# Patient Record
Sex: MALE | Race: WHITE | NOT HISPANIC OR LATINO | Employment: FULL TIME | URBAN - METROPOLITAN AREA
[De-identification: names, ages, dates, MRNs, and addresses within clinical notes are randomized per-mention and may not be internally consistent; named-entity substitution may affect disease eponyms.]

---

## 2017-02-17 ENCOUNTER — TRANSFERRED RECORDS (OUTPATIENT)
Dept: HEALTH INFORMATION MANAGEMENT | Facility: CLINIC | Age: 57
End: 2017-02-17

## 2017-02-17 LAB
ALT SERPL-CCNC: 54 IU/L (ref 5–40)
AST SERPL-CCNC: 25 U/L (ref 5–34)
CREAT SERPL-MCNC: 1.39 MG/DL (ref 0.5–1.3)
GFR SERPL CREATININE-BSD FRML MDRD: 56 ML/MIN/1.73M2

## 2017-05-02 ENCOUNTER — TRANSFERRED RECORDS (OUTPATIENT)
Dept: HEALTH INFORMATION MANAGEMENT | Facility: CLINIC | Age: 57
End: 2017-05-02

## 2017-06-07 ENCOUNTER — TRANSFERRED RECORDS (OUTPATIENT)
Dept: HEALTH INFORMATION MANAGEMENT | Facility: CLINIC | Age: 57
End: 2017-06-07

## 2017-11-15 ENCOUNTER — TRANSFERRED RECORDS (OUTPATIENT)
Dept: HEALTH INFORMATION MANAGEMENT | Facility: CLINIC | Age: 57
End: 2017-11-15

## 2017-11-20 ENCOUNTER — OFFICE VISIT (OUTPATIENT)
Dept: FAMILY MEDICINE | Facility: CLINIC | Age: 57
End: 2017-11-20
Payer: COMMERCIAL

## 2017-11-20 VITALS
RESPIRATION RATE: 16 BRPM | BODY MASS INDEX: 33.92 KG/M2 | SYSTOLIC BLOOD PRESSURE: 126 MMHG | WEIGHT: 229 LBS | OXYGEN SATURATION: 95 % | DIASTOLIC BLOOD PRESSURE: 96 MMHG | HEIGHT: 69 IN | HEART RATE: 95 BPM | TEMPERATURE: 97.8 F

## 2017-11-20 DIAGNOSIS — Z23 ENCOUNTER FOR IMMUNIZATION: ICD-10-CM

## 2017-11-20 DIAGNOSIS — E78.5 HYPERLIPIDEMIA LDL GOAL <160: Primary | ICD-10-CM

## 2017-11-20 DIAGNOSIS — M54.2 CERVICALGIA: ICD-10-CM

## 2017-11-20 DIAGNOSIS — I10 ESSENTIAL HYPERTENSION, BENIGN: ICD-10-CM

## 2017-11-20 LAB
ANION GAP SERPL CALCULATED.3IONS-SCNC: 11 MMOL/L (ref 3–14)
BUN SERPL-MCNC: 18 MG/DL (ref 7–30)
CALCIUM SERPL-MCNC: 9.3 MG/DL (ref 8.5–10.1)
CHLORIDE SERPL-SCNC: 105 MMOL/L (ref 94–109)
CHOLEST SERPL-MCNC: 230 MG/DL
CO2 SERPL-SCNC: 23 MMOL/L (ref 20–32)
CREAT SERPL-MCNC: 1.3 MG/DL (ref 0.66–1.25)
GFR SERPL CREATININE-BSD FRML MDRD: 57 ML/MIN/1.7M2
GLUCOSE SERPL-MCNC: 99 MG/DL (ref 70–99)
HDLC SERPL-MCNC: 54 MG/DL
LDLC SERPL CALC-MCNC: 125 MG/DL
NONHDLC SERPL-MCNC: 176 MG/DL
POTASSIUM SERPL-SCNC: 4.2 MMOL/L (ref 3.4–5.3)
SODIUM SERPL-SCNC: 139 MMOL/L (ref 133–144)
TRIGL SERPL-MCNC: 257 MG/DL
TSH SERPL DL<=0.005 MIU/L-ACNC: 1.41 MU/L (ref 0.4–4)

## 2017-11-20 PROCEDURE — 80061 LIPID PANEL: CPT | Performed by: FAMILY MEDICINE

## 2017-11-20 PROCEDURE — 90471 IMMUNIZATION ADMIN: CPT | Performed by: FAMILY MEDICINE

## 2017-11-20 PROCEDURE — 99214 OFFICE O/P EST MOD 30 MIN: CPT | Mod: 25 | Performed by: FAMILY MEDICINE

## 2017-11-20 PROCEDURE — 84443 ASSAY THYROID STIM HORMONE: CPT | Performed by: FAMILY MEDICINE

## 2017-11-20 PROCEDURE — 90686 IIV4 VACC NO PRSV 0.5 ML IM: CPT | Performed by: FAMILY MEDICINE

## 2017-11-20 PROCEDURE — 80048 BASIC METABOLIC PNL TOTAL CA: CPT | Performed by: FAMILY MEDICINE

## 2017-11-20 PROCEDURE — 36415 COLL VENOUS BLD VENIPUNCTURE: CPT | Performed by: FAMILY MEDICINE

## 2017-11-20 RX ORDER — GABAPENTIN 300 MG/1
900 CAPSULE ORAL AT BEDTIME
Refills: 1 | COMMUNITY
Start: 2017-08-13 | End: 2023-11-25

## 2017-11-20 RX ORDER — MISOPROSTOL 200 UG/1
TABLET ORAL
Refills: 0 | COMMUNITY
Start: 2017-08-21 | End: 2019-10-30

## 2017-11-20 RX ORDER — ESOMEPRAZOLE MAGNESIUM 40 MG/1
40 CAPSULE, DELAYED RELEASE ORAL 2 TIMES DAILY
Refills: 0 | COMMUNITY
Start: 2017-11-15 | End: 2021-01-14

## 2017-11-20 RX ORDER — SUMATRIPTAN 100 MG/1
100 TABLET, FILM COATED ORAL
Refills: 1 | COMMUNITY
Start: 2017-11-18 | End: 2021-01-14

## 2017-11-20 RX ORDER — LISINOPRIL 10 MG/1
10 TABLET ORAL DAILY
Qty: 90 TABLET | Refills: 1 | Status: SHIPPED | OUTPATIENT
Start: 2017-11-20 | End: 2018-05-17 | Stop reason: DRUGHIGH

## 2017-11-20 RX ORDER — DICLOFENAC SODIUM 75 MG/1
TABLET, DELAYED RELEASE ORAL
Refills: 2 | COMMUNITY
Start: 2017-11-14 | End: 2017-11-20

## 2017-11-20 NOTE — NURSING NOTE
Injectable Influenza Immunization Documentation    1.  Are you sick today? (Fever of 100.5 or higher on the day of the clinic)   No    2.  Have you ever had Guillain-Fairfax Syndrome within 6 weeks of an influenza vaccionation?  No    3. Do you have a life-threatening allergy to eggs?  No    4. Do you have a life-threatening allergy to a component of the vaccine? May include antibiotics, gelatin or latex.  No     5. Have you ever had a reaction to a dose of flu vaccine that needed immediate medical attention?  No     Form completed by Princess SHANIKA Duong CMA

## 2017-11-20 NOTE — PROGRESS NOTES
"  SUBJECTIVE:   Avi Bhatti is a 57 year old male who presents to clinic today for the following health issues:      Hyperlipidemia Follow-Up      Rate your low fat/cholesterol diet?: good    Taking statin?  No    Other lipid medications/supplements?:  none    Hypertension Follow-up      Outpatient blood pressures are not being checked.    Low Salt Diet: not monitoring salt          Amount of exercise or physical activity: None    Problems taking medications regularly: No    Medication side effects: Gabapentin  Diet: low salt and low fat/cholesterol    Headaches and neck pain  Pt has seen neurologist and is scheduled to see pain clinic on 11/30/17        Problem list and histories reviewed & adjusted, as indicated.  Additional history: as documented    Labs reviewed in EPIC    Reviewed and updated as needed this visit by clinical staffTobacco  Allergies  Meds  Med Hx  Surg Hx  Fam Hx  Soc Hx      Reviewed and updated as needed this visit by Provider         ROS:  CONSTITUTIONAL:NEGATIVE for fever, chills, change in weight  RESP:NEGATIVE for significant cough or SOB  CV: NEGATIVE for chest pain, palpitations or peripheral edema  MUSCULOSKELETAL: POSITIVE  for back pain low back and neck pain  NEURO: NEGATIVE for weakness, dizziness or paresthesias and POSITIVE for HX headaches-migraine and HX headaches-musculoskelatal    OBJECTIVE:                                                    BP (!) 126/96 (BP Location: Right arm, Patient Position: Sitting, Cuff Size: Adult Large)  Pulse 95  Temp 97.8  F (36.6  C) (Tympanic)  Resp 16  Ht 5' 9\" (1.753 m)  Wt 229 lb (103.9 kg)  SpO2 95%  BMI 33.82 kg/m2  Body mass index is 33.82 kg/(m^2).  GENERAL APPEARANCE: healthy, alert and no distress  NECK: no adenopathy, no asymmetry, masses, or scars, thyroid normal to palpation and no bruits  RESP: lungs clear to auscultation - no rales, rhonchi or wheezes  CV: regular rates and rhythm, normal S1 S2, no S3 or S4 and no " murmur, click or rub  MS: extremities normal- no gross deformities noted    Diagnostic test results:  Lab: see below, results pending       ASSESSMENT/PLAN:                                                        ICD-10-CM    1. Hyperlipidemia LDL goal <160 E78.5 Lipid panel reflex to direct LDL Fasting   2. Essential hypertension, benign I10 lisinopril (PRINIVIL/ZESTRIL) 10 MG tablet     Basic metabolic panel  (Ca, Cl, CO2, Creat, Gluc, K, Na, BUN)     TSH with free T4 reflex   3. Encounter for immunization Z23 HC FLU VAC PRESRV FREE QUAD SPLIT VIR 3+YRS IM     VACCINE ADMINISTRATION, INITIAL   4. Cervicalgia M54.2        Follow up with Provider - 3 mo   Patient Instructions   Monitor BP 1-2 times weekly for the next 4-8 weeks.  If not improving return for recheck earlier than planned  Goal for BP< 130/80  Alternate ice & heat.  Use Ice massage on trigger point areas.  Do gentle Range of motion exercises      Andrew Anders MD  [facility]

## 2017-11-20 NOTE — LETTER
November 22, 2017      Avi Bhatti  [address] [facility] [address]        Dear ,    We are writing to inform you of your test results.    Metabolic panel shows minimal decrease in kidney function but stable from last year  Thyroid test (TSH ) is normal  Cholesterol panel shows mild elevation of total cholesterol, Triglycerides and LDL cholesterol, slightly higher than last year.  Keep watching diet, limiting cholesterol and carbohydrates.  Increase Omega 3 fatty acid foods      Resulted Orders   Basic metabolic panel  (Ca, Cl, CO2, Creat, Gluc, K, Na, BUN)   Result Value Ref Range    Sodium 139 133 - 144 mmol/L    Potassium 4.2 3.4 - 5.3 mmol/L    Chloride 105 94 - 109 mmol/L    Carbon Dioxide 23 20 - 32 mmol/L    Anion Gap 11 3 - 14 mmol/L    Glucose 99 70 - 99 mg/dL      Comment:      Fasting specimen    Urea Nitrogen 18 7 - 30 mg/dL    Creatinine 1.30 (H) 0.66 - 1.25 mg/dL    GFR Estimate 57 (L) >60 mL/min/1.7m2      Comment:      Non  GFR Calc    GFR Estimate If Black 68 >60 mL/min/1.7m2      Comment:       GFR Calc    Calcium 9.3 8.5 - 10.1 mg/dL   TSH with free T4 reflex   Result Value Ref Range    TSH 1.41 0.40 - 4.00 mU/L   Lipid panel reflex to direct LDL Fasting   Result Value Ref Range    Cholesterol 230 (H) <200 mg/dL      Comment:      Desirable:       <200 mg/dl    Triglycerides 257 (H) <150 mg/dL      Comment:      Borderline high:  150-199 mg/dl  High:             200-499 mg/dl  Very high:       >499 mg/dl  Fasting specimen      HDL Cholesterol 54 >39 mg/dL    LDL Cholesterol Calculated 125 (H) <100 mg/dL      Comment:      Above desirable:  100-129 mg/dl  Borderline High:  130-159 mg/dL  High:             160-189 mg/dL  Very high:       >189 mg/dl      Non HDL Cholesterol 176 (H) <130 mg/dL      Comment:      Above Desirable:  130-159 mg/dl  Borderline high:  160-189 mg/dl  High:             190-219 mg/dl  Very high:       >219  mg/dl         If you have any questions or concerns, please call the clinic at the number listed above.       Sincerely,        Andrew Anders MD

## 2017-11-20 NOTE — MR AVS SNAPSHOT
"              After Visit Summary   11/20/2017    Avi Bhatti    MRN: [id number]           Patient Information     Date Of Birth          1960        Visit Information        Provider Department      11/20/2017 7:45 AM Andrew Anders MD [facility]        Today's Diagnoses     Hyperlipidemia LDL goal <160    -  1    Essential hypertension, benign          Care Instructions    Monitor BP 1-2 times weekly for the next 4-8 weeks.  If not improving return for recheck earlier than planned  Goal for BP< 130/80          Follow-ups after your visit        Follow-up notes from your care team     Return in about 3 months (around 2/20/2018).      Who to contact     If you have questions or need follow up information about today's clinic visit or your schedule please contact [facility] directly at [phone].  Normal or non-critical lab and imaging results will be communicated to you by MyChart, letter or phone within 4 business days after the clinic has received the results. If you do not hear from us within 7 days, please contact the clinic through MyChart or phone. If you have a critical or abnormal lab result, we will notify you by phone as soon as possible.  Submit refill requests through [employer] or call your pharmacy and they will forward the refill request to us. Please allow 3 business days for your refill to be completed.          Additional Information About Your Visit        MyChart Information     [employer] lets you send messages to your doctor, view your test results, renew your prescriptions, schedule appointments and more. To sign up, go to [email or web address] . Click on \"Log in\" on the left side of the screen, which will take you to the Welcome page. Then click on \"Sign up Now\" on the right side of the page.     You will be asked to enter the access code listed below, as well as some personal information. Please follow the directions to " "create your username and password.     Your access code is: [email or web address]  Expires: 2018  8:05 AM     Your access code will  in 90 days. If you need help or a new code, please call your [facility] clinic or [phone].        Care EveryWhere ID     This is your Care EveryWhere ID. This could be used by other organizations to access your [facility] medical records  [id number]        Your Vitals Were     Pulse Temperature Respirations Height Pulse Oximetry BMI (Body Mass Index)    95 97.8  F (36.6  C) (Tympanic) 16 5' 9\" (1.753 m) 95% 33.82 kg/m2       Blood Pressure from Last 3 Encounters:   17 (!) 126/96   16 128/82   10/22/15 (!) 136/94    Weight from Last 3 Encounters:   17 229 lb (103.9 kg)   16 223 lb (101.2 kg)   10/22/15 222 lb (100.7 kg)              We Performed the Following     Basic metabolic panel  (Ca, Cl, CO2, Creat, Gluc, K, Na, BUN)     Lipid panel reflex to direct LDL Fasting     TSH with free T4 reflex          Today's Medication Changes          These changes are accurate as of: 17  8:08 AM.  If you have any questions, ask your nurse or doctor.               These medicines have changed or have updated prescriptions.        Dose/Directions    lisinopril 10 MG tablet   Commonly known as:  PRINIVIL/ZESTRIL   This may have changed:  See the new instructions.   Used for:  Essential hypertension, benign   Changed by:  Andrew Anders MD        Dose:  10 mg   Take 1 tablet (10 mg) by mouth daily   Quantity:  90 tablet   Refills:  1         Stop taking these medicines if you haven't already. Please contact your care team if you have questions.     ENBREL 50 MG/ML injection   Generic drug:  etanercept   Stopped by:  Andrew Anders MD                Where to get your medicines      These medications were sent to [facility] - [address]   [address], [place] [address]     Phone:  [phone]     " lisinopril 10 MG tablet                Primary Care Provider Office Phone # Fax #    Andrew Anders [phone] [address] [facility] [address]        Equal Access to Services     NAEL HERZOG : Hadii aad ku hadsixtoo Soomaali, waaxda luqadaha, qaybta kaalmada adeegyada, waxradha elizaldein lilyn adejuan c turner laMarcojosey aparicio. So [facility] [phone].    ATENCIÓN: Si habla español, tiene a smith disposición servicios gratuitos de asistencia lingüística. Llame al [phone].    We comply with applicable federal civil rights laws and [place] laws. We do not discriminate on the basis of race, color, national origin, age, disability, sex, sexual orientation, or gender identity.            Thank you!     Thank you for choosing [facility]  for your care. Our goal is always to provide you with excellent care. Hearing back from our patients is one way we can continue to improve our services. Please take a few minutes to complete the written survey that you may receive in the mail after your visit with us. Thank you!             Your Updated Medication List - Protect others around you: Learn how to safely use, store and throw away your medicines at www.disposemymeds.org.          This list is accurate as of: 11/20/17  8:08 AM.  Always use your most recent med list.                   Brand Name Dispense Instructions for use Diagnosis    ASACOL  MG EC tablet   Generic drug:  mesalamine           CALCIUM /VITAMIN D TABS   OR      1 TABLET DAILY        diclofenac-misoprostol 75-0.2 MG per EC tablet    ARTHROTEC 75          esomeprazole 40 MG CR capsule    nexIUM          gabapentin 300 MG capsule    NEURONTIN          HUMIRA PEN 40 MG/0.8ML pen kit   Generic drug:  adalimumab           lisinopril 10 MG tablet    PRINIVIL/ZESTRIL    90 tablet    Take 1 tablet (10 mg) by mouth daily    Essential hypertension, benign       misoprostol 200 MCG tablet    CYTOTEC          Multi-vitamin  Tabs tablet      Take 1 tablet by mouth daily.        ranitidine 150 MG tablet    ZANTAC     daily prn        SUMAtriptan 100 MG tablet    IMITREX          TIZANIDINE HCL PO      Take 8 mg by mouth At Bedtime.

## 2017-11-20 NOTE — NURSING NOTE
"Chief Complaint   Patient presents with     Hypertension     F/U-FASTING     Lipids     F/U-FASTING       Initial BP (!) 126/94 (BP Location: Right arm, Patient Position: Sitting, Cuff Size: Adult Large)  Pulse 95  Temp 97.8  F (36.6  C) (Tympanic)  Resp 16  Ht 5' 9\" (1.753 m)  Wt 229 lb (103.9 kg)  SpO2 95%  BMI 33.82 kg/m2 Estimated body mass index is 33.82 kg/(m^2) as calculated from the following:    Height as of this encounter: 5' 9\" (1.753 m).    Weight as of this encounter: 229 lb (103.9 kg).  Medication Reconciliation: complete     Princess SHANIKA Duong, BERNY      "

## 2017-11-30 ENCOUNTER — TRANSFERRED RECORDS (OUTPATIENT)
Dept: HEALTH INFORMATION MANAGEMENT | Facility: CLINIC | Age: 57
End: 2017-11-30

## 2017-12-07 ENCOUNTER — TRANSFERRED RECORDS (OUTPATIENT)
Dept: HEALTH INFORMATION MANAGEMENT | Facility: CLINIC | Age: 57
End: 2017-12-07

## 2017-12-27 ENCOUNTER — TRANSFERRED RECORDS (OUTPATIENT)
Dept: HEALTH INFORMATION MANAGEMENT | Facility: CLINIC | Age: 57
End: 2017-12-27

## 2018-04-18 ENCOUNTER — TRANSFERRED RECORDS (OUTPATIENT)
Dept: HEALTH INFORMATION MANAGEMENT | Facility: CLINIC | Age: 58
End: 2018-04-18

## 2018-05-15 ENCOUNTER — TRANSFERRED RECORDS (OUTPATIENT)
Dept: HEALTH INFORMATION MANAGEMENT | Facility: CLINIC | Age: 58
End: 2018-05-15

## 2018-05-17 ENCOUNTER — OFFICE VISIT (OUTPATIENT)
Dept: FAMILY MEDICINE | Facility: CLINIC | Age: 58
End: 2018-05-17
Payer: COMMERCIAL

## 2018-05-17 VITALS
HEART RATE: 106 BPM | WEIGHT: 233 LBS | OXYGEN SATURATION: 96 % | HEIGHT: 68 IN | BODY MASS INDEX: 35.31 KG/M2 | SYSTOLIC BLOOD PRESSURE: 140 MMHG | TEMPERATURE: 97.7 F | DIASTOLIC BLOOD PRESSURE: 100 MMHG | RESPIRATION RATE: 20 BRPM

## 2018-05-17 DIAGNOSIS — K50.10 CROHN'S DISEASE OF LARGE INTESTINE WITHOUT COMPLICATION (H): Chronic | ICD-10-CM

## 2018-05-17 DIAGNOSIS — I10 ESSENTIAL HYPERTENSION, BENIGN: ICD-10-CM

## 2018-05-17 DIAGNOSIS — M54.2 CERVICALGIA: ICD-10-CM

## 2018-05-17 DIAGNOSIS — G43.009 MIGRAINE WITHOUT AURA AND WITHOUT STATUS MIGRAINOSUS, NOT INTRACTABLE: ICD-10-CM

## 2018-05-17 DIAGNOSIS — Z11.59 NEED FOR HEPATITIS C SCREENING TEST: ICD-10-CM

## 2018-05-17 DIAGNOSIS — Z00.01 ENCOUNTER FOR ROUTINE ADULT HEALTH EXAMINATION WITH ABNORMAL FINDINGS: Primary | ICD-10-CM

## 2018-05-17 DIAGNOSIS — Z23 NEED FOR PROPHYLACTIC VACCINATION WITH TETANUS-DIPHTHERIA (TD): ICD-10-CM

## 2018-05-17 LAB
ERYTHROCYTE [DISTWIDTH] IN BLOOD BY AUTOMATED COUNT: 13.1 % (ref 10–15)
HCT VFR BLD AUTO: 49.5 % (ref 40–53)
HGB BLD-MCNC: 17 G/DL (ref 13.3–17.7)
MCH RBC QN AUTO: 31.8 PG (ref 26.5–33)
MCHC RBC AUTO-ENTMCNC: 34.3 G/DL (ref 31.5–36.5)
MCV RBC AUTO: 93 FL (ref 78–100)
PLATELET # BLD AUTO: 200 10E9/L (ref 150–450)
RBC # BLD AUTO: 5.35 10E12/L (ref 4.4–5.9)
WBC # BLD AUTO: 8.3 10E9/L (ref 4–11)

## 2018-05-17 PROCEDURE — 90471 IMMUNIZATION ADMIN: CPT | Performed by: FAMILY MEDICINE

## 2018-05-17 PROCEDURE — 36415 COLL VENOUS BLD VENIPUNCTURE: CPT | Performed by: FAMILY MEDICINE

## 2018-05-17 PROCEDURE — 99396 PREV VISIT EST AGE 40-64: CPT | Mod: 25 | Performed by: FAMILY MEDICINE

## 2018-05-17 PROCEDURE — 85027 COMPLETE CBC AUTOMATED: CPT | Performed by: FAMILY MEDICINE

## 2018-05-17 PROCEDURE — 80053 COMPREHEN METABOLIC PANEL: CPT | Performed by: FAMILY MEDICINE

## 2018-05-17 PROCEDURE — 80061 LIPID PANEL: CPT | Performed by: FAMILY MEDICINE

## 2018-05-17 PROCEDURE — 90715 TDAP VACCINE 7 YRS/> IM: CPT | Performed by: FAMILY MEDICINE

## 2018-05-17 PROCEDURE — 86803 HEPATITIS C AB TEST: CPT | Performed by: FAMILY MEDICINE

## 2018-05-17 PROCEDURE — G0103 PSA SCREENING: HCPCS | Performed by: FAMILY MEDICINE

## 2018-05-17 RX ORDER — LISINOPRIL 20 MG/1
20 TABLET ORAL DAILY
Qty: 90 TABLET | Refills: 3 | Status: SHIPPED | OUTPATIENT
Start: 2018-05-17 | End: 2019-05-20

## 2018-05-17 RX ORDER — LISINOPRIL 10 MG/1
10 TABLET ORAL DAILY
Qty: 90 TABLET | Refills: 1 | Status: CANCELLED | OUTPATIENT
Start: 2018-05-17

## 2018-05-17 RX ORDER — OXYCODONE AND ACETAMINOPHEN 5; 325 MG/1; MG/1
1 TABLET ORAL 2 TIMES DAILY PRN
Qty: 40 TABLET | Refills: 0 | Status: SHIPPED | OUTPATIENT
Start: 2018-05-17 | End: 2018-09-06

## 2018-05-17 NOTE — MR AVS SNAPSHOT
After Visit Summary   5/17/2018    Avi Bhatti    MRN: [id number]           Patient Information     Date Of Birth          1960        Visit Information        Provider Department      5/17/2018 11:15 AM Andrew Anders MD [facility]        Today's Diagnoses     Encounter for routine adult health examination with abnormal findings    -  1    Need for hepatitis C screening test        Need for prophylactic vaccination with tetanus-diphtheria (TD)        Essential hypertension, benign        Crohn's disease of large intestine without complication (H)        Cervicalgia          Care Instructions      Preventive Health Recommendations  Male Ages 50 - 64    Yearly exam:             See your health care provider every year in order to  o   Review health changes.   o   Discuss preventive care.    o   Review your medicines if your doctor has prescribed any.     Have a cholesterol test every 5 years, or more frequently if you are at risk for high cholesterol/heart disease.     Have a diabetes test (fasting glucose) every three years. If you are at risk for diabetes, you should have this test more often.     Have a colonoscopy at age 50, or have a yearly FIT test (stool test). These exams will check for colon cancer.      Talk with your health care provider about whether or not a prostate cancer screening test (PSA) is right for you.    You should be tested each year for STDs (sexually transmitted diseases), if you re at risk.     Shots: Get a flu shot each year. Get a tetanus shot every 10 years.     Nutrition:    Eat at least 5 servings of fruits and vegetables daily.     Eat whole-grain bread, whole-wheat pasta and brown rice instead of white grains and rice.     Talk to your provider about Calcium and Vitamin D.     Lifestyle    Exercise for at least 150 minutes a week (30 minutes a day, 5 days a week). This will help you control your weight and prevent disease.  "    Limit alcohol to one drink per day.     No smoking.     Wear sunscreen to prevent skin cancer.     See your dentist every six months for an exam and cleaning.     See your eye doctor every 1 to 2 years.            Follow-ups after your visit        Who to contact     If you have questions or need follow up information about today's clinic visit or your schedule please contact [facility] directly at [phone].  Normal or non-critical lab and imaging results will be communicated to you by [employer], letter or phone within 4 business days after the clinic has received the results. If you do not hear from us within 7 days, please contact the clinic through [email or web address] or phone. If you have a critical or abnormal lab result, we will notify you by phone as soon as possible.  Submit refill requests through [employer] or call your pharmacy and they will forward the refill request to us. Please allow 3 business days for your refill to be completed.          Additional Information About Your Visit        [employer] Information     [employer] lets you send messages to your doctor, view your test results, renew your prescriptions, schedule appointments and more. To sign up, go to [email or web address] . Click on \"Log in\" on the left side of the screen, which will take you to the Welcome page. Then click on \"Sign up Now\" on the right side of the page.     You will be asked to enter the access code listed below, as well as some personal information. Please follow the directions to create your username and password.     Your access code is: [email or web address]  Expires: 8/15/2018 11:45 AM     Your access code will  in 90 days. If you need help or a new code, please call your [facility] or [phone].        Care EveryWhere ID     This is your Care EveryWhere ID. This could be used by other organizations to access your [facility] medical records  [id number]        Your Vitals Were     Pulse Temperature " "Respirations Height Pulse Oximetry BMI (Body Mass Index)    106 97.7  F (36.5  C) (Tympanic) 20 5' 8.25\" (1.734 m) 96% 35.17 kg/m2       Blood Pressure from Last 3 Encounters:   05/17/18 (!) 140/100   11/20/17 (!) 126/96   02/29/16 128/82    Weight from Last 3 Encounters:   05/17/18 233 lb (105.7 kg)   11/20/17 229 lb (103.9 kg)   02/29/16 223 lb (101.2 kg)              We Performed the Following     CBC with platelets     Comprehensive metabolic panel     Hepatitis C Screen Reflex to HCV RNA Quant and Genotype     Lipid panel reflex to direct LDL Fasting     Prostate spec antigen screen     TDAP VACCINE (ADACEL)     VACCINE ADMINISTRATION, INITIAL          Today's Medication Changes          These changes are accurate as of 5/17/18 11:45 AM.  If you have any questions, ask your nurse or doctor.               Start taking these medicines.        Dose/Directions    oxyCODONE-acetaminophen 5-325 MG per tablet   Commonly known as:  PERCOCET   Used for:  Cervicalgia   Started by:  Andrew Anders MD        Dose:  1 tablet   Take 1 tablet by mouth 2 times daily as needed for moderate to severe pain   Quantity:  40 tablet   Refills:  0         These medicines have changed or have updated prescriptions.        Dose/Directions    lisinopril 20 MG tablet   Commonly known as:  PRINIVIL/ZESTRIL   This may have changed:    - medication strength  - how much to take   Used for:  Essential hypertension, benign   Changed by:  Andrew Anders MD        Dose:  20 mg   Take 1 tablet (20 mg) by mouth daily   Quantity:  90 tablet   Refills:  3            Where to get your medicines      These medications were sent to [facility] - [address]   [address]  [address], [place] [address]     Phone:  [phone]     lisinopril 20 MG tablet         Some of these will need a paper prescription and others can be bought over the counter.  Ask your nurse if you have questions.     Bring a " paper prescription for each of these medications     oxyCODONE-acetaminophen 5-325 MG per tablet               Information about OPIOIDS     PRESCRIPTION OPIOIDS: WHAT YOU NEED TO KNOW   You have a prescription for an opioid (narcotic) pain medicine. Opioids can cause addiction. If you have a history of chemical dependency of any type, you are at a higher risk of becoming addicted to opioids. Only take this medicine after all other options have been tried. Take it for as short a time and as few doses as possible.     Do not:    Drive. If you drive while taking these medicines, you could be arrested for driving under the influence (DUI).    Operate heavy machinery    Do any other dangerous activities while taking these medicines.     Drink any alcohol while taking these medicines.      Take with any other medicines that contain acetaminophen. Read all labels carefully. Look for the word  acetaminophen  or  Tylenol.  Ask your pharmacist if you have questions or are unsure.    Store your pills in a secure place, locked if possible. We will not replace any lost or stolen medicine. If you don t finish your medicine, please throw away (dispose) as directed by your pharmacist. The [place] Pollution Control Agency has more information about safe disposal: https://www.pca.[facility].[place].us/living-green/managing-unwanted-medications    All opioids tend to cause constipation. Drink plenty of water and eat foods that have a lot of fiber, such as fruits, vegetables, prune juice, apple juice and high-fiber cereal. Take a laxative (Miralax, milk of magnesia, Colace, Senna) if you don t move your bowels at least every other day.          Primary Care Provider Office Phone # Fax #    Andrew Anders [phone] [address] [facility] [address]        Equal Access to Services     NAEL HERZOG AH: Vincent Al, shira saavedra, donovan avina  ah. So [facility] [phone].    ATENCIÓN: Si angela yi, tiene a smith disposición servicios gratuitos de asistencia lingüística. Lissett al [phone].    We comply with applicable federal civil rights laws and [place] laws. We do not discriminate on the basis of race, color, national origin, age, disability, sex, sexual orientation, or gender identity.            Thank you!     Thank you for choosing [facility]  for your care. Our goal is always to provide you with excellent care. Hearing back from our patients is one way we can continue to improve our services. Please take a few minutes to complete the written survey that you may receive in the mail after your visit with us. Thank you!             Your Updated Medication List - Protect others around you: Learn how to safely use, store and throw away your medicines at www.disposemymeds.org.          This list is accurate as of 5/17/18 11:45 AM.  Always use your most recent med list.                   Brand Name Dispense Instructions for use Diagnosis    ASACOL  MG EC tablet   Generic drug:  mesalamine           CALCIUM /VITAMIN D TABS   OR      1 TABLET DAILY        diclofenac-misoprostol 75-0.2 MG per EC tablet    ARTHROTEC 75          esomeprazole 40 MG CR capsule    nexIUM          gabapentin 300 MG capsule    NEURONTIN          HUMIRA PEN 40 MG/0.8ML pen kit   Generic drug:  adalimumab           lisinopril 20 MG tablet    PRINIVIL/ZESTRIL    90 tablet    Take 1 tablet (20 mg) by mouth daily    Essential hypertension, benign       misoprostol 200 MCG tablet    CYTOTEC          Multi-vitamin Tabs tablet      Take 1 tablet by mouth daily.        oxyCODONE-acetaminophen 5-325 MG per tablet    PERCOCET    40 tablet    Take 1 tablet by mouth 2 times daily as needed for moderate to severe pain    Cervicalgia       ranitidine 150 MG tablet    ZANTAC     daily prn        SUMAtriptan 100 MG tablet    IMITREX          TIZANIDINE HCL PO       Take 8 mg by mouth At Bedtime.

## 2018-05-17 NOTE — NURSING NOTE
Screening Questionnaire for Adult Immunization    Are you sick today?   No   Do you have allergies to medications, food, a vaccine component or latex?   Yes   Have you ever had a serious reaction after receiving a vaccination?   No   Do you have a long-term health problem with heart disease, lung disease, asthma, kidney disease, metabolic disease (e.g. diabetes), anemia, or other blood disorder?   No   Do you have cancer, leukemia, HIV/AIDS, or any other immune system problem?   No   In the past 3 months, have you taken medications that affect  your immune system, such as prednisone, other steroids, or anticancer drugs; drugs for the treatment of rheumatoid arthritis, Crohn s disease, or psoriasis; or have you had radiation treatments?   No   Have you had a seizure, or a brain or other nervous system problem?   No   During the past year, have you received a transfusion of blood or blood     products, or been given immune (gamma) globulin or antiviral drug?   No   For women: Are you pregnant or is there a chance you could become        pregnant during the next month?   No   Have you received any vaccinations in the past 4 weeks?   No     Immunization questionnaire was positive for at least one answer.  Notified provider.        Per orders of Dr. Anders, injection of Tdap given by Princess SHANIKA Duong. Patient instructed to remain in clinic for 15 minutes afterwards, and to report any adverse reaction to me immediately.       Screening performed by Princess SHANIKA Duong on 5/17/2018 at 11:47 AM.

## 2018-05-17 NOTE — LETTER
May 18, 2018      Avi Bhatti  [address] [facility] [address]        Dear ,    We are writing to inform you of your test results.    Prostate test (PSA) is normal  Metabolic panel is essentially normal.  GFR very slightly decreased but not significant  Cholesterol panel shows mild elevation of Triglycerides and total.  LDL is under goal of 130  Hepatitis C test is normal, no evidence for exposure  CBC is normal including Hemoglobin    Resulted Orders   Hepatitis C Screen Reflex to HCV RNA Quant and Genotype   Result Value Ref Range    Hepatitis C Antibody Nonreactive NR^Nonreactive      Comment:      Assay performance characteristics have not been established for newborns,   infants, and children     Lipid panel reflex to direct LDL Fasting   Result Value Ref Range    Cholesterol 218 (H) <200 mg/dL      Comment:      Desirable:       <200 mg/dl    Triglycerides 232 (H) <150 mg/dL      Comment:      Borderline high:  150-199 mg/dl  High:             200-499 mg/dl  Very high:       >499 mg/dl  Non Fasting      HDL Cholesterol 44 >39 mg/dL    LDL Cholesterol Calculated 128 (H) <100 mg/dL      Comment:      Above desirable:  100-129 mg/dl  Borderline High:  130-159 mg/dL  High:             160-189 mg/dL  Very high:       >189 mg/dl      Non HDL Cholesterol 174 (H) <130 mg/dL      Comment:      Above Desirable:  130-159 mg/dl  Borderline high:  160-189 mg/dl  High:             190-219 mg/dl  Very high:       >219 mg/dl     CBC with platelets   Result Value Ref Range    WBC 8.3 4.0 - 11.0 10e9/L    RBC Count 5.35 4.4 - 5.9 10e12/L    Hemoglobin 17.0 13.3 - 17.7 g/dL    Hematocrit 49.5 40.0 - 53.0 %    MCV 93 78 - 100 fl    MCH 31.8 26.5 - 33.0 pg    MCHC 34.3 31.5 - 36.5 g/dL    RDW 13.1 10.0 - 15.0 %    Platelet Count 200 150 - 450 10e9/L   Comprehensive metabolic panel   Result Value Ref Range    Sodium 138 133 - 144 mmol/L    Potassium 4.3 3.4 - 5.3 mmol/L    Chloride 106 94 - 109  mmol/L    Carbon Dioxide 23 20 - 32 mmol/L    Anion Gap 9 3 - 14 mmol/L    Glucose 98 70 - 99 mg/dL      Comment:      Non Fasting    Urea Nitrogen 21 7 - 30 mg/dL    Creatinine 1.25 0.66 - 1.25 mg/dL    GFR Estimate 59 (L) >60 mL/min/1.7m2      Comment:      Non  GFR Calc    GFR Estimate If Black 71 >60 mL/min/1.7m2      Comment:       GFR Calc    Calcium 9.3 8.5 - 10.1 mg/dL    Bilirubin Total 0.7 0.2 - 1.3 mg/dL    Albumin 4.1 3.4 - 5.0 g/dL    Protein Total 7.7 6.8 - 8.8 g/dL    Alkaline Phosphatase 87 40 - 150 U/L    ALT 54 0 - 70 U/L    AST 21 0 - 45 U/L   Prostate spec antigen screen   Result Value Ref Range    PSA 0.88 0 - 4 ug/L      Comment:      Assay Method:  Chemiluminescence using Siemens Vista analyzer       If you have any questions or concerns, please call the clinic at the number listed above.       Sincerely,        Andrew Anders MD

## 2018-05-18 LAB
ALBUMIN SERPL-MCNC: 4.1 G/DL (ref 3.4–5)
ALP SERPL-CCNC: 87 U/L (ref 40–150)
ALT SERPL W P-5'-P-CCNC: 54 U/L (ref 0–70)
ANION GAP SERPL CALCULATED.3IONS-SCNC: 9 MMOL/L (ref 3–14)
AST SERPL W P-5'-P-CCNC: 21 U/L (ref 0–45)
BILIRUB SERPL-MCNC: 0.7 MG/DL (ref 0.2–1.3)
BUN SERPL-MCNC: 21 MG/DL (ref 7–30)
CALCIUM SERPL-MCNC: 9.3 MG/DL (ref 8.5–10.1)
CHLORIDE SERPL-SCNC: 106 MMOL/L (ref 94–109)
CHOLEST SERPL-MCNC: 218 MG/DL
CO2 SERPL-SCNC: 23 MMOL/L (ref 20–32)
CREAT SERPL-MCNC: 1.25 MG/DL (ref 0.66–1.25)
GFR SERPL CREATININE-BSD FRML MDRD: 59 ML/MIN/1.7M2
GLUCOSE SERPL-MCNC: 98 MG/DL (ref 70–99)
HCV AB SERPL QL IA: NONREACTIVE
HDLC SERPL-MCNC: 44 MG/DL
LDLC SERPL CALC-MCNC: 128 MG/DL
NONHDLC SERPL-MCNC: 174 MG/DL
POTASSIUM SERPL-SCNC: 4.3 MMOL/L (ref 3.4–5.3)
PROT SERPL-MCNC: 7.7 G/DL (ref 6.8–8.8)
PSA SERPL-ACNC: 0.88 UG/L (ref 0–4)
SODIUM SERPL-SCNC: 138 MMOL/L (ref 133–144)
TRIGL SERPL-MCNC: 232 MG/DL

## 2018-05-25 ENCOUNTER — TRANSFERRED RECORDS (OUTPATIENT)
Dept: HEALTH INFORMATION MANAGEMENT | Facility: CLINIC | Age: 58
End: 2018-05-25

## 2018-05-29 ENCOUNTER — TRANSFERRED RECORDS (OUTPATIENT)
Dept: HEALTH INFORMATION MANAGEMENT | Facility: CLINIC | Age: 58
End: 2018-05-29

## 2018-05-31 ENCOUNTER — TRANSFERRED RECORDS (OUTPATIENT)
Dept: HEALTH INFORMATION MANAGEMENT | Facility: CLINIC | Age: 58
End: 2018-05-31

## 2018-08-06 ENCOUNTER — TRANSFERRED RECORDS (OUTPATIENT)
Dept: HEALTH INFORMATION MANAGEMENT | Facility: CLINIC | Age: 58
End: 2018-08-06

## 2018-08-30 ENCOUNTER — TRANSFERRED RECORDS (OUTPATIENT)
Dept: HEALTH INFORMATION MANAGEMENT | Facility: CLINIC | Age: 58
End: 2018-08-30

## 2018-09-06 ENCOUNTER — OFFICE VISIT (OUTPATIENT)
Dept: FAMILY MEDICINE | Facility: CLINIC | Age: 58
End: 2018-09-06
Payer: COMMERCIAL

## 2018-09-06 VITALS
DIASTOLIC BLOOD PRESSURE: 86 MMHG | HEART RATE: 115 BPM | OXYGEN SATURATION: 98 % | RESPIRATION RATE: 20 BRPM | WEIGHT: 228 LBS | TEMPERATURE: 97.8 F | BODY MASS INDEX: 34.56 KG/M2 | HEIGHT: 68 IN | SYSTOLIC BLOOD PRESSURE: 114 MMHG

## 2018-09-06 DIAGNOSIS — M54.2 CERVICALGIA: Primary | ICD-10-CM

## 2018-09-06 DIAGNOSIS — G44.59 OTHER COMPLICATED HEADACHE SYNDROME: ICD-10-CM

## 2018-09-06 PROCEDURE — 99213 OFFICE O/P EST LOW 20 MIN: CPT | Performed by: FAMILY MEDICINE

## 2018-09-06 RX ORDER — DULOXETIN HYDROCHLORIDE 60 MG/1
60 CAPSULE, DELAYED RELEASE ORAL DAILY
Refills: 0 | COMMUNITY
Start: 2018-08-23 | End: 2023-10-18

## 2018-09-06 RX ORDER — OXYCODONE AND ACETAMINOPHEN 5; 325 MG/1; MG/1
1 TABLET ORAL 2 TIMES DAILY PRN
Qty: 40 TABLET | Refills: 0 | Status: SHIPPED | OUTPATIENT
Start: 2018-09-06 | End: 2019-07-19

## 2018-09-06 NOTE — PROGRESS NOTES
SUBJECTIVE:   Avi Bhatti is a 58 year old male who presents to clinic today for the following health issues:    Headache  Onset: ongoing. Last HA has lasted 6 days    Description:   Location: bilateral in the temporal area, back of head, neck   Character: throbbing pain  Frequency:  weekly  Duration:  days    Intensity: mild, severe    Progression of Symptoms:  same, constant and intermittent    Accompanying Signs & Symptoms:  Stiff neck: YES  Neck or upper back pain: YES  Fever: YES- hot flash episodes  Sinus pressure: no   Nausea or vomiting: no   Dizziness: YES  Numbness: no   Weakness: no   Visual changes: no     History:   Head trauma: YES  Family history of migraines: no   Previous tests for headaches: YES  Neurologist evaluations: YES  Able to do daily activities: sometimes  Wake with a headaches: YES  Do headaches wake you up: YES  Daily pain medication use: YES- percocet  Work/school stressors/changes: no     Precipitating factors:   Does light make it worse: no   Does sound make it worse: no     Alleviating factors:  Does sleep help: YES    Therapies Tried and outcome: Gabapentin and narcotics ( Percocet )    Migraine Follow-Up    Headaches symptoms:  Worsened     Frequency: weekly     Duration of headaches: days    Able to do normal daily activities/work with migraines: Yes    Rescue/Relief medication:sumatriptan (Imitrex)              Effectiveness: moderate relief    Preventative medication: None    Neurologic complications: No new stroke-like symptoms, loss of vision or speech, numbness or weakness    In the past 4 weeks, how often have you gone to Urgent Care or the emergency room because of your headaches?  0    Pt has seen neurology, last time was this spring.  Last report I have was from November 2017  He also has been seen at [facility] in June and Aug 23 for a facet injection    He notes some relief but only slightly from the injections        Problem list and histories  "reviewed & adjusted, as indicated.  Additional history: as documented    Labs reviewed in EPIC    Reviewed and updated as needed this visit by clinical staff       Reviewed and updated as needed this visit by Provider         ROS:  CONSTITUTIONAL:NEGATIVE for fever, chills, change in weight  RESP:NEGATIVE for significant cough or SOB  CV: NEGATIVE for chest pain, palpitations or peripheral edema  MUSCULOSKELETAL: POSITIVE  for arthralgias diffuse and neck pain  NEURO: NEGATIVE for weakness, dizziness or paresthesias and POSITIVE for HX headaches-musculoskelatal    OBJECTIVE:                                                    /86 (BP Location: Right arm, Patient Position: Sitting, Cuff Size: Adult Large)  Pulse 115  Temp 97.8  F (36.6  C) (Tympanic)  Resp 20  Ht 5' 8.25\" (1.734 m)  Wt 228 lb (103.4 kg)  SpO2 98%  BMI 34.41 kg/m2  Body mass index is 34.41 kg/(m^2).  GENERAL APPEARANCE: healthy, alert and no distress  NECK: no adenopathy, no asymmetry, masses, or scars and thyroid normal to palpation  RESP: lungs clear to auscultation - no rales, rhonchi or wheezes  MS: extremities normal- no gross deformities noted and normal range of motion neck, but pain with hyperextension  NEURO: Normal strength and tone, sensory exam grossly normal, mentation intact, speech normal and Romberg negative    Diagnostic test results:  none      ASSESSMENT/PLAN:                                                        ICD-10-CM    1. Cervicalgia M54.2 oxyCODONE-acetaminophen (PERCOCET) 5-325 MG per tablet   2. Other complicated headache syndrome G44.59        Follow up with Provider - 3 mo   Patient will schedule follow up with neurologist and have report sent here   Patient Instructions   Alternate ice & heat.  Use Ice massage on trigger point areas.  Do gentle Range of motion exercises      Andrew Anders MD  [facility]    "

## 2018-09-06 NOTE — MR AVS SNAPSHOT
"              After Visit Summary   9/6/2018    Avi Bhatti    MRN: [id number]           Patient Information     Date Of Birth          1960        Visit Information        Provider Department      9/6/2018 9:15 AM Andrew Anders MD [facility]        Today's Diagnoses     Cervicalgia    -  1    Other complicated headache syndrome          Care Instructions    Alternate ice & heat.  Use Ice massage on trigger point areas.  Do gentle Range of motion exercises          Follow-ups after your visit        Who to contact     If you have questions or need follow up information about today's clinic visit or your schedule please contact [facility] directly at [phone].  Normal or non-critical lab and imaging results will be communicated to you by MyChart, letter or phone within 4 business days after the clinic has received the results. If you do not hear from us within 7 days, please contact the clinic through MyChart or phone. If you have a critical or abnormal lab result, we will notify you by phone as soon as possible.  Submit refill requests through [facility] or call your pharmacy and they will forward the refill request to us. Please allow 3 business days for your refill to be completed.          Additional Information About Your Visit        Care EveryWhere ID     This is your Care EveryWhere ID. This could be used by other organizations to access your [facility] medical records  [id number]        Your Vitals Were     Pulse Temperature Respirations Height Pulse Oximetry BMI (Body Mass Index)    115 97.8  F (36.6  C) (Tympanic) 20 5' 8.25\" (1.734 m) 98% 34.41 kg/m2       Blood Pressure from Last 3 Encounters:   09/06/18 114/86   05/17/18 (!) 140/100   11/20/17 (!) 126/96    Weight from Last 3 Encounters:   09/06/18 228 lb (103.4 kg)   05/17/18 233 lb (105.7 kg)   11/20/17 229 lb (103.9 kg)              Today, you had the following     No orders " found for display         Where to get your medicines      Some of these will need a paper prescription and others can be bought over the counter.  Ask your nurse if you have questions.     Bring a paper prescription for each of these medications     oxyCODONE-acetaminophen 5-325 MG per tablet         Information about OPIOIDS     PRESCRIPTION OPIOIDS: WHAT YOU NEED TO KNOW   We gave you an opioid (narcotic) pain medicine. It is important to manage your pain, but opioids are not always the best choice. You should first try all the other options your care team gave you. Take this medicine for as short a time (and as few doses) as possible.    Some activities can increase your pain, such as bandage changes or therapy sessions. It may help to take your pain medicine 30 to 60 minutes before these activities. Reduce your stress by getting enough sleep, working on hobbies you enjoy and practicing relaxation or meditation. Talk to your care team about ways to manage your pain beyond prescription opioids.    These medicines have risks:    DO NOT drive when on new or higher doses of pain medicine. These medicines can affect your alertness and reaction times, and you could be arrested for driving under the influence (DUI). If you need to use opioids long-term, talk to your care team about driving.    DO NOT operate heavy machinery    DO NOT do any other dangerous activities while taking these medicines.    DO NOT drink any alcohol while taking these medicines.     If the opioid prescribed includes acetaminophen, DO NOT take with any other medicines that contain acetaminophen. Read all labels carefully. Look for the word  acetaminophen  or  Tylenol.  Ask your pharmacist if you have questions or are unsure.    You can get addicted to pain medicines, especially if you have a history of addiction (chemical, alcohol or substance dependence). Talk to your care team about ways to reduce this risk.    All opioids tend to cause  constipation. Drink plenty of water and eat foods that have a lot of fiber, such as fruits, vegetables, prune juice, apple juice and high-fiber cereal. Take a laxative (Miralax, milk of magnesia, Colace, Senna) if you don t move your bowels at least every other day. Other side effects include upset stomach, sleepiness, dizziness, throwing up, tolerance (needing more of the medicine to have the same effect), physical dependence and slowed breathing.    Store your pills in a secure place, locked if possible. We will not replace any lost or stolen medicine. If you don t finish your medicine, please throw away (dispose) as directed by your pharmacist. The [place] [employer] has more information about safe disposal: https://www.[employer] - Payments Anytime | [email or web address]-[email or web address]         Primary Care Provider Office Phone # Fax #    Andrew Anders [phone]       [address] [facility] [address]        Equal Access to Services     NAEL HERZOG : Hadii aad ku hadasho Soomaali, waaxda luqadaha, qaybta kaalmada adeegyada, waxay idiin hayjosey sandy . So [facility] [phone].    ATENCIÓN: Si habla español, tiene a smith disposición servicios gratuitos de asistencia lingüística. Llame al [phone].    We comply with applicable federal civil rights laws and [place] laws. We do not discriminate on the basis of race, color, national origin, age, disability, sex, sexual orientation, or gender identity.            Thank you!     Thank you for choosing [facility]  for your care. Our goal is always to provide you with excellent care. Hearing back from our patients is one way we can continue to improve our services. Please take a few minutes to complete the written survey that you may receive in the mail after your visit with us. Thank you!             Your Updated Medication List - Protect others around you: Learn how to safely use, store and  throw away your medicines at www.disposemymeds.org.          This list is accurate as of 9/6/18  9:46 AM.  Always use your most recent med list.                   Brand Name Dispense Instructions for use Diagnosis    ASACOL  MG EC tablet   Generic drug:  mesalamine           CALCIUM /VITAMIN D TABS   OR      1 TABLET DAILY        diclofenac-misoprostol 75-0.2 MG per EC tablet    ARTHROTEC 75          DULoxetine 60 MG EC capsule    CYMBALTA          esomeprazole 40 MG CR capsule    nexIUM          gabapentin 300 MG capsule    NEURONTIN          HUMIRA PEN 40 MG/0.8ML pen kit   Generic drug:  adalimumab           lisinopril 20 MG tablet    PRINIVIL/ZESTRIL    90 tablet    Take 1 tablet (20 mg) by mouth daily    Essential hypertension, benign       misoprostol 200 MCG tablet    CYTOTEC          Multi-vitamin Tabs tablet      Take 1 tablet by mouth daily.        oxyCODONE-acetaminophen 5-325 MG per tablet    PERCOCET    40 tablet    Take 1 tablet by mouth 2 times daily as needed for moderate to severe pain    Cervicalgia       ranitidine 150 MG tablet    ZANTAC     daily prn        SUMAtriptan 100 MG tablet    IMITREX          TIZANIDINE HCL PO      Take 8 mg by mouth At Bedtime.

## 2018-10-29 ENCOUNTER — TRANSFERRED RECORDS (OUTPATIENT)
Dept: HEALTH INFORMATION MANAGEMENT | Facility: CLINIC | Age: 58
End: 2018-10-29

## 2018-11-09 ENCOUNTER — TRANSFERRED RECORDS (OUTPATIENT)
Dept: HEALTH INFORMATION MANAGEMENT | Facility: CLINIC | Age: 58
End: 2018-11-09

## 2018-11-15 ENCOUNTER — TRANSFERRED RECORDS (OUTPATIENT)
Dept: HEALTH INFORMATION MANAGEMENT | Facility: CLINIC | Age: 58
End: 2018-11-15

## 2018-12-12 ENCOUNTER — TRANSFERRED RECORDS (OUTPATIENT)
Dept: HEALTH INFORMATION MANAGEMENT | Facility: CLINIC | Age: 58
End: 2018-12-12

## 2019-01-10 RX ORDER — MISOPROSTOL 200 UG/1
TABLET ORAL
Refills: 0 | Status: CANCELLED | OUTPATIENT
Start: 2019-01-10

## 2019-02-19 ENCOUNTER — TRANSFERRED RECORDS (OUTPATIENT)
Dept: HEALTH INFORMATION MANAGEMENT | Facility: CLINIC | Age: 59
End: 2019-02-19

## 2019-02-27 ENCOUNTER — TRANSFERRED RECORDS (OUTPATIENT)
Dept: HEALTH INFORMATION MANAGEMENT | Facility: CLINIC | Age: 59
End: 2019-02-27

## 2019-03-06 ENCOUNTER — TELEPHONE (OUTPATIENT)
Dept: FAMILY MEDICINE | Facility: CLINIC | Age: 59
End: 2019-03-06

## 2019-03-06 NOTE — TELEPHONE ENCOUNTER
PA Initiation    Medication: Lyrica 50mg - INITIATED  Insurance Company: [employer] [place] - Phone [phone] Fax [phone]  Pharmacy Filling the Rx: [facility] - [address] [place] [address]   Filling Pharmacy Phone: [phone]  Filling Pharmacy Fax:    Start Date: 3/6/2019

## 2019-03-06 NOTE — TELEPHONE ENCOUNTER
Prior Authorization Retail Medication Request    Medication/Dose: Lyrica 50mg  ICD code (if different than what is on RX):  unkonwn  Previously Tried and Failed:  unknown  Rationale:  Prior auth required    Insurance Name:  [facility] [employer]  Insurance ID:  [id number]      [facility]

## 2019-03-07 NOTE — TELEPHONE ENCOUNTER
This medication was prescribed by Dr. Vizcarra at [facility] of neurology. I called the patient to let him know and that he should contact them to see what he should do. Left VM on home phone.

## 2019-03-07 NOTE — TELEPHONE ENCOUNTER
PRIOR AUTHORIZATION DENIED    Medication: Lyrica 50mg - DENIED    Denial Date: 3/7/2019    Denial Rational: Patient does not have have an FDA approved condition for this drug. If given an FDA approved condition, patient will then have to try and fail 3 formulary alternatives.    Appeal Information:

## 2019-03-11 ENCOUNTER — TRANSFERRED RECORDS (OUTPATIENT)
Dept: HEALTH INFORMATION MANAGEMENT | Facility: CLINIC | Age: 59
End: 2019-03-11

## 2019-05-20 DIAGNOSIS — I10 ESSENTIAL HYPERTENSION, BENIGN: ICD-10-CM

## 2019-05-22 NOTE — TELEPHONE ENCOUNTER
"Requested Prescriptions   Pending Prescriptions Disp Refills     lisinopril (PRINIVIL/ZESTRIL) 20 MG tablet [Pharmacy Med Name: LISINOPRIL 20MG TABS] 90 tablet 3     Sig: TAKE ONE TABLET BY MOUTH DAILY   Last Written Prescription Date:  5/17/18  Last Fill Quantity: 90,  # refills: 3   Last office visit: 9/6/2018 with prescribing provider:     Future Office Visit:        ACE Inhibitors (Including Combos) Protocol Failed - 5/20/2019  8:45 PM        Failed - Normal serum creatinine on file in past 12 months     Recent Labs   Lab Test 05/17/18  1150   CR 1.25             Failed - Normal serum potassium on file in past 12 months     Recent Labs   Lab Test 05/17/18  1150   POTASSIUM 4.3             Passed - Blood pressure under 140/90 in past 12 months     BP Readings from Last 3 Encounters:   09/06/18 114/86   05/17/18 (!) 140/100   11/20/17 (!) 126/96                 Passed - Recent (12 mo) or future (30 days) visit within the authorizing provider's specialty     Patient had office visit in the last 12 months or has a visit in the next 30 days with authorizing provider or within the authorizing provider's specialty.  See \"Patient Info\" tab in inbasket, or \"Choose Columns\" in Meds & Orders section of the refill encounter.              Passed - Medication is active on med list        Passed - Patient is age 18 or older          "

## 2019-05-23 RX ORDER — LISINOPRIL 20 MG/1
TABLET ORAL
Qty: 30 TABLET | Refills: 0 | Status: SHIPPED | OUTPATIENT
Start: 2019-05-23 | End: 2019-07-03

## 2019-05-23 NOTE — TELEPHONE ENCOUNTER
Medication is being filled for 1 time refill only due to:  Future labs ordered Cr, K+.     OV: 9/6/2018.

## 2019-06-05 ENCOUNTER — TRANSFERRED RECORDS (OUTPATIENT)
Dept: HEALTH INFORMATION MANAGEMENT | Facility: CLINIC | Age: 59
End: 2019-06-05

## 2019-07-18 ENCOUNTER — OFFICE VISIT (OUTPATIENT)
Dept: URGENT CARE | Facility: URGENT CARE | Age: 59
End: 2019-07-18
Payer: COMMERCIAL

## 2019-07-18 VITALS
OXYGEN SATURATION: 95 % | SYSTOLIC BLOOD PRESSURE: 152 MMHG | WEIGHT: 240 LBS | TEMPERATURE: 97.7 F | BODY MASS INDEX: 36.23 KG/M2 | DIASTOLIC BLOOD PRESSURE: 90 MMHG | HEART RATE: 86 BPM

## 2019-07-18 DIAGNOSIS — E66.01 MORBID OBESITY (H): ICD-10-CM

## 2019-07-18 PROCEDURE — 99213 OFFICE O/P EST LOW 20 MIN: CPT | Performed by: FAMILY MEDICINE

## 2019-07-19 ENCOUNTER — OFFICE VISIT (OUTPATIENT)
Dept: URGENT CARE | Facility: URGENT CARE | Age: 59
End: 2019-07-19
Payer: COMMERCIAL

## 2019-07-19 VITALS
OXYGEN SATURATION: 96 % | BODY MASS INDEX: 36.57 KG/M2 | HEART RATE: 89 BPM | DIASTOLIC BLOOD PRESSURE: 106 MMHG | WEIGHT: 242.3 LBS | SYSTOLIC BLOOD PRESSURE: 149 MMHG | TEMPERATURE: 96.5 F

## 2019-07-19 DIAGNOSIS — H61.21 IMPACTED CERUMEN OF RIGHT EAR: Primary | ICD-10-CM

## 2019-07-19 PROCEDURE — 99213 OFFICE O/P EST LOW 20 MIN: CPT | Performed by: PHYSICIAN ASSISTANT

## 2019-07-19 NOTE — PROGRESS NOTES
Subjective     Avi Bhatti is a 59 year old male who presents to clinic today for the following health issues:    Complaining of his ear being plugged and that he is not able to hear much today on the right side.    He works with his ears covered quite a bit having trouble hearing.  No fever no chills  No runny nose or congestion does have obstruction with need for use of the CPAP at night.    HPI   Past Medical History:   Diagnosis Date     Ankylosing spondylitis (H)      Arthritis      Crohn's colitis (H)      Sleep apnea     does not use CPAP due to claustrophobia               Reviewed and updated as needed this visit by Provider         Review of Systems   ROS COMP: Constitutional, HEENT, cardiovascular, pulmonary, GI, , musculoskeletal, neuro, skin, endocrine and psych systems are negative, except as otherwise noted.      Objective    /90   Pulse 86   Temp 97.7  F (36.5  C) (Tympanic)   Wt 108.9 kg (240 lb)   SpO2 95%   BMI 36.23 kg/m     Body mass index is 36.23 kg/m .  Physical Exam   GENERAL: alert and mild   distress  EYES: Eyes grossly normal to inspection, PERRL and conjunctivae and sclerae normal  HENT: normal cephalic/atraumatic, tympanic membranes not visualized secondary to cerumen impaction, nasal mucosa edematous , oropharynx clear and oral mucous membranes moist  NECK: bilateral anterior cervical adenopathy, no asymmetry, masses, or scars and thyroid normal to palpation  RESP: lungs clear to auscultation - no rales, rhonchi or wheezes  MS: no gross musculoskeletal defects noted, no edema    Diagnostic Test Results:  Labs reviewed in Epic  none         Assessment & Plan     Bilateral cerumen impaction worse on the right.       Follow-up with your primary care as needed  Jerry Stafford MD  [facility]

## 2019-07-19 NOTE — PATIENT INSTRUCTIONS
1.  Use the viscous lidocaine overnight see if this helps loosen up the wax in your ear.    Return tomorrow for further evaluation early as possible in case we need to have ENT see you

## 2019-07-20 NOTE — PROGRESS NOTES
SUBJECTIVE:  Avi Bhatti is a 59 year old male who presents with right ear hearing loss for 1 day(s). Was seen in the clinic yesterday and had R ear flushed. Nurse was unable to clear cerumen impaction on the R side. He has been using lidocaine viscous for the last day.       Past Medical History:   Diagnosis Date     Ankylosing spondylitis (H)      Arthritis      Crohn's colitis (H)      Sleep apnea     does not use CPAP due to claustrophobia     Current Outpatient Medications   Medication Sig Dispense Refill     ASACOL  MG EC tablet   0     CALCIUM /VITAMIN D TABS   OR 1 TABLET DAILY       diclofenac-misoprostol (ARTHROTEC 75) 75-0.2 MG per tablet   0     DULoxetine (CYMBALTA) 60 MG EC capsule   0     esomeprazole (NEXIUM) 40 MG CR capsule   0     gabapentin (NEURONTIN) 300 MG capsule   1     HUMIRA PEN 40 MG/0.8ML pen kit   0     lisinopril (PRINIVIL/ZESTRIL) 20 MG tablet TAKE ONE TABLET BY MOUTH ONCE DAILY (NEEDS LABS FOR FURTHER REFILLS) 30 tablet 0     misoprostol (CYTOTEC) 200 MCG tablet   0     multivitamin, therapeutic with minerals (MULTI-VITAMIN) TABS Take 1 tablet by mouth daily.       RANITIDINE  MG OR TABS daily prn       SUMAtriptan (IMITREX) 100 MG tablet   1     TIZANIDINE HCL PO Take 8 mg by mouth At Bedtime.       Social History     Tobacco Use     Smoking status: Never Smoker     Smokeless tobacco: Never Used   Substance Use Topics     Alcohol use: Yes     Comment: seldom       ROS:   Review of systems negative except as stated above.    OBJECTIVE:  BP (!) 149/106   Pulse 89   Temp 96.5  F (35.8  C)   Wt 109.9 kg (242 lb 4.8 oz)   SpO2 96%   BMI 36.57 kg/m     EXAM:  The right TM is not visualized secondary to cerumen     The right auditory canal is obstructed with cerumen  The left TM is normal: no effusions, no erythema, and normal landmarks  The left auditory canal is normal and without drainage, edema or erythema  GENERAL: no acute distress  SKIN: no suspicious lesions  or rashes     ASSESSMENT / PLAN:  1. Impacted cerumen of right ear  Cerumen removed by nurse in clinic   Mild swelling is noted of ear canal, likely due to recent manipulation.  If development of drainage or increased pain at tragus use drops as prescribed    Diagnosis and treatment plan was reviewed with patient and/or family.   We went over any labs or imaging. Discussed worsening symptoms or little to no relief despite treatment plan to follow-up with PCP or return to clinic.  Patient verbalizes understanding. All questions were addressed and answered.   Fara Craig PA-C

## 2019-07-25 PROBLEM — E66.01 MORBID OBESITY (H): Status: ACTIVE | Noted: 2019-07-25

## 2019-07-26 ENCOUNTER — OFFICE VISIT (OUTPATIENT)
Dept: FAMILY MEDICINE | Facility: CLINIC | Age: 59
End: 2019-07-26
Payer: COMMERCIAL

## 2019-07-26 VITALS
HEART RATE: 114 BPM | WEIGHT: 239 LBS | TEMPERATURE: 98 F | OXYGEN SATURATION: 98 % | SYSTOLIC BLOOD PRESSURE: 160 MMHG | DIASTOLIC BLOOD PRESSURE: 100 MMHG | RESPIRATION RATE: 20 BRPM | BODY MASS INDEX: 36.07 KG/M2

## 2019-07-26 DIAGNOSIS — M25.552 HIP PAIN, LEFT: Primary | ICD-10-CM

## 2019-07-26 DIAGNOSIS — I10 ESSENTIAL HYPERTENSION, BENIGN: ICD-10-CM

## 2019-07-26 PROCEDURE — 99214 OFFICE O/P EST MOD 30 MIN: CPT | Performed by: FAMILY MEDICINE

## 2019-07-26 RX ORDER — METHYLPREDNISOLONE 4 MG
TABLET, DOSE PACK ORAL
Qty: 21 TABLET | Refills: 0 | Status: SHIPPED | OUTPATIENT
Start: 2019-07-26 | End: 2019-10-30

## 2019-07-26 RX ORDER — LISINOPRIL 40 MG/1
40 TABLET ORAL DAILY
Qty: 30 TABLET | Refills: 1 | Status: SHIPPED | OUTPATIENT
Start: 2019-07-26 | End: 2019-09-26

## 2019-07-26 NOTE — PROGRESS NOTES
Subjective     Avi Bhatti is a 59 year old male who presents to clinic today for the following health issues:    HPI     BP high at last Urgent Care visit.    Hypertension Follow-up      Do you check your blood pressure regularly outside of the clinic? No     Are you following a low salt diet? No    Are your blood pressures ever more than 140 on the top number (systolic) OR more   than 90 on the bottom number (diastolic), for example 140/90? N/a    Pt has had BP checked a couple of times recently, found it higher       Joint Pain    Onset: over 1 year    Description:   Location: left leg  Character: Stabbing    Intensity: mild, moderate, severe    Progression of Symptoms: worse    Accompanying Signs & Symptoms:  Other symptoms: lump near left hip    History:   Previous similar pain: YES      Precipitating factors:   Trauma or overuse: no     Alleviating factors:  Improved by: nothing    Therapies Tried and outcome: heat and ice have not helped    Pain more intense when he lifts leg to go up stairs especially after sitting for longer time          BP Readings from Last 3 Encounters:   07/26/19 (!) 160/100   07/19/19 (!) 149/106   07/18/19 152/90    Wt Readings from Last 3 Encounters:   07/26/19 108.4 kg (239 lb)   07/19/19 109.9 kg (242 lb 4.8 oz)   07/18/19 108.9 kg (240 lb)                      Reviewed and updated as needed this visit by Provider         Review of Systems   ROS COMP: CONSTITUTIONAL: NEGATIVE for fever, chills, change in weight  ENT/MOUTH: NEGATIVE for ear, mouth and throat problems  RESP: NEGATIVE for significant cough or SOB  CV: NEGATIVE for chest pain, palpitations or peripheral edema  MUSCULOSKELETAL: POSITIVE  for arthralgias Lt hip      Objective    BP (!) 160/100 (BP Location: Right arm, Patient Position: Sitting, Cuff Size: Adult Large)   Pulse 114   Temp 98  F (36.7  C) (Tympanic)   Resp 20   Wt 108.4 kg (239 lb)   SpO2 98%   BMI 36.07 kg/m    Body mass index is 36.07  kg/m .  Physical Exam   GENERAL: healthy, alert and no distress  NECK: no adenopathy, no asymmetry, masses, or scars and thyroid normal to palpation  RESP: lungs clear to auscultation - no rales, rhonchi or wheezes  CV: regular rate and rhythm, normal S1 S2, no S3 or S4, no murmur, click or rub, no peripheral edema and peripheral pulses strong  MS: LLE exam shows tender to palpation Lt quad tendon, increased pain with tendon resistance    Diagnostic Test Results:  Labs reviewed in Epic  none         Assessment & Plan     Avi was seen today for hypertension and musculoskeletal problem.    Diagnoses and all orders for this visit:    Hip pain, left  -     methylPREDNISolone (MEDROL DOSEPAK) 4 MG tablet therapy pack; Follow Package Directions    Essential hypertension, benign  -     lisinopril (PRINIVIL/ZESTRIL) 40 MG tablet; Take 1 tablet (40 mg) by mouth daily           FUTURE APPOINTMENTS:       - Follow-up visit in 1 mo  Patient Instructions   Alternate ice & heat.  Use Ice massage on trigger point areas.  Do gentle Range of motion exercises  Increase the Lisinopril to 40 mg daily, take 2 of the current 20 mg daily until gone      Return in about 1 month (around 8/26/2019) for Hypertension.    Andrew Anders MD  [facility]

## 2019-07-26 NOTE — PATIENT INSTRUCTIONS
Alternate ice & heat.  Use Ice massage on trigger point areas.  Do gentle Range of motion exercises  Increase the Lisinopril to 40 mg daily, take 2 of the current 20 mg daily until gone

## 2019-08-06 ENCOUNTER — OFFICE VISIT (OUTPATIENT)
Dept: FAMILY MEDICINE | Facility: CLINIC | Age: 59
End: 2019-08-06
Payer: COMMERCIAL

## 2019-08-06 ENCOUNTER — ANCILLARY PROCEDURE (OUTPATIENT)
Dept: GENERAL RADIOLOGY | Facility: CLINIC | Age: 59
End: 2019-08-06
Attending: PHYSICIAN ASSISTANT
Payer: COMMERCIAL

## 2019-08-06 VITALS
DIASTOLIC BLOOD PRESSURE: 98 MMHG | SYSTOLIC BLOOD PRESSURE: 124 MMHG | OXYGEN SATURATION: 96 % | RESPIRATION RATE: 16 BRPM | WEIGHT: 236 LBS | BODY MASS INDEX: 35.62 KG/M2 | TEMPERATURE: 98 F | HEART RATE: 112 BPM

## 2019-08-06 DIAGNOSIS — M25.552 HIP PAIN, LEFT: Primary | ICD-10-CM

## 2019-08-06 DIAGNOSIS — M16.12 PRIMARY OSTEOARTHRITIS OF LEFT HIP: ICD-10-CM

## 2019-08-06 PROCEDURE — 99213 OFFICE O/P EST LOW 20 MIN: CPT | Performed by: PHYSICIAN ASSISTANT

## 2019-08-06 PROCEDURE — 73502 X-RAY EXAM HIP UNI 2-3 VIEWS: CPT | Mod: FY

## 2019-08-06 ASSESSMENT — ENCOUNTER SYMPTOMS
GASTROINTESTINAL NEGATIVE: 1
CONSTITUTIONAL NEGATIVE: 1
CARDIOVASCULAR NEGATIVE: 1
RESPIRATORY NEGATIVE: 1
NEUROLOGICAL NEGATIVE: 1
EYES NEGATIVE: 1
PSYCHIATRIC NEGATIVE: 1

## 2019-08-06 NOTE — PROGRESS NOTES
Subjective     Avi Bhatti is a 59 year old male who presents to clinic today for the following health issues:    HPI   Musculoskeletal problem/pain      Duration: over a year- progressively getting worse    Description  Location: lt hip radiates to knee    Intensity:  moderate    Accompanying signs and symptoms: radiation of pain to lt knee    History  Previous similar problem: no   Previous evaluation:  none    Precipitating or alleviating factors:  Trauma or overuse: no   Aggravating factors include: walking and overuse    Therapies tried and outcome: Medrol dose collin- not effective Lidocaine patch 4%- helpful    Sometimes he can walk it off  Sometimes walking makes it worse  Inconsistent symptoms  Steps are difficult - going up  Decreased range of motion, difficult to lift leg to get in truck  When severe - toes are tingling  After favoring it, he has pain in the buttocks  Mostly pain is anterior hip/groin        Patient Active Problem List   Diagnosis     Sleep apnea     Ankylosing spondylitis (H)     Hyperlipidemia LDL goal <160     BMI 30-35     Essential hypertension, benign     Crohn's disease (H)     Adenomatous colon polyp     Cervicalgia     Migraine without aura and without status migrainosus, not intractable     Other complicated headache syndrome     Obesity (BMI 35.0-39.9) with comorbidity (H)     Past Surgical History:   Procedure Laterality Date     EXTRACTION(S) DENTAL  12/18/2012    Procedure: EXTRACTION(S) DENTAL;  Extraction of Teeth 30, 19;  Surgeon: Sujey Cunningham DDS;  Location: [facility] OR     repair fracture & insert pins left hand[       SMALL BOWEL RESECTION  1993       Social History     Tobacco Use     Smoking status: Never Smoker     Smokeless tobacco: Never Used   Substance Use Topics     Alcohol use: Yes     Comment: seldom     Family History   Problem Relation Age of Onset     Hypertension Father      Alcoholism Father      Other - See Comments Father      Coronary Artery Disease  Father      Skin Cancer Father          Current Outpatient Medications   Medication Sig Dispense Refill     ASACOL  MG EC tablet   0     CALCIUM /VITAMIN D TABS   OR 1 TABLET DAILY       diclofenac-misoprostol (ARTHROTEC 75) 75-0.2 MG per tablet   0     DULoxetine (CYMBALTA) 60 MG EC capsule   0     esomeprazole (NEXIUM) 40 MG CR capsule   0     gabapentin (NEURONTIN) 300 MG capsule   1     HUMIRA PEN 40 MG/0.8ML pen kit   0     lisinopril (PRINIVIL/ZESTRIL) 20 MG tablet TAKE ONE TABLET BY MOUTH ONCE DAILY (NEEDS LABS FOR FURTHER REFILLS) 30 tablet 0     lisinopril (PRINIVIL/ZESTRIL) 40 MG tablet Take 1 tablet (40 mg) by mouth daily 30 tablet 1     methylPREDNISolone (MEDROL DOSEPAK) 4 MG tablet therapy pack Follow Package Directions 21 tablet 0     misoprostol (CYTOTEC) 200 MCG tablet   0     multivitamin, therapeutic with minerals (MULTI-VITAMIN) TABS Take 1 tablet by mouth daily.       RANITIDINE  MG OR TABS daily prn       SUMAtriptan (IMITREX) 100 MG tablet   1     TIZANIDINE HCL PO Take 8 mg by mouth At Bedtime.       Allergies   Allergen Reactions     Reglan [Metoclopramide]        Reviewed and updated as needed this visit by Provider         Review of Systems   Constitutional: Negative.    HENT: Negative.    Eyes: Negative.    Respiratory: Negative.    Cardiovascular: Negative.    Gastrointestinal: Negative.    Genitourinary: Negative.    Musculoskeletal:        As in HPI   Skin: Negative.    Neurological: Negative.    Psychiatric/Behavioral: Negative.          Objective    BP (!) 124/98 (Cuff Size: Adult Large)   Pulse 112   Temp 98  F (36.7  C) (Tympanic)   Resp 16   Wt 107 kg (236 lb)   SpO2 96%   BMI 35.62 kg/m    Physical Exam   Constitutional: He is oriented to person, place, and time. He appears well-developed and well-nourished. No distress.   HENT:   Head: Normocephalic and atraumatic.   Nose: Nose normal.   Eyes: Conjunctivae and EOM are normal.   Neck: Normal range of motion.    Pulmonary/Chest: Effort normal.   Musculoskeletal:        Right hip: He exhibits normal range of motion.        Left hip: He exhibits decreased range of motion (pain at endpoints).   Neurological: He is alert and oriented to person, place, and time. He has normal strength. Gait (antalgic) abnormal.   Skin: Skin is warm and dry.   Psychiatric: He has a normal mood and affect. Judgment normal.       Diagnostic Test Results:  No results found for this or any previous visit (from the past 24 hour(s)).      XR AP pelvis and lateral left hip:  My findings: decreased joint space left hip, osteophyte formation laterally    Assessment & Plan   Problem List Items Addressed This Visit     None      Visit Diagnoses     Hip pain, left    -  Primary    Relevant Orders    XR Pelvis and Hip Left 1 View    Primary osteoarthritis of left hip        Relevant Orders    ORTHOPEDICS ADULT REFERRAL         Patient would like to first discuss left hip arthritis with his rheumatologist (appointment is in two weeks), then proceed with orthopedics as needed.         There are no Patient Instructions on file for this visit.    Return in about 2 weeks (around 8/20/2019) for Specialist Appointment.    Sandrita Ernst PA-C  [facility]

## 2019-08-26 ENCOUNTER — TELEPHONE (OUTPATIENT)
Dept: FAMILY MEDICINE | Facility: CLINIC | Age: 59
End: 2019-08-26

## 2019-08-26 NOTE — TELEPHONE ENCOUNTER
Reason for Call:  Other     Detailed comments: needs copy of most recent xrays to take to [facility] ordered last week by [facility]    Phone Number Patient can be reached at: Home number on file [phone] (home)    Best Time: will pu at [facility] on Thursday 8/29    Can we leave a detailed message on this number? Not Applicable    Call taken on 8/26/2019 at 9:17 AM by EM CARVER

## 2019-08-29 ENCOUNTER — TRANSFERRED RECORDS (OUTPATIENT)
Dept: HEALTH INFORMATION MANAGEMENT | Facility: CLINIC | Age: 59
End: 2019-08-29

## 2019-09-22 ENCOUNTER — ANCILLARY PROCEDURE (OUTPATIENT)
Dept: GENERAL RADIOLOGY | Facility: CLINIC | Age: 59
End: 2019-09-22
Attending: PHYSICIAN ASSISTANT
Payer: COMMERCIAL

## 2019-09-22 ENCOUNTER — OFFICE VISIT (OUTPATIENT)
Dept: URGENT CARE | Facility: URGENT CARE | Age: 59
End: 2019-09-22
Payer: COMMERCIAL

## 2019-09-22 VITALS
DIASTOLIC BLOOD PRESSURE: 99 MMHG | RESPIRATION RATE: 20 BRPM | WEIGHT: 235 LBS | SYSTOLIC BLOOD PRESSURE: 130 MMHG | TEMPERATURE: 97.9 F | BODY MASS INDEX: 35.47 KG/M2 | HEART RATE: 117 BPM | OXYGEN SATURATION: 94 %

## 2019-09-22 DIAGNOSIS — J20.9 ACUTE BRONCHITIS, UNSPECIFIED ORGANISM: ICD-10-CM

## 2019-09-22 DIAGNOSIS — R05.9 COUGH: Primary | ICD-10-CM

## 2019-09-22 DIAGNOSIS — R05.9 COUGH: ICD-10-CM

## 2019-09-22 LAB — D DIMER PPP FEU-MCNC: 0.5 UG/ML FEU (ref 0–0.5)

## 2019-09-22 PROCEDURE — 99214 OFFICE O/P EST MOD 30 MIN: CPT | Performed by: PHYSICIAN ASSISTANT

## 2019-09-22 PROCEDURE — 85379 FIBRIN DEGRADATION QUANT: CPT | Performed by: PHYSICIAN ASSISTANT

## 2019-09-22 PROCEDURE — 36415 COLL VENOUS BLD VENIPUNCTURE: CPT | Performed by: PHYSICIAN ASSISTANT

## 2019-09-22 PROCEDURE — 71046 X-RAY EXAM CHEST 2 VIEWS: CPT

## 2019-09-22 RX ORDER — ALBUTEROL SULFATE 90 UG/1
2 AEROSOL, METERED RESPIRATORY (INHALATION) EVERY 4 HOURS PRN
Qty: 1 INHALER | Refills: 0 | Status: SHIPPED | OUTPATIENT
Start: 2019-09-22 | End: 2021-01-14

## 2019-09-22 NOTE — PROGRESS NOTES
SUBJECTIVE:  Avi Bhatti is a 59 year old male who presents to the clinic today with a chief complaint of cough  for 1 week(s).  His cough is described as productive of yellow sputum.    The patient's symptoms are moderate and stable.  Associated symptoms include congestion, nasal congestion, rhinorrhea and sore throat, feverish. The patient's symptoms are exacerbated by lying down  Patient has been using nothing  to improve symptoms.    Past Medical History:   Diagnosis Date     Ankylosing spondylitis (H)      Arthritis      Crohn's colitis (H)      Sleep apnea     does not use CPAP due to claustrophobia       Current Outpatient Medications   Medication Sig Dispense Refill     ASACOL  MG EC tablet   0     diclofenac-misoprostol (ARTHROTEC 75) 75-0.2 MG per tablet   0     DULoxetine (CYMBALTA) 60 MG EC capsule   0     esomeprazole (NEXIUM) 40 MG CR capsule   0     gabapentin (NEURONTIN) 300 MG capsule   1     HUMIRA PEN 40 MG/0.8ML pen kit   0     lisinopril (PRINIVIL/ZESTRIL) 20 MG tablet TAKE ONE TABLET BY MOUTH ONCE DAILY (NEEDS LABS FOR FURTHER REFILLS) 30 tablet 0     lisinopril (PRINIVIL/ZESTRIL) 40 MG tablet Take 1 tablet (40 mg) by mouth daily 30 tablet 1     misoprostol (CYTOTEC) 200 MCG tablet   0     multivitamin, therapeutic with minerals (MULTI-VITAMIN) TABS Take 1 tablet by mouth daily.       RANITIDINE  MG OR TABS daily prn       SUMAtriptan (IMITREX) 100 MG tablet   1     TIZANIDINE HCL PO Take 8 mg by mouth At Bedtime.       CALCIUM /VITAMIN D TABS   OR 1 TABLET DAILY       methylPREDNISolone (MEDROL DOSEPAK) 4 MG tablet therapy pack Follow Package Directions (Patient not taking: Reported on 9/22/2019) 21 tablet 0       Social History     Tobacco Use     Smoking status: Never Smoker     Smokeless tobacco: Never Used   Substance Use Topics     Alcohol use: Yes     Comment: seldom       ROS  10 point ROS negative except as listed above      OBJECTIVE:  BP (!) 130/99   Pulse 117    Temp 97.9  F (36.6  C) (Tympanic)   Resp 20   Wt 106.6 kg (235 lb)   SpO2 94%   BMI 35.47 kg/m    GENERAL APPEARANCE: healthy, alert and no distress  EYES: conjunctiva clear  HENT: ear canals and TM's normal.  Nose and mouth without ulcers, erythema or lesions  NECK: supple, nontender, no lymphadenopathy  RESP: lungs clear to auscultation - no rales, rhonchi or wheezes  CV: regular rates and rhythm, normal S1 S2, no murmur noted  ABDOMEN:  soft, nontender, no HSM or masses and bowel sounds normal  NEURO: Normal strength and tone, sensory exam grossly normal,  normal speech and mentation  SKIN: no suspicious lesions or rashes    X-ray image initially interpreted in clinic by me in order to rule out pneumonia.  No evidence appreciated.    Results for orders placed or performed in visit on 09/22/19   D dimer, quantitative   Result Value Ref Range    D Dimer 0.5 0.0 - 0.50 ug/ml FEU         ASSESSMENT:    (R05) Cough  (primary encounter diagnosis)  Comment: likely viral uri.  Symptoms fit with infectious etiology.  D-Dimer negative+  Plan: XR Chest 2 Views, D dimer, quantitative  Red flags and emergent follow up discussed, and understood by patient  Follow up with PCP if symptoms worsen or fail to improve    (J20.9) Acute bronchitis, unspecified organism  Plan: albuterol (PROAIR HFA/PROVENTIL HFA/VENTOLIN         HFA) 108 (90 Base) MCG/ACT inhaler  Follow up with PCP if symptoms worsen or fail to improve    Patient Instructions   GO HOME, REST.      Go to ER if worsening tonight    Answer call later tonight

## 2019-09-27 ENCOUNTER — OFFICE VISIT (OUTPATIENT)
Dept: FAMILY MEDICINE | Facility: CLINIC | Age: 59
End: 2019-09-27
Payer: COMMERCIAL

## 2019-09-27 VITALS
SYSTOLIC BLOOD PRESSURE: 138 MMHG | HEIGHT: 68 IN | BODY MASS INDEX: 35.31 KG/M2 | TEMPERATURE: 97.7 F | RESPIRATION RATE: 20 BRPM | OXYGEN SATURATION: 96 % | HEART RATE: 113 BPM | WEIGHT: 233 LBS | DIASTOLIC BLOOD PRESSURE: 100 MMHG

## 2019-09-27 DIAGNOSIS — I10 UNCONTROLLED HYPERTENSION: ICD-10-CM

## 2019-09-27 DIAGNOSIS — H65.01 NON-RECURRENT ACUTE SEROUS OTITIS MEDIA OF RIGHT EAR: ICD-10-CM

## 2019-09-27 DIAGNOSIS — N18.30 CKD (CHRONIC KIDNEY DISEASE) STAGE 3, GFR 30-59 ML/MIN (H): ICD-10-CM

## 2019-09-27 DIAGNOSIS — M45.9 ANKYLOSING SPONDYLITIS, UNSPECIFIED SITE OF SPINE (H): ICD-10-CM

## 2019-09-27 DIAGNOSIS — R00.0 SINUS TACHYCARDIA: ICD-10-CM

## 2019-09-27 DIAGNOSIS — R73.01 IMPAIRED FASTING GLUCOSE: ICD-10-CM

## 2019-09-27 DIAGNOSIS — Z23 NEED FOR PROPHYLACTIC VACCINATION AND INOCULATION AGAINST INFLUENZA: ICD-10-CM

## 2019-09-27 DIAGNOSIS — K50.10 CROHN'S DISEASE OF LARGE INTESTINE WITHOUT COMPLICATION (H): ICD-10-CM

## 2019-09-27 DIAGNOSIS — J40 BRONCHITIS: Primary | ICD-10-CM

## 2019-09-27 DIAGNOSIS — E66.01 MORBID OBESITY (H): ICD-10-CM

## 2019-09-27 DIAGNOSIS — R09.02 HYPOXIA: ICD-10-CM

## 2019-09-27 DIAGNOSIS — G43.009 MIGRAINE WITHOUT AURA AND WITHOUT STATUS MIGRAINOSUS, NOT INTRACTABLE: ICD-10-CM

## 2019-09-27 PROBLEM — G43.019 INTRACTABLE MIGRAINE WITHOUT AURA AND WITHOUT STATUS MIGRAINOSUS: Status: ACTIVE | Noted: 2017-11-30

## 2019-09-27 PROBLEM — M50.30 DEGENERATIVE DISC DISEASE, CERVICAL: Status: ACTIVE | Noted: 2017-11-30

## 2019-09-27 PROCEDURE — 90471 IMMUNIZATION ADMIN: CPT | Performed by: FAMILY MEDICINE

## 2019-09-27 PROCEDURE — 90682 RIV4 VACC RECOMBINANT DNA IM: CPT | Performed by: FAMILY MEDICINE

## 2019-09-27 PROCEDURE — 99214 OFFICE O/P EST MOD 30 MIN: CPT | Mod: 25 | Performed by: FAMILY MEDICINE

## 2019-09-27 RX ORDER — CEPHALEXIN 500 MG/1
500 CAPSULE ORAL 3 TIMES DAILY
Qty: 21 CAPSULE | Refills: 0 | Status: SHIPPED | OUTPATIENT
Start: 2019-09-27 | End: 2019-10-30

## 2019-09-27 RX ORDER — METOPROLOL SUCCINATE 50 MG/1
50 TABLET, EXTENDED RELEASE ORAL DAILY
Qty: 90 TABLET | Refills: 0 | Status: SHIPPED | OUTPATIENT
Start: 2019-09-27 | End: 2019-12-18

## 2019-09-27 RX ORDER — LISINOPRIL 40 MG/1
40 TABLET ORAL DAILY
Qty: 90 TABLET | Refills: 1 | Status: SHIPPED | OUTPATIENT
Start: 2019-09-27 | End: 2019-10-30

## 2019-09-27 ASSESSMENT — MIFFLIN-ST. JEOR: SCORE: 1845.35

## 2019-09-27 NOTE — LETTER
My Migraine Action Plan      Date: 9/27/2019     My Name: Avi Bhatti   YOB: 1960  My Pharmacy:    [id number] - [place], [place] - [address] [address] [facility] [place] - [address] [id number] - [place], [place] - [address]       My (Preventative) Control Medicine:         My Rescue Medicine:    My Doctor: Andrew Anders     My Clinic: [address] [id number] [phone]        GREEN ZONE = Good Control    My headache plan is working.   I can do what I need to do.           I WILL:     ? Keep managing my triggers.  ? Write in my migraine diary each time I have a headache.  ? Keep taking my preventive (controller) medicine daily.  ? Take my relief and rescue medicine as needed.             YELLOW ZONE = Not Enough Control    My headache plan isn t always working.   My headaches keep me from doing   some of the things I need to do.       I WILL:     ? Set goals to control my triggers and act on them.  ? Write in my migraine diary each time I have a headache and review it for                      patterns or new triggers.  ? Keep taking my preventive (controller) medicine daily.  ? Take my relief and rescue medicine as needed.  ? Call my doctor or clinic at if I stay in the Yellow Zone.             RED ZONE = Poor or No Control    My headache plan has  failed. I can t do anything  when I have one. My  medicines aren t working.           I WILL:   ? Set goals to control my triggers and act on them.  ? Write in my migraine diary each time I have a headache and review it for                      patterns or new triggers.  ? Keep taking my preventive (controller) medicine daily.  ? Take my relief and rescue medicine as needed.  ? Call my doctor or clinic or go to urgent care or an ER if I m having the worst                  headache of my  life.  ? Call my doctor or clinic or go to urgent care or an ER if my medicine doesn t work.  ? Let my doctor or clinic know within 2 weeks if I have gone to an urgent care or             emergency department.          Provider specific instructions:

## 2019-09-27 NOTE — PATIENT INSTRUCTIONS
1.  Run a cold air vaporizer as much as possible. If you cannot,  boil water and breath the warm vapors 2-3 times a day to try to open up the sinuses take 2400mgm of guaifenesin per 24 hours   You can do this by taking  Mucinex plain blue  1200 mg  One tablet twice a day (This may come as 600mg/tablet and you need to take 2 tabs twice a day) or you could buy the cheaper  generic 400mgm / tab and take 2 tablets 3 x a day or 1 and 1/2 tablets 4 x a day . .Guaifenesin is  the major component of most cough syrups, because it makes the mucus less thick, and therefore it drains out better and you are less likely to cough from it dripping on the back of your throat.  Irrigate the  nose with plain water under the kitchen sink faucet or the shower.  Barbra pots, spray bottles, etc accumulate bacteria and are not recommended.   The tickle in the throat is also helped by gargling with vinegar and honey mixture, or pop or mouth wash as these coat the throat.  Please try to rinse teeth with water after using these .   Do not use sudafed or pseudephedrine as it dries the mucus up so it is harder to get it out, and it can raise your BP     2. No difference was  Noted by patients in a double blind study when given codeine, tylenol ( acetaminophen) or ibuprofen (all in identical pills). They felt no difference in pain relief. Since ibuprofen and the NSAIDs  causes kidney damage, esophageal damage with heartburn, and can increase the risk of esophageal and stomach cancer and ulcers,and colonic strictures. They also cause increased risk of heart attack .   I recommend that you use tylenol(acetaminophen) for pain. Use the acetaminophen ES  Which has 500mgm/tablet You can take up to 2 tablets 4 times a day as need for pain.  If this is not enough, you can add in ibuprofen or aleve(naprosyn) with 2 glasses of fluid and some food-to protect the stomach and esophagus. Please let us know if you are continuing to take ibuprofen or aleve, as  we will need to periodically check your kidney function with a blood test.    3/.  Weight Loss Tips  1. Do not eat after 6 hrs before your expected bedtime  2. Have your heaviest meal for breakfast, a slightly lighter meal at lunch and a snack 6 hrs before bed  3. No sugar/calorie drinks except milk ie no fruit juice, pop, alcohol.  4. Drink milk 30min before meals to decrease your hunger. Also it is excellent as part of your last meal of the day snack  5. Drink lots of water  6. Increase fiber in diet: all bran cereal, salads, popcorn etc  7. Have only one small serving of fruit a day about 1/2 cup (as this is high in sugar)  8. EXERCISE is the bottom line. Without it, you will gain weight even on a low calorie diet. Best if done 2-3X a day as can    Being overweight contributes to high blood pressure and high cholesterol, both of which cause heart attacks, strokes and kidney failure, prediabetes and diabetes, arthritis, and liver disease

## 2019-09-27 NOTE — PROGRESS NOTES
Avi Bhatti is a 59 year old male who presents to clinic today for the following health issues:    HPI     ED/UC Follow-Up    Facility:  [facility]  Date of visit: 09/22/2019  Reason for visit: Cough  Current Status: Not Better    ENT Symptoms             Symptoms: cc Present Absent Comment   Fever/Chills   x    Fatigue   x    Muscle Aches   x    Eye Irritation   x    Sneezing   x    Nasal Tony/Drg   x    Sinus Pressure/Pain   x    Loss of smell   x    Dental pain   x    Sore Throat   x    Swollen Glands   x    Ear Pain/Fullness  x  Right Ear Plugged   Cough  x     Wheeze   x    Chest Pain   x    Shortness of breath   x    Rash   x    Other   x      Symptom duration:  x 2 weeks   Symptom severity:  Severe   Treatments tried:  Inhaler, Cough Syrup   Contacts:  None   Hx of hypoxia 96% , tobacco, frequent URIs   Likely COPD  CXR 9-22-19  wnl lungs with cardiomegaly  MRI 10-08 [facility]     MORBID OBESITY    --BMI = 35.2  -comorbid glu intol, CKD, HTN,   --sedentary life style       Glucose Intolerance   Follow-up      How often are you checking your blood sugar? Not at all    Hi FBS     What time of day are you checking your blood sugars (select all that apply)?      Have you had any blood sugars above 200?  No    Have you had any blood sugars below 70?  No    What symptoms do you notice when your blood sugar is low?  None    What concerns do you have today about your diabetes? None     Do you have any of these symptoms? (Select all that apply)  No numbness or tingling in feet.  No redness, sores or blisters on feet.  No complaints of excessive thirst.  No reports of blurry vision.  No significant changes to weight.     Have you had a diabetic eye exam in the last 12 months? No    BP Readings from Last 2 Encounters:   09/27/19 (!) 138/100   09/22/19 (!) 130/99     LDL Cholesterol Calculated (mg/dL)   Date Value   05/17/2018 128 (H)   11/20/2017 125 (H)       Diabetes Management Resources  Hypertension  -Uncontrolled with Chronic Sinus Tachycardia       Do you check your blood pressure regularly outside of the clinic? No     Are you following a low salt diet? No    Are your blood pressures ever more than 140 on the top number (systolic) OR more   than 90 on the bottom number (diastolic), for example 140/90? Yes to 160/110 for yrs   Med lisinoril 40mgm     Chronic Kidney Disease:Stage 3 Follow-up      Current NSAID use?  No    ---comorbid glu intol, CKD, HTN,     CROHNS DISEASE     - on asacol   -rare problems  -Dx in 2008    ANKYLOSING SPONDYLITIS     -stiff in am   -upper lumbar   -under rheum   -    Migraine     Since your last clinic visit, how have your headaches changed?  Improved    How often are you getting headaches or migraines? rare     Are you able to do normal daily activities when you have a migraine? Yes    Are you taking rescue/relief medications? (Select all that apply) No    How helpful is your rescue/relief medication?  I get total relief    Are you taking any medications to prevent migraines? (Select all that apply)  No    In the past 4 weeks, how often have you gone to urgent care or the emergency room because of your headaches?  0            Reviewed and updated as needed this visit by Provider  Tobacco  Allergies  Meds  Problems  Med Hx  Surg Hx  Fam Hx         Review of Systems   ROS COMP: CONSTITUTIONAL: NEGATIVE for fever, chills, change in weight  INTEGUMENTARY/SKIN: NEGATIVE for worrisome rashes, moles or lesions  EYES: NEGATIVE for vision changes or irritation  ENT/MOUTH: POSITIVE for , earache bilateral, hoarseness, nasal congestion, postnasal drainage and rhinorrhea-clear  RESP:POSITIVE for , cough-productive, dyspnea on exertion and wheezing  BREAST: NEGATIVE for masses, tenderness or discharge  CV: NEGATIVE for chest pain, palpitations or peripheral edema  GI: NEGATIVE for nausea, abdominal pain, heartburn, or change in bowel habits  : NEGATIVE for frequency, dysuria, or  "hematuria  MUSCULOSKELETAL: NEGATIVE for significant arthralgias or myalgia  NEURO: NEGATIVE for weakness, dizziness or paresthesias  ENDOCRINE: NEGATIVE for temperature intolerance, skin/hair changes  HEME: NEGATIVE for bleeding problems  PSYCHIATRIC: NEGATIVE for changes in mood or affect         BP (!) 138/100   Pulse 113   Temp 97.7  F (36.5  C) (Tympanic)   Resp 20   Ht 1.734 m (5' 8.25\")   Wt 105.7 kg (233 lb)   SpO2 96%   BMI 35.17 kg/m    Body mass index is 35.17 kg/m .  Physical Exam   GENERAL: healthy, alert, no distress, obese and elderly  EYES: Eyes grossly normal to inspection, PERRL and conjunctivae and sclerae normal  HENT: ear canals and TM's normal, nose and mouth without ulcers or lesions  NECK: no adenopathy, no asymmetry, masses, or scars and thyroid normal to palpation  RESP: lungs clear to auscultation - no rales, rhonchi or wheezes and rhonchi bibasilar  CV: regular rate and rhythm, normal S1 S2, no S3 or S4, no murmur, click or rub, no peripheral edema and peripheral pulses strong  ABDOMEN: soft, nontender, no hepatosplenomegaly, no masses and bowel sounds normal  MS: no gross musculoskeletal defects noted, no edema  SKIN: no suspicious lesions or rashes  NEURO: Normal strength and tone, mentation intact and speech normal  PSYCH: mentation appears normal, affect normal/bright  LYMPH: no cervical, supraclavicular, axillary, or inguinal adenopathy    Diagnostic Test Results:  Labs reviewed in Epic  Results for orders placed or performed in visit on 09/22/19   XR Chest 2 Views    Narrative    CHEST TWO VIEW   9/22/2019 4:40 PM     HISTORY: Cough.    COMPARISON: Chest x-rays dated 10/17/2015 and 1/23/2008.    FINDINGS:  Cardiac silhouette appears mildly prominent. Thoracic aorta  is tortuous. Lungs are grossly clear. Mediastinum and pulmonary  vascularity are within normal limits. No pneumothorax or significant  pleural fluid collection is identified.      Impression    IMPRESSION :  1. " "Cardiac silhouette appears mildly enlarged indicating possible mild  cardiomegaly.  2. Tortuous thoracic aorta.  3. No other evidence of acute cardiopulmonary disease is seen. No  pneumonia is seen.    LYNN DE LA PAZ MD           Assessment & Plan       ICD-10-CM    1. Bronchitis J40    2. Uncontrolled hypertension-issue of new med  I10    3. CKD (chronic kidney disease) stage 3, GFR 30-59 ml/min (H) N18.3    4. Ankylosing spondylitis, unspecified site of spine (H) M45.9    5. Crohn's disease of large intestine without complication (H) K50.10    6. Non-recurrent acute serous otitis media of right ear H65.01    7. Hypoxia R09.02    8. Sinus tachycardia R00.0    9. Impaired fasting glucose R73.01    10. Migraine without aura and without status migrainosus, not intractable G43.009    11. Obesity (BMI 35.0-39.9) with comorbidity (H) E66.01    12. Need for prophylactic vaccination and inoculation against influenza Z23 INFLUENZA QUAD, RECOMBINANT, P-FREE (RIV4) (FLUBLOCK) [98471]     Vaccine Administration, Initial [96039]        BMI:   Estimated body mass index is 35.17 kg/m  as calculated from the following:    Height as of this encounter: 1.734 m (5' 8.25\").    Weight as of this encounter: 105.7 kg (233 lb).   Weight management plan: Discussed healthy diet and exercise guidelines        Patient Instructions   1.  Run a cold air vaporizer as much as possible. If you cannot,  boil water and breath the warm vapors 2-3 times a day to try to open up the sinuses take 2400mgm of guaifenesin per 24 hours   You can do this by taking  Mucinex plain blue  1200 mg  One tablet twice a day (This may come as 600mg/tablet and you need to take 2 tabs twice a day) or you could buy the cheaper  generic 400mgm / tab and take 2 tablets 3 x a day or 1 and 1/2 tablets 4 x a day . .Guaifenesin is  the major component of most cough syrups, because it makes the mucus less thick, and therefore it drains out better and you are less likely to cough " from it dripping on the back of your throat.  Irrigate the  nose with plain water under the kitchen sink faucet or the shower.  Barbra pots, spray bottles, etc accumulate bacteria and are not recommended.   The tickle in the throat is also helped by gargling with vinegar and honey mixture, or pop or mouth wash as these coat the throat.  Please try to rinse teeth with water after using these .   Do not use sudafed or pseudephedrine as it dries the mucus up so it is harder to get it out, and it can raise your BP     2. No difference was  Noted by patients in a double blind study when given codeine, tylenol ( acetaminophen) or ibuprofen (all in identical pills). They felt no difference in pain relief. Since ibuprofen and the NSAIDs  causes kidney damage, esophageal damage with heartburn, and can increase the risk of esophageal and stomach cancer and ulcers,and colonic strictures. They also cause increased risk of heart attack .   I recommend that you use tylenol(acetaminophen) for pain. Use the acetaminophen ES  Which has 500mgm/tablet You can take up to 2 tablets 4 times a day as need for pain.  If this is not enough, you can add in ibuprofen or aleve(naprosyn) with 2 glasses of fluid and some food-to protect the stomach and esophagus. Please let us know if you are continuing to take ibuprofen or aleve, as we will need to periodically check your kidney function with a blood test.    3/.  Weight Loss Tips  1. Do not eat after 6 hrs before your expected bedtime  2. Have your heaviest meal for breakfast, a slightly lighter meal at lunch and a snack 6 hrs before bed  3. No sugar/calorie drinks except milk ie no fruit juice, pop, alcohol.  4. Drink milk 30min before meals to decrease your hunger. Also it is excellent as part of your last meal of the day snack  5. Drink lots of water  6. Increase fiber in diet: all bran cereal, salads, popcorn etc  7. Have only one small serving of fruit a day about 1/2 cup (as this is high  in sugar)  8. EXERCISE is the bottom line. Without it, you will gain weight even on a low calorie diet. Best if done 2-3X a day as can    Being overweight contributes to high blood pressure and high cholesterol, both of which cause heart attacks, strokes and kidney failure, prediabetes and diabetes, arthritis, and liver disease         Return in about 1 month (around 10/27/2019) for BP Recheck.    Beryl Jansen MD  [facility]

## 2019-09-27 NOTE — NURSING NOTE
"Chief Complaint   Patient presents with     Urgent Care     URI     Imm/Inj     BP (!) 138/100   Pulse 113   Temp 97.7  F (36.5  C) (Tympanic)   Resp 20   Ht 1.734 m (5' 8.25\")   Wt 105.7 kg (233 lb)   SpO2 96%   BMI 35.17 kg/m   Estimated body mass index is 35.17 kg/m  as calculated from the following:    Height as of this encounter: 1.734 m (5' 8.25\").    Weight as of this encounter: 105.7 kg (233 lb).  BP completed using cuff size: regular   Adelita Moyer CMA    Health Maintenance Due   Topic Date Due     ADVANCE CARE PLANNING  1960     HIV SCREENING  02/19/1974     ZOSTER IMMUNIZATION (1 of 2) 02/19/2009     PREVENTIVE CARE VISIT  05/17/2019     LIPID  05/17/2019     Health Maintenance reviewed at today's visit patient asked to schedule/complete:   Routine Health Visit: Patient agrees to schedule  Immunizations:  Patient agrees to schedule    "

## 2019-09-28 PROBLEM — I10 UNCONTROLLED HYPERTENSION: Status: ACTIVE | Noted: 2019-09-28

## 2019-10-16 ENCOUNTER — OFFICE VISIT (OUTPATIENT)
Dept: URGENT CARE | Facility: URGENT CARE | Age: 59
End: 2019-10-16
Payer: COMMERCIAL

## 2019-10-16 ENCOUNTER — ANCILLARY PROCEDURE (OUTPATIENT)
Dept: GENERAL RADIOLOGY | Facility: CLINIC | Age: 59
End: 2019-10-16
Attending: PHYSICIAN ASSISTANT
Payer: COMMERCIAL

## 2019-10-16 VITALS
DIASTOLIC BLOOD PRESSURE: 94 MMHG | RESPIRATION RATE: 18 BRPM | BODY MASS INDEX: 35.62 KG/M2 | HEART RATE: 112 BPM | SYSTOLIC BLOOD PRESSURE: 126 MMHG | TEMPERATURE: 97.3 F | OXYGEN SATURATION: 94 % | WEIGHT: 236 LBS

## 2019-10-16 DIAGNOSIS — R05.9 COUGH: Primary | ICD-10-CM

## 2019-10-16 DIAGNOSIS — R05.9 COUGH: ICD-10-CM

## 2019-10-16 LAB
BASOPHILS # BLD AUTO: 0 10E9/L (ref 0–0.2)
BASOPHILS NFR BLD AUTO: 0.3 %
DIFFERENTIAL METHOD BLD: NORMAL
EOSINOPHIL # BLD AUTO: 0.3 10E9/L (ref 0–0.7)
EOSINOPHIL NFR BLD AUTO: 3.1 %
ERYTHROCYTE [DISTWIDTH] IN BLOOD BY AUTOMATED COUNT: 12.6 % (ref 10–15)
HCT VFR BLD AUTO: 49.5 % (ref 40–53)
HGB BLD-MCNC: 16.6 G/DL (ref 13.3–17.7)
LYMPHOCYTES # BLD AUTO: 2.8 10E9/L (ref 0.8–5.3)
LYMPHOCYTES NFR BLD AUTO: 30.3 %
MCH RBC QN AUTO: 31.3 PG (ref 26.5–33)
MCHC RBC AUTO-ENTMCNC: 33.5 G/DL (ref 31.5–36.5)
MCV RBC AUTO: 93 FL (ref 78–100)
MONOCYTES # BLD AUTO: 0.9 10E9/L (ref 0–1.3)
MONOCYTES NFR BLD AUTO: 9.6 %
NEUTROPHILS # BLD AUTO: 5.2 10E9/L (ref 1.6–8.3)
NEUTROPHILS NFR BLD AUTO: 56.7 %
PLATELET # BLD AUTO: 218 10E9/L (ref 150–450)
RBC # BLD AUTO: 5.31 10E12/L (ref 4.4–5.9)
WBC # BLD AUTO: 9.3 10E9/L (ref 4–11)

## 2019-10-16 PROCEDURE — 87798 DETECT AGENT NOS DNA AMP: CPT | Performed by: PHYSICIAN ASSISTANT

## 2019-10-16 PROCEDURE — 71046 X-RAY EXAM CHEST 2 VIEWS: CPT

## 2019-10-16 PROCEDURE — 36415 COLL VENOUS BLD VENIPUNCTURE: CPT | Performed by: PHYSICIAN ASSISTANT

## 2019-10-16 PROCEDURE — 99214 OFFICE O/P EST MOD 30 MIN: CPT | Performed by: PHYSICIAN ASSISTANT

## 2019-10-16 PROCEDURE — 85025 COMPLETE CBC W/AUTO DIFF WBC: CPT | Performed by: PHYSICIAN ASSISTANT

## 2019-10-17 NOTE — PATIENT INSTRUCTIONS
1.  Plenty of fluids, rest, warm compresses on face  2.  Mucinex twice daily for at least 4 days  3.  Barbra Pot 1x in the morning 1x at night (SALINE MIST SPRAY IS AN ACCEPTABLE, THOUGH NOT AS EFFECTIVE REPLACEMENT)  4.  Benadryl (diphenhydramine) at bedtime   5.  Either Claritin (Loratadine), Allegra (Fexofenadine), or Zyrtec (Cetirizine) in the day  6.  Flonase (Fluticasone) 2x each nostril twice a day for two weeks, then once each nostril once a day       Please let us know if symptoms persist, or worsen.  Fever and focal pain may be a sign of a bacterial infection which may need antibiotic treatment              Patient Education     Viral Bronchitis (Adult)    You have a viral bronchitis. Bronchitis is inflammation and swelling of the lining of the lungs. This is often caused by an infection. Symptoms include a dry, hacking cough that is worse at night. The cough may bring up yellow-green mucus. You may also feel short of breath or wheeze. Other symptoms may include tiredness, chest discomfort, and chills.  Bronchitis that is caused by a virus is not treated with antibiotics. Instead, medicines may be given to help relieve symptoms. Symptoms can last up to 2 weeks, although the cough may last much longer.  This illness is contagious during the first few days and is spread through the air by coughing and sneezing, or by direct contact (touching the sick person and then touching your own eyes, nose, or mouth).  Most viral illnesses resolve within 10 to 14 days with rest and simple home remedies, although they may sometimes last for several weeks.  Home care    If symptoms are severe, rest at home for the first 2 to 3 days. When you go back to your usual activities, don't let yourself get too tired.    Do not smoke. Also avoid being exposed to secondhand smoke.    You may use over-the-counter medicine to control fever or pain, unless another pain medicine was prescribed. If you have chronic liver or kidney disease  or have ever had a stomach ulcer or gastrointestinal bleeding, talk with your healthcare provider before using these medicines. Also talk to your provider if you are taking medicine to prevent blood clots. Aspirin should never be given to anyone younger than 18 years of age who is ill with a viral infection or fever. It may cause severe liver or brain damage.    Your appetite may be poor, so a light diet is fine. Avoid dehydration by drinking 6 to 8 glasses of fluids per day (such as water, soft drinks, sports drinks, juices, tea, or soup). Extra fluids will help loosen secretions in the nose and lungs.    Over-the-counter cough, cold, and sore-throat medicines will not shorten the length of the illness, but they may help to reduce symptoms. Don't use decongestants if you have high blood pressure.  Follow-up care  Follow up with your healthcare provider, or as advised. If you had an X-ray or ECG (electrocardiogram), a specialist will review it. You will be notified of any new findings that may affect your care.  If you are age 65 or older, or if you have a chronic lung disease or condition that affects your immune system, or you smoke, ask your healthcare provider about getting a pneumococcal vaccine and a yearly flu shot (influenza vaccine).  When to seek medical advice  Call your healthcare provider right away if any of these occur:    Fever of 100.4 F (38 C) or higher, or as directed by your healthcare provider    Coughing up increased amounts of colored sputum    Weakness, drowsiness, headache, facial pain, ear pain, or a stiff neck  Call 911  Call 911 if any of these occur:    Coughing up blood    Worsening weakness, drowsiness, headache, or stiff neck    Trouble breathing, wheezing, or pain with breathing  Date Last Reviewed: 6/1/2018 2000-2018 [employer]. [address] [place] [address], [place] [address]. All rights reserved. This information is not intended as a substitute for professional medical  care. Always follow your healthcare professional's instructions.

## 2019-10-18 LAB
B PARAPERT DNA SPEC QL NAA+PROBE: NOT DETECTED
B PERT DNA SPEC QL NAA+PROBE: NOT DETECTED
BORDETELLA COMMENT: NORMAL

## 2019-10-21 NOTE — PROGRESS NOTES
SUBJECTIVE:  Avi Bhatti is a 59 year old male who presents to the clinic today with a chief complaint of cough  for 2 week(s).  His cough is described as nonproductive.    The patient's symptoms are moderate and stable.  Associated symptoms include ear pain. The patient's symptoms are exacerbated by no particular triggers  Patient has been using inhaler  to improve symptoms.    Past Medical History:   Diagnosis Date     Ankylosing spondylitis (H)      Arthritis      Crohn's colitis (H)      Sleep apnea     does not use CPAP due to claustrophobia       Current Outpatient Medications   Medication Sig Dispense Refill     albuterol (PROAIR HFA/PROVENTIL HFA/VENTOLIN HFA) 108 (90 Base) MCG/ACT inhaler Inhale 2 puffs into the lungs every 4 hours as needed for shortness of breath / dyspnea or wheezing 1 Inhaler 0     ASACOL  MG EC tablet   0     CALCIUM /VITAMIN D TABS   OR 1 TABLET DAILY       cephALEXin (KEFLEX) 500 MG capsule Take 1 capsule (500 mg) by mouth 3 times daily 21 capsule 0     diclofenac-misoprostol (ARTHROTEC 75) 75-0.2 MG per tablet   0     DULoxetine (CYMBALTA) 60 MG EC capsule   0     esomeprazole (NEXIUM) 40 MG CR capsule   0     gabapentin (NEURONTIN) 300 MG capsule   1     HUMIRA PEN 40 MG/0.8ML pen kit   0     lisinopril (PRINIVIL/ZESTRIL) 20 MG tablet TAKE ONE TABLET BY MOUTH ONCE DAILY (NEEDS LABS FOR FURTHER REFILLS) 30 tablet 0     lisinopril (PRINIVIL/ZESTRIL) 40 MG tablet Take 1 tablet (40 mg) by mouth daily 90 tablet 1     metoprolol succinate ER (TOPROL-XL) 50 MG 24 hr tablet Take 1 tablet (50 mg) by mouth daily 90 tablet 0     misoprostol (CYTOTEC) 200 MCG tablet   0     multivitamin, therapeutic with minerals (MULTI-VITAMIN) TABS Take 1 tablet by mouth daily.       RANITIDINE  MG OR TABS daily prn       SUMAtriptan (IMITREX) 100 MG tablet   1     TIZANIDINE HCL PO Take 8 mg by mouth At Bedtime.       methylPREDNISolone (MEDROL DOSEPAK) 4 MG tablet therapy pack Follow  Package Directions (Patient not taking: Reported on 10/16/2019) 21 tablet 0       Social History     Tobacco Use     Smoking status: Never Smoker     Smokeless tobacco: Never Used   Substance Use Topics     Alcohol use: Yes     Comment: seldom       ROS  10 point ROS negative except as listed above      OBJECTIVE:  BP (!) 126/94 (BP Location: Right arm)   Pulse 112   Temp 97.3  F (36.3  C) (Tympanic)   Resp 18   Wt 107 kg (236 lb)   SpO2 94%   BMI 35.62 kg/m    GENERAL APPEARANCE: healthy, alert and no distress  EYES:  conjunctiva clear  HENT: ear canals and TM's normal.  Nose and mouth without ulcers, erythema or lesions  NECK: supple, nontender, no lymphadenopathy  RESP: lungs clear to auscultation - no rales, rhonchi or wheezes  CV: regular rates and rhythm, normal S1 S2, no murmur noted  ABDOMEN:  soft, nontender, no HSM or masses and bowel sounds normal  NEURO: Normal strength and tone, sensory exam grossly normal,  normal speech and mentation  SKIN: no suspicious lesions or rashes      X-ray image initially interpreted in clinic by me in order to rule out pneumonia.  No evidence appreciated.    Results for orders placed or performed in visit on 10/16/19   CBC with platelets and differential   Result Value Ref Range    WBC 9.3 4.0 - 11.0 10e9/L    RBC Count 5.31 4.4 - 5.9 10e12/L    Hemoglobin 16.6 13.3 - 17.7 g/dL    Hematocrit 49.5 40.0 - 53.0 %    MCV 93 78 - 100 fl    MCH 31.3 26.5 - 33.0 pg    MCHC 33.5 31.5 - 36.5 g/dL    RDW 12.6 10.0 - 15.0 %    Platelet Count 218 150 - 450 10e9/L    Diff Method Automated Method     % Neutrophils 56.7 %    % Lymphocytes 30.3 %    % Monocytes 9.6 %    % Eosinophils 3.1 %    % Basophils 0.3 %    Absolute Neutrophil 5.2 1.6 - 8.3 10e9/L    Absolute Lymphocytes 2.8 0.8 - 5.3 10e9/L    Absolute Monocytes 0.9 0.0 - 1.3 10e9/L    Absolute Eosinophils 0.3 0.0 - 0.7 10e9/L    Absolute Basophils 0.0 0.0 - 0.2 10e9/L   B. pertussis/parapertussis PCR-NP   Result Value Ref  Range    B Pertussis PCR Not Detected NDET^Not Detected    B parapertussis PCR Not Detected NDET^Not Detected    Bordetella comment SEE NOTE        ASSESSMENT:  (R05) Cough  (primary encounter diagnosis)  Comment: no evidence of pneumonia  Plan: XR Chest 2 Views, CBC with platelets and         differential, B. pertussis/parapertussis PCR-NP    Follow up with primary this week     Patient Instructions   1.  Plenty of fluids, rest, warm compresses on face  2.  Mucinex twice daily for at least 4 days  3.  Barbra Pot 1x in the morning 1x at night (SALINE MIST SPRAY IS AN ACCEPTABLE, THOUGH NOT AS EFFECTIVE REPLACEMENT)  4.  Benadryl (diphenhydramine) at bedtime   5.  Either Claritin (Loratadine), Allegra (Fexofenadine), or Zyrtec (Cetirizine) in the day  6.  Flonase (Fluticasone) 2x each nostril twice a day for two weeks, then once each nostril once a day       Please let us know if symptoms persist, or worsen.  Fever and focal pain may be a sign of a bacterial infection which may need antibiotic treatment              Patient Education     Viral Bronchitis (Adult)    You have a viral bronchitis. Bronchitis is inflammation and swelling of the lining of the lungs. This is often caused by an infection. Symptoms include a dry, hacking cough that is worse at night. The cough may bring up yellow-green mucus. You may also feel short of breath or wheeze. Other symptoms may include tiredness, chest discomfort, and chills.  Bronchitis that is caused by a virus is not treated with antibiotics. Instead, medicines may be given to help relieve symptoms. Symptoms can last up to 2 weeks, although the cough may last much longer.  This illness is contagious during the first few days and is spread through the air by coughing and sneezing, or by direct contact (touching the sick person and then touching your own eyes, nose, or mouth).  Most viral illnesses resolve within 10 to 14 days with rest and simple home remedies, although they may  sometimes last for several weeks.  Home care    If symptoms are severe, rest at home for the first 2 to 3 days. When you go back to your usual activities, don't let yourself get too tired.    Do not smoke. Also avoid being exposed to secondhand smoke.    You may use over-the-counter medicine to control fever or pain, unless another pain medicine was prescribed. If you have chronic liver or kidney disease or have ever had a stomach ulcer or gastrointestinal bleeding, talk with your healthcare provider before using these medicines. Also talk to your provider if you are taking medicine to prevent blood clots. Aspirin should never be given to anyone younger than 18 years of age who is ill with a viral infection or fever. It may cause severe liver or brain damage.    Your appetite may be poor, so a light diet is fine. Avoid dehydration by drinking 6 to 8 glasses of fluids per day (such as water, soft drinks, sports drinks, juices, tea, or soup). Extra fluids will help loosen secretions in the nose and lungs.    Over-the-counter cough, cold, and sore-throat medicines will not shorten the length of the illness, but they may help to reduce symptoms. Don't use decongestants if you have high blood pressure.  Follow-up care  Follow up with your healthcare provider, or as advised. If you had an X-ray or ECG (electrocardiogram), a specialist will review it. You will be notified of any new findings that may affect your care.  If you are age 65 or older, or if you have a chronic lung disease or condition that affects your immune system, or you smoke, ask your healthcare provider about getting a pneumococcal vaccine and a yearly flu shot (influenza vaccine).  When to seek medical advice  Call your healthcare provider right away if any of these occur:    Fever of 100.4 F (38 C) or higher, or as directed by your healthcare provider    Coughing up increased amounts of colored sputum    Weakness, drowsiness, headache, facial pain, ear  pain, or a stiff neck  Call 911  Call 911 if any of these occur:    Coughing up blood    Worsening weakness, drowsiness, headache, or stiff neck    Trouble breathing, wheezing, or pain with breathing  Date Last Reviewed: 6/1/2018 2000-2018 [employer]. [address] [place] [address]. All rights reserved. This information is not intended as a substitute for professional medical care. Always follow your healthcare professional's instructions.

## 2019-10-24 ENCOUNTER — HOSPITAL ENCOUNTER (OUTPATIENT)
Dept: LAB | Facility: CLINIC | Age: 59
Discharge: HOME | End: 2019-10-24
Attending: ORTHOPAEDIC SURGERY | Admitting: ORTHOPAEDIC SURGERY
Payer: COMMERCIAL

## 2019-10-24 DIAGNOSIS — Z01.812 PRE-OPERATIVE LABORATORY EXAMINATION: ICD-10-CM

## 2019-10-24 LAB
MRSA DNA SPEC QL NAA+PROBE: NEGATIVE
SPECIMEN SOURCE: NORMAL

## 2019-10-24 PROCEDURE — 87640 STAPH A DNA AMP PROBE: CPT | Performed by: ORTHOPAEDIC SURGERY

## 2019-10-24 PROCEDURE — 40000830 ZZHCL STATISTIC STAPH AUREUS METH RESIST SCREEN PCR: Performed by: ORTHOPAEDIC SURGERY

## 2019-10-24 PROCEDURE — 87641 MR-STAPH DNA AMP PROBE: CPT | Performed by: ORTHOPAEDIC SURGERY

## 2019-10-24 PROCEDURE — 40000829 ZZHCL STATISTIC STAPH AUREUS SUSCEPT SCREEN PCR: Performed by: ORTHOPAEDIC SURGERY

## 2019-10-25 RX ORDER — MUPIROCIN 20 MG/G
OINTMENT TOPICAL 2 TIMES DAILY
Status: ON HOLD | COMMUNITY
Start: 2019-11-12 | End: 2019-11-20

## 2019-10-25 NOTE — OR NURSING
Nasal screen MSSA positive.  Pt notified.  Reviewed instructions with pt regarding mupiricin for 7 days prior to surgery as well as extra showers with exidine for 5 days prior to surgery.  Pt verbalized understanding.  Dr. Coats' office notified of above.

## 2019-10-29 ENCOUNTER — HEALTH MAINTENANCE LETTER (OUTPATIENT)
Age: 59
End: 2019-10-29

## 2019-10-30 RX ORDER — DICLOFENAC SODIUM 75 MG/1
75 TABLET, DELAYED RELEASE ORAL 2 TIMES DAILY
Status: ON HOLD | COMMUNITY
End: 2021-02-04

## 2019-10-30 RX ORDER — LISINOPRIL 40 MG/1
40 TABLET ORAL DAILY
COMMUNITY
End: 2020-03-23

## 2019-10-30 RX ORDER — MISOPROSTOL 200 UG/1
200 TABLET ORAL 2 TIMES DAILY
COMMUNITY
End: 2023-10-28

## 2019-11-01 ENCOUNTER — OFFICE VISIT (OUTPATIENT)
Dept: FAMILY MEDICINE | Facility: CLINIC | Age: 59
End: 2019-11-01
Payer: COMMERCIAL

## 2019-11-01 VITALS
TEMPERATURE: 97.9 F | BODY MASS INDEX: 35.32 KG/M2 | DIASTOLIC BLOOD PRESSURE: 86 MMHG | WEIGHT: 234 LBS | RESPIRATION RATE: 20 BRPM | SYSTOLIC BLOOD PRESSURE: 128 MMHG | HEART RATE: 66 BPM | OXYGEN SATURATION: 96 %

## 2019-11-01 DIAGNOSIS — K50.10 CROHN'S DISEASE OF LARGE INTESTINE WITHOUT COMPLICATION (H): Chronic | ICD-10-CM

## 2019-11-01 DIAGNOSIS — M16.12 PRIMARY OSTEOARTHRITIS OF LEFT HIP: ICD-10-CM

## 2019-11-01 DIAGNOSIS — Z01.818 PREOP GENERAL PHYSICAL EXAM: Primary | ICD-10-CM

## 2019-11-01 DIAGNOSIS — I10 ESSENTIAL HYPERTENSION, BENIGN: ICD-10-CM

## 2019-11-01 DIAGNOSIS — E78.5 HYPERLIPIDEMIA LDL GOAL <160: ICD-10-CM

## 2019-11-01 LAB
ERYTHROCYTE [DISTWIDTH] IN BLOOD BY AUTOMATED COUNT: 13 % (ref 10–15)
HCT VFR BLD AUTO: 44 % (ref 40–53)
HGB BLD-MCNC: 15 G/DL (ref 13.3–17.7)
MCH RBC QN AUTO: 31.5 PG (ref 26.5–33)
MCHC RBC AUTO-ENTMCNC: 34.1 G/DL (ref 31.5–36.5)
MCV RBC AUTO: 92 FL (ref 78–100)
PLATELET # BLD AUTO: 211 10E9/L (ref 150–450)
RBC # BLD AUTO: 4.76 10E12/L (ref 4.4–5.9)
WBC # BLD AUTO: 8.1 10E9/L (ref 4–11)

## 2019-11-01 PROCEDURE — 93000 ELECTROCARDIOGRAM COMPLETE: CPT | Performed by: FAMILY MEDICINE

## 2019-11-01 PROCEDURE — 99215 OFFICE O/P EST HI 40 MIN: CPT | Mod: 25 | Performed by: FAMILY MEDICINE

## 2019-11-01 PROCEDURE — 85027 COMPLETE CBC AUTOMATED: CPT | Performed by: FAMILY MEDICINE

## 2019-11-01 PROCEDURE — 80048 BASIC METABOLIC PNL TOTAL CA: CPT | Performed by: FAMILY MEDICINE

## 2019-11-01 PROCEDURE — 36415 COLL VENOUS BLD VENIPUNCTURE: CPT | Performed by: FAMILY MEDICINE

## 2019-11-01 PROCEDURE — 80061 LIPID PANEL: CPT | Performed by: FAMILY MEDICINE

## 2019-11-01 NOTE — PROGRESS NOTES
[facility]  [address]  [place] [address]  [phone]  Dept: [phone]    PRE-OP EVALUATION:  Today's date: 2019    Avi Bhatti (: 1960) presents for pre-operative evaluation assessment as requested by Dr. Allen Coats.  He requires evaluation and anesthesia risk assessment prior to undergoing surgery/procedure for treatment of left hip pain.    Primary Physician: Andrew Anders  Type of Anesthesia Anticipated: General    Patient has a Health Care Directive or Living Will:  Has a directive completed but not notarized. Will bring to hospital to have notarized    Preop Questions 2019   Who is doing your surgery? Dr. Allen Coats   What are you having done? TOTAL LEFT HIP ARTHROPLASTY   Date of Surgery/Procedure: 19   Facility or Hospital where procedure/surgery will be performed: [facility]   1.  Do you have a history of Heart attack, stroke, stent, coronary bypass surgery, or other heart surgery? No   2.  Do you ever have any pain or discomfort in your chest? No   3.  Do you have a history of  Heart Failure? No   4.   Are you troubled by shortness of breath when:  walking on a level surface, or up a slight hill, or at night? YES - all   5.  Do you currently have a cold, bronchitis or other respiratory infection? No   6.  Do you have a cough, shortness of breath, or wheezing? YES - ongoing cough   7.  Do you sometimes get pains in the calves of your legs when you walk? No   8. Do you or anyone in your family have previous history of blood clots? No   9.  Do you or does anyone in your family have a serious bleeding problem such as prolonged bleeding following surgeries or cuts? No   10. Have you ever had problems with anemia or been told to take iron pills? YES - many years ago, not current   11. Have you had any abnormal blood loss such as black, tarry or bloody stools? No   12. Have you ever had a blood  transfusion? No   13. Have you or any of your relatives ever had problems with anesthesia? YES- patient has difficulty waking up, certain anesthesia aren't effective (at dentist), bowel problem   14. Do you have sleep apnea, excessive snoring or daytime drowsiness? YES - sleep apnea   15. Do you have any prosthetic heart valves? No   16. Do you have prosthetic joints? No         HPI:     HPI related to upcoming procedure: Worsening Lt hip arthritis over past several years, now limiting activities significantly.  Pt is presenting for surgery      See problem list for active medical problems.  Problems all longstanding and stable, except as noted/documented.  See ROS for pertinent symptoms related to these conditions.      MEDICAL HISTORY:     Patient Active Problem List    Diagnosis Date Noted     Uncontrolled hypertension 09/28/2019     Priority: Medium     CKD (chronic kidney disease) stage 3, GFR 30-59 ml/min (H) 09/27/2019     Priority: Medium     Obesity (BMI 35.0-39.9) with comorbidity (H) 07/25/2019     Priority: Medium     Intractable migraine without aura and without status migrainosus 11/30/2017     Priority: Medium     Degenerative disc disease, cervical 11/30/2017     Priority: Medium     Adenomatous colon polyp 10/23/2015     Priority: Medium     Essential hypertension, benign 04/21/2015     Priority: Medium     Crohn's disease (H) 04/21/2015     Priority: Medium     BMI 30-35 04/20/2015     Priority: Medium     Hyperlipidemia LDL goal <160 10/16/2014     Priority: Medium     Cervicalgia 07/03/2013     Priority: Medium     Tension headache 07/03/2013     Priority: Medium     Muscle spasm 07/03/2013     Priority: Medium     Sleep apnea, obstructive 12/14/2012     Priority: Medium     Ankylosing spondylitis (H) 12/14/2012     Priority: Medium      Past Medical History:   Diagnosis Date     Ankylosing spondylitis (H)      Arthritis      Crohn's colitis (H)      Gastroesophageal reflux disease       Hypertension      Migraine      Other chronic pain     headache and neck pain, receives botox injections Q 12 weeks     Sleep apnea     does not use CPAP due to claustrophobia     Past Surgical History:   Procedure Laterality Date     BOTOX CHRONIC MIGRAINE HEAD ACHES      for chronic headache and neck pain, injections every 12 weeks     EXTRACTION(S) DENTAL  12/18/2012    Procedure: EXTRACTION(S) DENTAL;  Extraction of Teeth 30, 19;  Surgeon: Sujey Cunningham DDS;  Location: [facility] OR     repair fracture & insert pins left hand  1996     SMALL BOWEL RESECTION  1993     Current Outpatient Medications   Medication Sig Dispense Refill     albuterol (PROAIR HFA/PROVENTIL HFA/VENTOLIN HFA) 108 (90 Base) MCG/ACT inhaler Inhale 2 puffs into the lungs every 4 hours as needed for shortness of breath / dyspnea or wheezing 1 Inhaler 0     ASACOL  MG EC tablet Take 1,600 mg by mouth 2 times daily   0     calcium carbonate-vitamin D (CALCIUM 600+D) 600-200 MG-UNIT TABS Take by mouth 2 times daily       diclofenac (VOLTAREN) 75 MG EC tablet Take 75 mg by mouth 2 times daily       DULoxetine (CYMBALTA) 60 MG EC capsule 60 mg daily   0     esomeprazole (NEXIUM) 40 MG CR capsule 40 mg 2 times daily   0     gabapentin (NEURONTIN) 300 MG capsule Take 900 mg by mouth At Bedtime   1     HUMIRA PEN 40 MG/0.8ML pen kit Inject 40 mg Subcutaneous every 14 days   0     lisinopril (PRINIVIL/ZESTRIL) 40 MG tablet Take 40 mg by mouth daily       metoprolol succinate ER (TOPROL-XL) 50 MG 24 hr tablet Take 1 tablet (50 mg) by mouth daily 90 tablet 0     misoprostol (CYTOTEC) 200 MCG tablet Take 200 mcg by mouth 2 times daily       multivitamin, therapeutic with minerals (MULTI-VITAMIN) TABS Take 1 tablet by mouth daily.       [START ON 11/12/2019] mupirocin (BACTROBAN) 2 % external ointment 2 times daily Apply a small amount in each nostril 2 times per day for 7 days prior to surgery.       RANITIDINE  MG OR TABS daily prn        SUMAtriptan (IMITREX) 100 MG tablet 100 mg at onset of headache   1     TIZANIDINE HCL PO Take 16 mg by mouth At Bedtime        OTC products: None, except as noted above    Allergies   Allergen Reactions     Prednisone Other (See Comments)     Elevated heart rate     Reglan [Metoclopramide] Other (See Comments)     agitation      Latex Allergy: NO    Social History     Tobacco Use     Smoking status: Never Smoker     Smokeless tobacco: Never Used   Substance Use Topics     Alcohol use: Yes     Comment: seldom     History   Drug Use No       REVIEW OF SYSTEMS:   CONSTITUTIONAL: NEGATIVE for fever, chills, change in weight  INTEGUMENTARY/SKIN: NEGATIVE for worrisome rashes, moles or lesions  EYES: NEGATIVE for vision changes or irritation  ENT/MOUTH: NEGATIVE for ear, mouth and throat problems  RESP: NEGATIVE for significant cough or SOB  BREAST: NEGATIVE for masses, tenderness or discharge  CV: NEGATIVE for chest pain, palpitations or peripheral edema  GI: NEGATIVE for nausea, abdominal pain, heartburn, or change in bowel habits  : NEGATIVE for frequency, dysuria, or hematuria  MUSCULOSKELETAL:POSITIVE  for arthralgias Lt hip and back pain low back  NEURO: NEGATIVE for weakness, dizziness or paresthesias  ENDOCRINE: NEGATIVE for temperature intolerance, skin/hair changes  HEME: NEGATIVE for bleeding problems  PSYCHIATRIC: NEGATIVE for changes in mood or affect    EXAM:   /86 (BP Location: Left arm, Patient Position: Sitting, Cuff Size: Adult Large)   Pulse 66   Temp 97.9  F (36.6  C) (Tympanic)   Resp 20   Wt 106.1 kg (234 lb)   SpO2 96%   BMI 35.32 kg/m      GENERAL APPEARANCE: healthy, alert and no distress     EYES: EOMI,  PERRL     HENT: ear canals and TM's normal and nose and mouth without ulcers or lesions     NECK: no adenopathy, no asymmetry, masses, or scars and thyroid normal to palpation     RESP: lungs clear to auscultation - no rales, rhonchi or wheezes     CV: regular rates and rhythm,  normal S1 S2, no S3 or S4 and no murmur, click or rub     ABDOMEN:  soft, nontender, no HSM or masses and bowel sounds normal     MS: arthritis of the Lt hip     SKIN: no suspicious lesions or rashes     NEURO: Normal strength and tone, sensory exam grossly normal, mentation intact and speech normal     PSYCH: mentation appears normal. and affect normal/bright     LYMPHATICS: No cervical adenopathy    DIAGNOSTICS:     EKG: appears normal, NSR, normal axis, normal intervals, no acute ST/T changes c/w ischemia, no LVH by voltage criteria  Labs Resulted Today:   Results for orders placed or performed in visit on 11/01/19   CBC with platelets     Status: None   Result Value Ref Range    WBC 8.1 4.0 - 11.0 10e9/L    RBC Count 4.76 4.4 - 5.9 10e12/L    Hemoglobin 15.0 13.3 - 17.7 g/dL    Hematocrit 44.0 40.0 - 53.0 %    MCV 92 78 - 100 fl    MCH 31.5 26.5 - 33.0 pg    MCHC 34.1 31.5 - 36.5 g/dL    RDW 13.0 10.0 - 15.0 %    Platelet Count 211 150 - 450 10e9/L     Labs Drawn and in Process:   Unresulted Labs Ordered in the Past 30 Days of this Admission     Date and Time Order Name Status Description    11/1/2019 1038 BASIC METABOLIC PANEL In process     11/1/2019 1038 LIPID REFLEX TO DIRECT LDL PANEL In process           Recent Labs   Lab Test 10/16/19  2042 05/17/18  1150 11/20/17  0810  10/17/15  1330   HGB 16.6 17.0  --    < > 16.6    200  --    < > 183   INR  --   --   --   --  0.93   NA  --  138 139   < > 138   POTASSIUM  --  4.3 4.2   < > 5.4*   CR  --  1.25 1.30*   < > 1.08    < > = values in this interval not displayed.        IMPRESSION:   Reason for surgery/procedure: Advanced degenerative arthritis Lt hip    The proposed surgical procedure is considered INTERMEDIATE risk.    REVISED CARDIAC RISK INDEX  The patient has the following serious cardiovascular risks for perioperative complications such as (MI, PE, VFib and 3  AV Block):  No serious cardiac risks  INTERPRETATION: 1 risks: Class II (low risk -  0.9% complication rate)    The patient has the following additional risks for perioperative complications:  No identified additional risks      ICD-10-CM    1. Preop general physical exam Z01.818 Lipid panel reflex to direct LDL Fasting     EKG 12-lead complete w/read - Clinics     CBC with platelets     Basic metabolic panel  (Ca, Cl, CO2, Creat, Gluc, K, Na, BUN)   2. Primary osteoarthritis of left hip M16.12    3. Hyperlipidemia LDL goal <160 E78.5    4. Essential hypertension, benign I10    5. Crohn's disease of large intestine without complication (H) K50.10        RECOMMENDATIONS:         --Patient is to take all scheduled medications on the day of surgery EXCEPT for modifications listed below.    He will hold the Diclofenac for the week before surgery    APPROVAL GIVEN to proceed with proposed procedure, without further diagnostic evaluation       Signed Electronically by: Andrew Anders MD    Copy of this evaluation report is provided to requesting physician.    [facility] Preop Guidelines    Revised Cardiac Risk Index

## 2019-11-02 LAB
ANION GAP SERPL CALCULATED.3IONS-SCNC: 8 MMOL/L (ref 3–14)
BUN SERPL-MCNC: 22 MG/DL (ref 7–30)
CALCIUM SERPL-MCNC: 8.8 MG/DL (ref 8.5–10.1)
CHLORIDE SERPL-SCNC: 104 MMOL/L (ref 94–109)
CHOLEST SERPL-MCNC: 234 MG/DL
CO2 SERPL-SCNC: 26 MMOL/L (ref 20–32)
CREAT SERPL-MCNC: 1.35 MG/DL (ref 0.66–1.25)
GFR SERPL CREATININE-BSD FRML MDRD: 56 ML/MIN/{1.73_M2}
GLUCOSE SERPL-MCNC: 76 MG/DL (ref 70–99)
HDLC SERPL-MCNC: 43 MG/DL
LDLC SERPL CALC-MCNC: 136 MG/DL
NONHDLC SERPL-MCNC: 191 MG/DL
POTASSIUM SERPL-SCNC: 4.2 MMOL/L (ref 3.4–5.3)
SODIUM SERPL-SCNC: 138 MMOL/L (ref 133–144)
TRIGL SERPL-MCNC: 277 MG/DL

## 2019-11-15 ASSESSMENT — MIFFLIN-ST. JEOR: SCORE: 1850.45

## 2019-11-15 NOTE — PLAN OF CARE
Pt had positive Nasal screen for MSSA. Pt uses CPAP per Nabil with internal Medicine Pt should clean contacts and replace tubing after Mupirocin treatment is complete. Pt notified and verified understanding of instruction by repeating back information.

## 2019-11-19 ENCOUNTER — ANESTHESIA EVENT (OUTPATIENT)
Dept: SURGERY | Facility: CLINIC | Age: 59
End: 2019-11-19
Payer: COMMERCIAL

## 2019-11-19 ENCOUNTER — APPOINTMENT (OUTPATIENT)
Dept: GENERAL RADIOLOGY | Facility: CLINIC | Age: 59
End: 2019-11-19
Attending: ORTHOPAEDIC SURGERY
Payer: COMMERCIAL

## 2019-11-19 ENCOUNTER — HOSPITAL ENCOUNTER (INPATIENT)
Facility: CLINIC | Age: 59
LOS: 2 days | Discharge: HOME | End: 2019-11-21
Attending: ORTHOPAEDIC SURGERY | Admitting: ORTHOPAEDIC SURGERY
Payer: COMMERCIAL

## 2019-11-19 ENCOUNTER — ANESTHESIA (OUTPATIENT)
Dept: SURGERY | Facility: CLINIC | Age: 59
End: 2019-11-19
Payer: COMMERCIAL

## 2019-11-19 DIAGNOSIS — Z96.642 STATUS POST TOTAL REPLACEMENT OF LEFT HIP: Primary | ICD-10-CM

## 2019-11-19 PROBLEM — Z96.649 S/P TOTAL HIP ARTHROPLASTY: Status: ACTIVE | Noted: 2019-11-19

## 2019-11-19 PROCEDURE — 25000128 H RX IP 250 OP 636: Performed by: ANESTHESIOLOGY

## 2019-11-19 PROCEDURE — 25800030 ZZH RX IP 258 OP 636: Performed by: ANESTHESIOLOGY

## 2019-11-19 PROCEDURE — C1713 ANCHOR/SCREW BN/BN,TIS/BN: HCPCS | Performed by: ORTHOPAEDIC SURGERY

## 2019-11-19 PROCEDURE — 99207 ZZC CONSULT E&M CHANGED TO INITIAL LEVEL: CPT | Performed by: PHYSICIAN ASSISTANT

## 2019-11-19 PROCEDURE — 37000008 ZZH ANESTHESIA TECHNICAL FEE, 1ST 30 MIN: Performed by: ORTHOPAEDIC SURGERY

## 2019-11-19 PROCEDURE — C1776 JOINT DEVICE (IMPLANTABLE): HCPCS | Performed by: ORTHOPAEDIC SURGERY

## 2019-11-19 PROCEDURE — 25800030 ZZH RX IP 258 OP 636: Performed by: NURSE ANESTHETIST, CERTIFIED REGISTERED

## 2019-11-19 PROCEDURE — 25000132 ZZH RX MED GY IP 250 OP 250 PS 637: Performed by: ORTHOPAEDIC SURGERY

## 2019-11-19 PROCEDURE — 25000125 ZZHC RX 250: Performed by: PHYSICIAN ASSISTANT

## 2019-11-19 PROCEDURE — 25800030 ZZH RX IP 258 OP 636: Performed by: ORTHOPAEDIC SURGERY

## 2019-11-19 PROCEDURE — 25000128 H RX IP 250 OP 636: Performed by: ORTHOPAEDIC SURGERY

## 2019-11-19 PROCEDURE — 99222 1ST HOSP IP/OBS MODERATE 55: CPT | Performed by: PHYSICIAN ASSISTANT

## 2019-11-19 PROCEDURE — 71000013 ZZH RECOVERY PHASE 1 LEVEL 1 EA ADDTL HR: Performed by: ORTHOPAEDIC SURGERY

## 2019-11-19 PROCEDURE — 25000132 ZZH RX MED GY IP 250 OP 250 PS 637: Performed by: PHYSICIAN ASSISTANT

## 2019-11-19 PROCEDURE — 36000093 ZZH SURGERY LEVEL 4 1ST 30 MIN: Performed by: ORTHOPAEDIC SURGERY

## 2019-11-19 PROCEDURE — 27210794 ZZH OR GENERAL SUPPLY STERILE: Performed by: ORTHOPAEDIC SURGERY

## 2019-11-19 PROCEDURE — 25000125 ZZHC RX 250: Performed by: ORTHOPAEDIC SURGERY

## 2019-11-19 PROCEDURE — 0SRB04A REPLACEMENT OF LEFT HIP JOINT WITH CERAMIC ON POLYETHYLENE SYNTHETIC SUBSTITUTE, UNCEMENTED, OPEN APPROACH: ICD-10-PCS | Performed by: ORTHOPAEDIC SURGERY

## 2019-11-19 PROCEDURE — 40000306 ZZH STATISTIC PRE PROC ASSESS II: Performed by: ORTHOPAEDIC SURGERY

## 2019-11-19 PROCEDURE — 25000128 H RX IP 250 OP 636: Performed by: NURSE ANESTHETIST, CERTIFIED REGISTERED

## 2019-11-19 PROCEDURE — 25000125 ZZHC RX 250: Performed by: NURSE ANESTHETIST, CERTIFIED REGISTERED

## 2019-11-19 PROCEDURE — 36000063 ZZH SURGERY LEVEL 4 EA 15 ADDTL MIN: Performed by: ORTHOPAEDIC SURGERY

## 2019-11-19 PROCEDURE — 25000128 H RX IP 250 OP 636: Performed by: PHYSICIAN ASSISTANT

## 2019-11-19 PROCEDURE — 71000012 ZZH RECOVERY PHASE 1 LEVEL 1 FIRST HR: Performed by: ORTHOPAEDIC SURGERY

## 2019-11-19 PROCEDURE — 40000986 XR PELVIS AND HIP PORTABLE LEFT 1 VIEW

## 2019-11-19 PROCEDURE — 37000009 ZZH ANESTHESIA TECHNICAL FEE, EACH ADDTL 15 MIN: Performed by: ORTHOPAEDIC SURGERY

## 2019-11-19 PROCEDURE — 12000000 ZZH R&B MED SURG/OB

## 2019-11-19 PROCEDURE — 25000128 H RX IP 250 OP 636

## 2019-11-19 DEVICE — IMPLANTABLE DEVICE: Type: IMPLANTABLE DEVICE | Site: HIP | Status: FUNCTIONAL

## 2019-11-19 DEVICE — IMP SCR ZIM 6.5X20MM ACET CUP SELF TAP 00-6250-065-20: Type: IMPLANTABLE DEVICE | Site: HIP | Status: FUNCTIONAL

## 2019-11-19 DEVICE — IMP SCR ZIM 6.5X30MM ACET CUP SELF TAP 00-6250-065-30: Type: IMPLANTABLE DEVICE | Site: HIP | Status: FUNCTIONAL

## 2019-11-19 DEVICE — IMP HEAD FEM ZIM BIOLOX DELTA CER 36MM +0 00-8775-036-02: Type: IMPLANTABLE DEVICE | Site: HIP | Status: FUNCTIONAL

## 2019-11-19 RX ORDER — MEPERIDINE HYDROCHLORIDE 50 MG/ML
12.5 INJECTION INTRAMUSCULAR; INTRAVENOUS; SUBCUTANEOUS EVERY 5 MIN PRN
Status: DISCONTINUED | OUTPATIENT
Start: 2019-11-19 | End: 2019-11-19 | Stop reason: HOSPADM

## 2019-11-19 RX ORDER — AMOXICILLIN 250 MG
2 CAPSULE ORAL 2 TIMES DAILY
Status: DISCONTINUED | OUTPATIENT
Start: 2019-11-19 | End: 2019-11-21 | Stop reason: HOSPADM

## 2019-11-19 RX ORDER — POLYETHYLENE GLYCOL 3350 17 G/17G
17 POWDER, FOR SOLUTION ORAL DAILY
Status: DISCONTINUED | OUTPATIENT
Start: 2019-11-20 | End: 2019-11-21 | Stop reason: HOSPADM

## 2019-11-19 RX ORDER — METHOCARBAMOL 750 MG/1
750 TABLET, FILM COATED ORAL 4 TIMES DAILY PRN
Status: DISCONTINUED | OUTPATIENT
Start: 2019-11-19 | End: 2019-11-21 | Stop reason: HOSPADM

## 2019-11-19 RX ORDER — LIDOCAINE 40 MG/G
CREAM TOPICAL
Status: DISCONTINUED | OUTPATIENT
Start: 2019-11-19 | End: 2019-11-21 | Stop reason: HOSPADM

## 2019-11-19 RX ORDER — GABAPENTIN 300 MG/1
900 CAPSULE ORAL AT BEDTIME
Status: DISCONTINUED | OUTPATIENT
Start: 2019-11-19 | End: 2019-11-21 | Stop reason: HOSPADM

## 2019-11-19 RX ORDER — NALOXONE HYDROCHLORIDE 0.4 MG/ML
.1-.4 INJECTION, SOLUTION INTRAMUSCULAR; INTRAVENOUS; SUBCUTANEOUS
Status: DISCONTINUED | OUTPATIENT
Start: 2019-11-19 | End: 2019-11-21 | Stop reason: HOSPADM

## 2019-11-19 RX ORDER — CEFAZOLIN SODIUM 2 G/100ML
2 INJECTION, SOLUTION INTRAVENOUS
Status: COMPLETED | OUTPATIENT
Start: 2019-11-19 | End: 2019-11-19

## 2019-11-19 RX ORDER — GLYCOPYRROLATE 0.2 MG/ML
INJECTION, SOLUTION INTRAMUSCULAR; INTRAVENOUS PRN
Status: DISCONTINUED | OUTPATIENT
Start: 2019-11-19 | End: 2019-11-19

## 2019-11-19 RX ORDER — METOPROLOL TARTRATE 1 MG/ML
1-2 INJECTION, SOLUTION INTRAVENOUS EVERY 5 MIN PRN
Status: DISCONTINUED | OUTPATIENT
Start: 2019-11-19 | End: 2019-11-19 | Stop reason: HOSPADM

## 2019-11-19 RX ORDER — DEXAMETHASONE SODIUM PHOSPHATE 4 MG/ML
INJECTION, SOLUTION INTRA-ARTICULAR; INTRALESIONAL; INTRAMUSCULAR; INTRAVENOUS; SOFT TISSUE PRN
Status: DISCONTINUED | OUTPATIENT
Start: 2019-11-19 | End: 2019-11-19

## 2019-11-19 RX ORDER — SODIUM CHLORIDE, SODIUM LACTATE, POTASSIUM CHLORIDE, CALCIUM CHLORIDE 600; 310; 30; 20 MG/100ML; MG/100ML; MG/100ML; MG/100ML
INJECTION, SOLUTION INTRAVENOUS CONTINUOUS
Status: DISCONTINUED | OUTPATIENT
Start: 2019-11-19 | End: 2019-11-19 | Stop reason: HOSPADM

## 2019-11-19 RX ORDER — KETAMINE HYDROCHLORIDE 10 MG/ML
INJECTION INTRAMUSCULAR; INTRAVENOUS PRN
Status: DISCONTINUED | OUTPATIENT
Start: 2019-11-19 | End: 2019-11-19

## 2019-11-19 RX ORDER — PANTOPRAZOLE SODIUM 40 MG/1
40 TABLET, DELAYED RELEASE ORAL 2 TIMES DAILY
Status: DISCONTINUED | OUTPATIENT
Start: 2019-11-19 | End: 2019-11-21 | Stop reason: HOSPADM

## 2019-11-19 RX ORDER — PROPOFOL 10 MG/ML
INJECTION, EMULSION INTRAVENOUS PRN
Status: DISCONTINUED | OUTPATIENT
Start: 2019-11-19 | End: 2019-11-19

## 2019-11-19 RX ORDER — ACETAMINOPHEN 325 MG/1
975 TABLET ORAL EVERY 8 HOURS
Status: DISCONTINUED | OUTPATIENT
Start: 2019-11-19 | End: 2019-11-21 | Stop reason: HOSPADM

## 2019-11-19 RX ORDER — HYDRALAZINE HYDROCHLORIDE 20 MG/ML
2.5-5 INJECTION INTRAMUSCULAR; INTRAVENOUS EVERY 10 MIN PRN
Status: DISCONTINUED | OUTPATIENT
Start: 2019-11-19 | End: 2019-11-19 | Stop reason: HOSPADM

## 2019-11-19 RX ORDER — NEOSTIGMINE METHYLSULFATE 1 MG/ML
VIAL (ML) INJECTION PRN
Status: DISCONTINUED | OUTPATIENT
Start: 2019-11-19 | End: 2019-11-19

## 2019-11-19 RX ORDER — LISINOPRIL 40 MG/1
40 TABLET ORAL DAILY
Status: DISCONTINUED | OUTPATIENT
Start: 2019-11-20 | End: 2019-11-21 | Stop reason: HOSPADM

## 2019-11-19 RX ORDER — DIMENHYDRINATE 50 MG/ML
25 INJECTION, SOLUTION INTRAMUSCULAR; INTRAVENOUS
Status: DISCONTINUED | OUTPATIENT
Start: 2019-11-19 | End: 2019-11-19 | Stop reason: HOSPADM

## 2019-11-19 RX ORDER — HYDROMORPHONE HYDROCHLORIDE 1 MG/ML
.3-.5 INJECTION, SOLUTION INTRAMUSCULAR; INTRAVENOUS; SUBCUTANEOUS EVERY 5 MIN PRN
Status: DISCONTINUED | OUTPATIENT
Start: 2019-11-19 | End: 2019-11-19 | Stop reason: HOSPADM

## 2019-11-19 RX ORDER — LIDOCAINE HYDROCHLORIDE 10 MG/ML
INJECTION, SOLUTION INFILTRATION; PERINEURAL PRN
Status: DISCONTINUED | OUTPATIENT
Start: 2019-11-19 | End: 2019-11-19

## 2019-11-19 RX ORDER — OXYCODONE HYDROCHLORIDE 5 MG/1
5-10 TABLET ORAL
Status: DISCONTINUED | OUTPATIENT
Start: 2019-11-19 | End: 2019-11-21 | Stop reason: HOSPADM

## 2019-11-19 RX ORDER — ONDANSETRON 4 MG/1
4 TABLET, ORALLY DISINTEGRATING ORAL EVERY 6 HOURS PRN
Status: DISCONTINUED | OUTPATIENT
Start: 2019-11-19 | End: 2019-11-21 | Stop reason: HOSPADM

## 2019-11-19 RX ORDER — HYDROMORPHONE HYDROCHLORIDE 1 MG/ML
0.2 INJECTION, SOLUTION INTRAMUSCULAR; INTRAVENOUS; SUBCUTANEOUS
Status: DISCONTINUED | OUTPATIENT
Start: 2019-11-19 | End: 2019-11-21 | Stop reason: HOSPADM

## 2019-11-19 RX ORDER — CEFAZOLIN SODIUM 1 G/3ML
1 INJECTION, POWDER, FOR SOLUTION INTRAMUSCULAR; INTRAVENOUS SEE ADMIN INSTRUCTIONS
Status: DISCONTINUED | OUTPATIENT
Start: 2019-11-19 | End: 2019-11-19 | Stop reason: HOSPADM

## 2019-11-19 RX ORDER — SODIUM CHLORIDE, SODIUM LACTATE, POTASSIUM CHLORIDE, CALCIUM CHLORIDE 600; 310; 30; 20 MG/100ML; MG/100ML; MG/100ML; MG/100ML
INJECTION, SOLUTION INTRAVENOUS CONTINUOUS
Status: DISCONTINUED | OUTPATIENT
Start: 2019-11-19 | End: 2019-11-21 | Stop reason: HOSPADM

## 2019-11-19 RX ORDER — EPHEDRINE SULFATE 50 MG/ML
INJECTION, SOLUTION INTRAMUSCULAR; INTRAVENOUS; SUBCUTANEOUS PRN
Status: DISCONTINUED | OUTPATIENT
Start: 2019-11-19 | End: 2019-11-19

## 2019-11-19 RX ORDER — METOPROLOL SUCCINATE 50 MG/1
50 TABLET, EXTENDED RELEASE ORAL DAILY
Status: DISCONTINUED | OUTPATIENT
Start: 2019-11-20 | End: 2019-11-21 | Stop reason: HOSPADM

## 2019-11-19 RX ORDER — ACETAMINOPHEN 325 MG/1
650 TABLET ORAL EVERY 4 HOURS PRN
Status: DISCONTINUED | OUTPATIENT
Start: 2019-11-22 | End: 2019-11-21 | Stop reason: HOSPADM

## 2019-11-19 RX ORDER — HYDROXYZINE HYDROCHLORIDE 10 MG/1
10 TABLET, FILM COATED ORAL EVERY 6 HOURS PRN
Status: DISCONTINUED | OUTPATIENT
Start: 2019-11-19 | End: 2019-11-21 | Stop reason: HOSPADM

## 2019-11-19 RX ORDER — BUPIVACAINE HYDROCHLORIDE AND EPINEPHRINE 2.5; 5 MG/ML; UG/ML
60 INJECTION, SOLUTION EPIDURAL; INFILTRATION; INTRACAUDAL; PERINEURAL ONCE
Status: COMPLETED | OUTPATIENT
Start: 2019-11-19 | End: 2019-11-19

## 2019-11-19 RX ORDER — NALOXONE HYDROCHLORIDE 0.4 MG/ML
.1-.4 INJECTION, SOLUTION INTRAMUSCULAR; INTRAVENOUS; SUBCUTANEOUS
Status: DISCONTINUED | OUTPATIENT
Start: 2019-11-19 | End: 2019-11-19

## 2019-11-19 RX ORDER — MESALAMINE 800 MG/1
1600 TABLET, DELAYED RELEASE ORAL 2 TIMES DAILY
Status: DISCONTINUED | OUTPATIENT
Start: 2019-11-19 | End: 2019-11-21 | Stop reason: HOSPADM

## 2019-11-19 RX ORDER — CELECOXIB 200 MG/1
200 CAPSULE ORAL 2 TIMES DAILY
Status: DISCONTINUED | OUTPATIENT
Start: 2019-11-20 | End: 2019-11-21 | Stop reason: HOSPADM

## 2019-11-19 RX ORDER — FENTANYL CITRATE 50 UG/ML
25-50 INJECTION, SOLUTION INTRAMUSCULAR; INTRAVENOUS
Status: DISCONTINUED | OUTPATIENT
Start: 2019-11-19 | End: 2019-11-19 | Stop reason: HOSPADM

## 2019-11-19 RX ORDER — CEFAZOLIN SODIUM 2 G/100ML
2 INJECTION, SOLUTION INTRAVENOUS EVERY 8 HOURS
Status: COMPLETED | OUTPATIENT
Start: 2019-11-19 | End: 2019-11-20

## 2019-11-19 RX ORDER — BISACODYL 10 MG
10 SUPPOSITORY, RECTAL RECTAL DAILY PRN
Status: DISCONTINUED | OUTPATIENT
Start: 2019-11-19 | End: 2019-11-21 | Stop reason: HOSPADM

## 2019-11-19 RX ORDER — KETOROLAC TROMETHAMINE 30 MG/ML
30 INJECTION, SOLUTION INTRAMUSCULAR; INTRAVENOUS
Status: COMPLETED | OUTPATIENT
Start: 2019-11-19 | End: 2019-11-19

## 2019-11-19 RX ORDER — MISOPROSTOL 200 UG/1
200 TABLET ORAL 2 TIMES DAILY
Status: DISCONTINUED | OUTPATIENT
Start: 2019-11-19 | End: 2019-11-21 | Stop reason: HOSPADM

## 2019-11-19 RX ORDER — HYDROMORPHONE HYDROCHLORIDE 1 MG/ML
INJECTION, SOLUTION INTRAMUSCULAR; INTRAVENOUS; SUBCUTANEOUS
Status: COMPLETED
Start: 2019-11-19 | End: 2019-11-19

## 2019-11-19 RX ORDER — ONDANSETRON 2 MG/ML
4 INJECTION INTRAMUSCULAR; INTRAVENOUS EVERY 6 HOURS PRN
Status: DISCONTINUED | OUTPATIENT
Start: 2019-11-19 | End: 2019-11-21 | Stop reason: HOSPADM

## 2019-11-19 RX ORDER — LIDOCAINE 40 MG/G
CREAM TOPICAL
Status: DISCONTINUED | OUTPATIENT
Start: 2019-11-19 | End: 2019-11-19 | Stop reason: HOSPADM

## 2019-11-19 RX ORDER — ONDANSETRON 4 MG/1
4 TABLET, ORALLY DISINTEGRATING ORAL EVERY 30 MIN PRN
Status: DISCONTINUED | OUTPATIENT
Start: 2019-11-19 | End: 2019-11-19 | Stop reason: HOSPADM

## 2019-11-19 RX ORDER — DULOXETIN HYDROCHLORIDE 30 MG/1
60 CAPSULE, DELAYED RELEASE ORAL DAILY
Status: DISCONTINUED | OUTPATIENT
Start: 2019-11-19 | End: 2019-11-21 | Stop reason: HOSPADM

## 2019-11-19 RX ORDER — FENTANYL CITRATE 50 UG/ML
INJECTION, SOLUTION INTRAMUSCULAR; INTRAVENOUS PRN
Status: DISCONTINUED | OUTPATIENT
Start: 2019-11-19 | End: 2019-11-19

## 2019-11-19 RX ORDER — HYDRALAZINE HYDROCHLORIDE 20 MG/ML
INJECTION INTRAMUSCULAR; INTRAVENOUS PRN
Status: DISCONTINUED | OUTPATIENT
Start: 2019-11-19 | End: 2019-11-19

## 2019-11-19 RX ORDER — ONDANSETRON 2 MG/ML
4 INJECTION INTRAMUSCULAR; INTRAVENOUS EVERY 30 MIN PRN
Status: DISCONTINUED | OUTPATIENT
Start: 2019-11-19 | End: 2019-11-19 | Stop reason: HOSPADM

## 2019-11-19 RX ADMIN — KETOROLAC TROMETHAMINE 30 MG: 30 INJECTION, SOLUTION INTRAMUSCULAR at 16:22

## 2019-11-19 RX ADMIN — FENTANYL CITRATE 150 MCG: 50 INJECTION, SOLUTION INTRAMUSCULAR; INTRAVENOUS at 13:13

## 2019-11-19 RX ADMIN — MISOPROSTOL 200 MCG: 200 TABLET ORAL at 21:20

## 2019-11-19 RX ADMIN — ASPIRIN 325 MG: 325 TABLET, DELAYED RELEASE ORAL at 18:31

## 2019-11-19 RX ADMIN — SODIUM CHLORIDE, POTASSIUM CHLORIDE, SODIUM LACTATE AND CALCIUM CHLORIDE: 600; 310; 30; 20 INJECTION, SOLUTION INTRAVENOUS at 13:56

## 2019-11-19 RX ADMIN — GLYCOPYRROLATE 0.7 MG: 0.2 INJECTION, SOLUTION INTRAMUSCULAR; INTRAVENOUS at 15:01

## 2019-11-19 RX ADMIN — MIDAZOLAM 2 MG: 1 INJECTION INTRAMUSCULAR; INTRAVENOUS at 13:05

## 2019-11-19 RX ADMIN — PANTOPRAZOLE SODIUM 40 MG: 40 TABLET, DELAYED RELEASE ORAL at 21:20

## 2019-11-19 RX ADMIN — OXYCODONE HYDROCHLORIDE 5 MG: 5 TABLET ORAL at 19:31

## 2019-11-19 RX ADMIN — OXYCODONE HYDROCHLORIDE 5 MG: 5 TABLET ORAL at 18:30

## 2019-11-19 RX ADMIN — CEFAZOLIN SODIUM 2 G: 2 INJECTION, SOLUTION INTRAVENOUS at 21:20

## 2019-11-19 RX ADMIN — Medication 50 MG: at 13:53

## 2019-11-19 RX ADMIN — FENTANYL CITRATE 50 MCG: 50 INJECTION, SOLUTION INTRAMUSCULAR; INTRAVENOUS at 15:10

## 2019-11-19 RX ADMIN — HYDROMORPHONE HYDROCHLORIDE 0.5 MG: 1 INJECTION, SOLUTION INTRAMUSCULAR; INTRAVENOUS; SUBCUTANEOUS at 17:04

## 2019-11-19 RX ADMIN — PROPOFOL 200 MG: 10 INJECTION, EMULSION INTRAVENOUS at 13:14

## 2019-11-19 RX ADMIN — TIZANIDINE 16 MG: 4 TABLET ORAL at 21:19

## 2019-11-19 RX ADMIN — SENNOSIDES AND DOCUSATE SODIUM 2 TABLET: 8.6; 5 TABLET ORAL at 21:41

## 2019-11-19 RX ADMIN — FENTANYL CITRATE 50 MCG: 50 INJECTION, SOLUTION INTRAMUSCULAR; INTRAVENOUS at 15:21

## 2019-11-19 RX ADMIN — TRANEXAMIC ACID 1 G: 1 INJECTION, SOLUTION INTRAVENOUS at 14:07

## 2019-11-19 RX ADMIN — OXYCODONE HYDROCHLORIDE 10 MG: 5 TABLET ORAL at 22:35

## 2019-11-19 RX ADMIN — HYDROMORPHONE HYDROCHLORIDE 0.5 MG: 1 INJECTION, SOLUTION INTRAMUSCULAR; INTRAVENOUS; SUBCUTANEOUS at 15:58

## 2019-11-19 RX ADMIN — SODIUM CHLORIDE, POTASSIUM CHLORIDE, SODIUM LACTATE AND CALCIUM CHLORIDE: 600; 310; 30; 20 INJECTION, SOLUTION INTRAVENOUS at 13:15

## 2019-11-19 RX ADMIN — ACETAMINOPHEN 975 MG: 325 TABLET, FILM COATED ORAL at 18:30

## 2019-11-19 RX ADMIN — DULOXETINE HYDROCHLORIDE 60 MG: 30 CAPSULE, DELAYED RELEASE ORAL at 21:19

## 2019-11-19 RX ADMIN — FENTANYL CITRATE 100 MCG: 50 INJECTION, SOLUTION INTRAMUSCULAR; INTRAVENOUS at 13:38

## 2019-11-19 RX ADMIN — HYDROXYZINE HYDROCHLORIDE 10 MG: 10 TABLET ORAL at 19:31

## 2019-11-19 RX ADMIN — FENTANYL CITRATE 50 MCG: 50 INJECTION, SOLUTION INTRAMUSCULAR; INTRAVENOUS at 15:37

## 2019-11-19 RX ADMIN — FENTANYL CITRATE 50 MCG: 50 INJECTION, SOLUTION INTRAMUSCULAR; INTRAVENOUS at 15:49

## 2019-11-19 RX ADMIN — MESALAMINE 1600 MG: 800 TABLET, DELAYED RELEASE ORAL at 21:19

## 2019-11-19 RX ADMIN — GLYCOPYRROLATE 0.2 MG: 0.2 INJECTION, SOLUTION INTRAMUSCULAR; INTRAVENOUS at 13:13

## 2019-11-19 RX ADMIN — SODIUM CHLORIDE, POTASSIUM CHLORIDE, SODIUM LACTATE AND CALCIUM CHLORIDE: 600; 310; 30; 20 INJECTION, SOLUTION INTRAVENOUS at 18:32

## 2019-11-19 RX ADMIN — GABAPENTIN 900 MG: 300 CAPSULE ORAL at 21:20

## 2019-11-19 RX ADMIN — SODIUM CHLORIDE, POTASSIUM CHLORIDE, SODIUM LACTATE AND CALCIUM CHLORIDE: 600; 310; 30; 20 INJECTION, SOLUTION INTRAVENOUS at 13:04

## 2019-11-19 RX ADMIN — DEXAMETHASONE SODIUM PHOSPHATE 4 MG: 4 INJECTION, SOLUTION INTRA-ARTICULAR; INTRALESIONAL; INTRAMUSCULAR; INTRAVENOUS; SOFT TISSUE at 13:14

## 2019-11-19 RX ADMIN — Medication 5 MG: at 14:35

## 2019-11-19 RX ADMIN — Medication 3.5 MG: at 15:01

## 2019-11-19 RX ADMIN — HYDROMORPHONE HYDROCHLORIDE 1 MG: 1 INJECTION, SOLUTION INTRAMUSCULAR; INTRAVENOUS; SUBCUTANEOUS at 13:25

## 2019-11-19 RX ADMIN — Medication 1 G: at 13:22

## 2019-11-19 RX ADMIN — HYDRALAZINE HYDROCHLORIDE 5 MG: 20 INJECTION INTRAMUSCULAR; INTRAVENOUS at 13:56

## 2019-11-19 RX ADMIN — LIDOCAINE HYDROCHLORIDE 50 MG: 10 INJECTION, SOLUTION INFILTRATION; PERINEURAL at 13:11

## 2019-11-19 RX ADMIN — ONDANSETRON HYDROCHLORIDE 4 MG: 2 INJECTION, SOLUTION INTRAMUSCULAR; INTRAVENOUS at 19:32

## 2019-11-19 RX ADMIN — CEFAZOLIN SODIUM 2 G: 2 INJECTION, SOLUTION INTRAVENOUS at 13:04

## 2019-11-19 RX ADMIN — HYDROMORPHONE HYDROCHLORIDE 0.5 MG: 1 INJECTION, SOLUTION INTRAMUSCULAR; INTRAVENOUS; SUBCUTANEOUS at 16:23

## 2019-11-19 ASSESSMENT — ACTIVITIES OF DAILY LIVING (ADL): ADLS_ACUITY_SCORE: 11

## 2019-11-19 ASSESSMENT — MIFFLIN-ST. JEOR: SCORE: 1845.92

## 2019-11-19 NOTE — PHARMACY-ADMISSION MEDICATION HISTORY
Medication reconciliation interview completed by pre-admitting nurse, reviewed by pharmacy. No further clarifications needed.     Prior to Admission medications    Medication Sig Last Dose Taking? Auth Provider   albuterol (PROAIR HFA/PROVENTIL HFA/VENTOLIN HFA) 108 (90 Base) MCG/ACT inhaler Inhale 2 puffs into the lungs every 4 hours as needed for shortness of breath / dyspnea or wheezing  Yes [place] PA-C   ASACOL  MG EC tablet Take 1,600 mg by mouth 2 times daily   Yes Reported, Patient   calcium carbonate-vitamin D (CALCIUM 600+D) 600-200 MG-UNIT TABS Take by mouth 2 times daily  Yes Reported, Patient   diclofenac (VOLTAREN) 75 MG EC tablet Take 75 mg by mouth 2 times daily  Yes Reported, Patient   DULoxetine (CYMBALTA) 60 MG EC capsule 60 mg daily   Yes Reported, Patient   esomeprazole (NEXIUM) 40 MG CR capsule 40 mg 2 times daily   Yes Reported, Patient   gabapentin (NEURONTIN) 300 MG capsule Take 900 mg by mouth At Bedtime   Yes Reported, Patient   HUMIRA PEN 40 MG/0.8ML pen kit Inject 40 mg Subcutaneous every 14 days   Yes Reported, Patient   lisinopril (PRINIVIL/ZESTRIL) 40 MG tablet Take 40 mg by mouth daily  Yes Reported, Patient   metoprolol succinate ER (TOPROL-XL) 50 MG 24 hr tablet Take 1 tablet (50 mg) by mouth daily  Yes Beryl Jansen MD   misoprostol (CYTOTEC) 200 MCG tablet Take 200 mcg by mouth 2 times daily  Yes Reported, Patient   multivitamin, therapeutic with minerals (MULTI-VITAMIN) TABS Take 1 tablet by mouth daily.  Yes Reported, Patient   mupirocin (BACTROBAN) 2 % external ointment 2 times daily Apply a small amount in each nostril 2 times per day for 7 days prior to surgery.  Yes Reported, Patient   TIZANIDINE HCL PO Take 16 mg by mouth At Bedtime   Yes Reported, Patient   RANITIDINE  MG OR TABS Take 150 mg by mouth daily as needed  More than a month at Unknown time  Reported, Patient   SUMAtriptan (IMITREX) 100 MG tablet 100 mg at onset of  headache  More than a month at Unknown time  Reported, Patient

## 2019-11-19 NOTE — CONSULTS
[facility] Hospitalist Consult     Avi Bhatti MRN# [id number]   YOB: 1960 Age: 59 year old   Date of Admission: 11/19/2019  PCP is Andrew Anders  Date of Service: 11/19/2019    Referring MD & Reason for Visit: I was asked by Allen Coats MD, to manage chronic medical problems.  Internal Medicine Physician Assistant: Fara Linn PA-C         Assessment and Plan:   Avi Bhatti is a 59 year old male  with a history of HTN, CKD, Migraine HA, HLD, YORDY, Crohn's Disease, Ankylosing Spondylitis, GERD, Left Hip Osteoarthritis who is admitted s/p Left Total Hip Arthroplasty.  Internal Medicine service was asked to see for management of chronic medical problems.       Left Hip Osteoarthritis s/p Left Total Hip Arthroplasty - POD #0. Doing well.     - will defer diet, activity, DVT ppx, and pain control to primary team.     HTN - continue pta Lisinopril and Toprol XL    Crohn's Disease - s/p ileocolectomy with primary anastamosis, currently stable.  Continue pta Asacol.  Resume Humira injections every 14 days upon discharge.     Migraine HA - currently asymptomatic.  Continue pta Gabapentin, Duloxetine, Tizanidine for headache prevention.  He receives botox injections regularly and uses Imitrex prn.    GERD - continue pta Misoprostol for NSAID induced ulcer prevention and Nexium    YORDY - continue pta bipap    CODE: Full  Diet/IVF: regular  GI ppx:  Nexium  DVT ppx: ASA      Fara Linn MS, PA-C  Internal Medicine Physician Assistant  [facility]  Pager: [phone]           Chief Complaint:   Left Hip Pain         HPI:   History is obtained from the patient and medical record. This patient is a 59 year old male with a history of HTN, CKD, Migraine HA, HLD, YORDY, Crohn's Disease, Ankylosing Spondylitis, GERD, Left Hip Osteoarthritis who is admitted s/p Left Total Hip Arthroplasty.  EBL 300ml.  Internal Medicine service was asked to see for management of chronic  medical problems.     Patient reports left hip pain even with minimal movement.  He denies chest pain, shortness of breath, abdominal pain, nausea, emesis.           Past Medical History:     Past Medical History:   Diagnosis Date     Ankylosing spondylitis (H)      Arthritis      Crohn's colitis (H)      Gastroesophageal reflux disease      Hypertension      Migraine      Other chronic pain     headache and neck pain, receives botox injections Q 12 weeks     Sleep apnea     does not use CPAP due to claustrophobia          Past Surgical History:     Past Surgical History:   Procedure Laterality Date     BOTOX CHRONIC MIGRAINE HEAD ACHES      for chronic headache and neck pain, injections every 12 weeks     EXTRACTION(S) DENTAL  12/18/2012    Procedure: EXTRACTION(S) DENTAL;  Extraction of Teeth 30, 19;  Surgeon: Sujey Cunningham DDS;  Location: [facility] OR     repair fracture & insert pins left hand  1996     SMALL BOWEL RESECTION  1993          Social History:     Social History     Socioeconomic History     Marital status:      Spouse name: Not on file     Number of children: Not on file     Years of education: Not on file     Highest education level: Not on file   Occupational History     Not on file   Social Needs     Financial resource strain: Not on file     Food insecurity:     Worry: Not on file     Inability: Not on file     Transportation needs:     Medical: Not on file     Non-medical: Not on file   Tobacco Use     Smoking status: Never Smoker     Smokeless tobacco: Never Used   Substance and Sexual Activity     Alcohol use: Yes     Comment: seldom     Drug use: No     Sexual activity: Not Currently     Partners: Female   Lifestyle     Physical activity:     Days per week: Not on file     Minutes per session: Not on file     Stress: Not on file   Relationships     Social connections:     Talks on phone: Not on file     Gets together: Not on file     Attends [employer] service: Not on file     Active  member of club or organization: Not on file     Attends meetings of clubs or organizations: Not on file     Relationship status: Not on file     Intimate partner violence:     Fear of current or ex partner: Not on file     Emotionally abused: Not on file     Physically abused: Not on file     Forced sexual activity: Not on file   Other Topics Concern     Parent/sibling w/ CABG, MI or angioplasty before 65F 55M? No   Social History Narrative     Not on file          Family History:     Family History   Problem Relation Age of Onset     Hypertension Father      Alcoholism Father      Other - See Comments Father      Coronary Artery Disease Father      Skin Cancer Father           Allergies:      Allergies   Allergen Reactions     Prednisone Other (See Comments)     Elevated heart rate     Reglan [Metoclopramide] Other (See Comments)     agitation          Medications:     Prior to Admission medications    Medication Sig Last Dose Taking? Auth Provider   albuterol (PROAIR HFA/PROVENTIL HFA/VENTOLIN HFA) 108 (90 Base) MCG/ACT inhaler Inhale 2 puffs into the lungs every 4 hours as needed for shortness of breath / dyspnea or wheezing Past Month at Unknown time Yes [place] PA-C   ASACOL  MG EC tablet Take 1,600 mg by mouth 2 times daily  11/18/2019 at Unknown time Yes Reported, Patient   calcium carbonate-vitamin D (CALCIUM 600+D) 600-200 MG-UNIT TABS Take by mouth 2 times daily 11/18/2019 at Unknown time Yes Reported, Patient   diclofenac (VOLTAREN) 75 MG EC tablet Take 75 mg by mouth 2 times daily Past Month at Unknown time Yes Reported, Patient   DULoxetine (CYMBALTA) 60 MG EC capsule 60 mg daily  11/18/2019 at Unknown time Yes Reported, Patient   esomeprazole (NEXIUM) 40 MG CR capsule 40 mg 2 times daily  11/18/2019 at Unknown time Yes Reported, Patient   gabapentin (NEURONTIN) 300 MG capsule Take 900 mg by mouth At Bedtime  11/18/2019 at Unknown time Yes Reported, Patient   HUMIRA PEN 40  "MG/0.8ML pen kit Inject 40 mg Subcutaneous every 14 days   Yes Reported, Patient   lisinopril (PRINIVIL/ZESTRIL) 40 MG tablet Take 40 mg by mouth daily 11/18/2019 at Unknown time Yes Reported, Patient   metoprolol succinate ER (TOPROL-XL) 50 MG 24 hr tablet Take 1 tablet (50 mg) by mouth daily 11/18/2019 at Unknown time Yes Beryl Jansen MD   misoprostol (CYTOTEC) 200 MCG tablet Take 200 mcg by mouth 2 times daily 11/18/2019 at Unknown time Yes Reported, Patient   multivitamin, therapeutic with minerals (MULTI-VITAMIN) TABS Take 1 tablet by mouth daily.  Yes Reported, Patient   mupirocin (BACTROBAN) 2 % external ointment 2 times daily Apply a small amount in each nostril 2 times per day for 7 days prior to surgery.  Yes Reported, Patient   TIZANIDINE HCL PO Take 16 mg by mouth At Bedtime  11/18/2019 at Unknown time Yes Reported, Patient   RANITIDINE  MG OR TABS Take 150 mg by mouth daily as needed  More than a month at Unknown time  Reported, Patient   SUMAtriptan (IMITREX) 100 MG tablet 100 mg at onset of headache  More than a month at Unknown time  Reported, Patient          Review of Systems:   A complete ROS was performed and is negative other than what is stated in the HPI.       Physical Exam:   Blood pressure 126/88, pulse 71, temperature 97.7  F (36.5  C), temperature source Temporal, resp. rate 11, height 1.727 m (5' 8\"), weight 106.1 kg (234 lb), SpO2 94 %. on room air  General: Alert, interactive, NAD, lying in bed with family at the bedside  HEENT: AT/NC, sclera anicteric, PERRL, EOMI  Chest/Resp: clear to auscultation bilaterally, no crackles or wheezes  Heart/CV: regular rate and rhythm, no murmur  Abdomen/GI: Soft, nontender, nondistended. +BS.  No HSM or masses, no rebound or guarding.  Extremities/MSK: No LE edema.  SCD in place.  Left hip bandage c/d/i.  FROM testing not performed.  Skin: Warm and dry  Neuro: Alert & oriented x 3         Labs:   ROUTINE ICU LABS (Last four " results)  CMPNo lab results found in last 7 days.  CBCNo lab results found in last 7 days.  INRNo lab results found in last 7 days.  Arterial Blood GasNo lab results found in last 7 days.

## 2019-11-19 NOTE — ANESTHESIA PREPROCEDURE EVALUATION
Anesthesia Pre-Procedure Evaluation    Patient: Avi Bhatti   MRN: [id number] : 1960          Preoperative Diagnosis: Osteoarthritis of left hip, unspecified osteoarthritis type [M16.12]    Procedure(s):  Left total hip arthroplasty    Past Medical History:   Diagnosis Date     Ankylosing spondylitis (H)      Arthritis      Crohn's colitis (H)      Gastroesophageal reflux disease      Hypertension      Migraine      Other chronic pain     headache and neck pain, receives botox injections Q 12 weeks     Sleep apnea     does not use CPAP due to claustrophobia     Past Surgical History:   Procedure Laterality Date     BOTOX CHRONIC MIGRAINE HEAD ACHES      for chronic headache and neck pain, injections every 12 weeks     EXTRACTION(S) DENTAL  2012    Procedure: EXTRACTION(S) DENTAL;  Extraction of Teeth 30, 19;  Surgeon: Sujey Cunningham DDS;  Location: [facility] OR     repair fracture & insert pins left hand       SMALL BOWEL RESECTION       Anesthesia Evaluation     .             ROS/MED HX    ENT/Pulmonary:     (+)sleep apnea, doesn't use CPAP , . .    Neurologic:  - neg neurologic ROS   (+)migraines,     Cardiovascular:     (+) hypertension----. : . . . :. .       METS/Exercise Tolerance:     Hematologic:  - neg hematologic  ROS       Musculoskeletal:   (+)  other musculoskeletal- Ankylosing spondylitis      GI/Hepatic:     (+) GERD Asymptomatic on medication,       Renal/Genitourinary:     (+) chronic renal disease,       Endo:     (+) Obesity, .      Psychiatric:  - neg psychiatric ROS       Infectious Disease:  - neg infectious disease ROS       Malignancy:      - no malignancy   Other:    (+) No chance of pregnancy C-spine cleared: N/A, H/O Chronic Pain,no other significant disability   - neg other ROS                      Physical Exam  Normal systems: cardiovascular, pulmonary and dental    Airway   Mallampati: II  TM distance: >3 FB  Neck ROM: full    Dental     Cardiovascular  "      Pulmonary             Lab Results   Component Value Date    WBC 8.1 11/01/2019    HGB 15.0 11/01/2019    HCT 44.0 11/01/2019     11/01/2019    CRP 6.9 10/17/2015    SED 5 10/17/2015     11/01/2019    POTASSIUM 4.2 11/01/2019    CHLORIDE 104 11/01/2019    CO2 26 11/01/2019    BUN 22 11/01/2019    CR 1.35 (H) 11/01/2019    GLC 76 11/01/2019    GRACE 8.8 11/01/2019    PHOS 3.5 06/08/2011    MAG 2.0 10/17/2015    ALBUMIN 4.1 05/17/2018    PROTTOTAL 7.7 05/17/2018    ALT 54 05/17/2018    AST 21 05/17/2018    ALKPHOS 87 05/17/2018    BILITOTAL 0.7 05/17/2018    INR 0.93 10/17/2015    TSH 1.41 11/20/2017       Preop Vitals  BP Readings from Last 3 Encounters:   11/19/19 (!) 133/94   11/01/19 128/86   10/16/19 (!) 126/94    Pulse Readings from Last 3 Encounters:   11/19/19 68   11/01/19 66   10/16/19 112      Resp Readings from Last 3 Encounters:   11/19/19 18   11/01/19 20   10/16/19 18    SpO2 Readings from Last 3 Encounters:   11/19/19 96%   11/01/19 96%   10/16/19 94%      Temp Readings from Last 1 Encounters:   11/01/19 97.9  F (36.6  C) (Tympanic)    Ht Readings from Last 1 Encounters:   11/19/19 1.727 m (5' 8\")      Wt Readings from Last 1 Encounters:   11/19/19 106.1 kg (234 lb)    Estimated body mass index is 35.58 kg/m  as calculated from the following:    Height as of this encounter: 1.727 m (5' 8\").    Weight as of this encounter: 106.1 kg (234 lb).       Anesthesia Plan      History & Physical Review  History and physical reviewed and following examination; no interval change.    ASA Status:  3 .    NPO Status:  > 8 hours    Plan for General and ETT with Intravenous induction. Maintenance will be Balanced.    PONV prophylaxis:  Dexamethasone or Solumedrol and Ondansetron (or other 5HT-3)       Postoperative Care  Postoperative pain management:  IV analgesics.      Consents  Anesthetic plan, risks, benefits and alternatives discussed with:  Patient.  Use of blood products discussed: Yes.   Use " of blood products discussed with Patient.  Consented to blood products.  .                 True Martinez MD                    .

## 2019-11-19 NOTE — PLAN OF CARE
Pt arrived to floor around 1645 on bed. Oriented to room, call light, IS, and POC. 0.5 Iv dilaudid given. CMS intact. VSS, 2L Nc. Cpap on 4L when sleeping. Abductor pill in place. Good dorsi/ plantar. Good pulses. Bs hypo. LS clear. Clear liquid diet. No drains. No N/V. Fox pulled in PACU before coming to floor. DTV. Oxy prn available when needed. Has not dangled yet. Plans to discharge home with wife when able.

## 2019-11-19 NOTE — BRIEF OP NOTE
S/P NIYAH for OA  Jorge L  EBL see dictated op note  No specimens  No anticipated complications

## 2019-11-19 NOTE — OP NOTE
Procedure Date: 11/19/2019      PREOPERATIVE DIAGNOSIS:  Osteoarthritis, left hip.      POSTOPERATIVE DIAGNOSIS:  Osteoarthritis, left hip.      PROCEDURE PERFORMED:  Left total hip arthroplasty.      SURGEON:  Allen Coats MD      ASSISTANT:  Sofiya Morrell PA-C      ANESTHESIA:  General.      ESTIMATED BLOOD LOSS:  300 mL.      COMPLICATIONS:  None.      DESCRIPTION OF PROCEDURE:  The patient was taken to the operating room where after administration of general anesthetic, antibiotic prophylaxis, tranexamic acid, decubitus position and sterile prep and drape, a posterior approach was utilized, splitting the fascia and reflecting the short external rotators and piriformis as a sleeve.  His tissue and hip were quite tight for all portions of the case.  Of note, initially the patient was hypertensive and a second dose of tranexamic acid was given.  Following the initial portion of the procedure, blood loss was minimal.  Retractors were carefully placed about the acetabulum and the sciatic nerve was palpated and protected.  Sequential reaming to 56 mm was performed as well as a 57 mm rim ream.  A 58 mm Ernesto cup was placed with excellent purchase.  Two screws were placed as a routine precaution and a posteriorly directed high wall liner was placed.      Attention was directed to the femoral side.  Sequential reaming and broaching was performed.  The calcar was solid and a size 9 M/L stem was placed based on preoperative planning.  In order to provide adequate stability, +0 x 36 mm ceramic head was placed which provided excellent anterior stability with no ability to dislocate the hip posteriorly until 90 degrees of flexion, 30 degrees of adduction, and 80 degrees of internal rotation.  The patient was preoperatively long and I felt that this combination provided the best combination of leg length restoration as well as stability.        The capsule was closed with a combination of FiberWire and Ethibond  suture.  Given that the piriformis was within the sleeve, it was reattached to the piriformis stump.  This provided an adequate posterior sleeve of tissue.  Copious antibiotic and Betadine irrigation were performed followed by the remaining layered anatomic closure.  The field was dry.  There were no complications.         SOURAV RIVERA MD             D: 2019   T: 2019   MT: PK      Name:     MAL NICKERSON   MRN:      [id number]        Account:        [id number]   :      1960           Procedure Date: 2019      Document: [id number]

## 2019-11-19 NOTE — ANESTHESIA CARE TRANSFER NOTE
Patient: Avi Bhatti    Procedure(s):  LEFT TOTAL HIP ARTHROPLASTY    Diagnosis: Osteoarthritis of left hip, unspecified osteoarthritis type [M16.12]  Diagnosis Additional Information: No value filed.    Anesthesia Type:   General, ETT     Note:  Airway :Face Mask  Patient transferred to:PACU  Comments: To PACU, report to RN, oxygen per face mask.      Vitals: (Last set prior to Anesthesia Care Transfer)    CRNA VITALS  11/19/2019 1432 - 11/19/2019 1517      11/19/2019             Resp Rate (observed):  (!) 1                Electronically Signed By: TONYA Serna CRNA  November 19, 2019  3:17 PM

## 2019-11-19 NOTE — ANESTHESIA POSTPROCEDURE EVALUATION
Patient: Avi Bhatti    Procedure(s):  LEFT TOTAL HIP ARTHROPLASTY    Diagnosis:Osteoarthritis of left hip, unspecified osteoarthritis type [M16.12]  Diagnosis Additional Information: No value filed.    Anesthesia Type:  General, ETT    Note:  Anesthesia Post Evaluation    Patient location during evaluation: PACU  Patient participation: Able to fully participate in evaluation  Level of consciousness: awake and alert  Pain management: adequate  Airway patency: patent  Cardiovascular status: acceptable  Respiratory status: acceptable  Hydration status: acceptable  PONV: controlled     Anesthetic complications: None          Last vitals:  Vitals:    11/19/19 1520 11/19/19 1525 11/19/19 1530   BP: (!) 161/105 (!) 137/98 133/76   Pulse: 103 84 101   Resp: 16 15 15   Temp:      SpO2: 93% 91% 95%         Electronically Signed By: True Martinez MD  November 19, 2019  3:39 PM

## 2019-11-20 ENCOUNTER — APPOINTMENT (OUTPATIENT)
Dept: PHYSICAL THERAPY | Facility: CLINIC | Age: 59
End: 2019-11-20
Attending: ORTHOPAEDIC SURGERY
Payer: COMMERCIAL

## 2019-11-20 ENCOUNTER — APPOINTMENT (OUTPATIENT)
Dept: OCCUPATIONAL THERAPY | Facility: CLINIC | Age: 59
End: 2019-11-20
Attending: ORTHOPAEDIC SURGERY
Payer: COMMERCIAL

## 2019-11-20 LAB
GLUCOSE SERPL-MCNC: 119 MG/DL (ref 70–99)
HGB BLD-MCNC: 12.6 G/DL (ref 13.3–17.7)

## 2019-11-20 PROCEDURE — 36415 COLL VENOUS BLD VENIPUNCTURE: CPT | Performed by: ORTHOPAEDIC SURGERY

## 2019-11-20 PROCEDURE — 99207 ZZC CDG-MDM COMPONENT: MEETS MODERATE - UP CODED: CPT | Performed by: HOSPITALIST

## 2019-11-20 PROCEDURE — 99232 SBSQ HOSP IP/OBS MODERATE 35: CPT | Performed by: HOSPITALIST

## 2019-11-20 PROCEDURE — 25000125 ZZHC RX 250: Performed by: ORTHOPAEDIC SURGERY

## 2019-11-20 PROCEDURE — 97161 PT EVAL LOW COMPLEX 20 MIN: CPT | Mod: GP

## 2019-11-20 PROCEDURE — 85018 HEMOGLOBIN: CPT | Performed by: ORTHOPAEDIC SURGERY

## 2019-11-20 PROCEDURE — 97165 OT EVAL LOW COMPLEX 30 MIN: CPT | Mod: GO | Performed by: STUDENT IN AN ORGANIZED HEALTH CARE EDUCATION/TRAINING PROGRAM

## 2019-11-20 PROCEDURE — 97535 SELF CARE MNGMENT TRAINING: CPT | Mod: GO | Performed by: STUDENT IN AN ORGANIZED HEALTH CARE EDUCATION/TRAINING PROGRAM

## 2019-11-20 PROCEDURE — 97110 THERAPEUTIC EXERCISES: CPT | Mod: GP

## 2019-11-20 PROCEDURE — 97530 THERAPEUTIC ACTIVITIES: CPT | Mod: GP

## 2019-11-20 PROCEDURE — 82947 ASSAY GLUCOSE BLOOD QUANT: CPT | Performed by: ORTHOPAEDIC SURGERY

## 2019-11-20 PROCEDURE — 25000132 ZZH RX MED GY IP 250 OP 250 PS 637: Performed by: PHYSICIAN ASSISTANT

## 2019-11-20 PROCEDURE — 97116 GAIT TRAINING THERAPY: CPT | Mod: GP

## 2019-11-20 PROCEDURE — 25000132 ZZH RX MED GY IP 250 OP 250 PS 637: Performed by: ORTHOPAEDIC SURGERY

## 2019-11-20 PROCEDURE — 12000000 ZZH R&B MED SURG/OB

## 2019-11-20 PROCEDURE — 25000128 H RX IP 250 OP 636: Performed by: ORTHOPAEDIC SURGERY

## 2019-11-20 RX ORDER — ACETAMINOPHEN 325 MG/1
975 TABLET ORAL EVERY 8 HOURS
Qty: 270 TABLET | Refills: 0 | Status: SHIPPED | OUTPATIENT
Start: 2019-11-20 | End: 2019-12-20

## 2019-11-20 RX ORDER — HYDROXYZINE HYDROCHLORIDE 10 MG/1
10 TABLET, FILM COATED ORAL EVERY 6 HOURS PRN
Qty: 30 TABLET | Refills: 0 | Status: SHIPPED | OUTPATIENT
Start: 2019-11-20 | End: 2021-01-14

## 2019-11-20 RX ORDER — OXYCODONE HYDROCHLORIDE 5 MG/1
TABLET ORAL
Qty: 50 TABLET | Refills: 0 | Status: SHIPPED | OUTPATIENT
Start: 2019-11-20 | End: 2021-01-14

## 2019-11-20 RX ORDER — AMOXICILLIN 250 MG
2 CAPSULE ORAL 2 TIMES DAILY
Qty: 60 TABLET | Refills: 1 | Status: SHIPPED | OUTPATIENT
Start: 2019-11-20 | End: 2023-10-02 | Stop reason: ALTCHOICE

## 2019-11-20 RX ORDER — LIDOCAINE HYDROCHLORIDE 20 MG/ML
JELLY TOPICAL ONCE
Status: DISCONTINUED | OUTPATIENT
Start: 2019-11-20 | End: 2019-11-21 | Stop reason: HOSPADM

## 2019-11-20 RX ORDER — LIDOCAINE HYDROCHLORIDE 20 MG/ML
6 JELLY TOPICAL ONCE
Status: COMPLETED | OUTPATIENT
Start: 2019-11-20 | End: 2019-11-20

## 2019-11-20 RX ORDER — ASPIRIN 325 MG
325 TABLET, DELAYED RELEASE (ENTERIC COATED) ORAL DAILY
Qty: 30 TABLET | Refills: 0 | Status: SHIPPED | OUTPATIENT
Start: 2019-11-20 | End: 2019-12-20

## 2019-11-20 RX ADMIN — OXYCODONE HYDROCHLORIDE 10 MG: 5 TABLET ORAL at 21:13

## 2019-11-20 RX ADMIN — LISINOPRIL 40 MG: 40 TABLET ORAL at 07:58

## 2019-11-20 RX ADMIN — OXYCODONE HYDROCHLORIDE 10 MG: 5 TABLET ORAL at 13:24

## 2019-11-20 RX ADMIN — HYDROXYZINE HYDROCHLORIDE 10 MG: 10 TABLET ORAL at 13:24

## 2019-11-20 RX ADMIN — ACETAMINOPHEN 975 MG: 325 TABLET, FILM COATED ORAL at 09:08

## 2019-11-20 RX ADMIN — LIDOCAINE HYDROCHLORIDE 6 ML: 20 JELLY TOPICAL at 04:39

## 2019-11-20 RX ADMIN — OXYCODONE HYDROCHLORIDE 10 MG: 5 TABLET ORAL at 02:11

## 2019-11-20 RX ADMIN — DULOXETINE HYDROCHLORIDE 60 MG: 30 CAPSULE, DELAYED RELEASE ORAL at 07:57

## 2019-11-20 RX ADMIN — SENNOSIDES AND DOCUSATE SODIUM 2 TABLET: 8.6; 5 TABLET ORAL at 08:00

## 2019-11-20 RX ADMIN — OXYCODONE HYDROCHLORIDE 10 MG: 5 TABLET ORAL at 09:08

## 2019-11-20 RX ADMIN — SENNOSIDES AND DOCUSATE SODIUM 2 TABLET: 8.6; 5 TABLET ORAL at 21:14

## 2019-11-20 RX ADMIN — MESALAMINE 1600 MG: 800 TABLET, DELAYED RELEASE ORAL at 21:13

## 2019-11-20 RX ADMIN — HYDROXYZINE HYDROCHLORIDE 10 MG: 10 TABLET ORAL at 08:01

## 2019-11-20 RX ADMIN — OXYCODONE HYDROCHLORIDE 5 MG: 5 TABLET ORAL at 11:04

## 2019-11-20 RX ADMIN — MISOPROSTOL 200 MCG: 200 TABLET ORAL at 21:13

## 2019-11-20 RX ADMIN — CELECOXIB 200 MG: 200 CAPSULE ORAL at 08:01

## 2019-11-20 RX ADMIN — ACETAMINOPHEN 975 MG: 325 TABLET, FILM COATED ORAL at 18:31

## 2019-11-20 RX ADMIN — OXYCODONE HYDROCHLORIDE 10 MG: 5 TABLET ORAL at 16:57

## 2019-11-20 RX ADMIN — HYDROXYZINE HYDROCHLORIDE 10 MG: 10 TABLET ORAL at 21:14

## 2019-11-20 RX ADMIN — CELECOXIB 200 MG: 200 CAPSULE ORAL at 21:14

## 2019-11-20 RX ADMIN — MISOPROSTOL 200 MCG: 200 TABLET ORAL at 07:59

## 2019-11-20 RX ADMIN — POLYETHYLENE GLYCOL 3350 17 G: 17 POWDER, FOR SOLUTION ORAL at 08:01

## 2019-11-20 RX ADMIN — ASPIRIN 325 MG: 325 TABLET, DELAYED RELEASE ORAL at 08:00

## 2019-11-20 RX ADMIN — PANTOPRAZOLE SODIUM 40 MG: 40 TABLET, DELAYED RELEASE ORAL at 21:14

## 2019-11-20 RX ADMIN — TIZANIDINE 16 MG: 4 TABLET ORAL at 21:18

## 2019-11-20 RX ADMIN — CEFAZOLIN SODIUM 2 G: 2 INJECTION, SOLUTION INTRAVENOUS at 04:43

## 2019-11-20 RX ADMIN — PANTOPRAZOLE SODIUM 40 MG: 40 TABLET, DELAYED RELEASE ORAL at 07:58

## 2019-11-20 RX ADMIN — OXYCODONE HYDROCHLORIDE 10 MG: 5 TABLET ORAL at 05:44

## 2019-11-20 RX ADMIN — MESALAMINE 1600 MG: 800 TABLET, DELAYED RELEASE ORAL at 07:58

## 2019-11-20 RX ADMIN — GABAPENTIN 900 MG: 300 CAPSULE ORAL at 21:18

## 2019-11-20 RX ADMIN — ACETAMINOPHEN 975 MG: 325 TABLET, FILM COATED ORAL at 02:11

## 2019-11-20 RX ADMIN — METOPROLOL SUCCINATE 50 MG: 50 TABLET, EXTENDED RELEASE ORAL at 07:58

## 2019-11-20 ASSESSMENT — ACTIVITIES OF DAILY LIVING (ADL)
ADLS_ACUITY_SCORE: 11
ADLS_ACUITY_SCORE: 11
ADLS_ACUITY_SCORE: 14
ADLS_ACUITY_SCORE: 14
ADLS_ACUITY_SCORE: 11
ADLS_ACUITY_SCORE: 12
PREVIOUS_RESPONSIBILITIES: MEDICATION MANAGEMENT;FINANCES;DRIVING

## 2019-11-20 NOTE — PLAN OF CARE
Afeb, vss, cms intact, aquacel dressing clean and dry though rolling up a little on the upper edge. Tolerating regular diet but unable to void yet, bladder scanned for 208cc at 1300. Up with assist of 1 and walker but experiencing alot of pain with activity so only able to ambulate to the door of his room this am. Did increase his pain meds for the pm therapies. Spoke with PA for Dr Coats and told her the status so will leave him impt and plan to see him tomorrow.

## 2019-11-20 NOTE — PLAN OF CARE
OT: Evaluation and treatment completed POD#1 L NIYAH    Patient plan: home with wife  Current status: Pt and wife educated on post-op precautions prior to activity; pt able to follow compensatory techniques to manage lower body dressing tasks, SBA; pt completed transfers from EOB and chair, FWW, SBA; mobility completed bed>bathroom>chair, FWW, SBA, cues provided to roll FWW and relax UEs as noted pt to be quite tense in shoulder with heavy reliance on FWW; pt completed comfort height toilet transfer simulated to home, FWW, counter support, SBA including management of clothing; pt receptive to education for yeimy care management within precautions; discussed tub set-up at home, pt declined to complete trial of transfer today or any other session as reported will be a while before getting to floor at home with tub; pt and wife receptive to safety considerations with transfer  Anticipated status at discharge: min A for dressing tasks, assist with IADLs    Occupational Therapy Discharge Summary    Reason for therapy discharge:    Discharged to home.  All goals and outcomes met, no further needs identified.    Progress towards therapy goal(s). See goals on Care Plan in [facility] electronic health record for goal details.  Goals met    Therapy recommendation(s):    No further therapy is recommended.

## 2019-11-20 NOTE — PROGRESS NOTES
11/20/19 1427   Quick Adds   Type of Visit Initial Occupational Therapy Evaluation   Living Environment   Lives With spouse   Living Arrangements house   Home Accessibility stairs to enter home;stairs within home   Transportation Anticipated car, drives self;family or friend will provide   Living Environment Comment pt and wife live together, wife works during the day. 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   Self-Care   Usual Activity Tolerance moderate   Current Activity Tolerance moderate   Equipment Currently Used at Home none   Activity/Exercise/Self-Care Comment pt has 3 dogs at home, enjoys playing with them   Functional Level   Ambulation 1-->assistive equipment  (cane)   Transferring 0-->independent   Toileting 1-->assistive equipment  (comfort height toilets, counter support)   Bathing 1-->assistive equipment  (tub shower, sliding door, grab bars)   Dressing 0-->independent   Fall history within last six months no   Prior Functional Level Comment independent at baseline    General Information   Onset of Illness/Injury or Date of Surgery - Date 11/19/19   Referring Physician Jorge L GARCIA   Patient/Family Goals Statement return home   Additional Occupational Profile Info/Pertinent History of Current Problem POD#1 L NIYAH   Precautions/Limitations left hip precautions   Weight-Bearing Status - LLE weight-bearing as tolerated   General Observations P/L precautions: no flexion limitation, avoid IR, pivot, ADD   Cognitive Status Examination   Orientation orientation to person, place and time   Level of Consciousness alert   Follows Commands (Cognition) WFL   Memory intact   Pain Assessment   Patient Currently in Pain Yes, see Vital Sign flowsheet  (significant pain with activity)   Mobility   Bed Mobility Comments SBA   Transfer Skills   Transfer Comments FWW, SBA   Transfer Skill: Sit to Stand   Level of [place]: Sit/Stand stand-by assist   Physical Assist/Nonphysical Assist: Sit/Stand verbal cues   Transfer Skill: Sit to  "Stand weight-bearing as tolerated   Assistive Device for Transfer: Sit/Stand rolling walker   Transfer Skill: Toilet Transfer   Level of [place]: Toilet stand-by assist   Physical Assist/Nonphysical Assist: Toilet verbal cues   Weight-Bearing Restrictions: Toilet weight-bearing as tolerated   Assistive Device rolling walker   Balance   Balance Comments impaired prior to surgery, using cane, now using FWW for additional support   Lower Body Dressing   Level of [place]: Dress Lower Body stand-by assist   Physical Assist/Nonphysical Assist: Dress Lower Body verbal cues   Toileting   Level of [place]: Toilet stand-by assist   Physical Assist/Nonphysical Assist: Toilet verbal cues   Instrumental Activities of Daily Living (IADL)   Previous Responsibilities medication management;finances;driving   Activities of Daily Living Analysis   Impairments Contributing to Impaired Activities of Daily Living balance impaired;pain;post surgical precautions;strength decreased   General Therapy Interventions   Planned Therapy Interventions ADL retraining;IADL retraining;transfer training   Clinical Impression   Criteria for Skilled Therapeutic Interventions Met yes, treatment indicated   OT Diagnosis impaired ability to manage ADL/IADLs   Influenced by the following impairments post-op pain, precautions, fatigue, impaired balance   Assessment of Occupational Performance 3-5 Performance Deficits   Identified Performance Deficits dressing, bathing, toileting, meals, driving   Clinical Decision Making (Complexity) Low complexity   Therapy Frequency Daily   Predicted Duration of Therapy Intervention (days/wks) da   Anticipated Discharge Disposition Home with Assist   Risks and Benefits of Treatment have been explained. Yes   Patient, Family & other staff in agreement with plan of care Yes   Clinical Impression Comments demonstrates ability to benefit from skilled OT services   [facility] AM-PAC TM \"6 Clicks\"   2016, " "Trustees of [facility], under license to [employer].  All rights reserved.   6 Clicks Short Forms Daily Activity Inpatient Short Form   [facility] AM-PAC  \"6 Clicks\" Daily Activity Inpatient Short Form   1. Putting on and taking off regular lower body clothing? 3 - A Little   2. Bathing (including washing, rinsing, drying)? 3 - A Little   3. Toileting, which includes using toilet, bedpan or urinal? 3 - A Little   4. Putting on and taking off regular upper body clothing? 4 - None   5. Taking care of personal grooming such as brushing teeth? 4 - None   6. Eating meals? 4 - None   Daily Activity Raw Score (Score out of 24.Lower scores equate to lower levels of function) 21   Total Evaluation Time   Total Evaluation Time (Minutes) 8     "

## 2019-11-20 NOTE — PROGRESS NOTES
"Orthopedic Surgery  11/20/2019  POD 1    S: Patient voices no unexpected ortho complaints today. Denies chest pain or shortness of breath. Did stand 3 times last night in room. Needed straight cath this am for 600.    O: Blood pressure 118/85, pulse 92, temperature 97.4  F (36.3  C), temperature source Oral, resp. rate 16, height 1.727 m (5' 8\"), weight 106.1 kg (234 lb), SpO2 95 %.  Lab Results   Component Value Date    HGB 15.0 11/01/2019     Lab Results   Component Value Date    INR 0.93 10/17/2015     I/O last 3 completed shifts:  In: 2700 [I.V.:2700]  Out: 850 [Urine:550; Blood:300]  Distal extremity CMSI bilaterally.  Calves are negative bilaterally, both soft and nontender.  The dressing is C/D/I.      A: Mr. Bhatti is doing well status post Procedure(s):  LEFT TOTAL HIP ARTHROPLASTY.    P: Continue physical therapy. Continue DVT pphx with ASA due to high mobility and low risk factors for DVT. Anticipate discharge to discharge to home tonight or tomorrow depending on progress with PT, safety and if any medical issues arise. Did not have formal PT yesterday due to late timing out of OR.    Sofiya Morrell PA-C  [phone]  "

## 2019-11-20 NOTE — PLAN OF CARE
Alert and oriented. LS clear. CPAP on with 2L O2 overnight. VSS. Pain managed with oxycodone. Assist of 2 with a walker and gait belt by bedside. Dressing is C/D/I. Denied numbness/tingling. Has not been able to void. Bladder scanned and catheterized for 600 ml. Tolerated well.

## 2019-11-20 NOTE — PROGRESS NOTES
[facility]    Hospitalist Progress Note      Assessment & Plan   Avi Bhatti is a 59 year old male  with a history of HTN, CKD, Migraine HA, HLD, YORDY, Crohn's Disease, Ankylosing Spondylitis, GERD, Left Hip Osteoarthritis who is admitted s/p Left Total Hip Arthroplasty.  Internal Medicine service was asked to see for management of chronic medical problems.      Left Hip Osteoarthritis s/p Left Total Hip Arthroplasty - POD #1. Doing well.     - will defer diet, activity, DVT ppx, and pain control to primary team.      HTN - continue pta Lisinopril and Toprol XL     Crohn's Disease - s/p ileocolectomy with primary anastamosis, currently stable.  Continue pta Asacol.  Resume Humira injections every 14 days upon discharge.  I have instructed patient to contact his PCP upon discharge for resumption instructions.     Migraine HA - currently asymptomatic.  Continue pta Gabapentin, Duloxetine, Tizanidine for headache prevention.  He receives botox injections regularly and uses Imitrex prn.  Should continue upon discharge     GERD - continue pta Misoprostol for NSAID induced ulcer prevention and Nexium     YORDY - continue pta bipap    Medication reconciliation for chronic medical problems completed.  Deferring pain management to primary surgical service.    CODE: Full  Diet/IVF: regular  GI ppx:  Nexium  DVT ppx: ASA per primary service    Jersey Quevedo MD  Text Page    Interval History   No acute events.  Patient states pain in the knee is relatively well managed.  He denies any current chest pain, shortness of breath, abdominal pain.  He states he is eating a bit more slowly compared to usual.  Denies any nausea or vomiting.    -Data reviewed today: I reviewed all new labs and imaging results over the last 24 hours.     Physical Exam   Temp: 96  F (35.6  C) Temp src: Oral BP: 131/80 Pulse: 75 Heart Rate: 73 Resp: 18 SpO2: 95 % O2 Device: None (Room air) Oxygen Delivery: 2 LPM  Vitals:    11/15/19 1358  11/19/19 1114   Weight: 106.6 kg (235 lb) 106.1 kg (234 lb)     Vital Signs with Ranges  Temp:  [96  F (35.6  C)-97.9  F (36.6  C)] 96  F (35.6  C)  Pulse:  [] 75  Heart Rate:  [64-95] 73  Resp:  [10-21] 18  BP: (118-163)/() 131/80  SpO2:  [91 %-98 %] 95 %  I/O last 3 completed shifts:  In: 4180 [P.O.:480; I.V.:3700]  Out: 1450 [Urine:1150; Blood:300]    General: Alert, interactive, NAD, lying in bed with family at the bedside  HEENT: AT/NC, sclera anicteric, PERRL, EOMI  Chest/Resp: clear to auscultation bilaterally, no crackles or wheezes  Heart/CV: regular rate and rhythm, no murmur  Abdomen/GI: Soft, nontender, nondistended. +BS.    No rebound or guarding.  Extremities/MSK: No LE edema.  SCD in place.  Left bandage c/d/i.  FROM testing not performed.  Skin: Warm and dry  Neuro: Alert & oriented x 3    Medications     lactated ringers 100 mL/hr at 11/19/19 1832       acetaminophen  975 mg Oral Q8H     aspirin  325 mg Oral Daily     celecoxib  200 mg Oral BID     DULoxetine  60 mg Oral Daily     gabapentin  900 mg Oral At Bedtime     lisinopril  40 mg Oral Daily     mesalamine  1,600 mg Oral BID     metoprolol succinate ER  50 mg Oral Daily     misoprostol  200 mcg Oral BID     pantoprazole  40 mg Oral BID     polyethylene glycol  17 g Oral Daily     senna-docusate  2 tablet Oral BID     sodium chloride (PF)  3 mL Intracatheter Q8H     tiZANidine  16 mg Oral At Bedtime       Data   Recent Labs   Lab 11/20/19  0619   HGB 12.6*   *       Recent Results (from the past 24 hour(s))   XR Pelvis w Hip Port Left 1 View    Narrative    PORTABLE ONE VIEW PELVIS AND ONE VIEW LEFT HIP  11/19/2019 3:55 PM     HISTORY: Postoperative evaluation of the left hip.    COMPARISON: 8/6/2019    FINDINGS: There are surgical changes of a left total hip arthroplasty.  The hardware is intact with no fracture or other complication seen.  Lateral skin staples are seen.  No other abnormality is demonstrated.      Impression     IMPRESSION: Unremarkable postoperative appearance of the left hip.    BRIAN PARRISH MD

## 2019-11-20 NOTE — PLAN OF CARE
PT: Orders received, evaluation completed and treatment initiated. 59 year old male s/p L NIYAH. Mod I with cane at baseline. Lives in a home with is spouse. There are 2 stairs (no rail) to enter the house. Pt set up a temporary bedroom on the main level to stay there if needed. There are 12 stairs (one rail) up to the bedroom level. Spouse works during the day but can assist outside of work hours. She will be home the first week. 12 stairs down the his office level where he works from home. Would like a FWW - will enter DME order.     Patient plan: Home with assist from wife  Current status: Education/demonstration provided on hip precautions. Supine>sit with HOB elevated and SBA. Cues needed for neutral L LE placement. Sit to/from stand with CGA. Heavy UE use. Ambulates 40' with FWW and CGA. Significantly decreased gait speed, difficulty placing weight through L LE due to 8-9/10 pain. Updated RN.   Anticipated status at discharge: Nikolay on the stairs, CGA or less for ambulation with FWW, Nikolay or less with bed mobility

## 2019-11-20 NOTE — PROGRESS NOTES
11/20/19 1000   Quick Adds   Type of Visit Initial PT Evaluation   Living Environment   Lives With spouse   Living Arrangements house   Home Accessibility stairs to enter home;stairs within home   Living Environment Comment There are 2 stairs (no rail) to enter the house. Pt set up a temporary bedroom on the main level to stay there if needed. There are 12 stairs (one rail) up to the bedroom level. Spouse works during the day but can assist outside of work hours. She will be home the first week. 12 stairs down the his office level where he works from home.    Self-Care   Usual Activity Tolerance moderate  (Difficulty with community distances)   Current Activity Tolerance fair   Equipment Currently Used at Home none   Functional Level Prior   Ambulation 1-->assistive equipment  (Cane. )   Transferring 0-->independent   Fall history within last six months no   General Information   Onset of Illness/Injury or Date of Surgery - Date 11/19/19   Referring Physician Jorge L GARCIA   Patient/Family Goals Statement Return home   Pertinent History of Current Problem (include personal factors and/or comorbidities that impact the POC) 59 year old male s/p L NIYAH.    Precautions/Limitations fall precautions;left hip precautions   General Info Comments P/L precautions per Dr. Coats.    Cognitive Status Examination   Orientation orientation to person, place and time   Level of Consciousness alert   Follows Commands and Answers Questions able to follow single-step instructions   Pain Assessment   Patient Currently in Pain Yes, see Vital Sign flowsheet  (5/10)   Range of Motion (ROM)   ROM Comment Decreased L LE AROM secondary to pain, weakness, and stffiness.    Strength   Strength Comments Fair quad set on the L LE.    Bed Mobility   Bed Mobility Comments Supine>sit with SBA, HOB elevated.    Transfer Skills   Transfer Comments Sit>stand with CGA.    Gait   Gait Comments Ambulates with FWW and CGA.    Balance   Balance Comments  "Requires bilat UE support on FWW for safe dynamic mobility.    Sensory Examination   Sensory Perception Comments Burning reported in L hip incision.    General Therapy Interventions   Planned Therapy Interventions bed mobility training;gait training;transfer training;strengthening;ROM;home program guidelines;progressive activity/exercise   Clinical Impression   Criteria for Skilled Therapeutic Intervention yes, treatment indicated   PT Diagnosis Impaired gait   Influenced by the following impairments Pain, decreased L LE AROM/strength, impaired balance, decreased activity tolerance   Functional limitations due to impairments Decreased IND with bed mobility, transfers, ambulation and stairs   Clinical Presentation Evolving/Changing   Clinical Presentation Rationale High pain levels.    Clinical Decision Making (Complexity) Low complexity   Therapy Frequency 2x/day   Predicted Duration of Therapy Intervention (days/wks) 2 days   Anticipated Equipment Needs at Discharge front wheeled walker   Anticipated Discharge Disposition Other (see comments)  (Defer to ortho surgeon)   Risk & Benefits of therapy have been explained Yes   Patient, Family & other staff in agreement with plan of care Yes   [facility] [employer] TM \"6 Clicks\"   2016, Trustees of [facility], under license to [employer].  All rights reserved.   6 Clicks Short Forms Basic Mobility Inpatient Short Form   [facility]-PAC  \"6 Clicks\" V.2 Basic Mobility Inpatient Short Form   1. Turning from your back to your side while in a flat bed without using bedrails? 3 - A Little   2. Moving from lying on your back to sitting on the side of a flat bed without using bedrails? 3 - A Little   3. Moving to and from a bed to a chair (including a wheelchair)? 3 - A Little   4. Standing up from a chair using your arms (e.g., wheelchair, or bedside chair)? 3 - A Little   5. To walk in hospital room? 3 - A Little   6. Climbing 3-5 steps with a railing? 2 - " A Lot   Basic Mobility Raw Score (Score out of 24.Lower scores equate to lower levels of function) 17   Total Evaluation Time   Total Evaluation Time (Minutes) 9

## 2019-11-21 ENCOUNTER — APPOINTMENT (OUTPATIENT)
Dept: PHYSICAL THERAPY | Facility: CLINIC | Age: 59
End: 2019-11-21
Attending: ORTHOPAEDIC SURGERY
Payer: COMMERCIAL

## 2019-11-21 ENCOUNTER — APPOINTMENT (OUTPATIENT)
Dept: GENERAL RADIOLOGY | Facility: CLINIC | Age: 59
End: 2019-11-21
Attending: PHYSICIAN ASSISTANT
Payer: COMMERCIAL

## 2019-11-21 VITALS
BODY MASS INDEX: 35.46 KG/M2 | SYSTOLIC BLOOD PRESSURE: 142 MMHG | OXYGEN SATURATION: 94 % | HEIGHT: 68 IN | HEART RATE: 61 BPM | RESPIRATION RATE: 18 BRPM | TEMPERATURE: 96.8 F | WEIGHT: 234 LBS | DIASTOLIC BLOOD PRESSURE: 88 MMHG

## 2019-11-21 LAB
GLUCOSE SERPL-MCNC: 96 MG/DL (ref 70–99)
HGB BLD-MCNC: 12.7 G/DL (ref 13.3–17.7)

## 2019-11-21 PROCEDURE — 85018 HEMOGLOBIN: CPT | Performed by: ORTHOPAEDIC SURGERY

## 2019-11-21 PROCEDURE — 97530 THERAPEUTIC ACTIVITIES: CPT | Mod: GP

## 2019-11-21 PROCEDURE — 73502 X-RAY EXAM HIP UNI 2-3 VIEWS: CPT

## 2019-11-21 PROCEDURE — 25000132 ZZH RX MED GY IP 250 OP 250 PS 637: Performed by: ORTHOPAEDIC SURGERY

## 2019-11-21 PROCEDURE — 99207 ZZC CDG-MDM COMPONENT: MEETS MODERATE - UP CODED: CPT | Performed by: HOSPITALIST

## 2019-11-21 PROCEDURE — 82947 ASSAY GLUCOSE BLOOD QUANT: CPT | Performed by: ORTHOPAEDIC SURGERY

## 2019-11-21 PROCEDURE — 97116 GAIT TRAINING THERAPY: CPT | Mod: GP

## 2019-11-21 PROCEDURE — 36415 COLL VENOUS BLD VENIPUNCTURE: CPT | Performed by: ORTHOPAEDIC SURGERY

## 2019-11-21 PROCEDURE — 97110 THERAPEUTIC EXERCISES: CPT | Mod: GP

## 2019-11-21 PROCEDURE — 99232 SBSQ HOSP IP/OBS MODERATE 35: CPT | Performed by: HOSPITALIST

## 2019-11-21 PROCEDURE — 25000132 ZZH RX MED GY IP 250 OP 250 PS 637: Performed by: PHYSICIAN ASSISTANT

## 2019-11-21 RX ADMIN — HYDROXYZINE HYDROCHLORIDE 10 MG: 10 TABLET ORAL at 04:33

## 2019-11-21 RX ADMIN — ASPIRIN 325 MG: 325 TABLET, DELAYED RELEASE ORAL at 09:52

## 2019-11-21 RX ADMIN — POLYETHYLENE GLYCOL 3350 17 G: 17 POWDER, FOR SOLUTION ORAL at 09:54

## 2019-11-21 RX ADMIN — MESALAMINE 1600 MG: 800 TABLET, DELAYED RELEASE ORAL at 09:53

## 2019-11-21 RX ADMIN — OXYCODONE HYDROCHLORIDE 10 MG: 5 TABLET ORAL at 04:33

## 2019-11-21 RX ADMIN — METOPROLOL SUCCINATE 50 MG: 50 TABLET, EXTENDED RELEASE ORAL at 09:52

## 2019-11-21 RX ADMIN — ACETAMINOPHEN 975 MG: 325 TABLET, FILM COATED ORAL at 01:13

## 2019-11-21 RX ADMIN — OXYCODONE HYDROCHLORIDE 10 MG: 5 TABLET ORAL at 01:13

## 2019-11-21 RX ADMIN — MISOPROSTOL 200 MCG: 200 TABLET ORAL at 09:53

## 2019-11-21 RX ADMIN — DULOXETINE HYDROCHLORIDE 60 MG: 30 CAPSULE, DELAYED RELEASE ORAL at 09:53

## 2019-11-21 RX ADMIN — SENNOSIDES AND DOCUSATE SODIUM 1 TABLET: 8.6; 5 TABLET ORAL at 09:53

## 2019-11-21 RX ADMIN — HYDROXYZINE HYDROCHLORIDE 10 MG: 10 TABLET ORAL at 11:38

## 2019-11-21 RX ADMIN — PANTOPRAZOLE SODIUM 40 MG: 40 TABLET, DELAYED RELEASE ORAL at 09:52

## 2019-11-21 RX ADMIN — OXYCODONE HYDROCHLORIDE 10 MG: 5 TABLET ORAL at 14:43

## 2019-11-21 RX ADMIN — OXYCODONE HYDROCHLORIDE 10 MG: 5 TABLET ORAL at 08:17

## 2019-11-21 RX ADMIN — ACETAMINOPHEN 975 MG: 325 TABLET, FILM COATED ORAL at 09:53

## 2019-11-21 RX ADMIN — OXYCODONE HYDROCHLORIDE 10 MG: 5 TABLET ORAL at 11:39

## 2019-11-21 RX ADMIN — CELECOXIB 200 MG: 200 CAPSULE ORAL at 09:53

## 2019-11-21 ASSESSMENT — ACTIVITIES OF DAILY LIVING (ADL)
ADLS_ACUITY_SCORE: 14

## 2019-11-21 NOTE — DISCHARGE INSTRUCTIONS
Call your surgeon or primary care doctor before or after your follow appointment if any of these issues occur:  1. Uncontrolled/persistent temperature, temperature >101  2. Uncontrolled pain despite pain medication  3. Numbness or tingling in operative leg, weakness, falls  4. Signs and symptoms of infection. Increased/persistent bleeding, redness, swelling, bruising, and/or drainage (yellow/odorous) from or around incision or incision pulling apart.  5. Dizziness, lightheaded, too sleepy (could be pain medication call your surgeon for guidance)  6. Calf pain, hard/swollen/warm area, chest pain or shortness of breath (if severe call 911)  7. Constipation despite taking over the counter stool softener and laxative for constipation (see other instructions for more information)  8. Trouble voiding or signs or symptoms of urinary tract infection  9. Uncontrolled bleeding (nose bleed, incision)  10.  If you are unable to wake the patient call 911      Other instructions:  See general discharge instruction sheet for hips.  1. Do exercises as instructed by therapist-slowly increased activity as pain tolerates  2. Observe your hip precautions if you have been instructed by physical therapy  3. Walk daily increasing distance and time each day by a little.  4. Ice hip for swelling and discomfort 3-4 times daily for 20 minutes  5. Notify your dentist of your implant so you can get antibiotics before any dental work or for any other invasive procedure.  6. Take over the counter stool softener (Mirilax, Senna, Colace) while on narcotics to prevent constipation so bowel movements are regular., take laxative (milk of magnesia) or a suppository if no bowel movement in 2-3 days (take as directed)  7. Keep track of when you take all medication, especially pain medication. See bottles for instructions and side effects  8. Incentive spirometer hourly to help prevent pneumonia  9. See medication handouts for medication information and  side effects  10. DO NOT DRINK alcohol or DRIVE on narcotic pain medication.    Ice L knee 3- 5 times a day for 20 minutes call Dr carty if knee pain and or swelling is not improving     Friends and family please read and review all discharge information and medication sheet      Dressing information: Do not change dressing  Can shower with Aquacell dressing, it is waterproof-do not remove the dressing will be changed at the follow up apt. with your surgeon  Inspect surrounding skin daily for s/s of infections.   Do not soak in tub or pool.   If dressing loosens, is leaking or if dressing is 1/2 or more full of drainage call your surgeon      Follow up Appointment:  Make follow up apt. 10-14 days post-surgery, call to make appointment  [phone]. Call the same number with any questions or issues prior to or after apt.    Thank you for choosing [facility]       Comments: No flexion limit.   Work on Figure-of-four position   Avoid internal rotation.   Avoid adduction past midline.   Avoid pivoting.      Thank you for allowing [facility] to participate in your cares!!

## 2019-11-21 NOTE — PLAN OF CARE
Patient plan: Home with assist from wife  Current status: Pt seated in WC upon arrival, agreeable to PT, reports L knee pain. Sit>stand from WC with FWW and CGA. Ambulated 1x100', 1x 150' with FWW and SBA with short step length, step-to pattern progressing to partial step through with cues, heavy reliance on UE support with multiple brief standing rest breaks to relax shoulders/ rest UE s. Performed 1x4 steps with B rails and CGA with cues for sequencing and heavy support on rails, 1x4 steps with R rail and SEC with CGA and heavy reliance on rails. Upon return to room, performed sit<>stand at toilet with heavy use of grab bar, FWW and CGA. Pt seated in chair in room upon departure, all needs in reach.   Anticipated status at discharge: Luis on the stairs, CGA or less for ambulation with FWW, Luis or less with bed mobility    PM session: Pt performed bed mobility with SBA for L LE, sit<>stand with FWW and SBA. Ambulated 150' with FWW and SBA with step-to pattern initially, progressing to partial step-through pattern with cues. Performed 1x4 steps with 1 rail- reaching forward to simulate home steps & SEC, performed 1x2 steps with 1 rail and Luis as option if pt does not acquire cane for home. Pts spouse present and edu on providing assist for stairs. Upon return to room, engaged in supine NIYAH exercises with cues for technique, with pt following along with HEP booklet and spouse edu on exercises, pt and spouse edu on recommendations for HEP including ambulation program. Goals met, RN updated.    Physical Therapy Discharge Summary    Reason for therapy discharge:    All goals and outcomes met, no further needs identified.    Progress towards therapy goal(s). See goals on Care Plan in [facility] electronic health record for goal details.  Goals met    Therapy recommendation(s):    Continue home exercise program.  NIYAH exercises 3x/day, ambulation program.

## 2019-11-21 NOTE — PROVIDER NOTIFICATION
Left message with dr carty care coordinator that patient's L knee is pain 10/10 and he is not sure about discharging due to the pain.

## 2019-11-21 NOTE — PROGRESS NOTES
"Orthopedic Surgery  11/21/2019  POD 2    S: Patient states that while sitting on the toilet yesterday had abrupt, sharp increase in pain. Now having anterior hip and thigh pain. He states he was able to walk but with a lot of pain. Denies chest pain or shortness of breath.     O: Blood pressure 108/59, pulse 61, temperature 96.8  F (36  C), temperature source Oral, resp. rate 16, height 1.727 m (5' 8\"), weight 106.1 kg (234 lb), SpO2 93 %.  Lab Results   Component Value Date    HGB 12.7 11/21/2019     Lab Results   Component Value Date    INR 0.93 10/17/2015     I/O last 3 completed shifts:  In: 1980 [P.O.:1980]  Out: 600 [Urine:600]  Distal extremity CMSI bilaterally.  Calves are negative bilaterally, both soft and nontender.  The dressing is C/D/I. Slightly rolling up.      A: Mr. Bhatti is doing well status post Procedure(s):  Left total hip arthroplasty.    P: Pain in thigh is likely normal post-op pain due to intramedullary prosthesis; but due to his size, I will get xray of hip to make sure no fracture. If no fracture is seen, will plan to continue physical therapy, continue DVT pphx with ASA and anticipate discharge to home later today. Patient is in agreement with this plan.    Sofiya Morrell PA-C  [phone]  "

## 2019-11-21 NOTE — PLAN OF CARE
VSS, RA, lower /59, Ax1 GB walker to bathroom, voiding good amts, arabella reg diet. Pain managed with scheduled Tylenol and prn [id number] q3 hours with Atarax q6. Hx sleep apnea, CPAP used overnight. IV saline locked, Aquacel clean dry intact. Plan is to discharge to home with wife. Pt has new left knee nerve pain with standing to sitting.

## 2019-11-21 NOTE — PLAN OF CARE
Day RN  Vss, CMS+minor swelling in L hip, L knee does not appear swollen but patient stated it is a little.   Up with SBA gait belt and walker. Passed pt but had increased pain >8 in the L knee through out the day when walking no buckling.   Oxycodone Tylenol and vistaril given good relief to the hip but not the L knee. Ice to L knee through out the day.   Voiding well. Ate and drank well. Passing gas  Patient requested before going home to let Mirtha or dr carty know about the persistent knee pain. Mirtha instructed me to call the office, left message with care coordinator. Waiting to here back.

## 2019-11-21 NOTE — PROGRESS NOTES
[facility]    Hospitalist Progress Note      Assessment & Plan   Avi Bhatti is a 59 year old male  with a history of HTN, CKD, Migraine HA, HLD, YORDY, Crohn's Disease, Ankylosing Spondylitis, GERD, Left Hip Osteoarthritis who is admitted s/p Left Total Hip Arthroplasty.  Internal Medicine service was asked to see for management of chronic medical problems.      Left Hip Osteoarthritis s/p Left Total Hip Arthroplasty - POD #2. Doing well.     -Patient tolerating diet.  Doing well with PT.  Pain control per primary team.     HTN - continue pta Lisinopril and Toprol XL     Crohn's Disease - s/p ileocolectomy with primary anastamosis, currently stable.  Continue pta Asacol.  Resume Humira injections every 14 days upon discharge.  I have instructed patient to contact his PCP upon discharge for resumption instructions.     Migraine HA - currently asymptomatic.  Continue pta Gabapentin, Duloxetine, Tizanidine for headache prevention.  He receives botox injections regularly and uses Imitrex prn.  Should continue upon discharge     GERD - continue pta Misoprostol for NSAID induced ulcer prevention and Nexium     YORDY - continue pta bipap     Medication reconciliation for chronic medical problems completed on 11/20..  Deferring pain management to primary surgical service.     CODE: Full  Diet/IVF: regular  GI ppx:  Nexium  DVT ppx: ASA per primary service    Jersey Quevedo MD  Text Page    Interval History   No acute events overnight.  Patient complaining of some left knee pain and thigh pain.  Evaluated by orthopedics.  X-ray obtained of the left hip that was unchanged.  He states that hip pain is improved.  He has been working with PT and has been doing well.  Plan for discharge this afternoon    -Data reviewed today: I reviewed all new labs and imaging results over the last 24 hours.    Physical Exam   Temp: 96.8  F (36  C) Temp src: Oral BP: (!) 142/88 Pulse: 61 Heart Rate: 64 Resp: 18 SpO2: 94 % O2 Device:  None (Room air)    Vitals:    11/15/19 1358 11/19/19 1114   Weight: 106.6 kg (235 lb) 106.1 kg (234 lb)     Vital Signs with Ranges  Temp:  [96.8  F (36  C)-97.6  F (36.4  C)] 96.8  F (36  C)  Pulse:  [61] 61  Heart Rate:  [64-76] 64  Resp:  [16-18] 18  BP: (108-142)/(59-88) 142/88  SpO2:  [92 %-96 %] 94 %  I/O last 3 completed shifts:  In: 1980 [P.O.:1980]  Out: 600 [Urine:600]    General: Alert, interactive, NAD, sitting up in the chair.  HEENT: AT/NC, sclera anicteric, PERRL, EOMI  Chest/Resp: clear to auscultation bilaterally, no crackles or wheezes  Heart/CV: regular rate and rhythm, no murmur  Abdomen/GI: Soft, nontender, nondistended. +BS.    No rebound or guarding.  Extremities/MSK: No LE edema.  SCD in place.  Left bandage c/d/i.   Skin: Warm and dry  Neuro: Alert & oriented x 3    Medications     lactated ringers 100 mL/hr at 11/19/19 1832       acetaminophen  975 mg Oral Q8H     aspirin  325 mg Oral Daily     celecoxib  200 mg Oral BID     DULoxetine  60 mg Oral Daily     gabapentin  900 mg Oral At Bedtime     lidocaine   Urethral Once     lisinopril  40 mg Oral Daily     mesalamine  1,600 mg Oral BID     metoprolol succinate ER  50 mg Oral Daily     misoprostol  200 mcg Oral BID     pantoprazole  40 mg Oral BID     polyethylene glycol  17 g Oral Daily     senna-docusate  2 tablet Oral BID     sodium chloride (PF)  3 mL Intracatheter Q8H     tiZANidine  16 mg Oral At Bedtime       Data   Recent Labs   Lab 11/21/19  0605 11/20/19  0619   HGB 12.7* 12.6*   GLC 96 119*       Recent Results (from the past 24 hour(s))   XR Hip Left 2-3 Views    Narrative    XR HIP LEFT 2-3 VIEWS 11/21/2019 8:35 AM     HISTORY: s/p left NIYAH increased pain      Impression    IMPRESSION: Left total hip arthroplasty in good alignment without  apparent complication. Postoperative soft tissue gas and skin staples  are noted. Appearance is essentially unchanged compared to 11/19/2019.    CODY SHAH MD

## 2019-11-21 NOTE — PLAN OF CARE
Pt c/o new pain in left groin & down to knee- feels he did something  when up to bathroom.- no change in cms or appearance of leg- using ice to sites that hurt- continue to moniter. Using 10mg of oxycodone & vistaril for pain. Up with assist of 1 to bathroom. Kimo regular diet. Initially unable to void & bladder scan for >500- awaiting lidojet from pharmacy when pt tried again with success and reports has had no further problems voiding. Cms itact. Rowena CD&I.

## 2019-11-22 ENCOUNTER — TELEPHONE (OUTPATIENT)
Dept: FAMILY MEDICINE | Facility: CLINIC | Age: 59
End: 2019-11-22

## 2019-11-22 NOTE — PROGRESS NOTES
Pt has decided not to wait to hear back from Dr Coats regarding pain in left knee & feels comfortable following up with clinic as needed. Pain has been controlled with po meds & pt encouraged to continue using ice as needed. Up with assist of 1. arabella regular diet & voiding without difficulty.  Discharge instructions reviewed with pt & questions answered. Discharge meds given to pt. Discharged home with family at 1615.

## 2019-11-27 NOTE — ADDENDUM NOTE
Addendum  created 11/27/19 0829 by True Martinez MD    Intraprocedure Event edited, Intraprocedure Staff edited

## 2019-12-05 ENCOUNTER — TRANSFERRED RECORDS (OUTPATIENT)
Dept: HEALTH INFORMATION MANAGEMENT | Facility: CLINIC | Age: 59
End: 2019-12-05

## 2019-12-10 NOTE — TELEPHONE ENCOUNTER
"Hospital/TCU/ED for chronic condition Discharge Protocol    \"Hi, my name is Narcisa Mary RN, a registered nurse, and I am calling from [facility].  I am calling to follow up and see how things are going for you after your recent emergency visit/hospital/TCU stay.\"    Tell me how you are doing now that you are home?\" patient is sore and slept better than in the hospital      Discharge Instructions    \"Let's review your discharge instructions.  What is/are the follow-up recommendations?  Pt. Response: Patient to follow up with surgery    \"Has an appointment with your primary care provider been scheduled?\"   Not needed.    \"When you see the provider, I would recommend that you bring your medications with you.\"    Medications    \"Tell me what changed about your medicines when you discharged?\"    Changes to chronic meds?    0-1    \"What questions do you have about your medications?\"    None     New diagnoses of heart failure, COPD, diabetes, or MI?    No              Post Discharge Medication Reconciliation Status: discharge medications reconciled, continue medications without change.    Was MTM referral placed (*Make sure to put transitions as reason for referral)?   No    Call Summary    \"What questions or concerns do you have about your recent visit and your follow-up care?\"     none    \"If you have questions or things don't continue to improve, we encourage you contact us through the main clinic number (give number).  Even if the clinic is not open, triage nurses are available 24/7 to help you.     We would like you to know that our clinic has extended hours (provide information).  We also have urgent care (provide details on closest location and hours/contact info)\"      \"Thank you for your time and take care!\"    SHYLA Hwang, RN  [employer] Triage         "
no

## 2019-12-17 DIAGNOSIS — I10 ESSENTIAL HYPERTENSION, BENIGN: Primary | ICD-10-CM

## 2019-12-18 ENCOUNTER — TRANSFERRED RECORDS (OUTPATIENT)
Dept: HEALTH INFORMATION MANAGEMENT | Facility: CLINIC | Age: 59
End: 2019-12-18

## 2019-12-18 RX ORDER — METOPROLOL SUCCINATE 50 MG/1
TABLET, EXTENDED RELEASE ORAL
Qty: 30 TABLET | Refills: 0 | Status: SHIPPED | OUTPATIENT
Start: 2019-12-18 | End: 2019-12-26 | Stop reason: DRUGHIGH

## 2019-12-18 NOTE — TELEPHONE ENCOUNTER
"Requested Prescriptions   Pending Prescriptions Disp Refills     metoprolol succinate ER (TOPROL-XL) 50 MG 24 hr tablet [Pharmacy Med Name: METOPROLOL SUCCINATE ER 50MG TB24]  Last Written Prescription Date:  9/27/2019  Last Fill Quantity: 90 tablet,  # refills: 0   Last office visit: 11/1/2019 with prescribing provider:  Martita   Future Office Visit:   Next 5 appointments (look out 90 days)    Dec 26, 2019 10:00 AM CST  SHORT with Andrew Anders MD  [facility] [address] [place] [id number]  [phone]          90 tablet 0     Sig: TAKE ONE TABLET BY MOUTH ONCE DAILY (NEED TO BE SEEN IN CLINIC FOR FURTHER REFILLS)       Beta-Blockers Protocol Failed - 12/17/2019  7:52 PM        Failed - Blood pressure under 140/90 in past 12 months     BP Readings from Last 3 Encounters:   11/21/19 (!) 142/88   11/01/19 128/86   10/16/19 (!) 126/94                 Passed - Patient is age 6 or older        Passed - Recent (12 mo) or future (30 days) visit within the authorizing provider's specialty     Patient has had an office visit with the authorizing provider or a provider within the authorizing providers department within the previous 12 mos or has a future within next 30 days. See \"Patient Info\" tab in inbasket, or \"Choose Columns\" in Meds & Orders section of the refill encounter.              Passed - Medication is active on med list           "

## 2019-12-18 NOTE — DISCHARGE SUMMARY
Diagnosis: left DJD hip  Procedure: left press-fit (noncemented) total hip replacement  Surgeon: Allen Coats MD  Patient underwent total hip replacement without complication. Patient had typical transient post-op anemia. Patient's hospital stay included physical therapy and DVT prophylaxis. After discussion with patient regarding no history of DVT and current high mobility, patient is discharged with ASA for home DVT pphx. Patient will follow -up in the office 10-14days post-op for wound check. Please refer to chart for any other specifics of this hospital stay.  Sofiya Morrell PA-C

## 2019-12-24 NOTE — PROGRESS NOTES
Subjective     Avi Bhatti is a 59 year old male who presents to clinic today for the following health issues:    HPI   Prediabetes/Follow up      How often are you checking your blood sugar? Not at all    What concerns do you have today about your diabetes? None     Do you have any of these symptoms? (Select all that apply)  No numbness or tingling in feet.  No redness, sores or blisters on feet.  No complaints of excessive thirst.  No reports of blurry vision.  No significant changes to weight.     Have you had a diabetic eye exam in the last 12 months? No    BP Readings from Last 2 Encounters:   12/26/19 (!) 164/98   11/21/19 (!) 142/88     LDL Cholesterol Calculated (mg/dL)   Date Value   11/01/2019 136 (H)   05/17/2018 128 (H)       Diabetes Management Resources    Hypertension Follow-up      Do you check your blood pressure regularly outside of the clinic? Yes     Are you following a low salt diet? no    Are your blood pressures ever more than 140 on the top number (systolic) OR more   than 90 on the bottom number (diastolic), for example 140/90? Yes     Pt has been getting readings at home of 150-166/      How many servings of fruits and vegetables do you eat daily?  0-1    On average, how many sweetened beverages do you drink each day (Examples: soda, juice, sweet tea, etc.  Do NOT count diet or artificially sweetened beverages)?   2-3 day    How many days per week do you miss taking your medication? 0        Recent Labs   Lab Test 11/01/19  1049 05/17/18  1150 11/20/17  0810 02/17/17  10/17/15  1330   * 128* 125*  --    < >  --    HDL 43 44 54  --    < >  --    TRIG 277* 232* 257*  --    < >  --    ALT  --  54  --  54*  --  44   CR 1.35* 1.25 1.30* 1.390*   < > 1.08   GFRESTIMATED 56* 59* 57* 56.0   < > 71   GFRESTBLACK 65 71 68  --    < > 85   POTASSIUM 4.2 4.3 4.2  --    < > 5.4*   TSH  --   --  1.41  --   --   --     < > = values in this interval not displayed.      BP Readings from  Last 3 Encounters:   12/26/19 (!) 164/98   11/21/19 (!) 142/88   11/01/19 128/86    Wt Readings from Last 3 Encounters:   12/26/19 110.7 kg (244 lb)   11/19/19 106.1 kg (234 lb)   11/01/19 106.1 kg (234 lb)                      Reviewed and updated as needed this visit by Provider         Review of Systems   ROS COMP: CONSTITUTIONAL: NEGATIVE for fever, chills, change in weight  ENT/MOUTH: NEGATIVE for ear, mouth and throat problems  RESP: NEGATIVE for significant cough or SOB  CV: NEGATIVE for chest pain, palpitations or peripheral edema      Objective    BP (!) 164/98   Pulse 61   Temp 96.8  F (36  C) (Tympanic)   Resp 16   Wt 110.7 kg (244 lb)   SpO2 98%   BMI 37.10 kg/m    Body mass index is 37.1 kg/m .  Physical Exam   GENERAL: healthy, alert and no distress  NECK: no adenopathy, no asymmetry, masses, or scars and thyroid normal to palpation  RESP: lungs clear to auscultation - no rales, rhonchi or wheezes  CV: regular rate and rhythm, normal S1 S2, no S3 or S4, no murmur, click or rub, no peripheral edema and peripheral pulses strong  ABDOMEN: soft, nontender, no hepatosplenomegaly, no masses and bowel sounds normal  MS: no gross musculoskeletal defects noted, no edema    Diagnostic Test Results:  Labs reviewed in [facility]  Lab: see below, results pending        Assessment & Plan     Avi was seen today for hypertension.    Diagnoses and all orders for this visit:    Uncontrolled hypertension  -     metoprolol succinate ER (TOPROL-XL) 100 MG 24 hr tablet; Take 1 tablet (100 mg) by mouth daily    Essential hypertension, benign  -     metoprolol succinate ER (TOPROL-XL) 100 MG 24 hr tablet; Take 1 tablet (100 mg) by mouth daily    CKD (chronic kidney disease) stage 3, GFR 30-59 ml/min (H)  -     Hemoglobin A1c  -     Basic metabolic panel    Encounter for screening for HIV  -     HIV Screening           FUTURE APPOINTMENTS:       - Follow-up visit in 3 mo  Patient Instructions   Increase the Metoprolol XL to  100 mg daily, take 2 of the current 50 mg tabs daily until gone, then       Return in about 3 months (around 3/26/2020) for Hypertension.    Andrew Anders MD  [facility]

## 2019-12-26 ENCOUNTER — OFFICE VISIT (OUTPATIENT)
Dept: FAMILY MEDICINE | Facility: CLINIC | Age: 59
End: 2019-12-26
Payer: COMMERCIAL

## 2019-12-26 VITALS
OXYGEN SATURATION: 98 % | SYSTOLIC BLOOD PRESSURE: 164 MMHG | WEIGHT: 244 LBS | DIASTOLIC BLOOD PRESSURE: 98 MMHG | TEMPERATURE: 96.8 F | BODY MASS INDEX: 37.1 KG/M2 | RESPIRATION RATE: 16 BRPM | HEART RATE: 61 BPM

## 2019-12-26 DIAGNOSIS — Z11.4 ENCOUNTER FOR SCREENING FOR HIV: ICD-10-CM

## 2019-12-26 DIAGNOSIS — N18.30 CKD (CHRONIC KIDNEY DISEASE) STAGE 3, GFR 30-59 ML/MIN (H): ICD-10-CM

## 2019-12-26 DIAGNOSIS — I10 UNCONTROLLED HYPERTENSION: Primary | ICD-10-CM

## 2019-12-26 DIAGNOSIS — I10 ESSENTIAL HYPERTENSION, BENIGN: ICD-10-CM

## 2019-12-26 LAB — HBA1C MFR BLD: 5.2 % (ref 0–5.6)

## 2019-12-26 PROCEDURE — 99214 OFFICE O/P EST MOD 30 MIN: CPT | Performed by: FAMILY MEDICINE

## 2019-12-26 PROCEDURE — 87389 HIV-1 AG W/HIV-1&-2 AB AG IA: CPT | Performed by: FAMILY MEDICINE

## 2019-12-26 PROCEDURE — 36415 COLL VENOUS BLD VENIPUNCTURE: CPT | Performed by: FAMILY MEDICINE

## 2019-12-26 PROCEDURE — 83036 HEMOGLOBIN GLYCOSYLATED A1C: CPT | Performed by: FAMILY MEDICINE

## 2019-12-26 PROCEDURE — 80048 BASIC METABOLIC PNL TOTAL CA: CPT | Performed by: FAMILY MEDICINE

## 2019-12-26 RX ORDER — METOPROLOL SUCCINATE 100 MG/1
100 TABLET, EXTENDED RELEASE ORAL DAILY
Qty: 90 TABLET | Refills: 1 | Status: SHIPPED | OUTPATIENT
Start: 2019-12-26 | End: 2020-07-13

## 2019-12-26 NOTE — PATIENT INSTRUCTIONS
Increase the Metoprolol XL to 100 mg daily, take 2 of the current 50 mg tabs daily until gone, then

## 2019-12-27 LAB
ANION GAP SERPL CALCULATED.3IONS-SCNC: 7 MMOL/L (ref 3–14)
BUN SERPL-MCNC: 18 MG/DL (ref 7–30)
CALCIUM SERPL-MCNC: 8.6 MG/DL (ref 8.5–10.1)
CHLORIDE SERPL-SCNC: 110 MMOL/L (ref 94–109)
CO2 SERPL-SCNC: 23 MMOL/L (ref 20–32)
CREAT SERPL-MCNC: 1.07 MG/DL (ref 0.66–1.25)
GFR SERPL CREATININE-BSD FRML MDRD: 75 ML/MIN/{1.73_M2}
GLUCOSE SERPL-MCNC: 87 MG/DL (ref 70–99)
HIV 1+2 AB+HIV1 P24 AG SERPL QL IA: NONREACTIVE
POTASSIUM SERPL-SCNC: 4.5 MMOL/L (ref 3.4–5.3)
SODIUM SERPL-SCNC: 140 MMOL/L (ref 133–144)

## 2019-12-31 ENCOUNTER — TRANSFERRED RECORDS (OUTPATIENT)
Dept: HEALTH INFORMATION MANAGEMENT | Facility: CLINIC | Age: 59
End: 2019-12-31

## 2020-02-19 DIAGNOSIS — I10 ESSENTIAL HYPERTENSION, BENIGN: ICD-10-CM

## 2020-02-19 RX ORDER — METOPROLOL SUCCINATE 50 MG/1
TABLET, EXTENDED RELEASE ORAL
Qty: 30 TABLET | Refills: 0 | OUTPATIENT
Start: 2020-02-19

## 2020-02-19 NOTE — TELEPHONE ENCOUNTER
"Requested Prescriptions   Pending Prescriptions Disp Refills     METOPROLOL SUCCINATE ER 50 MG PO 24 hr tablet [Pharmacy Med Name: METOPROLOL SUCCINATE ER 50MG TB24]  DISCONTINUED  Last Written Prescription Date:  12/18/2019 END: 12/26/2019  Last Fill Quantity: 30 tablet,  # refills: 0   Last office visit: 12/26/2019 with prescribing provider:  Martita   Future Office Visit:     30 tablet 0     Sig: TAKE ONE TABLET BY MOUTH ONCE DAILY (NEED TO BE SEEN IN CLINIC FOR FURTHER REFILLS)       Beta-Blockers Protocol Failed - 2/19/2020  9:59 AM        Failed - Blood pressure under 140/90 in past 12 months     BP Readings from Last 3 Encounters:   12/26/19 (!) 164/98   11/21/19 (!) 142/88   11/01/19 128/86                 Passed - Patient is age 6 or older        Passed - Recent (12 mo) or future (30 days) visit within the authorizing provider's specialty     Patient has had an office visit with the authorizing provider or a provider within the authorizing providers department within the previous 12 mos or has a future within next 30 days. See \"Patient Info\" tab in inbasket, or \"Choose Columns\" in Meds & Orders section of the refill encounter.              Passed - Medication is active on med list           "

## 2020-02-27 ENCOUNTER — TRANSFERRED RECORDS (OUTPATIENT)
Dept: HEALTH INFORMATION MANAGEMENT | Facility: CLINIC | Age: 60
End: 2020-02-27

## 2020-02-27 LAB
ALT SERPL-CCNC: 47 U/L (ref 0–78)
AST SERPL-CCNC: 22 U/L (ref 0–37)
CREAT SERPL-MCNC: 1.26 MG/DL (ref 0.7–1.3)
GLUCOSE SERPL-MCNC: 101 MG/DL (ref 74–100)
POTASSIUM SERPL-SCNC: 5.2 MMOL/L (ref 3.5–5.1)

## 2020-04-09 ENCOUNTER — TRANSFERRED RECORDS (OUTPATIENT)
Dept: HEALTH INFORMATION MANAGEMENT | Facility: CLINIC | Age: 60
End: 2020-04-09

## 2020-05-04 ENCOUNTER — TRANSFERRED RECORDS (OUTPATIENT)
Dept: HEALTH INFORMATION MANAGEMENT | Facility: CLINIC | Age: 60
End: 2020-05-04

## 2020-05-04 LAB
ALT SERPL-CCNC: 47 IU/L (ref 5–40)
AST SERPL-CCNC: 28 U/L (ref 5–34)
CREAT SERPL-MCNC: 1.15 MG/DL (ref 0.5–1.3)
GFR SERPL CREATININE-BSD FRML MDRD: 68.7 ML/MIN/1.73M2

## 2020-06-02 DIAGNOSIS — I10 ESSENTIAL HYPERTENSION, BENIGN: ICD-10-CM

## 2020-06-02 DIAGNOSIS — I10 UNCONTROLLED HYPERTENSION: ICD-10-CM

## 2020-06-02 RX ORDER — METOPROLOL SUCCINATE 100 MG/1
TABLET, EXTENDED RELEASE ORAL
Qty: 90 TABLET | Refills: 1 | OUTPATIENT
Start: 2020-06-02

## 2020-06-02 RX ORDER — LISINOPRIL 40 MG/1
TABLET ORAL
Qty: 90 TABLET | Refills: 0 | OUTPATIENT
Start: 2020-06-02

## 2020-06-02 NOTE — TELEPHONE ENCOUNTER
Virtual visit if he has a BP cuff at home, in person if not?  Should pt just come in for a nurse only BP appt?

## 2020-06-02 NOTE — TELEPHONE ENCOUNTER
Yes please, BP log for last 4 days at home should be OK. Virtual visit to discuss on the changes if any as he didn't follow up with provider since last 6 months with high blood pressures.    Thank you,  Krystyna Mello MD on 6/2/2020 at 1:28 PM

## 2020-06-02 NOTE — TELEPHONE ENCOUNTER
Spoke to pt's wife, informed her of need for appt (virtual visit) & to monitor BP readings 4 days prior to visit.   Pt is working - and will call back to schedule appt.

## 2020-06-02 NOTE — TELEPHONE ENCOUNTER
Pt has Uncontrolled blood pressure, please schedule a virtual visit at the earliest available so that we can document his current blood pressures before further refills with dosage adjustment. Last seen by  in 12/2019 with Uncontrolled blood pressure.     Thank you,  Krystyna Mello MD on 6/2/2020 at 11:44 AM

## 2020-07-13 ENCOUNTER — VIRTUAL VISIT (OUTPATIENT)
Dept: FAMILY MEDICINE | Facility: CLINIC | Age: 60
End: 2020-07-13
Payer: COMMERCIAL

## 2020-07-13 DIAGNOSIS — N18.30 CKD (CHRONIC KIDNEY DISEASE) STAGE 3, GFR 30-59 ML/MIN (H): ICD-10-CM

## 2020-07-13 DIAGNOSIS — I10 ESSENTIAL HYPERTENSION, BENIGN: Primary | ICD-10-CM

## 2020-07-13 DIAGNOSIS — Z23 NEED FOR VACCINATION: ICD-10-CM

## 2020-07-13 DIAGNOSIS — E87.5 HYPERKALEMIA: ICD-10-CM

## 2020-07-13 DIAGNOSIS — E78.2 MIXED HYPERLIPIDEMIA: ICD-10-CM

## 2020-07-13 PROCEDURE — 99214 OFFICE O/P EST MOD 30 MIN: CPT | Mod: 95 | Performed by: INTERNAL MEDICINE

## 2020-07-13 RX ORDER — METOPROLOL SUCCINATE 100 MG/1
100 TABLET, EXTENDED RELEASE ORAL DAILY
Qty: 90 TABLET | Refills: 1 | Status: SHIPPED | OUTPATIENT
Start: 2020-07-13 | End: 2020-12-29

## 2020-07-13 RX ORDER — LISINOPRIL 40 MG/1
40 TABLET ORAL DAILY
Qty: 90 TABLET | Refills: 1 | Status: SHIPPED | OUTPATIENT
Start: 2020-07-13 | End: 2020-12-29

## 2020-07-13 NOTE — PROGRESS NOTES
"Avi Bhatti is a 60 year old male who is being evaluated via a billable telephone visit.      The patient has been notified of following:     \"This telephone visit will be conducted via a call between you and your physician/provider. We have found that certain health care needs can be provided without the need for a physical exam.  This service lets us provide the care you need with a short phone conversation.  If a prescription is necessary we can send it directly to your pharmacy.  If lab work is needed we can place an order for that and you can then stop by our lab to have the test done at a later time.    Telephone visits are billed at different rates depending on your insurance coverage. During this emergency period, for some insurers they may be billed the same as an in-person visit.  Please reach out to your insurance provider with any questions.    If during the course of the call the physician/provider feels a telephone visit is not appropriate, you will not be charged for this service.\"    Patient has given verbal consent for Telephone visit?  Yes    What phone number would you like to be contacted at? [phone]    How would you like to obtain your AVS? David Stacy     Avi Bhatti is a 60 year old male who presents via phone visit today for the following health issues:    HPI  Hypertension Follow-up      Do you check your blood pressure regularly outside of the clinic? Yes     Are you following a low salt diet? Yes    Are your blood pressures ever more than 140 on the top number (systolic) OR more   than 90 on the bottom number (diastolic), for example 140/90? Yes      How many servings of fruits and vegetables do you eat daily?  2-3    On average, how many sweetened beverages do you drink each day (Examples: soda, juice, sweet tea, etc.  Do NOT count diet or artificially sweetened beverages)?   0    How many days per week do you exercise enough to make your heart beat faster? 3 or " less    How many minutes a day do you exercise enough to make your heart beat faster? 9 or less  How many days per week do you miss taking your medication? 7    What makes it hard for you to take your medications?  RAN OUT OF MEDS      Essential hypertension, benign  This is a chronic health problem that is uncontrolled with current medications and lifestyle measures. Currently on Lisinopril 40 mg daily and Metoprolol 100 mg daily. Home BP log 143/94 today, 154/105 0 147/98 in 06/2020 when he was taking medications.  Pt states that he was out of medications for last 10 days.     Patient Active Problem List   Diagnosis     Sleep apnea, obstructive     Ankylosing spondylitis (H)     Mixed hyperlipidemia     BMI 30-35     Essential hypertension, benign     Crohn's disease (H)     Adenomatous colon polyp     Cervicalgia     Intractable migraine without aura and without status migrainosus     Tension headache     Obesity (BMI 35.0-39.9) with comorbidity (H)     Muscle spasm     Degenerative disc disease, cervical     CKD (chronic kidney disease) stage 3, GFR 30-59 ml/min (H)     Uncontrolled hypertension     S/P total hip arthroplasty     Past Surgical History:   Procedure Laterality Date     ARTHROPLASTY HIP Left 11/19/2019    Procedure: Left total hip arthroplasty;  Surgeon: Allen Coats MD;  Location: [facility] OR     BOTOX CHRONIC MIGRAINE HEAD ACHES      for chronic headache and neck pain, injections every 12 weeks     EXTRACTION(S) DENTAL  12/18/2012    Procedure: EXTRACTION(S) DENTAL;  Extraction of Teeth 30, 19;  Surgeon: Sujey Cunningham DDS;  Location: [facility] OR     repair fracture & insert pins left hand  1996     SMALL BOWEL RESECTION  1993       Social History     Tobacco Use     Smoking status: Never Smoker     Smokeless tobacco: Never Used   Substance Use Topics     Alcohol use: Yes     Comment: seldom     Family History   Problem Relation Age of Onset     Hypertension Father      Alcoholism Father       Other - See Comments Father      Coronary Artery Disease Father      Skin Cancer Father            Reviewed and updated as needed this visit by Provider         Review of Systems   Constitutional, HEENT, cardiovascular, pulmonary, GI, , musculoskeletal, neuro, skin, endocrine and psych systems are negative, except as otherwise noted.       Objective   Reported vitals:  There were no vitals taken for this visit.   healthy, alert and no distress  PSYCH: Alert and oriented times 3; coherent speech, normal   rate and volume, able to articulate logical thoughts, able   to abstract reason, no tangential thoughts, no hallucinations   or delusions  His affect is normal  RESP: No cough, no audible wheezing, able to talk in full sentences  Remainder of exam unable to be completed due to telephone visits    Diagnostic Test Results:  Labs reviewed in Epic        Assessment and Plan  1. Essential hypertension, benign  Ongoing, borderline elevated. Will send medications to the pharmacy. Emphasized to be compliant with his medications and check the BP log for next 5 days and send in [employer].  - lisinopril (ZESTRIL) 40 MG tablet; Take 1 tablet (40 mg) by mouth daily  Dispense: 90 tablet; Refill: 1  - metoprolol succinate ER (TOPROL-XL) 100 MG 24 hr tablet; Take 1 tablet (100 mg) by mouth daily  Dispense: 90 tablet; Refill: 1    2. Hyperkalemia  3. CKD (chronic kidney disease) stage 3, GFR 30-59 ml/min (H)  Last check in 02/2020 showing high potassium , answered questioned by the patient. Advised to avoid high potassium foods and supplements over the counter.   - Basic metabolic panel; Future    4. Mixed hyperlipidemia  - Lipid panel reflex to direct LDL Fasting; Future    5. Need for vaccination  Patient requesting for the vaccine to be given as he already had one in the past and opting to take during the lab appointment.  - ZOSTER VACCINE RECOMBINANT ADJUVANTED IM NJX; Future    Patient Instructions   Medications for BP sent to  "your pharmacy , please send me the BP log for next 5 days checking twice a day with medications.    Please do the fasting lab work given within 2 weeks.     ===================    Dietary Approaches to Stop Hypertension trial -- A different approach was evaluated in the Dietary Approaches to Stop Hypertension (DASH) trial [6]. Rather than evaluating sodium intake or weight loss, DASH randomly assigned 459 patients with BPs of less than 160/80 to 95 mmHg to one of three diets:  ?A control diet low in fruits, vegetables, and legumes and high in snacks, sweets, meats, and saturated fat.  ?A diet rich in fruits, vegetables, legumes and low in snacks and sweets.  ?A combination diet rich in fruits, vegetables, legumes, and low-fat dairy products and low in snacks, sweets, meats, and saturated and total fat (this combination diet is called the \"DASH diet\"). The DASH diet is comprised of four to five servings of fruit, four to five servings of vegetables, two to three servings of low-fat dairy per day, and <25 percent fat.  The following observations were noted in which the BP reductions were expressed in relation to the fall in BP seen with the control diet:  ?The fruits and vegetables diet reduced the BP by 2.8/1.1 mmHg, and the combination diet reduced the BP by 5.5/3.0.  ?These effects were more pronounced in patients with hypertension. With the combination diet, for example, the BP fell 11.4/5.5 mmHg in hypertensives versus 3.5/2.1 mmHg in the normotensives.  ?The antihypertensive effects were maximal by the end of week 2 with any of the diets and were then maintained for eight weeks.    Return in about 3 months (around 10/13/2020), or if symptoms worsen or fail to improve, for Preventative Visit.    Krystyna Mello MD  [facility]    Phone call duration:  11 minutes            "

## 2020-07-13 NOTE — ASSESSMENT & PLAN NOTE
This is a chronic health problem that is uncontrolled with current medications and lifestyle measures. Currently on Lisinopril 40 mg daily and Metoprolol 100 mg daily. Home BP log 143/94 today, 154/105 0 147/98 in 06/2020 when he was taking medications.  Pt states that he was out of medications for last 10 days.

## 2020-07-13 NOTE — PATIENT INSTRUCTIONS
"Medications for BP sent to your pharmacy , please send me the BP log for next 5 days checking twice a day with medications.    Please do the fasting lab work given within 2 weeks.     ===================    Dietary Approaches to Stop Hypertension trial -- A different approach was evaluated in the Dietary Approaches to Stop Hypertension (DASH) trial [6]. Rather than evaluating sodium intake or weight loss, DASH randomly assigned 459 patients with BPs of less than 160/80 to 95 mmHg to one of three diets:  ?A control diet low in fruits, vegetables, and legumes and high in snacks, sweets, meats, and saturated fat.  ?A diet rich in fruits, vegetables, legumes and low in snacks and sweets.  ?A combination diet rich in fruits, vegetables, legumes, and low-fat dairy products and low in snacks, sweets, meats, and saturated and total fat (this combination diet is called the \"DASH diet\"). The DASH diet is comprised of four to five servings of fruit, four to five servings of vegetables, two to three servings of low-fat dairy per day, and <25 percent fat.  The following observations were noted in which the BP reductions were expressed in relation to the fall in BP seen with the control diet:  ?The fruits and vegetables diet reduced the BP by 2.8/1.1 mmHg, and the combination diet reduced the BP by 5.5/3.0.  ?These effects were more pronounced in patients with hypertension. With the combination diet, for example, the BP fell 11.4/5.5 mmHg in hypertensives versus 3.5/2.1 mmHg in the normotensives.  ?The antihypertensive effects were maximal by the end of week 2 with any of the diets and were then maintained for eight weeks.  "

## 2020-07-19 ENCOUNTER — MYC MEDICAL ADVICE (OUTPATIENT)
Dept: FAMILY MEDICINE | Facility: CLINIC | Age: 60
End: 2020-07-19

## 2020-07-19 DIAGNOSIS — I10 UNCONTROLLED HYPERTENSION: Primary | ICD-10-CM

## 2020-07-20 RX ORDER — LISINOPRIL 10 MG/1
TABLET ORAL
Qty: 90 TABLET | Refills: 1 | Status: SHIPPED | OUTPATIENT
Start: 2020-07-20 | End: 2020-12-29

## 2020-11-10 ENCOUNTER — TRANSFERRED RECORDS (OUTPATIENT)
Dept: HEALTH INFORMATION MANAGEMENT | Facility: CLINIC | Age: 60
End: 2020-11-10

## 2020-11-24 ENCOUNTER — TRANSFERRED RECORDS (OUTPATIENT)
Dept: HEALTH INFORMATION MANAGEMENT | Facility: CLINIC | Age: 60
End: 2020-11-24

## 2020-12-29 DIAGNOSIS — I10 ESSENTIAL HYPERTENSION, BENIGN: ICD-10-CM

## 2020-12-29 DIAGNOSIS — I10 UNCONTROLLED HYPERTENSION: ICD-10-CM

## 2020-12-29 RX ORDER — LISINOPRIL 40 MG/1
40 TABLET ORAL DAILY
Qty: 90 TABLET | Refills: 0 | Status: ON HOLD | OUTPATIENT
Start: 2020-12-29 | End: 2021-02-05

## 2020-12-29 RX ORDER — METOPROLOL SUCCINATE 100 MG/1
100 TABLET, EXTENDED RELEASE ORAL DAILY
Qty: 90 TABLET | Refills: 0 | Status: ON HOLD | OUTPATIENT
Start: 2020-12-29 | End: 2021-02-05

## 2020-12-29 RX ORDER — LISINOPRIL 10 MG/1
TABLET ORAL
Qty: 90 TABLET | Refills: 0 | Status: SHIPPED | OUTPATIENT
Start: 2020-12-29 | End: 2021-04-20

## 2020-12-29 NOTE — TELEPHONE ENCOUNTER
Refill done. Future refills only after follow up appointment with me.     Thank you,  Krystyna Mello MD on 12/29/2020 at 12:19 PM

## 2020-12-29 NOTE — TELEPHONE ENCOUNTER
Called and left a voicemail, and also sent a [employer] message asking the patient to schedule a appointment with Dr. Mello.    Elizabeth No on 12/29/2020 at 3:38 PM

## 2021-01-01 ENCOUNTER — TRANSFERRED RECORDS (OUTPATIENT)
Dept: HEALTH INFORMATION MANAGEMENT | Facility: CLINIC | Age: 61
End: 2021-01-01

## 2021-01-01 NOTE — PROGRESS NOTES
SUBJECTIVE:   CC: Avi Bhatti is an 58 year old male who presents for preventative health visit.     Physical   Annual:     Getting at least 3 servings of Calcium per day::  NO    Bi-annual eye exam::  NO    Dental care twice a year::  NO    Sleep apnea or symptoms of sleep apnea::  Daytime drowsiness, Excessive snoring and Sleep apnea    Frequency of exercise::  None    Taking medications regularly::  Yes    Medication side effects::  Lightheadedness    Additional concerns today::  YES            Hyperlipidemia Follow-Up      Rate your low fat/cholesterol diet?: not monitoring fat    Taking statin?  No    Other lipid medications/supplements?:  none    Hypertension Follow-up      Outpatient blood pressures are not being checked. Was seen at Gastroenterology clinic and BP was 160/109.    Low Salt Diet: not monitoring salt    Sees [facility] for migraines. Requesting Oxycodone today. Says this helps.  He uses it very sparingly just when pain is more intense    Migraine Follow-Up    Headaches symptoms:  Worsened     Frequency: weekly     Duration of headaches: days    Able to do normal daily activities/work with migraines: Yes    Rescue/Relief medication:sumatriptan (Imitrex)              Effectiveness: moderate relief    Preventative medication: None    Neurologic complications: No new stroke-like symptoms, loss of vision or speech, numbness or weakness    In the past 4 weeks, how often have you gone to Urgent Care or the emergency room because of your headaches?  0    He is going back to see pain specialist at [facility].  He has appt on May 29.  He got injections for neck last Dec.  Helped only slightly but did help somewhat    Today's PHQ-2 Score:   PHQ-2 ( 1999 [employer]) 5/17/2018   Q1: Little interest or pleasure in doing things 1   Q2: Feeling down, depressed or hopeless 1   PHQ-2 Score 2   Q1: Little interest or pleasure in doing things Several days   Q2: Feeling down, depressed or  [FreeTextEntry1] : Hx as described. S/P cataract and craniosynostosis repair. W/U by genetics, hearing and speech is pending. \par Mild left side head tilt. Normal tone on exam \par Follow in 3 months or sooner if needed.  "hopeless Several days   PHQ-2 Score 2   Answers for HPI/ROS submitted by the patient on 5/17/2018   PHQ-2 Score: 2      Abuse: Current or Past(Physical, Sexual or Emotional)- No  Do you feel safe in your environment - Yes    Social History   Substance Use Topics     Smoking status: Never Smoker     Smokeless tobacco: Never Used     Alcohol use Yes      Comment: seldom     Alcohol Use 5/17/2018   If you drink alcohol do you typically have greater than 3 drinks per day OR greater than 7 drinks per week? No   No flowsheet data found.    Last PSA: No results found for: PSA    Reviewed orders with patient. Reviewed health maintenance and updated orders accordingly - Yes  Labs reviewed in EPIC    Reviewed and updated as needed this visit by clinical staff  Tobacco  Allergies  Meds  Med Hx  Surg Hx  Fam Hx  Soc Hx        Reviewed and updated as needed this visit by Provider            Review of Systems  CONSTITUTIONAL: NEGATIVE for fever, chills, change in weight  INTEGUMENTARY/SKIN: NEGATIVE for worrisome rashes, moles or lesions  EYES: NEGATIVE for vision changes or irritation  ENT: NEGATIVE for ear, mouth and throat problems  RESP: NEGATIVE for significant cough or SOB  CV: NEGATIVE for chest pain, palpitations or peripheral edema  GI: POSITIVE for abdominal pain generalized and Crohns disease   male: negative for dysuria, hematuria, decreased urinary stream, erectile dysfunction, urethral discharge  MUSCULOSKELETAL:POSITIVE  for neck pain  NEURO: POSITIVE for HX headaches-migraine  PSYCHIATRIC: NEGATIVE for changes in mood or affect    OBJECTIVE:   BP (!) 140/100 (BP Location: Right arm, Patient Position: Sitting, Cuff Size: Adult Large)  Pulse 106  Temp 97.7  F (36.5  C) (Tympanic)  Resp 20  Ht 5' 8.25\" (1.734 m)  Wt 233 lb (105.7 kg)  SpO2 96%  BMI 35.17 kg/m2    Physical Exam  GENERAL: healthy, alert and no distress  EYES: Eyes grossly normal to inspection, PERRL and conjunctivae and sclerae " normal  HENT: ear canals and TM's normal, nose and mouth without ulcers or lesions  NECK: no adenopathy, no asymmetry, masses, or scars and thyroid normal to palpation  RESP: lungs clear to auscultation - no rales, rhonchi or wheezes  CV: regular rate and rhythm, normal S1 S2, no S3 or S4, no murmur, click or rub, no peripheral edema and peripheral pulses strong  ABDOMEN: soft, nontender, no hepatosplenomegaly, no masses and bowel sounds normal   (male): testicles normal without atrophy or masses, no hernias and penis normal without urethral discharge  MS: no gross musculoskeletal defects noted, no edema  SKIN: no suspicious lesions or rashes  NEURO: Normal strength and tone, mentation intact and speech normal  PSYCH: mentation appears normal, affect normal/bright    ASSESSMENT/PLAN:   Avi was seen today for physical.    Diagnoses and all orders for this visit:    Encounter for routine adult health examination with abnormal findings  -     Lipid panel reflex to direct LDL Fasting  -     CBC with platelets  -     Prostate spec antigen screen    Need for hepatitis C screening test  -     Hepatitis C Screen Reflex to HCV RNA Quant and Genotype    Need for prophylactic vaccination with tetanus-diphtheria (TD)  -     TDAP VACCINE (ADACEL)  -     VACCINE ADMINISTRATION, INITIAL    Essential hypertension, benign  -     lisinopril (PRINIVIL/ZESTRIL) 20 MG tablet; Take 1 tablet (20 mg) by mouth daily  -     Comprehensive metabolic panel    Crohn's disease of large intestine without complication (H)    Cervicalgia  -     oxyCODONE-acetaminophen (PERCOCET) 5-325 MG per tablet; Take 1 tablet by mouth 2 times daily as needed for moderate to severe pain    Migraine without aura and without status migrainosus, not intractable    Other orders  -     Cancel: lisinopril (PRINIVIL/ZESTRIL) 10 MG tablet; Take 1 tablet (10 mg) by mouth daily        COUNSELING:   Reviewed preventive health counseling, as reflected in patient  "instructions       Regular exercise       Healthy diet/nutrition       Immunizations    Vaccinated for: TDAP             Prostate cancer screening    Increase BP medication     reports that he has never smoked. He has never used smokeless tobacco.    Estimated body mass index is 35.17 kg/(m^2) as calculated from the following:    Height as of this encounter: 5' 8.25\" (1.734 m).    Weight as of this encounter: 233 lb (105.7 kg).   Weight management plan: Discussed healthy diet and exercise guidelines and patient will follow up in 6 months in clinic to re-evaluate.    Counseling Resources:  ATP IV Guidelines  Pooled Cohorts Equation Calculator  FRAX Risk Assessment  ICSI Preventive Guidelines  Dietary Guidelines for Americans, 2010  USDA's MyPlate  ASA Prophylaxis  Lung CA Screening    Andrew Anders MD  [facility]  "

## 2021-01-13 DIAGNOSIS — Z11.59 ENCOUNTER FOR SCREENING FOR OTHER VIRAL DISEASES: ICD-10-CM

## 2021-01-14 RX ORDER — HYDROCODONE BITARTRATE AND ACETAMINOPHEN 5; 325 MG/1; MG/1
1 TABLET ORAL EVERY 6 HOURS PRN
Status: ON HOLD | COMMUNITY
End: 2021-02-04

## 2021-01-14 RX ORDER — PANTOPRAZOLE SODIUM 40 MG/1
40 TABLET, DELAYED RELEASE ORAL DAILY
COMMUNITY
End: 2023-10-28

## 2021-01-15 ENCOUNTER — HEALTH MAINTENANCE LETTER (OUTPATIENT)
Age: 61
End: 2021-01-15

## 2021-01-22 ENCOUNTER — OFFICE VISIT (OUTPATIENT)
Dept: PEDIATRICS | Facility: CLINIC | Age: 61
End: 2021-01-22
Payer: COMMERCIAL

## 2021-01-22 VITALS
OXYGEN SATURATION: 98 % | HEIGHT: 68 IN | TEMPERATURE: 97.6 F | RESPIRATION RATE: 16 BRPM | BODY MASS INDEX: 37.07 KG/M2 | HEART RATE: 62 BPM | DIASTOLIC BLOOD PRESSURE: 84 MMHG | SYSTOLIC BLOOD PRESSURE: 128 MMHG | WEIGHT: 244.6 LBS

## 2021-01-22 DIAGNOSIS — Z01.818 PREOP GENERAL PHYSICAL EXAM: Primary | ICD-10-CM

## 2021-01-22 DIAGNOSIS — M54.16 LUMBAR RADICULOPATHY: ICD-10-CM

## 2021-01-22 DIAGNOSIS — K50.00 CROHN'S DISEASE OF SMALL INTESTINE WITHOUT COMPLICATION (H): Chronic | ICD-10-CM

## 2021-01-22 DIAGNOSIS — E66.01 MORBID OBESITY (H): ICD-10-CM

## 2021-01-22 DIAGNOSIS — Z23 NEED FOR IMMUNIZATION AGAINST INFLUENZA: ICD-10-CM

## 2021-01-22 DIAGNOSIS — N18.32 STAGE 3B CHRONIC KIDNEY DISEASE (H): ICD-10-CM

## 2021-01-22 DIAGNOSIS — I10 ESSENTIAL HYPERTENSION, BENIGN: ICD-10-CM

## 2021-01-22 DIAGNOSIS — M45.9 ANKYLOSING SPONDYLITIS, UNSPECIFIED SITE OF SPINE (H): Chronic | ICD-10-CM

## 2021-01-22 LAB
BASOPHILS # BLD AUTO: 0 10E9/L (ref 0–0.2)
BASOPHILS NFR BLD AUTO: 0.4 %
DIFFERENTIAL METHOD BLD: NORMAL
EOSINOPHIL # BLD AUTO: 0.2 10E9/L (ref 0–0.7)
EOSINOPHIL NFR BLD AUTO: 2.6 %
ERYTHROCYTE [DISTWIDTH] IN BLOOD BY AUTOMATED COUNT: 13.1 % (ref 10–15)
HCT VFR BLD AUTO: 46 % (ref 40–53)
HGB BLD-MCNC: 14.8 G/DL (ref 13.3–17.7)
LYMPHOCYTES # BLD AUTO: 2.3 10E9/L (ref 0.8–5.3)
LYMPHOCYTES NFR BLD AUTO: 28.6 %
MCH RBC QN AUTO: 31.6 PG (ref 26.5–33)
MCHC RBC AUTO-ENTMCNC: 32.2 G/DL (ref 31.5–36.5)
MCV RBC AUTO: 98 FL (ref 78–100)
MONOCYTES # BLD AUTO: 0.8 10E9/L (ref 0–1.3)
MONOCYTES NFR BLD AUTO: 9.9 %
NEUTROPHILS # BLD AUTO: 4.6 10E9/L (ref 1.6–8.3)
NEUTROPHILS NFR BLD AUTO: 58.5 %
PLATELET # BLD AUTO: 207 10E9/L (ref 150–450)
RBC # BLD AUTO: 4.68 10E12/L (ref 4.4–5.9)
WBC # BLD AUTO: 7.9 10E9/L (ref 4–11)

## 2021-01-22 PROCEDURE — 93000 ELECTROCARDIOGRAM COMPLETE: CPT | Performed by: FAMILY MEDICINE

## 2021-01-22 PROCEDURE — 90471 IMMUNIZATION ADMIN: CPT | Performed by: FAMILY MEDICINE

## 2021-01-22 PROCEDURE — 99214 OFFICE O/P EST MOD 30 MIN: CPT | Mod: 25 | Performed by: FAMILY MEDICINE

## 2021-01-22 PROCEDURE — 80053 COMPREHEN METABOLIC PANEL: CPT | Performed by: FAMILY MEDICINE

## 2021-01-22 PROCEDURE — 36415 COLL VENOUS BLD VENIPUNCTURE: CPT | Performed by: FAMILY MEDICINE

## 2021-01-22 PROCEDURE — 90682 RIV4 VACC RECOMBINANT DNA IM: CPT | Performed by: FAMILY MEDICINE

## 2021-01-22 PROCEDURE — 85025 COMPLETE CBC W/AUTO DIFF WBC: CPT | Performed by: FAMILY MEDICINE

## 2021-01-22 ASSESSMENT — PATIENT HEALTH QUESTIONNAIRE - PHQ9
SUM OF ALL RESPONSES TO PHQ QUESTIONS 1-9: 12
10. IF YOU CHECKED OFF ANY PROBLEMS, HOW DIFFICULT HAVE THESE PROBLEMS MADE IT FOR YOU TO DO YOUR WORK, TAKE CARE OF THINGS AT HOME, OR GET ALONG WITH OTHER PEOPLE: VERY DIFFICULT
SUM OF ALL RESPONSES TO PHQ QUESTIONS 1-9: 12

## 2021-01-22 ASSESSMENT — MIFFLIN-ST. JEOR: SCORE: 1889

## 2021-01-22 NOTE — PATIENT INSTRUCTIONS

## 2021-01-22 NOTE — PROGRESS NOTES
[facility]  [address]  [address]  [facility] [address]  Phone: [phone]  Fax: [phone]  Primary Provider: Andrew Anders  Pre-op Performing Provider: MEG JACKSON    PREOPERATIVE EVALUATION:  Today's date: 1/22/2021    Avi Bhatti is a 60 year old male who presents for a preoperative evaluation.    Surgical Information:  Surgery/Procedure: Lumbar 3-4 posterior lumbar instrumented fusion with or without interbody cage  Surgery Location: [facility]  Surgeon: Dr. Jakub Velasquez  Surgery Date: 2/2/21  Time of Surgery: 12:55pm  Where patient plans to recover: At home with family  Fax number for surgical facility: Note does not need to be faxed, will be available electronically in Epic.    Type of Anesthesia Anticipated: General    Subjective     HPI related to upcoming procedure:     Fernando has a lumbar radiculopathy with findings at L3-4.    He has Crohn's and ankylosing spondylitis. He is on immunosuppressive medication.    He is obese and has YORDY.    Preop Questions 1/22/2021   1. Have you ever had a heart attack or stroke? No   2. Have you ever had surgery on your heart or blood vessels, such as a stent placement, a coronary artery bypass, or surgery on an artery in your head, neck, heart, or legs? No   3. Do you have chest pain with activity? No   4. Do you have a history of  heart failure? No   5. Do you currently have a cold, bronchitis or symptoms of other infection? No   6. Do you have a cough, shortness of breath, or wheezing? No   7. Do you or anyone in your family have previous history of blood clots? No   8. Do you or does anyone in your family have a serious bleeding problem such as prolonged bleeding following surgeries or cuts? No   9. Have you ever had problems with anemia or been told to take iron pills? No   10. Have you had any abnormal blood loss such as black, tarry or bloody stools? No   11. Have you ever had a blood transfusion? No   12. Are  you willing to have a blood transfusion if it is medically needed before, during, or after your surgery? Yes   13. Have you or any of your relatives ever had problems with anesthesia? YES - post op nausea   14. Do you have sleep apnea, excessive snoring or daytime drowsiness? YES - YORDY   14a. Do you have a CPAP machine? Yes   15. Do you have any artifical heart valves or other implanted medical devices like a pacemaker, defibrillator, or continuous glucose monitor? No   16. Do you have artificial joints? YES - L hip   17. Are you allergic to latex? No       Health Care Directive:  Patient does not have a Health Care Directive or Living Will:       Preoperative Review of :   reviewed - no record of controlled substances prescribed.            Review of Systems  Constitutional, neuro, ENT, endocrine, pulmonary, cardiac, gastrointestinal, genitourinary, musculoskeletal, integument and psychiatric systems are negative, except as otherwise noted.    Patient Active Problem List    Diagnosis Date Noted     S/P total hip arthroplasty 11/19/2019     Priority: Medium     Uncontrolled hypertension 09/28/2019     Priority: Medium     CKD (chronic kidney disease) stage 3, GFR 30-59 ml/min 09/27/2019     Priority: Medium     Obesity (BMI 35.0-39.9) with comorbidity (H) 07/25/2019     Priority: Medium     Intractable migraine without aura and without status migrainosus 11/30/2017     Priority: Medium     Degenerative disc disease, cervical 11/30/2017     Priority: Medium     Adenomatous colon polyp 10/23/2015     Priority: Medium     Essential hypertension, benign 04/21/2015     Priority: Medium     Crohn's disease (H) 04/21/2015     Priority: Medium     BMI 30-35 04/20/2015     Priority: Medium     Mixed hyperlipidemia 10/16/2014     Priority: Medium     Cervicalgia 07/03/2013     Priority: Medium     Tension headache 07/03/2013     Priority: Medium     Muscle spasm 07/03/2013     Priority: Medium     Sleep apnea,  obstructive 12/14/2012     Priority: Medium     Ankylosing spondylitis (H) 12/14/2012     Priority: Medium      Past Medical History:   Diagnosis Date     Ankylosing spondylitis (H)      Arthritis      Crohn's colitis (H)      Gastroesophageal reflux disease      Hypertension      Migraine      Other chronic pain     headache and neck pain, receives botox injections Q 12 weeks     Sleep apnea     cpap     Past Surgical History:   Procedure Laterality Date     ARTHROPLASTY HIP Left 11/19/2019    Procedure: Left total hip arthroplasty;  Surgeon: Allen Coats MD;  Location: [facility] OR     BOTOX CHRONIC MIGRAINE HEAD ACHES      for chronic headache and neck pain, injections every 12 weeks     COLONOSCOPY       EXTRACTION(S) DENTAL  12/18/2012    Procedure: EXTRACTION(S) DENTAL;  Extraction of Teeth 30, 19;  Surgeon: Sujey Cunningham DDS;  Location: [facility] OR     repair fracture & insert pins left hand  1996     SMALL BOWEL RESECTION  1993     Current Outpatient Medications   Medication Sig Dispense Refill     ASACOL  MG EC tablet Take 1,600 mg by mouth 2 times daily   0     calcium carbonate-vitamin D (CALCIUM 600+D) 600-200 MG-UNIT TABS Take by mouth 2 times daily       diclofenac (VOLTAREN) 75 MG EC tablet Take 75 mg by mouth 2 times daily       DULoxetine (CYMBALTA) 60 MG EC capsule 60 mg daily   0     gabapentin (NEURONTIN) 300 MG capsule Take 900 mg by mouth At Bedtime   1     HUMIRA PEN 40 MG/0.8ML pen kit Inject 40 mg Subcutaneous every 14 days   0     HYDROcodone-acetaminophen (NORCO) 5-325 MG tablet Take 1 tablet by mouth every 6 hours as needed for severe pain       lisinopril (ZESTRIL) 10 MG tablet Take 1 tablet daily in the night. 90 tablet 0     lisinopril (ZESTRIL) 40 MG tablet Take 1 tablet (40 mg) by mouth daily 90 tablet 0     metoprolol succinate ER (TOPROL-XL) 100 MG 24 hr tablet Take 1 tablet (100 mg) by mouth daily 90 tablet 0     misoprostol (CYTOTEC) 200 MCG tablet Take 200 mcg by mouth 2  "times daily       multivitamin, therapeutic with minerals (MULTI-VITAMIN) TABS Take 1 tablet by mouth daily.       order for DME Equipment being ordered: Walker Wheels () and Walker ()  Treatment Diagnosis: Impaired gait 1 each 0     pantoprazole (PROTONIX) 40 MG EC tablet Take 40 mg by mouth daily       senna-docusate (SENOKOT-S/PERICOLACE) 8.6-50 MG tablet Take 2 tablets by mouth 2 times daily 60 tablet 1     TIZANIDINE HCL PO Take 16 mg by mouth At Bedtime          Allergies   Allergen Reactions     No Clinical Screening - See Comments      benny have not worked in past     Prednisone Other (See Comments)     Elevated heart rate, has had without problems more recently     Reglan [Metoclopramide] Other (See Comments)     agitation        Social History     Tobacco Use     Smoking status: Never Smoker     Smokeless tobacco: Never Used   Substance Use Topics     Alcohol use: Yes     Comment: seldom       History   Drug Use No         Objective     /84 (BP Location: Right arm, Cuff Size: Adult Large)   Pulse 62   Temp 97.6  F (36.4  C) (Tympanic)   Resp 16   Ht 1.727 m (5' 8\")   Wt 110.9 kg (244 lb 9.6 oz)   SpO2 98%   BMI 37.19 kg/m      Physical Exam    GENERAL APPEARANCE: alert, obese, NAD     EYES: EOMI,  PERRL     HENT: mouth without ulcers or lesions     NECK: no adenopathy, no asymmetry, masses, or scars and thyroid normal to palpation     RESP: lungs clear to auscultation      CV: regular rates and rhythm, no murmur, click or rub     ABDOMEN:  soft, nontender, no HSM or masses      MS: extremities normal- no gross deformities noted     SKIN: no suspicious lesions or rashes     NEURO: Normal strength and tone,  mentation intact and speech normal     PSYCH: mentation appears normal. and affect normal/bright     LYMPHATICS: No cervical adenopathy    Recent Labs   Lab Test 05/04/20 02/27/20 12/26/19  1018 11/21/19  0605 11/20/19  0619 11/01/19  1049 10/16/19  2042 10/16/19  2042   HGB  --  "  --   --  12.7* 12.6* 15.0  --  16.6   PLT  --   --   --   --   --  211  --  218   NA  --   --  140  --   --  138  --   --    POTASSIUM  --  5.2* 4.5  --   --  4.2   < >  --    CR 1.150 1.26 1.07  --   --  1.35*   < >  --    A1C  --   --  5.2  --   --   --   --   --     < > = values in this interval not displayed.        Diagnostics:  Results for orders placed or performed in visit on 01/22/21   Comprehensive metabolic panel (BMP + Alb, Alk Phos, ALT, AST, Total. Bili, TP)     Status: Abnormal   Result Value Ref Range    Sodium 140 133 - 144 mmol/L    Potassium 5.5 (H) 3.4 - 5.3 mmol/L    Chloride 110 (H) 94 - 109 mmol/L    Carbon Dioxide 24 20 - 32 mmol/L    Anion Gap 6 3 - 14 mmol/L    Glucose 77 70 - 99 mg/dL    Urea Nitrogen 32 (H) 7 - 30 mg/dL    Creatinine 1.77 (H) 0.66 - 1.25 mg/dL    GFR Estimate 40 (L) >60 mL/min/[1.73_m2]    GFR Estimate If Black 47 (L) >60 mL/min/[1.73_m2]    Calcium 8.9 8.5 - 10.1 mg/dL    Bilirubin Total 0.5 0.2 - 1.3 mg/dL    Albumin 3.8 3.4 - 5.0 g/dL    Protein Total 7.4 6.8 - 8.8 g/dL    Alkaline Phosphatase 94 40 - 150 U/L    ALT 36 0 - 70 U/L    AST 17 0 - 45 U/L   CBC with platelets and differential     Status: None   Result Value Ref Range    WBC 7.9 4.0 - 11.0 10e9/L    RBC Count 4.68 4.4 - 5.9 10e12/L    Hemoglobin 14.8 13.3 - 17.7 g/dL    Hematocrit 46.0 40.0 - 53.0 %    MCV 98 78 - 100 fl    MCH 31.6 26.5 - 33.0 pg    MCHC 32.2 31.5 - 36.5 g/dL    RDW 13.1 10.0 - 15.0 %    Platelet Count 207 150 - 450 10e9/L    % Neutrophils 58.5 %    % Lymphocytes 28.6 %    % Monocytes 9.9 %    % Eosinophils 2.6 %    % Basophils 0.4 %    Absolute Neutrophil 4.6 1.6 - 8.3 10e9/L    Absolute Lymphocytes 2.3 0.8 - 5.3 10e9/L    Absolute Monocytes 0.8 0.0 - 1.3 10e9/L    Absolute Eosinophils 0.2 0.0 - 0.7 10e9/L    Absolute Basophils 0.0 0.0 - 0.2 10e9/L    Diff Method Automated Method        EKG -   RSR, rate 58  Axis nl. Conduction nl.  ST segments nl. T waves WNL.  No scar. No ectopy.  EKG  is WNL.  Stable since 11-1-19'      Revised Cardiac Risk Index (RCRI):  The patient has the following serious cardiovascular risks for perioperative complications:   - No serious cardiac risks = 0 points     RCRI Interpretation: 1 point: Class II (low risk - 0.9% complication rate)           Assessment & Plan   The proposed surgical procedure is considered INTERMEDIATE risk.      (Z01.818) Preop general physical exam  (primary encounter diagnosis)  Comment:   Plan: EKG 12-lead complete w/read - Clinics,         Comprehensive metabolic panel (BMP + Alb, Alk         Phos, ALT, AST, Total. Bili, TP), CBC with         platelets and differential            (M54.16) Lumbar radiculopathy  Comment:   Plan:     (K50.00) Crohn's disease of small intestine without complication (H)  Comment:   Plan:     (M45.9) Ankylosing spondylitis, unspecified site of spine (H)  Comment:   Plan:     (I10) Essential hypertension, benign  Comment:   Plan:     (Z23) Need for immunization against influenza  Comment:   Plan: [place] RIV4 (FLUBLOK) VACCINE RECOMBINANT DNA PRSRV        ANTIBIO FREE, IM ([id number])            (E66.01) Obesity (BMI 35.0-39.9) with comorbidity (H)  Comment:   Plan:     (N18.32) Stage 3b chronic kidney disease  Comment:   Plan:            Risks and Recommendations:  The patient has the following additional risks and recommendations for perioperative complications:   - No identified additional risk factors other than previously addressed      Meds - Cymbalta, Protonix on AM of surgery.      RECOMMENDATION:  APPROVAL GIVEN to proceed with proposed procedure, without further diagnostic evaluation.    Signed Electronically by: Jamaal Andersen MD    Copy of this evaluation report is provided to requesting physician.    [facility] Preop Guidelines    Revised Cardiac Risk Index  Answers for HPI/ROS submitted by the patient on 1/22/2021   If you checked off any problems, how difficult have these problems  made it for you to do your work, take care of things at home, or get along with other people?: Very difficult  PHQ9 TOTAL SCORE: 12

## 2021-01-22 NOTE — H&P (VIEW-ONLY)
[facility]  [address]  [address]  [facility] [address]  Phone: [phone]  Fax: [phone]  Primary Provider: Andrew Anders  Pre-op Performing Provider: MEG JACKSON    PREOPERATIVE EVALUATION:  Today's date: 1/22/2021    Avi Bhatti is a 60 year old male who presents for a preoperative evaluation.    Surgical Information:  Surgery/Procedure: Lumbar 3-4 posterior lumbar instrumented fusion with or without interbody cage  Surgery Location: [facility]  Surgeon: Dr. Jakub Velasquez  Surgery Date: 2/2/21  Time of Surgery: 12:55pm  Where patient plans to recover: At home with family  Fax number for surgical facility: Note does not need to be faxed, will be available electronically in Epic.    Type of Anesthesia Anticipated: General    Subjective     HPI related to upcoming procedure:     Fernando has a lumbar radiculopathy with findings at L3-4.    He has Crohn's and ankylosing spondylitis. He is on immunosuppressive medication.    He is obese and has YORDY.    Preop Questions 1/22/2021   1. Have you ever had a heart attack or stroke? No   2. Have you ever had surgery on your heart or blood vessels, such as a stent placement, a coronary artery bypass, or surgery on an artery in your head, neck, heart, or legs? No   3. Do you have chest pain with activity? No   4. Do you have a history of  heart failure? No   5. Do you currently have a cold, bronchitis or symptoms of other infection? No   6. Do you have a cough, shortness of breath, or wheezing? No   7. Do you or anyone in your family have previous history of blood clots? No   8. Do you or does anyone in your family have a serious bleeding problem such as prolonged bleeding following surgeries or cuts? No   9. Have you ever had problems with anemia or been told to take iron pills? No   10. Have you had any abnormal blood loss such as black, tarry or bloody stools? No   11. Have you ever had a blood transfusion? No   12. Are  you willing to have a blood transfusion if it is medically needed before, during, or after your surgery? Yes   13. Have you or any of your relatives ever had problems with anesthesia? YES - post op nausea   14. Do you have sleep apnea, excessive snoring or daytime drowsiness? YES - YORDY   14a. Do you have a CPAP machine? Yes   15. Do you have any artifical heart valves or other implanted medical devices like a pacemaker, defibrillator, or continuous glucose monitor? No   16. Do you have artificial joints? YES - L hip   17. Are you allergic to latex? No       Health Care Directive:  Patient does not have a Health Care Directive or Living Will:       Preoperative Review of :   reviewed - no record of controlled substances prescribed.            Review of Systems  Constitutional, neuro, ENT, endocrine, pulmonary, cardiac, gastrointestinal, genitourinary, musculoskeletal, integument and psychiatric systems are negative, except as otherwise noted.    Patient Active Problem List    Diagnosis Date Noted     S/P total hip arthroplasty 11/19/2019     Priority: Medium     Uncontrolled hypertension 09/28/2019     Priority: Medium     CKD (chronic kidney disease) stage 3, GFR 30-59 ml/min 09/27/2019     Priority: Medium     Obesity (BMI 35.0-39.9) with comorbidity (H) 07/25/2019     Priority: Medium     Intractable migraine without aura and without status migrainosus 11/30/2017     Priority: Medium     Degenerative disc disease, cervical 11/30/2017     Priority: Medium     Adenomatous colon polyp 10/23/2015     Priority: Medium     Essential hypertension, benign 04/21/2015     Priority: Medium     Crohn's disease (H) 04/21/2015     Priority: Medium     BMI 30-35 04/20/2015     Priority: Medium     Mixed hyperlipidemia 10/16/2014     Priority: Medium     Cervicalgia 07/03/2013     Priority: Medium     Tension headache 07/03/2013     Priority: Medium     Muscle spasm 07/03/2013     Priority: Medium     Sleep apnea,  obstructive 12/14/2012     Priority: Medium     Ankylosing spondylitis (H) 12/14/2012     Priority: Medium      Past Medical History:   Diagnosis Date     Ankylosing spondylitis (H)      Arthritis      Crohn's colitis (H)      Gastroesophageal reflux disease      Hypertension      Migraine      Other chronic pain     headache and neck pain, receives botox injections Q 12 weeks     Sleep apnea     cpap     Past Surgical History:   Procedure Laterality Date     ARTHROPLASTY HIP Left 11/19/2019    Procedure: Left total hip arthroplasty;  Surgeon: Allen Coats MD;  Location: [facility] OR     BOTOX CHRONIC MIGRAINE HEAD ACHES      for chronic headache and neck pain, injections every 12 weeks     COLONOSCOPY       EXTRACTION(S) DENTAL  12/18/2012    Procedure: EXTRACTION(S) DENTAL;  Extraction of Teeth 30, 19;  Surgeon: Sujey Cunningham DDS;  Location: [facility] OR     repair fracture & insert pins left hand  1996     SMALL BOWEL RESECTION  1993     Current Outpatient Medications   Medication Sig Dispense Refill     ASACOL  MG EC tablet Take 1,600 mg by mouth 2 times daily   0     calcium carbonate-vitamin D (CALCIUM 600+D) 600-200 MG-UNIT TABS Take by mouth 2 times daily       diclofenac (VOLTAREN) 75 MG EC tablet Take 75 mg by mouth 2 times daily       DULoxetine (CYMBALTA) 60 MG EC capsule 60 mg daily   0     gabapentin (NEURONTIN) 300 MG capsule Take 900 mg by mouth At Bedtime   1     HUMIRA PEN 40 MG/0.8ML pen kit Inject 40 mg Subcutaneous every 14 days   0     HYDROcodone-acetaminophen (NORCO) 5-325 MG tablet Take 1 tablet by mouth every 6 hours as needed for severe pain       lisinopril (ZESTRIL) 10 MG tablet Take 1 tablet daily in the night. 90 tablet 0     lisinopril (ZESTRIL) 40 MG tablet Take 1 tablet (40 mg) by mouth daily 90 tablet 0     metoprolol succinate ER (TOPROL-XL) 100 MG 24 hr tablet Take 1 tablet (100 mg) by mouth daily 90 tablet 0     misoprostol (CYTOTEC) 200 MCG tablet Take 200 mcg by mouth 2  "times daily       multivitamin, therapeutic with minerals (MULTI-VITAMIN) TABS Take 1 tablet by mouth daily.       order for DME Equipment being ordered: Walker Wheels () and Walker ()  Treatment Diagnosis: Impaired gait 1 each 0     pantoprazole (PROTONIX) 40 MG EC tablet Take 40 mg by mouth daily       senna-docusate (SENOKOT-S/PERICOLACE) 8.6-50 MG tablet Take 2 tablets by mouth 2 times daily 60 tablet 1     TIZANIDINE HCL PO Take 16 mg by mouth At Bedtime          Allergies   Allergen Reactions     No Clinical Screening - See Comments      benny have not worked in past     Prednisone Other (See Comments)     Elevated heart rate, has had without problems more recently     Reglan [Metoclopramide] Other (See Comments)     agitation        Social History     Tobacco Use     Smoking status: Never Smoker     Smokeless tobacco: Never Used   Substance Use Topics     Alcohol use: Yes     Comment: seldom       History   Drug Use No         Objective     /84 (BP Location: Right arm, Cuff Size: Adult Large)   Pulse 62   Temp 97.6  F (36.4  C) (Tympanic)   Resp 16   Ht 1.727 m (5' 8\")   Wt 110.9 kg (244 lb 9.6 oz)   SpO2 98%   BMI 37.19 kg/m      Physical Exam    GENERAL APPEARANCE: alert, obese, NAD     EYES: EOMI,  PERRL     HENT: mouth without ulcers or lesions     NECK: no adenopathy, no asymmetry, masses, or scars and thyroid normal to palpation     RESP: lungs clear to auscultation      CV: regular rates and rhythm, no murmur, click or rub     ABDOMEN:  soft, nontender, no HSM or masses      MS: extremities normal- no gross deformities noted     SKIN: no suspicious lesions or rashes     NEURO: Normal strength and tone,  mentation intact and speech normal     PSYCH: mentation appears normal. and affect normal/bright     LYMPHATICS: No cervical adenopathy    Recent Labs   Lab Test 05/04/20 02/27/20 12/26/19  1018 11/21/19  0605 11/20/19  0619 11/01/19  1049 10/16/19  2042 10/16/19  2042   HGB  --  "  --   --  12.7* 12.6* 15.0  --  16.6   PLT  --   --   --   --   --  211  --  218   NA  --   --  140  --   --  138  --   --    POTASSIUM  --  5.2* 4.5  --   --  4.2   < >  --    CR 1.150 1.26 1.07  --   --  1.35*   < >  --    A1C  --   --  5.2  --   --   --   --   --     < > = values in this interval not displayed.        Diagnostics:  Results for orders placed or performed in visit on 01/22/21   Comprehensive metabolic panel (BMP + Alb, Alk Phos, ALT, AST, Total. Bili, TP)     Status: Abnormal   Result Value Ref Range    Sodium 140 133 - 144 mmol/L    Potassium 5.5 (H) 3.4 - 5.3 mmol/L    Chloride 110 (H) 94 - 109 mmol/L    Carbon Dioxide 24 20 - 32 mmol/L    Anion Gap 6 3 - 14 mmol/L    Glucose 77 70 - 99 mg/dL    Urea Nitrogen 32 (H) 7 - 30 mg/dL    Creatinine 1.77 (H) 0.66 - 1.25 mg/dL    GFR Estimate 40 (L) >60 mL/min/[1.73_m2]    GFR Estimate If Black 47 (L) >60 mL/min/[1.73_m2]    Calcium 8.9 8.5 - 10.1 mg/dL    Bilirubin Total 0.5 0.2 - 1.3 mg/dL    Albumin 3.8 3.4 - 5.0 g/dL    Protein Total 7.4 6.8 - 8.8 g/dL    Alkaline Phosphatase 94 40 - 150 U/L    ALT 36 0 - 70 U/L    AST 17 0 - 45 U/L   CBC with platelets and differential     Status: None   Result Value Ref Range    WBC 7.9 4.0 - 11.0 10e9/L    RBC Count 4.68 4.4 - 5.9 10e12/L    Hemoglobin 14.8 13.3 - 17.7 g/dL    Hematocrit 46.0 40.0 - 53.0 %    MCV 98 78 - 100 fl    MCH 31.6 26.5 - 33.0 pg    MCHC 32.2 31.5 - 36.5 g/dL    RDW 13.1 10.0 - 15.0 %    Platelet Count 207 150 - 450 10e9/L    % Neutrophils 58.5 %    % Lymphocytes 28.6 %    % Monocytes 9.9 %    % Eosinophils 2.6 %    % Basophils 0.4 %    Absolute Neutrophil 4.6 1.6 - 8.3 10e9/L    Absolute Lymphocytes 2.3 0.8 - 5.3 10e9/L    Absolute Monocytes 0.8 0.0 - 1.3 10e9/L    Absolute Eosinophils 0.2 0.0 - 0.7 10e9/L    Absolute Basophils 0.0 0.0 - 0.2 10e9/L    Diff Method Automated Method        EKG -   RSR, rate 58  Axis nl. Conduction nl.  ST segments nl. T waves WNL.  No scar. No ectopy.  EKG  is WNL.  Stable since 11-1-19'      Revised Cardiac Risk Index (RCRI):  The patient has the following serious cardiovascular risks for perioperative complications:   - No serious cardiac risks = 0 points     RCRI Interpretation: 1 point: Class II (low risk - 0.9% complication rate)           Assessment & Plan   The proposed surgical procedure is considered INTERMEDIATE risk.      (Z01.818) Preop general physical exam  (primary encounter diagnosis)  Comment:   Plan: EKG 12-lead complete w/read - Clinics,         Comprehensive metabolic panel (BMP + Alb, Alk         Phos, ALT, AST, Total. Bili, TP), CBC with         platelets and differential            (M54.16) Lumbar radiculopathy  Comment:   Plan:     (K50.00) Crohn's disease of small intestine without complication (H)  Comment:   Plan:     (M45.9) Ankylosing spondylitis, unspecified site of spine (H)  Comment:   Plan:     (I10) Essential hypertension, benign  Comment:   Plan:     (Z23) Need for immunization against influenza  Comment:   Plan: [place] RIV4 (FLUBLOK) VACCINE RECOMBINANT DNA PRSRV        ANTIBIO FREE, IM ([id number])            (E66.01) Obesity (BMI 35.0-39.9) with comorbidity (H)  Comment:   Plan:     (N18.32) Stage 3b chronic kidney disease  Comment:   Plan:            Risks and Recommendations:  The patient has the following additional risks and recommendations for perioperative complications:   - No identified additional risk factors other than previously addressed      Meds - Cymbalta, Protonix on AM of surgery.      RECOMMENDATION:  APPROVAL GIVEN to proceed with proposed procedure, without further diagnostic evaluation.    Signed Electronically by: Jamaal Andersen MD    Copy of this evaluation report is provided to requesting physician.    [facility] Pre[facility] Guidelines    Revised Cardiac Risk Index  Answers for HPI/ROS submitted by the patient on 1/22/2021   If you checked off any problems, how difficult have these problems  made it for you to do your work, take care of things at home, or get along with other people?: Very difficult  PHQ9 TOTAL SCORE: 12

## 2021-01-23 LAB
ALBUMIN SERPL-MCNC: 3.8 G/DL (ref 3.4–5)
ALP SERPL-CCNC: 94 U/L (ref 40–150)
ALT SERPL W P-5'-P-CCNC: 36 U/L (ref 0–70)
ANION GAP SERPL CALCULATED.3IONS-SCNC: 6 MMOL/L (ref 3–14)
AST SERPL W P-5'-P-CCNC: 17 U/L (ref 0–45)
BILIRUB SERPL-MCNC: 0.5 MG/DL (ref 0.2–1.3)
BUN SERPL-MCNC: 32 MG/DL (ref 7–30)
CALCIUM SERPL-MCNC: 8.9 MG/DL (ref 8.5–10.1)
CHLORIDE SERPL-SCNC: 110 MMOL/L (ref 94–109)
CO2 SERPL-SCNC: 24 MMOL/L (ref 20–32)
CREAT SERPL-MCNC: 1.77 MG/DL (ref 0.66–1.25)
GFR SERPL CREATININE-BSD FRML MDRD: 40 ML/MIN/{1.73_M2}
GLUCOSE SERPL-MCNC: 77 MG/DL (ref 70–99)
POTASSIUM SERPL-SCNC: 5.5 MMOL/L (ref 3.4–5.3)
PROT SERPL-MCNC: 7.4 G/DL (ref 6.8–8.8)
SODIUM SERPL-SCNC: 140 MMOL/L (ref 133–144)

## 2021-01-23 ASSESSMENT — PATIENT HEALTH QUESTIONNAIRE - PHQ9: SUM OF ALL RESPONSES TO PHQ QUESTIONS 1-9: 12

## 2021-01-29 NOTE — PHARMACY-ADMISSION MEDICATION HISTORY
Medication reconciliation completed by pre-admitting.    Prior to Admission medications    Medication Sig Last Dose Taking? Auth Provider   ASACOL  MG EC tablet Take 1,600 mg by mouth 2 times daily   Yes Reported, Patient   calcium carbonate-vitamin D (CALCIUM 600+D) 600-200 MG-UNIT TABS Take by mouth 2 times daily   Yes Reported, Patient   diclofenac (VOLTAREN) 75 MG EC tablet Take 75 mg by mouth 2 times daily  Yes Reported, Patient   DULoxetine (CYMBALTA) 60 MG EC capsule 60 mg daily   Yes Reported, Patient   gabapentin (NEURONTIN) 300 MG capsule Take 900 mg by mouth At Bedtime   Yes Reported, Patient   HUMIRA PEN 40 MG/0.8ML pen kit Inject 40 mg Subcutaneous every 14 days   Yes Reported, Patient   HYDROcodone-acetaminophen (NORCO) 5-325 MG tablet Take 1 tablet by mouth every 6 hours as needed for severe pain  Yes Reported, Patient   lisinopril (ZESTRIL) 10 MG tablet Take 1 tablet daily in the night.  Yes Krystyna Mello MD   lisinopril (ZESTRIL) 40 MG tablet Take 1 tablet (40 mg) by mouth daily  Yes Krystyna Mello MD   metoprolol succinate ER (TOPROL-XL) 100 MG 24 hr tablet Take 1 tablet (100 mg) by mouth daily  Yes Krystyna Mello MD   misoprostol (CYTOTEC) 200 MCG tablet Take 200 mcg by mouth 2 times daily  Yes Reported, Patient   multivitamin, therapeutic with minerals (MULTI-VITAMIN) TABS Take 1 tablet by mouth daily.  Yes Reported, Patient   pantoprazole (PROTONIX) 40 MG EC tablet Take 40 mg by mouth daily  Yes Reported, Patient   senna-docusate (SENOKOT-S/PERICOLACE) 8.6-50 MG tablet Take 2 tablets by mouth 2 times daily  Yes Sofiya Morrell PA-C   TIZANIDINE HCL PO Take 16 mg by mouth At Bedtime   Yes Reported, Patient   order for DME Equipment being ordered: Walker Wheels () and Walker ()  Treatment Diagnosis: Impaired gait   Allen Coats MD

## 2021-01-30 DIAGNOSIS — Z11.59 ENCOUNTER FOR SCREENING FOR OTHER VIRAL DISEASES: ICD-10-CM

## 2021-01-30 LAB
SARS-COV-2 RNA RESP QL NAA+PROBE: NORMAL
SPECIMEN SOURCE: NORMAL

## 2021-01-30 PROCEDURE — 87635 SARS-COV-2 COVID-19 AMP PRB: CPT | Performed by: ORTHOPAEDIC SURGERY

## 2021-01-31 LAB
LABORATORY COMMENT REPORT: NORMAL
SARS-COV-2 RNA RESP QL NAA+PROBE: NEGATIVE
SPECIMEN SOURCE: NORMAL

## 2021-02-01 ENCOUNTER — ANESTHESIA EVENT (OUTPATIENT)
Dept: SURGERY | Facility: CLINIC | Age: 61
End: 2021-02-01
Payer: COMMERCIAL

## 2021-02-02 ENCOUNTER — HOSPITAL ENCOUNTER (INPATIENT)
Facility: CLINIC | Age: 61
LOS: 3 days | Discharge: HOME | End: 2021-02-05
Attending: ORTHOPAEDIC SURGERY | Admitting: ORTHOPAEDIC SURGERY
Payer: COMMERCIAL

## 2021-02-02 ENCOUNTER — TRANSFERRED RECORDS (OUTPATIENT)
Dept: HEALTH INFORMATION MANAGEMENT | Facility: CLINIC | Age: 61
End: 2021-02-02

## 2021-02-02 ENCOUNTER — APPOINTMENT (OUTPATIENT)
Dept: GENERAL RADIOLOGY | Facility: CLINIC | Age: 61
End: 2021-02-02
Attending: ORTHOPAEDIC SURGERY
Payer: COMMERCIAL

## 2021-02-02 ENCOUNTER — ANESTHESIA (OUTPATIENT)
Dept: SURGERY | Facility: CLINIC | Age: 61
End: 2021-02-02
Payer: COMMERCIAL

## 2021-02-02 DIAGNOSIS — Z98.1 S/P LUMBAR FUSION: Primary | ICD-10-CM

## 2021-02-02 DIAGNOSIS — R33.9 URINE RETENTION: ICD-10-CM

## 2021-02-02 DIAGNOSIS — I10 ESSENTIAL HYPERTENSION, BENIGN: ICD-10-CM

## 2021-02-02 LAB
ABO + RH BLD: NORMAL
ABO + RH BLD: NORMAL
BLD GP AB SCN SERPL QL: NORMAL
BLOOD BANK CMNT PATIENT-IMP: NORMAL
POTASSIUM SERPL-SCNC: 5.3 MMOL/L (ref 3.4–5.3)
SPECIMEN EXP DATE BLD: NORMAL

## 2021-02-02 PROCEDURE — 999N000141 HC STATISTIC PRE-PROCEDURE NURSING ASSESSMENT: Performed by: ORTHOPAEDIC SURGERY

## 2021-02-02 PROCEDURE — 272N000001 HC OR GENERAL SUPPLY STERILE: Performed by: ORTHOPAEDIC SURGERY

## 2021-02-02 PROCEDURE — 36415 COLL VENOUS BLD VENIPUNCTURE: CPT | Performed by: ANESTHESIOLOGY

## 2021-02-02 PROCEDURE — 250N000011 HC RX IP 250 OP 636: Performed by: PHYSICIAN ASSISTANT

## 2021-02-02 PROCEDURE — 86901 BLOOD TYPING SEROLOGIC RH(D): CPT | Performed by: ANESTHESIOLOGY

## 2021-02-02 PROCEDURE — 86900 BLOOD TYPING SEROLOGIC ABO: CPT | Performed by: ANESTHESIOLOGY

## 2021-02-02 PROCEDURE — 120N000001 HC R&B MED SURG/OB

## 2021-02-02 PROCEDURE — 278N000051 HC OR IMPLANT GENERAL: Performed by: ORTHOPAEDIC SURGERY

## 2021-02-02 PROCEDURE — 250N000013 HC RX MED GY IP 250 OP 250 PS 637: Performed by: PHYSICIAN ASSISTANT

## 2021-02-02 PROCEDURE — 999N000179 XR SURGERY CARM FLUORO LESS THAN 5 MIN W STILLS: Mod: TC

## 2021-02-02 PROCEDURE — 258N000003 HC RX IP 258 OP 636: Performed by: ORTHOPAEDIC SURGERY

## 2021-02-02 PROCEDURE — 272N000004 HC RX 272: Performed by: ORTHOPAEDIC SURGERY

## 2021-02-02 PROCEDURE — 84132 ASSAY OF SERUM POTASSIUM: CPT | Performed by: ANESTHESIOLOGY

## 2021-02-02 PROCEDURE — 258N000003 HC RX IP 258 OP 636: Performed by: ANESTHESIOLOGY

## 2021-02-02 PROCEDURE — 370N000017 HC ANESTHESIA TECHNICAL FEE, PER MIN: Performed by: ORTHOPAEDIC SURGERY

## 2021-02-02 PROCEDURE — 710N000009 HC RECOVERY PHASE 1, LEVEL 1, PER MIN: Performed by: ORTHOPAEDIC SURGERY

## 2021-02-02 PROCEDURE — 250N000009 HC RX 250: Performed by: NURSE ANESTHETIST, CERTIFIED REGISTERED

## 2021-02-02 PROCEDURE — 250N000009 HC RX 250: Performed by: ORTHOPAEDIC SURGERY

## 2021-02-02 PROCEDURE — 258N000003 HC RX IP 258 OP 636: Performed by: PHYSICIAN ASSISTANT

## 2021-02-02 PROCEDURE — 86850 RBC ANTIBODY SCREEN: CPT | Performed by: ANESTHESIOLOGY

## 2021-02-02 PROCEDURE — 258N000003 HC RX IP 258 OP 636: Performed by: NURSE ANESTHETIST, CERTIFIED REGISTERED

## 2021-02-02 PROCEDURE — 272N000683 HC OR SPINE - CAGE/SPACER/DISK/CORD/CONNECTOR OPNP: Performed by: ORTHOPAEDIC SURGERY

## 2021-02-02 PROCEDURE — 360N000085 HC SURGERY LEVEL 5 W/ FLUORO, PER MIN: Performed by: ORTHOPAEDIC SURGERY

## 2021-02-02 PROCEDURE — 250N000011 HC RX IP 250 OP 636: Performed by: NURSE ANESTHETIST, CERTIFIED REGISTERED

## 2021-02-02 PROCEDURE — C1713 ANCHOR/SCREW BN/BN,TIS/BN: HCPCS | Performed by: ORTHOPAEDIC SURGERY

## 2021-02-02 PROCEDURE — C1762 CONN TISS, HUMAN(INC FASCIA): HCPCS | Performed by: ORTHOPAEDIC SURGERY

## 2021-02-02 DEVICE — GRAFT BONE MAGNIFUSE 1CMX5CM 7509215: Type: IMPLANTABLE DEVICE | Site: SPINE LUMBAR | Status: FUNCTIONAL

## 2021-02-02 DEVICE — IMPLANTABLE DEVICE: Type: IMPLANTABLE DEVICE | Site: SPINE LUMBAR | Status: FUNCTIONAL

## 2021-02-02 RX ORDER — MEPERIDINE HYDROCHLORIDE 25 MG/ML
12.5 INJECTION INTRAMUSCULAR; INTRAVENOUS; SUBCUTANEOUS
Status: DISCONTINUED | OUTPATIENT
Start: 2021-02-02 | End: 2021-02-02 | Stop reason: HOSPADM

## 2021-02-02 RX ORDER — LABETALOL 20 MG/4 ML (5 MG/ML) INTRAVENOUS SYRINGE
10
Status: DISCONTINUED | OUTPATIENT
Start: 2021-02-02 | End: 2021-02-02 | Stop reason: HOSPADM

## 2021-02-02 RX ORDER — HYDROMORPHONE HYDROCHLORIDE 1 MG/ML
.3-.5 INJECTION, SOLUTION INTRAMUSCULAR; INTRAVENOUS; SUBCUTANEOUS EVERY 10 MIN PRN
Status: DISCONTINUED | OUTPATIENT
Start: 2021-02-02 | End: 2021-02-02 | Stop reason: HOSPADM

## 2021-02-02 RX ORDER — ALBUTEROL SULFATE 0.83 MG/ML
2.5 SOLUTION RESPIRATORY (INHALATION) EVERY 4 HOURS PRN
Status: DISCONTINUED | OUTPATIENT
Start: 2021-02-02 | End: 2021-02-02 | Stop reason: HOSPADM

## 2021-02-02 RX ORDER — NALOXONE HYDROCHLORIDE 0.4 MG/ML
0.2 INJECTION, SOLUTION INTRAMUSCULAR; INTRAVENOUS; SUBCUTANEOUS
Status: DISCONTINUED | OUTPATIENT
Start: 2021-02-02 | End: 2021-02-05 | Stop reason: HOSPADM

## 2021-02-02 RX ORDER — ONDANSETRON 2 MG/ML
4 INJECTION INTRAMUSCULAR; INTRAVENOUS EVERY 6 HOURS PRN
Status: DISCONTINUED | OUTPATIENT
Start: 2021-02-02 | End: 2021-02-05 | Stop reason: HOSPADM

## 2021-02-02 RX ORDER — PROPOFOL 10 MG/ML
INJECTION, EMULSION INTRAVENOUS PRN
Status: DISCONTINUED | OUTPATIENT
Start: 2021-02-02 | End: 2021-02-02

## 2021-02-02 RX ORDER — EPHEDRINE SULFATE 50 MG/ML
INJECTION, SOLUTION INTRAMUSCULAR; INTRAVENOUS; SUBCUTANEOUS PRN
Status: DISCONTINUED | OUTPATIENT
Start: 2021-02-02 | End: 2021-02-02

## 2021-02-02 RX ORDER — DEXAMETHASONE SODIUM PHOSPHATE 4 MG/ML
INJECTION, SOLUTION INTRA-ARTICULAR; INTRALESIONAL; INTRAMUSCULAR; INTRAVENOUS; SOFT TISSUE PRN
Status: DISCONTINUED | OUTPATIENT
Start: 2021-02-02 | End: 2021-02-02

## 2021-02-02 RX ORDER — OXYCODONE HYDROCHLORIDE 5 MG/1
5 TABLET ORAL EVERY 4 HOURS PRN
Status: DISCONTINUED | OUTPATIENT
Start: 2021-02-02 | End: 2021-02-02

## 2021-02-02 RX ORDER — HYDRALAZINE HYDROCHLORIDE 20 MG/ML
2.5-5 INJECTION INTRAMUSCULAR; INTRAVENOUS EVERY 10 MIN PRN
Status: DISCONTINUED | OUTPATIENT
Start: 2021-02-02 | End: 2021-02-02 | Stop reason: HOSPADM

## 2021-02-02 RX ORDER — ONDANSETRON 4 MG/1
4 TABLET, ORALLY DISINTEGRATING ORAL EVERY 6 HOURS PRN
Status: DISCONTINUED | OUTPATIENT
Start: 2021-02-02 | End: 2021-02-05 | Stop reason: HOSPADM

## 2021-02-02 RX ORDER — NEOSTIGMINE METHYLSULFATE 1 MG/ML
VIAL (ML) INJECTION PRN
Status: DISCONTINUED | OUTPATIENT
Start: 2021-02-02 | End: 2021-02-02

## 2021-02-02 RX ORDER — VITAMIN B COMPLEX
25 TABLET ORAL 2 TIMES DAILY
Status: DISCONTINUED | OUTPATIENT
Start: 2021-02-02 | End: 2021-02-05 | Stop reason: HOSPADM

## 2021-02-02 RX ORDER — NALOXONE HYDROCHLORIDE 0.4 MG/ML
0.4 INJECTION, SOLUTION INTRAMUSCULAR; INTRAVENOUS; SUBCUTANEOUS
Status: DISCONTINUED | OUTPATIENT
Start: 2021-02-02 | End: 2021-02-02 | Stop reason: HOSPADM

## 2021-02-02 RX ORDER — ONDANSETRON 2 MG/ML
INJECTION INTRAMUSCULAR; INTRAVENOUS PRN
Status: DISCONTINUED | OUTPATIENT
Start: 2021-02-02 | End: 2021-02-02

## 2021-02-02 RX ORDER — KETOROLAC TROMETHAMINE 30 MG/ML
30 INJECTION, SOLUTION INTRAMUSCULAR; INTRAVENOUS EVERY 6 HOURS PRN
Status: DISCONTINUED | OUTPATIENT
Start: 2021-02-02 | End: 2021-02-02 | Stop reason: HOSPADM

## 2021-02-02 RX ORDER — LIDOCAINE HYDROCHLORIDE 10 MG/ML
INJECTION, SOLUTION INFILTRATION; PERINEURAL PRN
Status: DISCONTINUED | OUTPATIENT
Start: 2021-02-02 | End: 2021-02-02

## 2021-02-02 RX ORDER — NALOXONE HYDROCHLORIDE 0.4 MG/ML
0.2 INJECTION, SOLUTION INTRAMUSCULAR; INTRAVENOUS; SUBCUTANEOUS
Status: DISCONTINUED | OUTPATIENT
Start: 2021-02-02 | End: 2021-02-02 | Stop reason: HOSPADM

## 2021-02-02 RX ORDER — SODIUM CHLORIDE 9 MG/ML
INJECTION, SOLUTION INTRAVENOUS CONTINUOUS
Status: DISCONTINUED | OUTPATIENT
Start: 2021-02-02 | End: 2021-02-04

## 2021-02-02 RX ORDER — DIAZEPAM 10 MG/2ML
2.5 INJECTION, SOLUTION INTRAMUSCULAR; INTRAVENOUS
Status: DISCONTINUED | OUTPATIENT
Start: 2021-02-02 | End: 2021-02-02 | Stop reason: HOSPADM

## 2021-02-02 RX ORDER — BUPIVACAINE HYDROCHLORIDE AND EPINEPHRINE 2.5; 5 MG/ML; UG/ML
30 INJECTION, SOLUTION EPIDURAL; INFILTRATION; INTRACAUDAL; PERINEURAL ONCE
Status: DISCONTINUED | OUTPATIENT
Start: 2021-02-02 | End: 2021-02-02 | Stop reason: HOSPADM

## 2021-02-02 RX ORDER — ACETAMINOPHEN 325 MG/1
975 TABLET ORAL EVERY 8 HOURS
Status: COMPLETED | OUTPATIENT
Start: 2021-02-02 | End: 2021-02-05

## 2021-02-02 RX ORDER — ONDANSETRON 2 MG/ML
4 INJECTION INTRAMUSCULAR; INTRAVENOUS EVERY 30 MIN PRN
Status: DISCONTINUED | OUTPATIENT
Start: 2021-02-02 | End: 2021-02-02 | Stop reason: HOSPADM

## 2021-02-02 RX ORDER — ONDANSETRON 4 MG/1
4 TABLET, ORALLY DISINTEGRATING ORAL EVERY 30 MIN PRN
Status: DISCONTINUED | OUTPATIENT
Start: 2021-02-02 | End: 2021-02-02 | Stop reason: HOSPADM

## 2021-02-02 RX ORDER — BUPIVACAINE HYDROCHLORIDE AND EPINEPHRINE 2.5; 5 MG/ML; UG/ML
INJECTION, SOLUTION EPIDURAL; INFILTRATION; INTRACAUDAL; PERINEURAL PRN
Status: DISCONTINUED | OUTPATIENT
Start: 2021-02-02 | End: 2021-02-02 | Stop reason: HOSPADM

## 2021-02-02 RX ORDER — FENTANYL CITRATE 50 UG/ML
25-50 INJECTION, SOLUTION INTRAMUSCULAR; INTRAVENOUS
Status: DISCONTINUED | OUTPATIENT
Start: 2021-02-02 | End: 2021-02-02 | Stop reason: HOSPADM

## 2021-02-02 RX ORDER — METHOCARBAMOL 750 MG/1
750 TABLET, FILM COATED ORAL 4 TIMES DAILY PRN
Status: DISCONTINUED | OUTPATIENT
Start: 2021-02-02 | End: 2021-02-05 | Stop reason: HOSPADM

## 2021-02-02 RX ORDER — HYDROXYZINE HYDROCHLORIDE 25 MG/1
25 TABLET, FILM COATED ORAL EVERY 6 HOURS PRN
Status: DISCONTINUED | OUTPATIENT
Start: 2021-02-02 | End: 2021-02-05 | Stop reason: HOSPADM

## 2021-02-02 RX ORDER — ACETAMINOPHEN 325 MG/1
650 TABLET ORAL ONCE
Status: DISCONTINUED | OUTPATIENT
Start: 2021-02-02 | End: 2021-02-02 | Stop reason: HOSPADM

## 2021-02-02 RX ORDER — CEFAZOLIN SODIUM 2 G/100ML
2 INJECTION, SOLUTION INTRAVENOUS
Status: COMPLETED | OUTPATIENT
Start: 2021-02-02 | End: 2021-02-02

## 2021-02-02 RX ORDER — GABAPENTIN 300 MG/1
900 CAPSULE ORAL AT BEDTIME
Status: DISCONTINUED | OUTPATIENT
Start: 2021-02-02 | End: 2021-02-05 | Stop reason: HOSPADM

## 2021-02-02 RX ORDER — NALOXONE HYDROCHLORIDE 0.4 MG/ML
0.4 INJECTION, SOLUTION INTRAMUSCULAR; INTRAVENOUS; SUBCUTANEOUS
Status: DISCONTINUED | OUTPATIENT
Start: 2021-02-02 | End: 2021-02-05 | Stop reason: HOSPADM

## 2021-02-02 RX ORDER — ACETAMINOPHEN 325 MG/1
650 TABLET ORAL EVERY 4 HOURS PRN
Status: DISCONTINUED | OUTPATIENT
Start: 2021-02-05 | End: 2021-02-05 | Stop reason: HOSPADM

## 2021-02-02 RX ORDER — LIDOCAINE 40 MG/G
CREAM TOPICAL
Status: DISCONTINUED | OUTPATIENT
Start: 2021-02-02 | End: 2021-02-05 | Stop reason: HOSPADM

## 2021-02-02 RX ORDER — AMOXICILLIN 250 MG
2 CAPSULE ORAL 2 TIMES DAILY
Status: DISCONTINUED | OUTPATIENT
Start: 2021-02-02 | End: 2021-02-05 | Stop reason: HOSPADM

## 2021-02-02 RX ORDER — CEFAZOLIN SODIUM 1 G/3ML
1 INJECTION, POWDER, FOR SOLUTION INTRAMUSCULAR; INTRAVENOUS EVERY 8 HOURS
Status: COMPLETED | OUTPATIENT
Start: 2021-02-02 | End: 2021-02-03

## 2021-02-02 RX ORDER — LIDOCAINE 40 MG/G
CREAM TOPICAL
Status: DISCONTINUED | OUTPATIENT
Start: 2021-02-02 | End: 2021-02-02 | Stop reason: HOSPADM

## 2021-02-02 RX ORDER — PROCHLORPERAZINE MALEATE 5 MG
10 TABLET ORAL EVERY 6 HOURS PRN
Status: DISCONTINUED | OUTPATIENT
Start: 2021-02-02 | End: 2021-02-05 | Stop reason: HOSPADM

## 2021-02-02 RX ORDER — CEFAZOLIN SODIUM 1 G/3ML
1 INJECTION, POWDER, FOR SOLUTION INTRAMUSCULAR; INTRAVENOUS SEE ADMIN INSTRUCTIONS
Status: DISCONTINUED | OUTPATIENT
Start: 2021-02-02 | End: 2021-02-02 | Stop reason: HOSPADM

## 2021-02-02 RX ORDER — MAGNESIUM HYDROXIDE 1200 MG/15ML
LIQUID ORAL PRN
Status: DISCONTINUED | OUTPATIENT
Start: 2021-02-02 | End: 2021-02-02 | Stop reason: HOSPADM

## 2021-02-02 RX ORDER — FENTANYL CITRATE 50 UG/ML
INJECTION, SOLUTION INTRAMUSCULAR; INTRAVENOUS PRN
Status: DISCONTINUED | OUTPATIENT
Start: 2021-02-02 | End: 2021-02-02

## 2021-02-02 RX ORDER — OXYCODONE HYDROCHLORIDE 5 MG/1
5-10 TABLET ORAL
Status: DISCONTINUED | OUTPATIENT
Start: 2021-02-02 | End: 2021-02-05 | Stop reason: HOSPADM

## 2021-02-02 RX ORDER — GLYCOPYRROLATE 0.2 MG/ML
INJECTION, SOLUTION INTRAMUSCULAR; INTRAVENOUS PRN
Status: DISCONTINUED | OUTPATIENT
Start: 2021-02-02 | End: 2021-02-02

## 2021-02-02 RX ORDER — HYDROMORPHONE HYDROCHLORIDE 1 MG/ML
.2-.4 INJECTION, SOLUTION INTRAMUSCULAR; INTRAVENOUS; SUBCUTANEOUS
Status: DISCONTINUED | OUTPATIENT
Start: 2021-02-02 | End: 2021-02-05 | Stop reason: HOSPADM

## 2021-02-02 RX ORDER — SODIUM CHLORIDE, SODIUM LACTATE, POTASSIUM CHLORIDE, CALCIUM CHLORIDE 600; 310; 30; 20 MG/100ML; MG/100ML; MG/100ML; MG/100ML
INJECTION, SOLUTION INTRAVENOUS CONTINUOUS
Status: DISCONTINUED | OUTPATIENT
Start: 2021-02-02 | End: 2021-02-02 | Stop reason: HOSPADM

## 2021-02-02 RX ORDER — PROPOFOL 10 MG/ML
INJECTION, EMULSION INTRAVENOUS CONTINUOUS PRN
Status: DISCONTINUED | OUTPATIENT
Start: 2021-02-02 | End: 2021-02-02

## 2021-02-02 RX ADMIN — FENTANYL CITRATE 100 MCG: 50 INJECTION, SOLUTION INTRAMUSCULAR; INTRAVENOUS at 14:37

## 2021-02-02 RX ADMIN — HYDROMORPHONE HYDROCHLORIDE 0.5 MG: 1 INJECTION, SOLUTION INTRAMUSCULAR; INTRAVENOUS; SUBCUTANEOUS at 15:45

## 2021-02-02 RX ADMIN — ROCURONIUM BROMIDE 50 MG: 10 INJECTION INTRAVENOUS at 13:12

## 2021-02-02 RX ADMIN — CEFAZOLIN 1 G: 1 INJECTION, POWDER, FOR SOLUTION INTRAMUSCULAR; INTRAVENOUS at 21:01

## 2021-02-02 RX ADMIN — Medication 10 MG: at 13:40

## 2021-02-02 RX ADMIN — HYDROMORPHONE HYDROCHLORIDE 0.5 MG: 1 INJECTION, SOLUTION INTRAMUSCULAR; INTRAVENOUS; SUBCUTANEOUS at 15:39

## 2021-02-02 RX ADMIN — GLYCOPYRROLATE 0.8 MG: 0.2 INJECTION, SOLUTION INTRAMUSCULAR; INTRAVENOUS at 15:33

## 2021-02-02 RX ADMIN — HYDROMORPHONE HYDROCHLORIDE 1 MG: 1 INJECTION, SOLUTION INTRAMUSCULAR; INTRAVENOUS; SUBCUTANEOUS at 14:27

## 2021-02-02 RX ADMIN — OXYCODONE HYDROCHLORIDE 5 MG: 5 TABLET ORAL at 18:37

## 2021-02-02 RX ADMIN — MIDAZOLAM 2 MG: 1 INJECTION INTRAMUSCULAR; INTRAVENOUS at 13:05

## 2021-02-02 RX ADMIN — ROCURONIUM BROMIDE 20 MG: 10 INJECTION INTRAVENOUS at 13:57

## 2021-02-02 RX ADMIN — HYDROMORPHONE HYDROCHLORIDE 1 MG: 1 INJECTION, SOLUTION INTRAMUSCULAR; INTRAVENOUS; SUBCUTANEOUS at 14:57

## 2021-02-02 RX ADMIN — DOCUSATE SODIUM 50 MG AND SENNOSIDES 8.6 MG 2 TABLET: 8.6; 5 TABLET, FILM COATED ORAL at 21:02

## 2021-02-02 RX ADMIN — GABAPENTIN 900 MG: 300 CAPSULE ORAL at 21:02

## 2021-02-02 RX ADMIN — OXYCODONE HYDROCHLORIDE 10 MG: 5 TABLET ORAL at 21:02

## 2021-02-02 RX ADMIN — SODIUM CHLORIDE, POTASSIUM CHLORIDE, SODIUM LACTATE AND CALCIUM CHLORIDE: 600; 310; 30; 20 INJECTION, SOLUTION INTRAVENOUS at 14:12

## 2021-02-02 RX ADMIN — CEFAZOLIN SODIUM 2 G: 2 INJECTION, SOLUTION INTRAVENOUS at 13:05

## 2021-02-02 RX ADMIN — ACETAMINOPHEN 975 MG: 325 TABLET, FILM COATED ORAL at 18:31

## 2021-02-02 RX ADMIN — FENTANYL CITRATE 100 MCG: 50 INJECTION, SOLUTION INTRAMUSCULAR; INTRAVENOUS at 15:12

## 2021-02-02 RX ADMIN — ONDANSETRON HYDROCHLORIDE 4 MG: 2 INJECTION, SOLUTION INTRAVENOUS at 15:29

## 2021-02-02 RX ADMIN — FENTANYL CITRATE 100 MCG: 50 INJECTION, SOLUTION INTRAMUSCULAR; INTRAVENOUS at 13:12

## 2021-02-02 RX ADMIN — PHENYLEPHRINE HYDROCHLORIDE 100 MCG: 10 INJECTION INTRAVENOUS at 14:01

## 2021-02-02 RX ADMIN — Medication 5 MG: at 14:08

## 2021-02-02 RX ADMIN — PROPOFOL 200 MG: 10 INJECTION, EMULSION INTRAVENOUS at 13:12

## 2021-02-02 RX ADMIN — ROCURONIUM BROMIDE 10 MG: 10 INJECTION INTRAVENOUS at 14:20

## 2021-02-02 RX ADMIN — SODIUM CHLORIDE, POTASSIUM CHLORIDE, SODIUM LACTATE AND CALCIUM CHLORIDE: 600; 310; 30; 20 INJECTION, SOLUTION INTRAVENOUS at 12:55

## 2021-02-02 RX ADMIN — Medication 5 MG: at 13:52

## 2021-02-02 RX ADMIN — Medication 10 MG: at 14:01

## 2021-02-02 RX ADMIN — SODIUM CHLORIDE: 9 INJECTION, SOLUTION INTRAVENOUS at 18:30

## 2021-02-02 RX ADMIN — Medication 10 MG: at 13:46

## 2021-02-02 RX ADMIN — PHENYLEPHRINE HYDROCHLORIDE 100 MCG: 10 INJECTION INTRAVENOUS at 13:57

## 2021-02-02 RX ADMIN — HYDROXYZINE HYDROCHLORIDE 25 MG: 25 TABLET, FILM COATED ORAL at 18:37

## 2021-02-02 RX ADMIN — DEXMEDETOMIDINE HYDROCHLORIDE 0.5 MCG/KG/HR: 100 INJECTION, SOLUTION INTRAVENOUS at 13:20

## 2021-02-02 RX ADMIN — Medication 1 TABLET: at 18:30

## 2021-02-02 RX ADMIN — PROPOFOL 50 MCG/KG/MIN: 10 INJECTION, EMULSION INTRAVENOUS at 13:20

## 2021-02-02 RX ADMIN — DEXAMETHASONE SODIUM PHOSPHATE 4 MG: 4 INJECTION, SOLUTION INTRA-ARTICULAR; INTRALESIONAL; INTRAMUSCULAR; INTRAVENOUS; SOFT TISSUE at 13:12

## 2021-02-02 RX ADMIN — ROCURONIUM BROMIDE 10 MG: 10 INJECTION INTRAVENOUS at 14:35

## 2021-02-02 RX ADMIN — SODIUM CHLORIDE, POTASSIUM CHLORIDE, SODIUM LACTATE AND CALCIUM CHLORIDE: 600; 310; 30; 20 INJECTION, SOLUTION INTRAVENOUS at 15:43

## 2021-02-02 RX ADMIN — PHENYLEPHRINE HYDROCHLORIDE 200 MCG: 10 INJECTION INTRAVENOUS at 14:43

## 2021-02-02 RX ADMIN — TIZANIDINE 16 MG: 4 TABLET ORAL at 21:02

## 2021-02-02 RX ADMIN — LIDOCAINE HYDROCHLORIDE 50 MG: 10 INJECTION, SOLUTION INFILTRATION; PERINEURAL at 13:12

## 2021-02-02 RX ADMIN — PHENYLEPHRINE HYDROCHLORIDE 100 MCG: 10 INJECTION INTRAVENOUS at 13:58

## 2021-02-02 RX ADMIN — Medication 5 MG: at 15:33

## 2021-02-02 RX ADMIN — Medication 25 MCG: at 21:02

## 2021-02-02 RX ADMIN — Medication 5 MG: at 14:43

## 2021-02-02 ASSESSMENT — MIFFLIN-ST. JEOR: SCORE: 1881.74

## 2021-02-02 ASSESSMENT — ACTIVITIES OF DAILY LIVING (ADL): ADLS_ACUITY_SCORE: 16

## 2021-02-02 NOTE — ANESTHESIA POSTPROCEDURE EVALUATION
Patient: Avi Bhatti    Procedure(s):  Lumbar 3-4 posterior lumbar instrumented fusion with interbody cage    Diagnosis:Ruptured lumbar disc [M51.26]  Lumbar radiculopathy [M54.16]  Lumbar spondylosis [M47.816]  Diagnosis Additional Information: No value filed.    Anesthesia Type:  General    Note:  Disposition: Admission   Postop Pain Control: Uneventful            Sign Out: Well controlled pain   PONV: No   Neuro/Psych: Uneventful            Sign Out: Acceptable/Baseline neuro status   Airway/Respiratory: Uneventful            Sign Out: Acceptable/Baseline resp. status   CV/Hemodynamics: Uneventful            Sign Out: Acceptable CV status   Other NRE: NONE   DID A NON-ROUTINE EVENT OCCUR? No         Last vitals:  Vitals:    02/02/21 1700 02/02/21 1704 02/02/21 1705   BP: 93/73 103/82    Pulse: 82 96    Resp: 17 15 10   Temp:  98.3  F (36.8  C)    SpO2: 94% 98% 93%       Electronically Signed By: Brown Encinas MD  February 2, 2021  5:39 PM

## 2021-02-02 NOTE — BRIEF OP NOTE
[facility]    Brief Operative Note    Pre-operative diagnosis:   Foraminal Ruptured lumbar disc [M51.26]  Lumbar radiculopathy [M54.16]  Lumbar spondylosis [M47.816]  Post-operative diagnosis Same as pre-operative diagnosis    Procedure: Procedure(s):  Lumbar 3-4 posterior lumbar instrumented fusion with interbody cage   Transforaminal Lumbar interbody fusion    Surgeon: Surgeon(s) and Role:     * Jakub Velasquez MD - Primary     * Fady Adkins PA-C - Assisting  Anesthesia: General   Estimated blood loss: 150  Drains: Hemovac  Specimens: * No specimens in log *  Findings:   None.  Complications: None.  Implants:   Implant Name Type Inv. Item Serial No.  Lot No. LRB No. Used Action   GRAFT BONE MAGNIFUSE [id number] Bone/Tissue/Biologic GRAFT BONE MAGNIFUSE [id number] [employer]  N/A 1 Implanted   CREO Screw 6.5 x 45mm    [employer] 8003  [id number] N/A 4 Implanted   CREO Locking Cap    [employer] 8003  [id number] N/A 4 Implanted   5.5 Curved Abraham 45mm    [employer] 8003  [id number] N/A 2 Implanted   RISE Spacer [id number], 7-14mm    [employer]  N/A 1 Implanted     Jakub Velasquez MD

## 2021-02-02 NOTE — ANESTHESIA PREPROCEDURE EVALUATION
Anesthesia Pre-Procedure Evaluation    Patient: Avi Bhatti   MRN: [id number] : 1960        Preoperative Diagnosis: Ruptured lumbar disc [M51.26]  Lumbar radiculopathy [M54.16]  Lumbar spondylosis [M47.816]   Procedure : Procedure(s):  Lumbar 3-4 posterior lumbar instrumented fusion with or without interbody cage     Past Medical History:   Diagnosis Date     Ankylosing spondylitis (H)      Arthritis      Crohn's colitis (H)      Gastroesophageal reflux disease      Hypertension      Migraine      Other chronic pain     headache and neck pain, receives botox injections Q 12 weeks     Sleep apnea     cpap      Past Surgical History:   Procedure Laterality Date     ARTHROPLASTY HIP Left 2019    Procedure: Left total hip arthroplasty;  Surgeon: Allen Coats MD;  Location: [facility] OR     BOTOX CHRONIC MIGRAINE HEAD ACHES      for chronic headache and neck pain, injections every 12 weeks     COLONOSCOPY       EXTRACTION(S) DENTAL  2012    Procedure: EXTRACTION(S) DENTAL;  Extraction of Teeth 30, 19;  Surgeon: Sujey Cunningham DDS;  Location: [facility] OR     repair fracture & insert pins left hand       SMALL BOWEL RESECTION        Allergies   Allergen Reactions     No Clinical Screening - See Comments      benny have not worked in past     Prednisone Other (See Comments)     Elevated heart rate, has had without problems more recently     Reglan [Metoclopramide] Other (See Comments)     agitation      Social History     Tobacco Use     Smoking status: Never Smoker     Smokeless tobacco: Never Used   Substance Use Topics     Alcohol use: Yes     Comment: seldom      Wt Readings from Last 1 Encounters:   21 110.2 kg (243 lb)        Anesthesia Evaluation   Pt has had prior anesthetic.     History of anesthetic complications  - PONV.      ROS/MED HX  ENT/Pulmonary:     (+) sleep apnea, uses CPAP,     Neurologic:       Cardiovascular:     (+) Dyslipidemia hypertension-----     METS/Exercise Tolerance:     Hematologic:       Musculoskeletal: Comment: Ankylosing spondylitis  (+) arthritis,     GI/Hepatic:     (+) GERD, Inflammatory bowel disease,     Renal/Genitourinary:     (+) renal disease, type: CRI,     Endo:     (+) Obesity,     Psychiatric/Substance Use:       Infectious Disease:       Malignancy:       Other:      (+) , H/O Chronic Pain,        Physical Exam    Airway        Mallampati: II   TM distance: > 3 FB   Neck ROM: full     Respiratory Devices and Support         Dental           Cardiovascular          Rhythm and rate: regular and normal     Pulmonary           breath sounds clear to auscultation           OUTSIDE LABS:  CBC:   Lab Results   Component Value Date    WBC 7.9 01/22/2021    WBC 8.1 11/01/2019    HGB 14.8 01/22/2021    HGB 12.7 (L) 11/21/2019    HCT 46.0 01/22/2021    HCT 44.0 11/01/2019     01/22/2021     11/01/2019     BMP:   Lab Results   Component Value Date     01/22/2021     12/26/2019    POTASSIUM 5.3 02/02/2021    POTASSIUM 5.5 (H) 01/22/2021    CHLORIDE 110 (H) 01/22/2021    CHLORIDE 110 (H) 12/26/2019    CO2 24 01/22/2021    CO2 23 12/26/2019    BUN 32 (H) 01/22/2021    BUN 18 12/26/2019    CR 1.77 (H) 01/22/2021    CR 1.150 05/04/2020    GLC 77 01/22/2021     (A) 02/27/2020     COAGS:   Lab Results   Component Value Date    INR 0.93 10/17/2015     POC: No results found for: BGM, HCG, HCGS  HEPATIC:   Lab Results   Component Value Date    ALBUMIN 3.8 01/22/2021    PROTTOTAL 7.4 01/22/2021    ALT 36 01/22/2021    AST 17 01/22/2021    ALKPHOS 94 01/22/2021    BILITOTAL 0.5 01/22/2021     OTHER:   Lab Results   Component Value Date    A1C 5.2 12/26/2019    GRACE 8.9 01/22/2021    PHOS 3.5 06/08/2011    MAG 2.0 10/17/2015    TSH 1.41 11/20/2017    CRP 6.9 10/17/2015    SED 5 10/17/2015       Anesthesia Plan     History & Physical Review      ASA Status:  3. NPO Status:  NPO Appropriate. .  Plan for General with Intravenous  induction. Device: ETT Maintenance will be Balanced.         PONV prophylaxis:  Ondansetron (or other 5HT-3) and Dexamethasone or Solumedrol.       Consents  Anesthesia Plan(s) and associated risks, benefits, and realistic alternatives discussed.    Questions answered and patient/representative(s) expressed understanding.    Discussed with:  Patient.             Postoperative Care  Postoperative pain management: IV analgesics, Oral pain medications and Multi-modal analgesia.           Brown Encinas MD

## 2021-02-02 NOTE — ANESTHESIA CARE TRANSFER NOTE
Patient: Avi Bhatti    Procedure(s):  Lumbar 3-4 posterior lumbar instrumented fusion with interbody cage    Diagnosis: Ruptured lumbar disc [M51.26]  Lumbar radiculopathy [M54.16]  Lumbar spondylosis [M47.816]  Diagnosis Additional Information: No value filed.    Anesthesia Type:   General     Note:    Oropharynx: oral airway in place  Level of Consciousness: drowsy  Oxygen Supplementation: face mask  Level of Supplemental Oxygen: 8  Independent Airway: airway patency not satisfactory and stable  Dentition: dentition unchanged  Vital Signs Stable: post-procedure vital signs reviewed and stable  Report to RN Given: handoff report given  Patient transferred to: PACU    Handoff Report: Identifed the Patient, Identified the Reponsible Provider, Reviewed the pertinent medical history, Discussed the surgical course, Reviewed Intra-OP anesthesia mangement and issues during anesthesia, Set expectations for post-procedure period and Allowed opportunity for questions and acknowledgement of understanding      Vitals: (Last set prior to Anesthesia Care Transfer)  CRNA VITALS  2/2/2021 1525 - 2/2/2021 1611      2/2/2021             NIBP:  131/83    Pulse:  81    NIBP Mean:  101    SpO2:  92 %    Resp Rate (observed):  16        Electronically Signed By: TONYA Nava CRNA  February 2, 2021  4:11 PM

## 2021-02-02 NOTE — ANESTHESIA PROCEDURE NOTES
Airway   Date/Time: 2/2/2021 1:12 PM   Patient location during procedure: OR  Staff -   CRNA: Jenifer Blakely APRN CRNA  Performed By: CRNA    Consent for Airway   Urgency: elective    Indications and Patient Condition  Indications for airway management: yeimy-procedural  Induction type:intravenousMask difficulty assessment: 2 - vent by mask + OA or adjuvant +/- NMBA    Final Airway Details  Final airway type: endotracheal airway  Successful airway:ETT - single and Oral  Endotracheal Airway Details   ETT size (mm): 8.0  Cuffed: yes  Successful intubation technique: direct laryngoscopy  Grade View of Cords: 1  Adjucts: stylet  Measured from: gums/teeth  Secured at (cm): 24  Secured with: plastic tape  Bite block used: Soft    Post intubation assessment   Placement verified by: capnometry, equal breath sounds and chest rise   Number of attempts at approach: 1  Secured with:plastic tape  Ease of procedure: easy  Dentition: Unchanged

## 2021-02-03 ENCOUNTER — APPOINTMENT (OUTPATIENT)
Dept: OCCUPATIONAL THERAPY | Facility: CLINIC | Age: 61
End: 2021-02-03
Attending: ORTHOPAEDIC SURGERY
Payer: COMMERCIAL

## 2021-02-03 ENCOUNTER — APPOINTMENT (OUTPATIENT)
Dept: PHYSICAL THERAPY | Facility: CLINIC | Age: 61
End: 2021-02-03
Attending: ORTHOPAEDIC SURGERY
Payer: COMMERCIAL

## 2021-02-03 LAB
ANION GAP SERPL CALCULATED.3IONS-SCNC: 5 MMOL/L (ref 3–14)
BUN SERPL-MCNC: 32 MG/DL (ref 7–30)
CALCIUM SERPL-MCNC: 7.8 MG/DL (ref 8.5–10.1)
CHLORIDE SERPL-SCNC: 110 MMOL/L (ref 94–109)
CO2 SERPL-SCNC: 23 MMOL/L (ref 20–32)
CREAT SERPL-MCNC: 1.7 MG/DL (ref 0.66–1.25)
GFR SERPL CREATININE-BSD FRML MDRD: 42 ML/MIN/{1.73_M2}
GLUCOSE BLDC GLUCOMTR-MCNC: 103 MG/DL (ref 70–99)
GLUCOSE BLDC GLUCOMTR-MCNC: 132 MG/DL (ref 70–99)
GLUCOSE SERPL-MCNC: 120 MG/DL (ref 70–99)
GLUCOSE SERPL-MCNC: 122 MG/DL (ref 70–99)
HGB BLD-MCNC: 12.4 G/DL (ref 13.3–17.7)
HGB BLD-MCNC: 12.6 G/DL (ref 13.3–17.7)
POTASSIUM SERPL-SCNC: 4.7 MMOL/L (ref 3.4–5.3)
POTASSIUM SERPL-SCNC: 5.9 MMOL/L (ref 3.4–5.3)
SODIUM SERPL-SCNC: 138 MMOL/L (ref 133–144)
TROPONIN I SERPL-MCNC: <0.015 UG/L (ref 0–0.04)

## 2021-02-03 PROCEDURE — 258N000003 HC RX IP 258 OP 636: Performed by: PHYSICIAN ASSISTANT

## 2021-02-03 PROCEDURE — 0SG00AJ FUSION OF LUMBAR VERTEBRAL JOINT WITH INTERBODY FUSION DEVICE, POSTERIOR APPROACH, ANTERIOR COLUMN, OPEN APPROACH: ICD-10-PCS | Performed by: ORTHOPAEDIC SURGERY

## 2021-02-03 PROCEDURE — 85018 HEMOGLOBIN: CPT | Performed by: INTERNAL MEDICINE

## 2021-02-03 PROCEDURE — 250N000013 HC RX MED GY IP 250 OP 250 PS 637: Performed by: INTERNAL MEDICINE

## 2021-02-03 PROCEDURE — 99222 1ST HOSP IP/OBS MODERATE 55: CPT | Performed by: INTERNAL MEDICINE

## 2021-02-03 PROCEDURE — 97165 OT EVAL LOW COMPLEX 30 MIN: CPT | Mod: GO

## 2021-02-03 PROCEDURE — 97161 PT EVAL LOW COMPLEX 20 MIN: CPT | Mod: GP | Performed by: PHYSICAL THERAPIST

## 2021-02-03 PROCEDURE — 36415 COLL VENOUS BLD VENIPUNCTURE: CPT | Performed by: PHYSICIAN ASSISTANT

## 2021-02-03 PROCEDURE — 999N001017 HC STATISTIC GLUCOSE BY METER IP

## 2021-02-03 PROCEDURE — 120N000001 HC R&B MED SURG/OB

## 2021-02-03 PROCEDURE — 8E0WXBF COMPUTER ASSISTED PROCEDURE OF TRUNK REGION, WITH FLUOROSCOPY: ICD-10-PCS | Performed by: ORTHOPAEDIC SURGERY

## 2021-02-03 PROCEDURE — 85018 HEMOGLOBIN: CPT | Performed by: PHYSICIAN ASSISTANT

## 2021-02-03 PROCEDURE — 258N000003 HC RX IP 258 OP 636: Performed by: INTERNAL MEDICINE

## 2021-02-03 PROCEDURE — 258N000001 HC RX 258: Performed by: INTERNAL MEDICINE

## 2021-02-03 PROCEDURE — 01NB0ZZ RELEASE LUMBAR NERVE, OPEN APPROACH: ICD-10-PCS | Performed by: ORTHOPAEDIC SURGERY

## 2021-02-03 PROCEDURE — 0SG0071 FUSION OF LUMBAR VERTEBRAL JOINT WITH AUTOLOGOUS TISSUE SUBSTITUTE, POSTERIOR APPROACH, POSTERIOR COLUMN, OPEN APPROACH: ICD-10-PCS | Performed by: ORTHOPAEDIC SURGERY

## 2021-02-03 PROCEDURE — 99207 PR CONSULT E&M CHANGED TO INITIAL LEVEL: CPT | Performed by: INTERNAL MEDICINE

## 2021-02-03 PROCEDURE — 80048 BASIC METABOLIC PNL TOTAL CA: CPT | Performed by: INTERNAL MEDICINE

## 2021-02-03 PROCEDURE — 84484 ASSAY OF TROPONIN QUANT: CPT | Performed by: INTERNAL MEDICINE

## 2021-02-03 PROCEDURE — 250N000013 HC RX MED GY IP 250 OP 250 PS 637: Performed by: PHYSICIAN ASSISTANT

## 2021-02-03 PROCEDURE — 97535 SELF CARE MNGMENT TRAINING: CPT | Mod: GO

## 2021-02-03 PROCEDURE — 250N000011 HC RX IP 250 OP 636: Performed by: PHYSICIAN ASSISTANT

## 2021-02-03 PROCEDURE — 82947 ASSAY GLUCOSE BLOOD QUANT: CPT | Performed by: PHYSICIAN ASSISTANT

## 2021-02-03 PROCEDURE — 84132 ASSAY OF SERUM POTASSIUM: CPT | Performed by: PHYSICIAN ASSISTANT

## 2021-02-03 PROCEDURE — 0SB20ZZ EXCISION OF LUMBAR VERTEBRAL DISC, OPEN APPROACH: ICD-10-PCS | Performed by: ORTHOPAEDIC SURGERY

## 2021-02-03 PROCEDURE — 36415 COLL VENOUS BLD VENIPUNCTURE: CPT | Performed by: INTERNAL MEDICINE

## 2021-02-03 PROCEDURE — 97116 GAIT TRAINING THERAPY: CPT | Mod: GP | Performed by: PHYSICAL THERAPIST

## 2021-02-03 PROCEDURE — 97530 THERAPEUTIC ACTIVITIES: CPT | Mod: GP | Performed by: PHYSICAL THERAPIST

## 2021-02-03 RX ORDER — DEXTROSE MONOHYDRATE 25 G/50ML
25 INJECTION, SOLUTION INTRAVENOUS ONCE
Status: COMPLETED | OUTPATIENT
Start: 2021-02-03 | End: 2021-02-03

## 2021-02-03 RX ORDER — PANTOPRAZOLE SODIUM 40 MG/1
40 TABLET, DELAYED RELEASE ORAL DAILY
Status: DISCONTINUED | OUTPATIENT
Start: 2021-02-03 | End: 2021-02-05 | Stop reason: HOSPADM

## 2021-02-03 RX ORDER — NICOTINE POLACRILEX 4 MG
15-30 LOZENGE BUCCAL
Status: DISCONTINUED | OUTPATIENT
Start: 2021-02-03 | End: 2021-02-05 | Stop reason: HOSPADM

## 2021-02-03 RX ORDER — DEXTROSE MONOHYDRATE 25 G/50ML
25-50 INJECTION, SOLUTION INTRAVENOUS
Status: DISCONTINUED | OUTPATIENT
Start: 2021-02-03 | End: 2021-02-05 | Stop reason: HOSPADM

## 2021-02-03 RX ORDER — TAMSULOSIN HYDROCHLORIDE 0.4 MG/1
0.4 CAPSULE ORAL DAILY
Status: DISCONTINUED | OUTPATIENT
Start: 2021-02-03 | End: 2021-02-05 | Stop reason: HOSPADM

## 2021-02-03 RX ORDER — MESALAMINE 800 MG/1
1600 TABLET, DELAYED RELEASE ORAL 2 TIMES DAILY
Status: DISCONTINUED | OUTPATIENT
Start: 2021-02-03 | End: 2021-02-05 | Stop reason: HOSPADM

## 2021-02-03 RX ORDER — DEXTROSE MONOHYDRATE 100 MG/ML
INJECTION, SOLUTION INTRAVENOUS CONTINUOUS
Status: DISCONTINUED | OUTPATIENT
Start: 2021-02-03 | End: 2021-02-04

## 2021-02-03 RX ORDER — DULOXETIN HYDROCHLORIDE 60 MG/1
60 CAPSULE, DELAYED RELEASE ORAL DAILY
Status: DISCONTINUED | OUTPATIENT
Start: 2021-02-03 | End: 2021-02-05 | Stop reason: HOSPADM

## 2021-02-03 RX ADMIN — DEXTROSE MONOHYDRATE 25 G: 500 INJECTION PARENTERAL at 03:51

## 2021-02-03 RX ADMIN — OXYCODONE HYDROCHLORIDE 10 MG: 5 TABLET ORAL at 11:45

## 2021-02-03 RX ADMIN — OXYCODONE HYDROCHLORIDE 10 MG: 5 TABLET ORAL at 17:39

## 2021-02-03 RX ADMIN — Medication 1 TABLET: at 17:39

## 2021-02-03 RX ADMIN — CEFAZOLIN 1 G: 1 INJECTION, POWDER, FOR SOLUTION INTRAMUSCULAR; INTRAVENOUS at 05:25

## 2021-02-03 RX ADMIN — ACETAMINOPHEN 975 MG: 325 TABLET, FILM COATED ORAL at 17:38

## 2021-02-03 RX ADMIN — PANTOPRAZOLE SODIUM 40 MG: 40 TABLET, DELAYED RELEASE ORAL at 11:51

## 2021-02-03 RX ADMIN — GABAPENTIN 900 MG: 300 CAPSULE ORAL at 21:51

## 2021-02-03 RX ADMIN — OXYCODONE HYDROCHLORIDE 10 MG: 5 TABLET ORAL at 21:52

## 2021-02-03 RX ADMIN — MESALAMINE 1600 MG: 800 TABLET, DELAYED RELEASE ORAL at 21:51

## 2021-02-03 RX ADMIN — DULOXETINE 60 MG: 60 CAPSULE, DELAYED RELEASE ORAL at 11:51

## 2021-02-03 RX ADMIN — HUMAN INSULIN 10 UNITS: 100 INJECTION, SOLUTION SUBCUTANEOUS at 03:55

## 2021-02-03 RX ADMIN — ACETAMINOPHEN 975 MG: 325 TABLET, FILM COATED ORAL at 11:44

## 2021-02-03 RX ADMIN — TIZANIDINE 16 MG: 4 TABLET ORAL at 21:51

## 2021-02-03 RX ADMIN — Medication 25 MCG: at 21:51

## 2021-02-03 RX ADMIN — OXYCODONE HYDROCHLORIDE 10 MG: 5 TABLET ORAL at 08:14

## 2021-02-03 RX ADMIN — DOCUSATE SODIUM 50 MG AND SENNOSIDES 8.6 MG 2 TABLET: 8.6; 5 TABLET, FILM COATED ORAL at 08:11

## 2021-02-03 RX ADMIN — Medication 1 TABLET: at 11:44

## 2021-02-03 RX ADMIN — DEXTROSE MONOHYDRATE: 100 INJECTION, SOLUTION INTRAVENOUS at 03:26

## 2021-02-03 RX ADMIN — ACETAMINOPHEN 975 MG: 325 TABLET, FILM COATED ORAL at 02:15

## 2021-02-03 RX ADMIN — SODIUM CHLORIDE 500 ML: 9 INJECTION, SOLUTION INTRAVENOUS at 00:41

## 2021-02-03 RX ADMIN — DOCUSATE SODIUM 50 MG AND SENNOSIDES 8.6 MG 2 TABLET: 8.6; 5 TABLET, FILM COATED ORAL at 21:50

## 2021-02-03 RX ADMIN — Medication 1 TABLET: at 08:11

## 2021-02-03 RX ADMIN — SODIUM CHLORIDE 500 ML: 9 INJECTION, SOLUTION INTRAVENOUS at 02:16

## 2021-02-03 RX ADMIN — OXYCODONE HYDROCHLORIDE 10 MG: 5 TABLET ORAL at 14:45

## 2021-02-03 RX ADMIN — TAMSULOSIN HYDROCHLORIDE 0.4 MG: 0.4 CAPSULE ORAL at 11:51

## 2021-02-03 RX ADMIN — MESALAMINE 1600 MG: 800 TABLET, DELAYED RELEASE ORAL at 11:50

## 2021-02-03 RX ADMIN — Medication 25 MCG: at 08:11

## 2021-02-03 ASSESSMENT — ACTIVITIES OF DAILY LIVING (ADL)
ADLS_ACUITY_SCORE: 14
ADLS_ACUITY_SCORE: 16
ADLS_ACUITY_SCORE: 16
ADLS_ACUITY_SCORE: 15
ADLS_ACUITY_SCORE: 16
ADLS_ACUITY_SCORE: 16

## 2021-02-03 NOTE — PROGRESS NOTES
02/03/21 1300   Quick Adds   Type of Visit Initial Occupational Therapy Evaluation   Living Environment   People in home spouse   Current Living Arrangements house   Home Accessibility stairs to enter home;stairs within home   Transportation Anticipated family or friend will provide;car, drives self   Living Environment Comments OT: Pt has step-over bathtub with 2 grab bars,  CHTS, plans to stay on first level when returns home   Self-Care   Usual Activity Tolerance moderate   Current Activity Tolerance moderate   Equipment Currently Used at Home cane, straight;walker, rolling   Activity/Exercise/Self-Care Comment pt was previously ind with ADLs/IADLs but had experienced some more back pain so was making some modifications due to pain   Disability/Function   Hearing Difficulty or Deaf no   Wear Glasses or Blind no   Concentrating, Remembering or Making Decisions Difficulty no   Difficulty Communicating no   Difficulty Eating/Swallowing no   Walking or Climbing Stairs Difficulty no   Dressing/Bathing Difficulty no   Toileting issues no   Doing Errands Independently Difficulty (such as shopping) no   General Information   Onset of Illness/Injury or Date of Surgery 02/02/21   Referring Physician Fady Adkins   Patient/Family Therapy Goal Statement (OT) to return home and enjoy family   Additional Occupational Profile Info/Pertinent History of Current Problem Pt is a 60 year old male POD1 s/p L3-4 fusion.   Performance Patterns (Routines, Roles, Habits) works for [employer], yardwork, has 3 dogs   Existing Precautions/Restrictions spinal   Cognitive Status Examination   Orientation Status orientation to person, place and time   Visual Perception   Visual Impairment/Limitations WNL   Sensory   Sensory Quick Adds No deficits were identified   Pain Assessment   Patient Currently in Pain Yes, see Vital Sign flowsheet   Integumentary/Edema   Integumentary/Edema no deficits were identifed   Posture   Posture not  impaired   Range of Motion Comprehensive   General Range of Motion no range of motion deficits identified   Strength Comprehensive (MMT)   General Manual Muscle Testing (MMT) Assessment no strength deficits identified   Coordination   Upper Extremity Coordination No deficits were identified   Bed Mobility   Comment (Bed Mobility) SBA with vc's for log roll technique sup>sit   Transfers   Transfer Comments SBA for sit<>stand with FWW, '   Activities of Daily Living   BADL Assessment/Intervention lower body dressing;toileting   Lower Body Dressing Assessment/Training   Flathead Level (Lower Body Dressing) maximum assist (25% patient effort)   Toileting   Flathead Level (Toileting) supervision;verbal cues   Instrumental Activities of Daily Living (IADL)   Previous Responsibilities medication management;yardwork;shopping;laundry;housekeeping;meal prep;finances;work   Clinical Impression   Criteria for Skilled Therapeutic Interventions Met (OT) yes;meets criteria;skilled treatment is necessary   OT Diagnosis decreased ind in ADLs/IADLs   OT Problem List-Impairments impacting ADL problems related to;activity tolerance impaired;pain;post-surgical precautions   Assessment of Occupational Performance 3-5 Performance Deficits   Identified Performance Deficits dressing, bathing, toileting, g/h, work   Planned Therapy Interventions (OT) ADL retraining;transfer training   Clinical Decision Making Complexity (OT) low complexity   Therapy Frequency (OT) 1x eval and treat   Predicted Duration of Therapy 1   Risk & Benefits of therapy have been explained evaluation/treatment results reviewed;care plan/treatment goals reviewed;patient;spouse/significant other;risks/benefits reviewed;current/potential barriers reviewed;participants voiced agreement with care plan;participants included   OT Discharge Planning    OT Discharge Recommendation (DC Rec) Home with assist   OT Rationale for DC Rec Pt making good progress, wife will be  available to support 24/7 for LE dressing and supervision for toilet transfer and tub tranfser   Total Evaluation Time (Minutes)   Total Evaluation Time (Minutes) 8

## 2021-02-03 NOTE — PLAN OF CARE
BPs returned to normal, potassium came back at 4.7. D10 stopped, glucose 120 by lab, 103 bedside. Afeb, cms and n/v intact, dressing clean and dry. Pain moderate but controlled with po analgesics. Good I/O, no n/v, tolerated regular diet. Up with assist of 1 and walker with brace on. Fox removed this afternoon after flomax started. Pt is due to void. Drain with small output, will be removed tomorrow. Home in 1-2 days.

## 2021-02-03 NOTE — PLAN OF CARE
Patient vital signs are at baseline: No,  Reason:  Soft Bps.  Patient able to ambulate as they were prior to admission or with assist devices provided by therapies during their stay:  No,  Reason: Stood by bedside.  Patient MUST void prior to discharge:  No,  Reason:  Fox catheter in place.  Patient able to tolerate oral intake:  Yes  Pain has adequate pain control using Oral analgesics:  Yes    Alert and oriented. Pain managed with oral medications. New orders for low BP, see MAR. Blood glucose monitored.

## 2021-02-03 NOTE — PROGRESS NOTES
"Ortho Rounding Note    S: IN bed, comfortable. Reporting improvement in LE pain. LBP but controlled with current regimen. Episodes of low BPs overnight- currently denies dizziness, lightheadedness, CP, SOB.     O:  Vital signs:   Blood pressure 137/82, pulse 92, temperature 98.4  F (36.9  C), temperature source Temporal, resp. rate 18, height 1.727 m (5' 8\"), weight 110.2 kg (243 lb), SpO2 92 %.  Estimated body mass index is 36.95 kg/m  as calculated from the following:    Height as of this encounter: 1.727 m (5' 8\").    Weight as of this encounter: 110.2 kg (243 lb).      Intake/Output Summary (Last 24 hours) at 2/3/2021 1433  Last data filed at 2/3/2021 1333  Gross per 24 hour   Intake 3216.75 ml   Output 2365 ml   Net 851.75 ml         Drain intact  Dressings c/d/i  5/5 motor and SPLT in BL UE and LE    A:  POD #1 s/p L3-4 TLIF     P:  General: Doing well this afternoon.  PT/OT. Daily cares.   Pain: Controlled   Act: up ad ronal, with therapy  DVT: Mech only  ID: routine postop abx to be completed 24 hours after surgery  Dispo: Plan for possible discharge to home in 1-2 nights.  Appreciate Medicine consult for medical management    Fady Adkins PA-C    "

## 2021-02-03 NOTE — PLAN OF CARE
Physical Therapy Discharge Summary    Reason for therapy discharge:    All goals and outcomes met, no further needs identified.    Progress towards therapy goal(s). See goals on Care Plan in Psychiatric electronic health record for goal details.  Goals met    Therapy recommendation(s):    No further therapy is recommended.

## 2021-02-03 NOTE — CONSULTS
[facility]  Hospitalist Consult Note  Name: Avi Bhatti    MRN: [id number]  YOB: 1960    Age: 60 year old  Date of admission: 2/2/2021  Primary care provider: No Ref-Primary, Physician     Requesting Physician:  Dr. Velasquez  Reason for consult:  Hypotension         Assessment and Plan:   Avi Bhatti is a 60 year old male with a history of Crohn's disease, ankylosing spondylitis, obesity, YORDY on CPAP, HTN, HLD, CKD stage IIIb, migraines, left NIYAH, and lumbar radiculopathy who was admitted on 2/2/2021 following L3-4 lumbar fusion by Dr. Velasquez.  Developing hypotension this evening down to 70 systolic, responsive to 500 mL of saline bolus.  Asymptomatic from this.  Providing further IV fluids.  Potassium level checked and elevated so shifting with insulin/D50 giving further IV fluids.  Placed on telemetry.    Assessment and plan by problem:  Lumbar radiculopathy:  s/p L3-4 posterior lumbar fusion on 2/2/2021 by Dr. Velasquez.  The patient is doing well.  Reports mild soreness of the back right now.  Reports resolution of his previous left upper leg pain following surgery.  Hypotension as below.  Will defer diet, activity, DVT prophylaxis, and pain control to the primary team. Continue physical and occupational therapy.  Continue incentive spirometry and follow hemoglobin to evaluate for surgical blood loss and potential need for transfusion.  His home gabapentin 900 mg at bedtime and tizanidine 16 mg at bedtime has been resumed.    Hypotension: Blood pressure this evening is low 79/53.  Asymptomatic from this.  Received 500 mL normal saline bolus and blood pressure up to 88/58.  Stat hemoglobin check only mildly decreased at 12.6 from 14.8 preoperatively.   ml.  Troponin undetectable.  No chest pain.  Suspect combination of some mild postsurgical blood loss, effect from anesthesia and oxycodone received earlier.  It is possible he could have taken his lisinopril today as his  potassium is also high.  He is also on metoprolol succinate.  Has been on prednisone in the past, but none recently so no role for stress dose steroids here.  -Hold lisinopril for now  -so far fluid responsive, give second bolus 500 ml NS then continue infusion at 100 ml/hr  -Monitor blood pressure closely until more stable then every 4 hours  -Recheck hemoglobin with morning lab draw    Hyperkalemia, CKD stage IIIb: Creatinine at baseline 1.7.  History of mild hyperkalemia in the mid 5 range.  Potassium was 5.3 preop, now 5.9 on recheck.  He is on lisinopril at baseline and may have taken dose this morning given he is also hypotensive postoperatively.  -Hold lisinopril  -Shift once with IV insulin/D50 per protocol  -No Kayexalate given postoperative status and risk for ileus  -IV fluid bolus as above and continuous fluids  -Recheck potassium every 4 hours x2    HTN: PTA on lisinopril 50 mg daily and metoprolol succinate 100 mg daily.  Hypotensive as above so hold both for now.  Has not received either here, but could have took them this morning.    Crohn's disease: Resume PTA mesalamine 1600 mg twice daily.  Has been on prednisone in the past, but none recently.  He is also on Humira every 2 weeks.    Ankylosing spondylitis: Takes combination of diclofenac 75 mg twice daily and misoprostol 200 mg twice daily.  Hold for now given postop spine surgery risk of bleeding.  Also is on Humira every 2 weeks.    GERD: Continue Protonix 40 mg daily.  May actually be on ease omeprazole per external pharmacy, but Protonix will suffice.    Obesity: BMI 36.    YORDY: Resume his home CPAP which he brought in.      Code status: Full code  Prophylaxis: per primary team  Disposition: per primary team. PT/OT    Thank you for the consultation, we will continue to follow along during the hospitalization. Please page with any questions or concerns.         History of Present Illness:   Avi Bhatti is a 60 year old male with a history  of Crohn's disease, ankylosing spondylitis, obesity, YORDY on CPAP, HTN, HLD, CKD stage IIIb, migraines, left NIYAH, and lumbar radiculopathy who was admitted on 2/2/2021 following L3-4 lumbar fusion by Dr. Velasquez.  Pre-operative note was fully reviewed and recommendations acknowledged. Op note and anesthesia notes and flow sheets reviewed.  Patient reports low back pain with severe left upper leg pain as the reason for pursuing lumbar fusion.  Previously on some opiates for pain along with gabapentin, tizanidine, Cymbalta.  Otherwise in normal state of health prior to surgery.  No recent chest pain or chest pressure.  No recent fevers, chills, cough, shortness of breath.    Evaluated postoperatively.  Developed hypotension to 79/53 that is so far responsive to IV fluid bolus.  He denies any lightheadedness when his blood pressure was low.  Also no chest pain, chest pressure, shortness of breath.  He does feel quite tired since his surgery.  Endorsing mild to moderate pain in the low back.  The left leg pain he had prior to surgery is resolved.  Denies numbness or tingling peripherally.              Past Medical History reviewed:     Past Medical History:   Diagnosis Date     Ankylosing spondylitis (H)      Arthritis      Crohn's colitis (H)      Gastroesophageal reflux disease      Hypertension      Migraine      Other chronic pain     headache and neck pain, receives botox injections Q 12 weeks     Sleep apnea     cpap             Past Surgical History reviewed:     Past Surgical History:   Procedure Laterality Date     ARTHROPLASTY HIP Left 11/19/2019    Procedure: Left total hip arthroplasty;  Surgeon: Allen Coats MD;  Location:  OR     BOTOX CHRONIC MIGRAINE HEAD ACHES      for chronic headache and neck pain, injections every 12 weeks     COLONOSCOPY       EXTRACTION(S) DENTAL  12/18/2012    Procedure: EXTRACTION(S) DENTAL;  Extraction of Teeth 30, 19;  Surgeon: Sujey Cunningham DDS;  Location:  OR      repair fracture & insert pins left hand  1996     SMALL BOWEL RESECTION  1993               Social History reviewed:     Social History     Tobacco Use     Smoking status: Never Smoker     Smokeless tobacco: Never Used   Substance Use Topics     Alcohol use: Yes     Comment: seldom             Family History reviewed:     Family History   Problem Relation Age of Onset     Hypertension Father      Alcoholism Father      Other - See Comments Father      Coronary Artery Disease Father      Skin Cancer Father              Allergies:     Allergies   Allergen Reactions     No Clinical Screening - See Comments      benny have not worked in past     Prednisone Other (See Comments)     Elevated heart rate, has had without problems more recently     Reglan [Metoclopramide] Other (See Comments)     agitation             Medications:     Prior to Admission medications    Medication Sig Last Dose Taking? Auth Provider   ASACOL  MG EC tablet Take 1,600 mg by mouth 2 times daily  2/1/2021 at 2100 Yes Reported, Patient   calcium carbonate-vitamin D (CALCIUM 600+D) 600-200 MG-UNIT TABS Take by mouth 2 times daily  Past Month at Unknown time Yes Reported, Patient   diclofenac (VOLTAREN) 75 MG EC tablet Take 75 mg by mouth 2 times daily 1/26/2021 Yes Reported, Patient   DULoxetine (CYMBALTA) 60 MG EC capsule 60 mg daily  2/2/2021 at 0700 Yes Reported, Patient   gabapentin (NEURONTIN) 300 MG capsule Take 900 mg by mouth At Bedtime  2/1/2021 at 2100 Yes Reported, Patient   HUMIRA PEN 40 MG/0.8ML pen kit Inject 40 mg Subcutaneous every 14 days  1/20/2021 Yes Reported, Patient   HYDROcodone-acetaminophen (NORCO) 5-325 MG tablet Take 1 tablet by mouth every 6 hours as needed for severe pain 2/1/2021 at 1600 Yes Reported, Patient   lisinopril (ZESTRIL) 10 MG tablet Take 1 tablet daily in the night. 2/1/2021 at 2100 Yes Krystyna Mello MD   lisinopril (ZESTRIL) 40 MG tablet Take 1 tablet (40 mg) by mouth daily 2/1/2021 at 2100  "Yes Krystyna Mello MD   metoprolol succinate ER (TOPROL-XL) 100 MG 24 hr tablet Take 1 tablet (100 mg) by mouth daily 2/1/2021 at 2100 Yes Krystyna Mello MD   multivitamin, therapeutic with minerals (MULTI-VITAMIN) TABS Take 1 tablet by mouth daily. 2/1/2021 at 0900 Yes Reported, Patient   order for DME Equipment being ordered: Walker Wheels () and Walker ()  Treatment Diagnosis: Impaired gait Past Week at Unknown time Yes Allen Coats MD   pantoprazole (PROTONIX) 40 MG EC tablet Take 40 mg by mouth daily 2/2/2021 at 0700 Yes Reported, Patient   senna-docusate (SENOKOT-S/PERICOLACE) 8.6-50 MG tablet Take 2 tablets by mouth 2 times daily  Yes Sofiya Morrell PA-C   TIZANIDINE HCL PO Take 16 mg by mouth At Bedtime  2/1/2021 at 2100 Yes Reported, Patient   misoprostol (CYTOTEC) 200 MCG tablet Take 200 mcg by mouth 2 times daily 1/26/2021  Reported, Patient       Current hospital administered medication list (MAR) also reviewed.          Review of Systems:     A comprehensive greater than 10 system review of systems was carried out.  Pertinent positives and negatives are noted above.  Otherwise negative for contributory info.            Physical Exam:   Blood pressure 90/60, pulse 67, temperature 97  F (36.1  C), temperature source Temporal, resp. rate 15, height 1.727 m (5' 8\"), weight 110.2 kg (243 lb), SpO2 94 %.    Exam:  Constitutional: Awake, NAD, appears fatigued  Eyes: sclera white   HEENT: atraumatic, MMM  Respiratory: no respiratory distress, lungs cta bilaterally, no crackles or wheeze.  CPAP is on  Cardiovascular: RRR.  No murmur   GI: Obese, non-tender, not distended, bowel sounds present  Skin: no rash or lesions, acyanotic  Musculoskeletal/extremities: atraumatic, no major deformities. No LE edema  Neurologic: A&Ox3, speech clear, can wiggle toes and light touch sensation is intact in all extremities  Psychiatric: calm, cooperative            Data:   Imaging:  " Reviewed.    EKG/Telemetry:  Reviewed.    Labs: Reviewed.   Recent Labs   Lab 02/03/21 0213   HGB 12.6*     Recent Labs   Lab 02/03/21 0213 02/02/21  1146     --    POTASSIUM 5.9* 5.3   CHLORIDE 110*  --    CO2 23  --    ANIONGAP 5  --    *  --    BUN 32*  --    CR 1.70*  --    GFRESTIMATED 42*  --    GFRESTBLACK 49*  --    GRACE 7.8*  --      Recent Labs   Lab 02/03/21 0213   TROPI <0.015       Abdoul Castillo MD  [facility] Hospitalist  February 3, 2021

## 2021-02-03 NOTE — PLAN OF CARE
Occupational Therapy Discharge Summary    Reason for therapy discharge:    Discharged to home.    Progress towards therapy goal(s). See goals on Care Plan in [facility] electronic health record for goal details.  Goals met    Therapy recommendation(s):    No further therapy is recommended.

## 2021-02-03 NOTE — PROVIDER NOTIFICATION
[phone] Paged to Dr. Velasquez via answering service. Received call back from TRISTIN Morrell to start NS Bolus.  [phone] Paged to admitting: Patient having low BPs (88/58). Has received 500 ml NS Bolus. Any new orders?

## 2021-02-03 NOTE — PHARMACY-CONSULT NOTE
Patient is being treated for hyperkalemia. A pharmacy consult was initiated to review the patient's medication list for possible causes of hyperkalemia.  The following medications for this patient may cause or exacerbate hyperkalemia: PTA meds of lisinopril, Voltaren, multivitamin w/ minerals if it contains potassium.  Of note, the PTA meds that can cause hyperkalemia have not been ordered during this admission.     Continue current medication regimen. The patient's PTA meds of lisinopril, Voltaren and multivitamin w/ minerals have not been ordered during this admission. May consider adjusting PTA meds at discharge. Of note, the patient's potassium levels in the past have been high or on the high end of the normal potassium range.

## 2021-02-03 NOTE — PROGRESS NOTES
Patient seen and examined. Full consult done this morning by Dr. Castillo for hypotension and hyperkalemia. BP now improved. Potassium also normalized.   Fox catheter removed this afternoon. He stated that he had postop urine retention in the past for which he required extra day of hospital stay. Started on Flomax today.

## 2021-02-03 NOTE — PROGRESS NOTES
02/03/21 1003   Quick Adds   Type of Visit Initial PT Evaluation   Living Environment   Living Environment Comments Pt lives in 2 story home with wife, all needs met on main level. 2 PHOEBE w/o rail. Use of SEC from time to time.    Self-Care   Usual Activity Tolerance moderate   Current Activity Tolerance moderate   Equipment Currently Used at Home cane, straight;walker, rolling   Disability/Function   Fall history within last six months no   General Information   Onset of Illness/Injury or Date of Surgery 02/02/21   Referring Physician Fady Adkins PA-C   Patient/Family Therapy Goals Statement (PT) To decrease pain level   Pertinent History of Current Problem (include personal factors and/or comorbidities that impact the POC) Pt is a 60 year old male POD1 s/p L3-4 fusion.   Existing Precautions/Restrictions brace worn when out of bed   Weight-Bearing Status - LLE full weight-bearing   Weight-Bearing Status - RLE full weight-bearing   Cognition   Orientation Status (Cognition) oriented x 4   Affect/Mental Status (Cognition) WNL   Follows Commands (Cognition) WNL   Pain Assessment   Patient Currently in Pain Yes, see Vital Sign flowsheet  (6/10 LBP)   Range of Motion (ROM)   ROM Comment B LE WFL; lumbar ROM grossly decreased   Strength   Strength Comments Able to complete B SLR, functionally demonstrated >3/5 strength   Bed Mobility   Comment (Bed Mobility) Nikolay supine <> sit   Transfers   Transfer Safety Comments CGA sit to stand with FWW   Gait/Stairs (Locomotion)   Distance in Feet (Required for LE Total Joints) 9   Pattern (Gait) step-to   Deviations/Abnormal Patterns (Gait) base of support, wide;gait speed decreased  (increased UE support)   Comment (Gait/Stairs) CGA with FWW   Balance   Balance Comments fair+ unsupported sitting EOB; fair static standing balance with FWW   Clinical Impression   Criteria for Skilled Therapeutic Intervention yes, treatment indicated   PT Diagnosis (PT) impaired  functional mobility   Influenced by the following impairments decreased lumbar ROM; pain   Functional limitations due to impairments impaired bed mobility, transfers, ambulation, stairs   Clinical Presentation Stable/Uncomplicated   Clinical Presentation Rationale Pt is medically stable   Clinical Decision Making (Complexity) low complexity   Therapy Frequency (PT)   (BID)   Planned Therapy Interventions (PT) balance training;bed mobility training   Anticipated Equipment Needs at Discharge (PT) walker, rolling   Risk & Benefits of therapy have been explained evaluation/treatment results reviewed;care plan/treatment goals reviewed;risks/benefits reviewed;current/potential barriers reviewed;participants voiced agreement with care plan;participants included;patient   PT Discharge Planning    PT Discharge Recommendation (DC Rec) home with assist   PT Rationale for DC Rec Anticipate that patient will be at least Nikolay with bed mobility and stairs, SBA with transfers and ambulation in order to return home.   PT Brief overview of current status  CGA with FWW   Total Evaluation Time   Total Evaluation Time (Minutes) 6

## 2021-02-03 NOTE — PLAN OF CARE
Vital signs stable, on  O2 at 4 lpm nasal cannula, sats 90's.  Lungs clear, encouraged inspirometer use.  Bowel sounds hypoactive, no nausea.  Dressing intact t lumbar spine.Log rolled to turn, reposition.  Cms, intact.  Stood up at bedside with walker, gait belt, aspen brace and assist of 2.  Pain controlled with tylenol, vistaril and oxycdone.  Care plan reviewed with pt.

## 2021-02-04 LAB
GLUCOSE SERPL-MCNC: 117 MG/DL (ref 70–99)
HGB BLD-MCNC: 13 G/DL (ref 13.3–17.7)

## 2021-02-04 PROCEDURE — 250N000011 HC RX IP 250 OP 636: Performed by: PHYSICIAN ASSISTANT

## 2021-02-04 PROCEDURE — 99232 SBSQ HOSP IP/OBS MODERATE 35: CPT | Performed by: INTERNAL MEDICINE

## 2021-02-04 PROCEDURE — 82947 ASSAY GLUCOSE BLOOD QUANT: CPT | Performed by: PHYSICIAN ASSISTANT

## 2021-02-04 PROCEDURE — 99207 PR CDG-MDM COMPONENT: MEETS LOW - DOWN CODED: CPT | Performed by: INTERNAL MEDICINE

## 2021-02-04 PROCEDURE — 120N000001 HC R&B MED SURG/OB

## 2021-02-04 PROCEDURE — 250N000013 HC RX MED GY IP 250 OP 250 PS 637: Performed by: PHYSICIAN ASSISTANT

## 2021-02-04 PROCEDURE — 85018 HEMOGLOBIN: CPT | Performed by: PHYSICIAN ASSISTANT

## 2021-02-04 PROCEDURE — 250N000013 HC RX MED GY IP 250 OP 250 PS 637: Performed by: INTERNAL MEDICINE

## 2021-02-04 PROCEDURE — 36415 COLL VENOUS BLD VENIPUNCTURE: CPT | Performed by: PHYSICIAN ASSISTANT

## 2021-02-04 RX ORDER — HYDROXYZINE HYDROCHLORIDE 25 MG/1
25 TABLET, FILM COATED ORAL EVERY 6 HOURS PRN
Qty: 20 TABLET | Refills: 0 | Status: SHIPPED | OUTPATIENT
Start: 2021-02-04 | End: 2021-04-20

## 2021-02-04 RX ORDER — OXYCODONE AND ACETAMINOPHEN 5; 325 MG/1; MG/1
1-2 TABLET ORAL EVERY 6 HOURS PRN
Qty: 25 TABLET | Refills: 0 | Status: SHIPPED | OUTPATIENT
Start: 2021-02-04 | End: 2021-02-07

## 2021-02-04 RX ORDER — METHOCARBAMOL 750 MG/1
750 TABLET, FILM COATED ORAL 4 TIMES DAILY PRN
Qty: 20 TABLET | Refills: 0 | Status: SHIPPED | OUTPATIENT
Start: 2021-02-04 | End: 2021-04-20

## 2021-02-04 RX ORDER — VITAMIN B COMPLEX
25 TABLET ORAL 2 TIMES DAILY
Qty: 180 TABLET | Refills: 0 | Status: SHIPPED | OUTPATIENT
Start: 2021-02-05

## 2021-02-04 RX ADMIN — DOCUSATE SODIUM 50 MG AND SENNOSIDES 8.6 MG 2 TABLET: 8.6; 5 TABLET, FILM COATED ORAL at 08:04

## 2021-02-04 RX ADMIN — Medication 1 TABLET: at 11:53

## 2021-02-04 RX ADMIN — MESALAMINE 1600 MG: 800 TABLET, DELAYED RELEASE ORAL at 08:03

## 2021-02-04 RX ADMIN — ACETAMINOPHEN 975 MG: 325 TABLET, FILM COATED ORAL at 17:50

## 2021-02-04 RX ADMIN — Medication 1 TABLET: at 06:55

## 2021-02-04 RX ADMIN — MESALAMINE 1600 MG: 800 TABLET, DELAYED RELEASE ORAL at 20:02

## 2021-02-04 RX ADMIN — METHOCARBAMOL TABLETS 750 MG: 750 TABLET, COATED ORAL at 21:11

## 2021-02-04 RX ADMIN — METHOCARBAMOL TABLETS 750 MG: 750 TABLET, COATED ORAL at 10:18

## 2021-02-04 RX ADMIN — OXYCODONE HYDROCHLORIDE 10 MG: 5 TABLET ORAL at 05:20

## 2021-02-04 RX ADMIN — ACETAMINOPHEN 975 MG: 325 TABLET, FILM COATED ORAL at 02:19

## 2021-02-04 RX ADMIN — OXYCODONE HYDROCHLORIDE 10 MG: 5 TABLET ORAL at 16:17

## 2021-02-04 RX ADMIN — PANTOPRAZOLE SODIUM 40 MG: 40 TABLET, DELAYED RELEASE ORAL at 08:03

## 2021-02-04 RX ADMIN — METHOCARBAMOL TABLETS 750 MG: 750 TABLET, COATED ORAL at 05:20

## 2021-02-04 RX ADMIN — Medication 1 TABLET: at 16:17

## 2021-02-04 RX ADMIN — OXYCODONE HYDROCHLORIDE 10 MG: 5 TABLET ORAL at 22:51

## 2021-02-04 RX ADMIN — TIZANIDINE 16 MG: 4 TABLET ORAL at 21:12

## 2021-02-04 RX ADMIN — Medication 25 MCG: at 08:04

## 2021-02-04 RX ADMIN — OXYCODONE HYDROCHLORIDE 10 MG: 5 TABLET ORAL at 20:02

## 2021-02-04 RX ADMIN — OXYCODONE HYDROCHLORIDE 10 MG: 5 TABLET ORAL at 02:20

## 2021-02-04 RX ADMIN — Medication 25 MCG: at 20:02

## 2021-02-04 RX ADMIN — HYDROMORPHONE HYDROCHLORIDE 0.4 MG: 1 INJECTION, SOLUTION INTRAMUSCULAR; INTRAVENOUS; SUBCUTANEOUS at 00:05

## 2021-02-04 RX ADMIN — GABAPENTIN 900 MG: 300 CAPSULE ORAL at 21:11

## 2021-02-04 RX ADMIN — DULOXETINE 60 MG: 60 CAPSULE, DELAYED RELEASE ORAL at 08:03

## 2021-02-04 RX ADMIN — OXYCODONE HYDROCHLORIDE 10 MG: 5 TABLET ORAL at 11:53

## 2021-02-04 RX ADMIN — DOCUSATE SODIUM 50 MG AND SENNOSIDES 8.6 MG 2 TABLET: 8.6; 5 TABLET, FILM COATED ORAL at 20:02

## 2021-02-04 RX ADMIN — OXYCODONE HYDROCHLORIDE 10 MG: 5 TABLET ORAL at 08:06

## 2021-02-04 RX ADMIN — ACETAMINOPHEN 975 MG: 325 TABLET, FILM COATED ORAL at 10:18

## 2021-02-04 RX ADMIN — TAMSULOSIN HYDROCHLORIDE 0.4 MG: 0.4 CAPSULE ORAL at 08:04

## 2021-02-04 ASSESSMENT — ACTIVITIES OF DAILY LIVING (ADL)
ADLS_ACUITY_SCORE: 15
ADLS_ACUITY_SCORE: 14
ADLS_ACUITY_SCORE: 14
ADLS_ACUITY_SCORE: 15
ADLS_ACUITY_SCORE: 14
ADLS_ACUITY_SCORE: 14

## 2021-02-04 NOTE — PROGRESS NOTES
[facility]    Hospitalist Progress Note  Provider : Edis Kwok MD  Date of Service (when I saw the patient): 02/04/2021    Assessment & Plan   Avi Bhatti is a 60 year old male with a history of Crohn's disease, ankylosing spondylitis, obesity, YORDY on CPAP, HTN, HLD, CKD stage IIIb, migraines, left NIYAH, and lumbar radiculopathy who was admitted on 2/2/2021 following L3-4 lumbar fusion by Dr. Velasquez.  Developing hypotension this evening down to 70 systolic, responsive to 500 mL of saline bolus.      Lumbar radiculopathy:  s/p L3-4 posterior lumbar fusion on 2/2/2021 by Dr. Velasquez.  The patient is doing well.  Reports mild soreness of the back right now.  Reports resolution of his previous left upper leg pain following surgery.  Hypotension as below.  Will defer diet, activity, DVT prophylaxis, and pain control to the primary team. Continue physical and occupational therapy. Continue incentive spirometry and follow hemoglobin to evaluate for surgical blood loss and potential need for transfusion. His home gabapentin 900 mg at bedtime and tizanidine 16 mg at bedtime has been resumed.     Hypotension: improved with IV fluids.   -Suspect combination of some mild postsurgical blood loss, effect from anesthesia and oxycodone received earlier.   -Hold lisinopril for now    Hyperkalemia, CKD stage IIIb: Creatinine at baseline 1.7.    -Improved with hyperkalemia protocol.      HTN: PTA on lisinopril 50 mg daily and metoprolol succinate 100 mg daily.  -Both metoprolol and lisinopril on hold for now.      Crohn's disease: Continue PTA mesalamine 1600 mg twice daily.  Has been on prednisone in the past, but none recently.  He is also on Humira every 2 weeks.     Ankylosing spondylitis: Takes combination of diclofenac 75 mg twice daily and misoprostol 200 mg twice daily.  Hold for now given postop spine surgery risk of bleeding.  Also is on Humira every 2 weeks.     GERD: Continue Protonix 40  mg daily.  on protonix     Obesity: BMI 36.     YORDY: Continue his home CPAP which he brought in.    Urine retention: started on Flomax. Now bauman out. Needs outpatient follow up. Will discharge on Flomax.       DVT Prophylaxis: Pneumatic Compression Devices  Code Status: Full Code    Disposition: Expected discharge per orthopedic surgery    Edis Kwok MD    Interval History   Patient seen and examined. He stated that he is feeling better. He has no nausea or vomiting. He denies fever. No abdominal pain    -Data reviewed today: I reviewed all new labs and imaging results over the last 24 hours. I personally reviewed     Physical Exam   Temp: 98.6  F (37  C) Temp src: Temporal BP: 114/66 Pulse: 72   Resp: 16 SpO2: 93 % O2 Device: None (Room air)    Vitals:    02/02/21 1122   Weight: 110.2 kg (243 lb)     Vital Signs with Ranges  Temp:  [97  F (36.1  C)-98.7  F (37.1  C)] 98.6  F (37  C)  Pulse:  [] 72  Resp:  [16-18] 16  BP: (110-140)/(66-92) 114/66  SpO2:  [90 %-94 %] 93 %  I/O last 3 completed shifts:  In: 2510 [P.O.:2270; I.V.:240]  Out: 1296 [Urine:1251; Drains:45]    GEN:  Alert, oriented x 3, appears comfortable, NAD.  HEENT:  Normocephalic/atraumatic, no scleral icterus, no nasal discharge, mouth moist.  CV:  Regular rate and rhythm, no murmur or JVD.  S1 + S2 noted, no S3 or S4.  LUNGS:  Clear to auscultation bilaterally without rales/rhonchi/wheezing/retractions.  Symmetric chest rise on inhalation noted.  ABD:  Active bowel sounds, soft, non-tender/non-distended.  No rebound/guarding/rigidity.  EXT:  No edema or cyanosis.  Hands/feet warm to touch with good signs of peripheral perfusion.  No joint synovitis noted.  SKIN:  Dry to touch, no exanthems noted in the visualized areas.  NEURO:  Symmetric muscle strength, sensation to touch grossly intact.  No new focal deficits appreciated.    Medications     dextrose 10% Stopped (02/03/21 0802)     sodium chloride 100 mL/hr at 02/02/21 4800        acetaminophen  975 mg Oral Q8H     calcium carbonate-vitamin D  1 tablet Oral TID AC     DULoxetine  60 mg Oral Daily     gabapentin  900 mg Oral At Bedtime     mesalamine  1,600 mg Oral BID     pantoprazole  40 mg Oral Daily     senna-docusate  2 tablet Oral BID     sodium chloride (PF)  3 mL Intracatheter Q8H     tamsulosin  0.4 mg Oral Daily     tiZANidine  16 mg Oral At Bedtime     cholecalciferol  25 mcg Oral BID       Data   Recent Labs   Lab 02/04/21  0612 02/03/21  0556 02/03/21  0213 02/02/21  1146   HGB 13.0* 12.4* 12.6*  --    NA  --   --  138  --    POTASSIUM  --  4.7 5.9* 5.3   CHLORIDE  --   --  110*  --    CO2  --   --  23  --    BUN  --   --  32*  --    CR  --   --  1.70*  --    ANIONGAP  --   --  5  --    GRACE  --   --  7.8*  --    * 120* 122*  --    TROPI  --   --  <0.015  --        No results found for this or any previous visit (from the past 24 hour(s)).

## 2021-02-04 NOTE — PLAN OF CARE
[facility] Orthopedic Nursing Progress Note       Assessment   Assessment:    CMS: is intact. Calves non-tender bilaterally.  Lungs: are clear, encouraged to use incentive spirometer w/a.  BS: active, no flatus, denies nausea  Urine: voided in toilet- states good amount  Surgical Site: Dressing is clean dry intact. Drain output minimal.  Pain: increased pain overnight. IV & po pain meds with minimal effect. Addition of Robaxin helpful. Ice to incisional area.   Activity: bedrest w/BRPs/A1/walker/brace  Slept intermittently.      Devin Griffiths RN

## 2021-02-04 NOTE — OP NOTE
Procedure Date: 02/02/2021      PREOPERATIVE DIAGNOSES:    1.  Left L3 radiculopathy and hip pain.   2.  Left L3-L4 foraminal disk herniation, inaccessible via diskectomy and recalcitrant to conservative care.      POSTOPERATIVE DIAGNOSES:   1.  Left L3 radiculopathy and hip pain.   2.  Left L3-L4 foraminal disk herniation, inaccessible via diskectomy and recalcitrant to conservative care.      PROCEDURES:   1.  L3-L4 transforaminal lumbar interbody fusion.   2.  Application of intervertebral biomechanical device for interbody fusion purposes.   3.  Subtotal left L3-L4 facetectomy and diskectomy for decompression of left L3 exiting nerve root.   4.  Posterolateral fusion, L3-L4.   5.  Application of posterior instrumentation for fusion purposes, L3 to L4, using a bilateral pedicle screw and dee dee construct.   6.  Local autograft bone.   7.  Crushed cancellous allograft bone.   8.  Fluoroscopy.   9.  Medtronic O-arm.      SURGEON:  Jakub Velasquez MD      INDICATIONS:  Mr. Bhatti is a 60-year-old male [facility] for left-sided leg and hip pain related to a left-sided L3-L4 foraminal disk herniation inaccessible via primary microdiskectomy.  He failed conservative care.  We discussed the above-mentioned procedure as a possible means to improve or alleviate his leg pain.  We discussed the risks, benefits and alternatives, and he elected to proceed.  He was presented with a consent form, which was read, understood and signed.  All questions were answered.  He was referred for preoperative H and P and COVID testing.      DESCRIPTION OF PROCEDURE:  On the day of the procedure, he was seen in preoperative area.  Questions were answered.  Skin was marked.  Consent was signed by both parties.  After finding no complications, he was brought to the operating room.  In the OR, he was successfully sedated, and an ET tube was placed.  A Fox catheter was placed under sterile conditions.  He was  rolled from a supine into a prone position over a 4-poster frame on a Ad table.  Eyes were free of compression.  SCDs were in place.  Shoulders and elbows were at 90 degrees with the shoulder slightly flexed forward routinely.  The lumbar region was clipped, prepped and draped in a standard fashion.  A timeout was performed, and IV antibiotics were administered.      We began by marking out the midline, which was infiltrated with Marcaine mixed with epinephrine.  A midline incision was created over the L3-L4 level.  We dissected down through the skin and subcutaneous tissue to the lumbar fascia, which was split in the midline.  We placed a Kocher clamp on the spinous process and verified our levels and continued.  Exposure was completed from L2 through L4.  L2 was exposed for placement of our spinous process clamp.  The exposure was taken down to expose the transverse processes of L3 and L4, and the capsule of the L3-L4 facet joint was removed.  All osteophytes were taken down in the facet joints with a #4 Kerrison.  All bone taken down during our procedure today was saved on the back table, cleaned and used for bone graft material moving forward.  Following exposure, we placed a spinous process clamp at L2.  A [employer] O-arm was brought into the room, and a spin was completed.  Information was successfully transferred to the Between system.      We began by identifying our starting point for our 4 pedicle screws using a Stealth-guided drill.  The drill was advanced through the pedicle under Stealth guidance.  Each  hole was then completed by advancing a straight thoracic awl through the pedicle into the anterior vertebral body.  All holes were sounded, and all cortices were noted to be intact.  Prior to placement of our pedicle screws, we decorticated the bilateral transverse processes at L3 and L4, including the lateral aspects of the facet joints and pars regions.  We then placed 1 packet of [employer]  MagniFuse (1 packet on each side)  to perform our posterolateral fusion.  After the decortication and placement of a MagniFuse packet, we then moved on to placement of our pedicle screws.  Screws were placed using Stealth guidance and a PowerEase drill.  All screws went in very well and had a solid endpoint.  We then placed some distraction across the left-sided pedicle screws.  A laminectomy was performed at L3 and L4 by taking down the posterior elements using a Leksell rongeur initially.  I then switched over to a high-speed bur for thinning out of the lamina and exposure of the ligamentum flavum.  The decompression was performed predominantly on the left-hand side with a subtotal facetectomy on the left L3-L4 level in preparation for a TLIF window.  Ultimately, the ligamentum flavum was taken down for exposure of the thecal sac and traversing nerve root.  The exiting nerve root was also visualized from the left side.  Cottonoids were used to protect the neurologic elements L3 and L4.  I then visualized the posterolateral aspect of the left L3-L4 disk space by retraction of the thecal sac using a D'Errico retractor.  A #15 blade was used to perform an annulotomy.  We then used our standard instruments including pituitaries, curettes and so to perform an L3-L4 diskectomy down to the bleeding bony endplates.  Trialing was performed.  The disk space was then packed with local autograft bone using a funnel device.  Following trialing, we then ended up placing our Globus Rise interbody cage, which was 10 x 30 x 7, elevated to 14 mm.  This went very well and offered great stability.  This completed our interbody fusion process.  We then moved back to our Globus pedicle screws that had been placed.  It should be noted that the Globus CREO pedicle screws were all size 6.5 x 45 mm and titanium.  We completed our instrumentation at that point by placing our 2 rods spanning from L3 to L4.  Four locking endcaps were  applied and final tightened.  Any remaining bone graft material was then placed in the bilateral gutters for further posterolateral fusion product.  Final x-rays were obtained, showing excellent placement of our hardware.  No complications throughout the procedure today.  Full decompression as expected.  The decompression and diskectomy was performed thoroughly on the patient's symptomatic left side at L3-L4.  We did not encounter any notable complication such as nerve injury or CSF leak today.  Blood loss was minimal.  Wound was copiously irrigated and suctioned.  A Hemovac drain was placed deep to lumbar fascia and taken out the poke hole.  This was tied to the skin with a nylon stitch.  Counts were correct.  We then performed our closure routinely, including the lumbar fascia, subcutaneous tissue and skin.  The wound was cleaned and dried.  Dermabond was applied.  Sterile dressings were applied.  Drapes were taken down.  He was then rolled back into a supine position on the hospital cart.  He was successfully extubated and brought to the PACU in stable condition.      Fady Adkins PA-C was present through the entirety of the procedure.  He was absolutely necessary from start to finish.  We used the Medtronic O-arm and fluoroscopy today.  In addition, we Medtronic MagniFuse.  We used Globus pedicle screw and interbody cages today.  All counts were correct.  No complications.         FREDY HOOKER MD             D: 2021   T: 2021   MT: [place]      Name:     MAL NICKERSON   MRN:      [id number]        Account:        [id number]   :      1960           Procedure Date: 2021      Document: [id number]

## 2021-02-04 NOTE — PLAN OF CARE
Vital signs stable, on room air, sats 90;s.  Remote telemetry,  per tele tech.No chest pain, n o dyspnea.  Hgb 12.4.  Lungs clear, encouraged inspirometer use.  Bowel sounds hypoactive, voiding.  CMS intact, dressing dry.Log rolled to turn, reposition.  Ambulated wearing tlso brace, using walker, gait belt and assist of 1.  Pain controlled with tylenol, oxycodone, ice pack application.Pt uses c pap at hs per home settings.  Care plan reviewed with pt and spouse.

## 2021-02-04 NOTE — PLAN OF CARE
Afeb, vss, cms and n/v intact, dressing clean and dry to incision, drain removed. Voiding and tolerating po. No flatus yet, abd becoming more distended and firm. After 3 walks in the hallways pt laid on his side and suppository given in an attempt to get his bowels started. Up with brace on with walker and sba of 1. Pain moderate, using oxycodone 10mg every 3 hours, robaxin given with some help. Plan is to discharge home tomorrow if bowels start working.

## 2021-02-05 VITALS
HEIGHT: 68 IN | TEMPERATURE: 97.1 F | SYSTOLIC BLOOD PRESSURE: 145 MMHG | HEART RATE: 99 BPM | WEIGHT: 243 LBS | OXYGEN SATURATION: 92 % | DIASTOLIC BLOOD PRESSURE: 83 MMHG | RESPIRATION RATE: 18 BRPM | BODY MASS INDEX: 36.83 KG/M2

## 2021-02-05 PROCEDURE — 99232 SBSQ HOSP IP/OBS MODERATE 35: CPT | Performed by: INTERNAL MEDICINE

## 2021-02-05 PROCEDURE — 250N000013 HC RX MED GY IP 250 OP 250 PS 637: Performed by: PHYSICIAN ASSISTANT

## 2021-02-05 PROCEDURE — 99207 PR CDG-MDM COMPONENT: MEETS LOW - DOWN CODED: CPT | Performed by: INTERNAL MEDICINE

## 2021-02-05 PROCEDURE — 250N000013 HC RX MED GY IP 250 OP 250 PS 637: Performed by: INTERNAL MEDICINE

## 2021-02-05 RX ORDER — METOPROLOL SUCCINATE 100 MG/1
50 TABLET, EXTENDED RELEASE ORAL DAILY
Qty: 90 TABLET | Refills: 0
Start: 2021-02-05 | End: 2021-04-20

## 2021-02-05 RX ORDER — BISACODYL 10 MG
10 SUPPOSITORY, RECTAL RECTAL DAILY PRN
Status: DISCONTINUED | OUTPATIENT
Start: 2021-02-05 | End: 2021-02-05 | Stop reason: HOSPADM

## 2021-02-05 RX ORDER — TAMSULOSIN HYDROCHLORIDE 0.4 MG/1
0.4 CAPSULE ORAL DAILY
Qty: 30 CAPSULE | Refills: 0 | Status: SHIPPED | OUTPATIENT
Start: 2021-02-05 | End: 2021-03-07

## 2021-02-05 RX ADMIN — ACETAMINOPHEN 975 MG: 325 TABLET, FILM COATED ORAL at 09:27

## 2021-02-05 RX ADMIN — MESALAMINE 1600 MG: 800 TABLET, DELAYED RELEASE ORAL at 09:28

## 2021-02-05 RX ADMIN — PANTOPRAZOLE SODIUM 40 MG: 40 TABLET, DELAYED RELEASE ORAL at 09:28

## 2021-02-05 RX ADMIN — ACETAMINOPHEN 975 MG: 325 TABLET, FILM COATED ORAL at 02:41

## 2021-02-05 RX ADMIN — TAMSULOSIN HYDROCHLORIDE 0.4 MG: 0.4 CAPSULE ORAL at 09:28

## 2021-02-05 RX ADMIN — DULOXETINE 60 MG: 60 CAPSULE, DELAYED RELEASE ORAL at 09:28

## 2021-02-05 RX ADMIN — Medication 25 MCG: at 09:27

## 2021-02-05 RX ADMIN — OXYCODONE HYDROCHLORIDE 10 MG: 5 TABLET ORAL at 02:42

## 2021-02-05 RX ADMIN — OXYCODONE HYDROCHLORIDE 10 MG: 5 TABLET ORAL at 10:14

## 2021-02-05 RX ADMIN — Medication 1 TABLET: at 13:36

## 2021-02-05 RX ADMIN — OXYCODONE HYDROCHLORIDE 10 MG: 5 TABLET ORAL at 13:23

## 2021-02-05 RX ADMIN — DOCUSATE SODIUM 50 MG AND SENNOSIDES 8.6 MG 2 TABLET: 8.6; 5 TABLET, FILM COATED ORAL at 09:26

## 2021-02-05 RX ADMIN — OXYCODONE HYDROCHLORIDE 10 MG: 5 TABLET ORAL at 06:57

## 2021-02-05 RX ADMIN — Medication 1 TABLET: at 06:57

## 2021-02-05 RX ADMIN — METHOCARBAMOL TABLETS 750 MG: 750 TABLET, COATED ORAL at 03:08

## 2021-02-05 ASSESSMENT — ACTIVITIES OF DAILY LIVING (ADL)
ADLS_ACUITY_SCORE: 16
ADLS_ACUITY_SCORE: 14

## 2021-02-05 NOTE — PROVIDER NOTIFICATION
Md notified- of surgical note of additonal suppository pt discharging at 1500 can I get miralax or other form of stool softner?   - suppository ordered.

## 2021-02-05 NOTE — PLAN OF CARE
[id number]: Pt resting comfortably. VSS. A&O. Pain managed with oxycodone, and ice packs. Dressing CDI CMS intact. AUO. BS+ passing gas. Plan is to discharge at 1500. All- discharge medications and discharge instructions given to and reviewed with patient. Spouse will be providing transportation home.

## 2021-02-05 NOTE — PROGRESS NOTES
Patient hospitalized for L3-L4 PLIF. Cleared for discharge to home today per MD. Discharge instructions, medications, and follow-ups reviewed with patient in detail. Discharge medications, including Atarax, robaxin, percocet, flomax, vitamin d3 were filled here and given at patient at discharge. Patient verbalized understanding of discharge instructions. Belongings were returned to patient at time of discharge. Spouse providing transport home. Pt refused suppository. Taken out of hospital via wheelchair.

## 2021-02-05 NOTE — PROGRESS NOTES
"Ortho Rounding Note    S: Sitting on edge of bed. Pain controlled at this time however reporting fairly significant episodes of LBP last night. Continues to have good improvement with regards to bi LE preop symptoms. Denies n/t, n/v/f/c, SOB, CP. Denies flatus.     O:  Vital signs:   Blood pressure (!) 159/105, pulse 121, temperature 97.5  F (36.4  C), temperature source Temporal, resp. rate 16, height 1.727 m (5' 8\"), weight 110.2 kg (243 lb), SpO2 97 %.  Estimated body mass index is 36.95 kg/m  as calculated from the following:    Height as of this encounter: 1.727 m (5' 8\").    Weight as of this encounter: 110.2 kg (243 lb).      Intake/Output Summary (Last 24 hours) at 2/4/2021 2042  Last data filed at 2/4/2021 1600  Gross per 24 hour   Intake 1635 ml   Output 11 ml   Net 1624 ml         Drain intact  Dressings c/d/i  5/5 motor and SPLT in BL UE and LE    A:  POD #2 s/p L3-4 TLIF    P:  General: Doing well this AM although slight regression from yesterday with Increased incisional pain from yesterday. Still working with PT/OT. Daily cares. No flatus at this time, if continued administer suppository.   Pain: PO  Act: up ad ronal, with therapy  DVT: Mech only  ID: routine postop abx to be completed 24 hours after surgery  Dispo: Plan for discharge to home tomorrow if no change in medical status.   Appreciate Medicine consult for medical management    Fady Adkins PA-C    "

## 2021-02-05 NOTE — PLAN OF CARE
"[facility] Nursing Progress Note       Assessment   Assessment:  /83   Pulse 103   Temp 97.6  F (36.4  C) (Temporal)   Resp 18   Ht 1.727 m (5' 8\")   Wt 110.2 kg (243 lb)   SpO2 96%   BMI 36.95 kg/m      CMS: is intact. Calves non-tender bilaterally.  Lungs: are clear  BS: active, + flatus   Urine: voiding adequately    Surgical Site: Dressing is clean dry intact.   Pain: initially rating pain 10/10. Unable to recline because of the pain. BP low-83/50. Explained to pt that due to low BP unable to give IV Dilaudid. Pt understanding. Pt ambulated in murillo- A1/walker/brace. Upon returning to room pt sat in chair and fell asleep. Pain was down to 4/10 when he awoke. BP 14/77. Oxycodone given and pt returned to bed. Ice to incisional area. Rating pain 4/10 this am.        Devin Griffiths RN   "

## 2021-02-05 NOTE — PROGRESS NOTES
[facility]    Hospitalist Progress Note  Provider : Edis Kwok MD  Date of Service (when I saw the patient): 02/05/2021    Assessment & Plan   Avi Bhatti is a 60 year old male with a history of Crohn's disease, ankylosing spondylitis, obesity, YORDY on CPAP, HTN, HLD, CKD stage IIIb, migraines, left NIYAH, and lumbar radiculopathy who was admitted on 2/2/2021 following L3-4 lumbar fusion by Dr. Velasquez.  Developing hypotension this evening down to 70 systolic, responsive to 500 mL of saline bolus.      Lumbar radiculopathy:  s/p L3-4 posterior lumbar fusion on 2/2/2021 by Dr. Velasquez.  The patient is doing well.  Reports mild soreness of the back right now.  Reports resolution of his previous left upper leg pain following surgery.  Hypotension as below.  Will defer diet, activity, DVT prophylaxis, and pain control to the primary team. Continue physical and occupational therapy. Continue incentive spirometry and follow hemoglobin to evaluate for surgical blood loss and potential need for transfusion. His home gabapentin 900 mg at bedtime and tizanidine 16 mg at bedtime has been resumed.     Hypotension: improved with IV fluids.   -Suspect combination of some mild postsurgical blood loss, effect from anesthesia and oxycodone received earlier.   -BP meds initially held. SBP now in 150's.  -Toprol Xl decreased to 50 mg daily. Lisinopril also decreased to 10 mg daily. He was advised to follow up his blood pressure closely at home. He was advised to follow up with PCP for further BP medication adjustment.     Hyperkalemia, CKD stage IIIb: Creatinine at baseline 1.7.    -Improved with hyperkalemia protocol.      HTN: PTA on lisinopril 50 mg daily and metoprolol succinate 100 mg daily.  -Both metoprolol and lisinopril on hold for now.      Crohn's disease: Continue PTA mesalamine 1600 mg twice daily.  Has been on prednisone in the past, but none recently.  He is also on Humira every 2  weeks.     Ankylosing spondylitis: Takes combination of diclofenac 75 mg twice daily and misoprostol 200 mg twice daily. Hold for now given postop spine surgery risk of bleeding.  Also is on Humira every 2 weeks.     GERD: Continue Protonix 40 mg daily.  on protonix     Obesity: BMI 36.     YORDY: Continue his home CPAP which he brought in.    Urine retention: started on Flomax. Now bauman out. Needs outpatient follow up. Will discharge on Flomax.       DVT Prophylaxis: Pneumatic Compression Devices  Code Status: Full Code    Disposition: Expected discharge per orthopedic surgery    Edis Kwok MD    Interval History   Patient seen and examined. He stated that he is feeling better. He has no nausea or vomiting. He denies fever. No abdominal pain    -Data reviewed today: I reviewed all new labs and imaging results over the last 24 hours. I personally reviewed     Physical Exam   Temp: 97.1  F (36.2  C) Temp src: Temporal BP: (!) 145/83 Pulse: 99   Resp: 18 SpO2: 92 % O2 Device: None (Room air)    Vitals:    02/02/21 1122   Weight: 110.2 kg (243 lb)     Vital Signs with Ranges  Temp:  [97.1  F (36.2  C)-98  F (36.7  C)] 97.1  F (36.2  C)  Pulse:  [] 99  Resp:  [14-18] 18  BP: ()/() 145/83  SpO2:  [90 %-97 %] 92 %  I/O last 3 completed shifts:  In: 780 [P.O.:780]  Out: 0     GEN:  Alert, oriented x 3, appears comfortable, NAD.  HEENT:  Normocephalic/atraumatic, no scleral icterus, no nasal discharge, mouth moist.  CV:  Regular rate and rhythm, no murmur or JVD.  S1 + S2 noted, no S3 or S4.  LUNGS:  Clear to auscultation bilaterally without rales/rhonchi/wheezing/retractions.  Symmetric chest rise on inhalation noted.  ABD:  Active bowel sounds, soft, non-tender/non-distended.  No rebound/guarding/rigidity.  EXT:  No edema or cyanosis.  Hands/feet warm to touch with good signs of peripheral perfusion.  No joint synovitis noted.  SKIN:  Dry to touch, no exanthems noted in the visualized areas.  NEURO:   Symmetric muscle strength, sensation to touch grossly intact.  No new focal deficits appreciated.    Medications       calcium carbonate-vitamin D  1 tablet Oral TID AC     DULoxetine  60 mg Oral Daily     gabapentin  900 mg Oral At Bedtime     mesalamine  1,600 mg Oral BID     pantoprazole  40 mg Oral Daily     senna-docusate  2 tablet Oral BID     sodium chloride (PF)  3 mL Intracatheter Q8H     tamsulosin  0.4 mg Oral Daily     tiZANidine  16 mg Oral At Bedtime     cholecalciferol  25 mcg Oral BID       Data   Recent Labs   Lab 02/04/21  0612 02/03/21  0556 02/03/21  0213 02/02/21  1146   HGB 13.0* 12.4* 12.6*  --    NA  --   --  138  --    POTASSIUM  --  4.7 5.9* 5.3   CHLORIDE  --   --  110*  --    CO2  --   --  23  --    BUN  --   --  32*  --    CR  --   --  1.70*  --    ANIONGAP  --   --  5  --    GRACE  --   --  7.8*  --    * 120* 122*  --    TROPI  --   --  <0.015  --        No results found for this or any previous visit (from the past 24 hour(s)).

## 2021-02-05 NOTE — PROVIDER NOTIFICATION
REASON FOR CONTACT: pt is rating pain 10/10 but BP is 80s/40s so can't give IV Dilaudid. Can we do something to raise BP?  PROVIDER CONTACTED:admitting hospitalist    TIME CONTACTED: now  MODE OF CONTACT: wbt

## 2021-02-05 NOTE — PROGRESS NOTES
"Ortho Rounding Note    S: Pain improved this AM. In bed, LE pre op symptoms continue to remain resolved. + flatus. Denies n/v/f/c, SOB, CP.     O:  Vital signs:   Blood pressure (!) 145/83, pulse 99, temperature 97.1  F (36.2  C), temperature source Temporal, resp. rate 18, height 1.727 m (5' 8\"), weight 110.2 kg (243 lb), SpO2 92 %.  Estimated body mass index is 36.95 kg/m  as calculated from the following:    Height as of this encounter: 1.727 m (5' 8\").    Weight as of this encounter: 110.2 kg (243 lb).      Intake/Output Summary (Last 24 hours) at 2/5/2021 0920  Last data filed at 2/4/2021 1600  Gross per 24 hour   Intake 300 ml   Output --   Net 300 ml         Dressings c/d/i  5/5 motor and SPLT in BL UE and LE    A:  POD #3 s/p L3-4 TLIF    P:  General: Doing well this AM. Recent positive flatus, no BM yet. Will order additional suppository. No significant abdominal pain. Daily cares.   Pain: PO  Act: up ad ronal, with therapy  DVT: Mech only  ID: routine postop abx to be completed 24 hours after surgery  Dispo: Plan for discharge to home later today. OK to DC once passes PT, medically stable, tolerating food, urinating  Appreciate Medicine consult for medical management    Fady Adkins PA-C    "

## 2021-02-05 NOTE — PLAN OF CARE
Vital signs stable.  Lungs clear, encouraged inspirometer use.  Bowel sounds hypo, passing flatus,   c/o constipation, encouraged ambulation ,po hydration, given scheduled senna and got suppository today.Voiding.  Pain controlled with tylenol, oxycodone , vistaril and robaxin, ice pack application.Hgb 13.0.  Dressing dry, intact.CMS intact.Care plan reviewed with pt.

## 2021-02-09 NOTE — DISCHARGE SUMMARY
[facility] Discharge Summary    Avi Bhatti MRN# [id number]   Age: 60 year old YOB: 1960     Date of Admission:  2/2/2021  Date of Discharge::  2/5/2021  3:38 PM  Admitting Physician:  Jakub Velasquez MD  Discharge Physician:  Fady Adkins PALianetC     Home clinic: [facility]          Admission Diagnoses:   Ruptured lumbar disc [M51.26]  Lumbar radiculopathy [M54.16]  Lumbar spondylosis [M47.816]  S/P lumbar fusion [Z98.1]          Discharge Diagnosis:   Patient Active Problem List    Diagnosis     S/P lumbar fusion     S/P total hip arthroplasty     Uncontrolled hypertension     CKD (chronic kidney disease) stage 3, GFR 30-59 ml/min     Obesity (BMI 35.0-39.9) with comorbidity (H)     Intractable migraine without aura and without status migrainosus     Degenerative disc disease, cervical     Adenomatous colon polyp     Essential hypertension, benign     Crohn's disease (H)     BMI 30-35     Mixed hyperlipidemia     Cervicalgia     Tension headache     Muscle spasm     Sleep apnea, obstructive     Ankylosing spondylitis (H)             Procedures:   Procedure(s): L3-4 TLIF              Medications Prior to Admission:     No medications prior to admission.             Discharge Medications:     Discharge Medication List as of 2/5/2021 11:28 AM      START taking these medications    Details   hydrOXYzine (ATARAX) 25 MG tablet Take 1 tablet (25 mg) by mouth every 6 hours as needed for itching, Disp-20 tablet, R-0, Local Print      methocarbamol (ROBAXIN) 750 MG tablet Take 1 tablet (750 mg) by mouth 4 times daily as needed for muscle spasms, Disp-20 tablet, R-0, Local Print      oxyCODONE-acetaminophen (PERCOCET) 5-325 MG tablet Take 1-2 tablets by mouth every 6 hours as needed for pain, Disp-25 tablet, R-0, Local Print      tamsulosin (FLOMAX) 0.4 MG capsule Take 1 capsule (0.4 mg) by mouth daily, Disp-30 capsule, R-0, E-Prescribe      Vitamin D3 (CHOLECALCIFEROL) 25 mcg (1000  units) tablet Take 1 tablet (25 mcg) by mouth 2 times daily, Disp-180 tablet, R-0, Local Print         CONTINUE these medications which have CHANGED    Details   metoprolol succinate ER (TOPROL-XL) 100 MG 24 hr tablet Take 0.5 tablets (50 mg) by mouth daily, Disp-90 tablet, R-0, No Print Out         CONTINUE these medications which have NOT CHANGED    Details   ASACOL  MG EC tablet Take 1,600 mg by mouth 2 times daily , R-0, MUSTAPHA, Historical      calcium carbonate-vitamin D (CALCIUM 600+D) 600-200 MG-UNIT TABS Take by mouth 2 times daily , Historical      DULoxetine (CYMBALTA) 60 MG EC capsule 60 mg daily , R-0, Historical      gabapentin (NEURONTIN) 300 MG capsule Take 900 mg by mouth At Bedtime , R-1, Historical      HUMIRA PEN 40 MG/0.8ML pen kit Inject 40 mg Subcutaneous every 14 days , R-0, MUSTAPHA, Historical      lisinopril (ZESTRIL) 10 MG tablet Take 1 tablet daily in the night., Disp-90 tablet, R-0, E-Prescribe      misoprostol (CYTOTEC) 200 MCG tablet Take 200 mcg by mouth 2 times daily, Historical      multivitamin, therapeutic with minerals (MULTI-VITAMIN) TABS Take 1 tablet by mouth daily., Historical      order for DME Equipment being ordered: Walker Wheels () and Walker ()  Treatment Diagnosis: Impaired gaitDisp-1 each, R-0, Local Print      pantoprazole (PROTONIX) 40 MG EC tablet Take 40 mg by mouth daily, Historical      senna-docusate (SENOKOT-S/PERICOLACE) 8.6-50 MG tablet Take 2 tablets by mouth 2 times daily, Disp-60 tablet, R-1, E-Prescribe      TIZANIDINE HCL PO Take 16 mg by mouth At Bedtime , Historical         STOP taking these medications       diclofenac (VOLTAREN) 75 MG EC tablet Comments:   Reason for Stopping:         HYDROcodone-acetaminophen (NORCO) 5-325 MG tablet Comments:   Reason for Stopping:                     Consultations:   PT, OT, Hospitalist             Hospital Course:   The patient's hospital course was unremarkable.  He recovered as anticipated and  experienced no post-operative complications.           Discharge Instructions and Follow-Up:   Discharge diet: Regular   Discharge activity: Activity as tolerated  Minimize bending, lifting, twisting. No lifting greater than 10 lbs. Remember, 1 gallon of milk is 8 lbs.     Discharge follow-up: Follow up with Fady Adkins PA-C in two weeks at [facility]. Please call Jenna [phone] to schedule.      Wound care: You incision is covered with an Aquacel dressing. This is a waterproof dressing that you are able to shower with. Do not submerge your dressing in water. Do not remove dressing until you are seen in clinic for your two week post op visit.     However, if you notice a significant amount of drainage on your dressing, or there is any concern for possible incision infection, remove to inspect the incision.    If you do remove the dressing prior to your two week visit, please cover with simple dressing. We suggest a folded piece of gauze with a few pieces of tape to hold in place. This will allow the incision to remain protected. Please make sure to cover your dressing with plastic/waterproof dressing to keep it waterproof if you have removed the initial dressing while in the shower. We ask that you continue to waterproof it until you are seen at your two week post op appointment.              Discharge Disposition:   Discharged to home      Attestation:  I have reviewed today's vital signs, notes, medications, labs and imaging.    Fady Adknis PA-C

## 2021-02-16 ENCOUNTER — TRANSFERRED RECORDS (OUTPATIENT)
Dept: HEALTH INFORMATION MANAGEMENT | Facility: CLINIC | Age: 61
End: 2021-02-16

## 2021-03-04 ENCOUNTER — TRANSFERRED RECORDS (OUTPATIENT)
Dept: HEALTH INFORMATION MANAGEMENT | Facility: CLINIC | Age: 61
End: 2021-03-04

## 2021-04-19 DIAGNOSIS — I10 ESSENTIAL HYPERTENSION, BENIGN: ICD-10-CM

## 2021-04-19 DIAGNOSIS — I10 UNCONTROLLED HYPERTENSION: ICD-10-CM

## 2021-04-19 RX ORDER — METOPROLOL SUCCINATE 100 MG/1
50 TABLET, EXTENDED RELEASE ORAL DAILY
Qty: 90 TABLET | Refills: 0 | Status: CANCELLED | OUTPATIENT
Start: 2021-04-19

## 2021-04-19 RX ORDER — LISINOPRIL 10 MG/1
TABLET ORAL
Qty: 90 TABLET | Refills: 0 | Status: CANCELLED | OUTPATIENT
Start: 2021-04-19

## 2021-04-20 ENCOUNTER — MYC MEDICAL ADVICE (OUTPATIENT)
Dept: PEDIATRICS | Facility: CLINIC | Age: 61
End: 2021-04-20

## 2021-04-20 ENCOUNTER — TELEPHONE (OUTPATIENT)
Dept: PEDIATRICS | Facility: CLINIC | Age: 61
End: 2021-04-20

## 2021-04-20 ENCOUNTER — IMMUNIZATION (OUTPATIENT)
Dept: NURSING | Facility: CLINIC | Age: 61
End: 2021-04-20
Payer: COMMERCIAL

## 2021-04-20 ENCOUNTER — OFFICE VISIT (OUTPATIENT)
Dept: PEDIATRICS | Facility: CLINIC | Age: 61
End: 2021-04-20
Payer: COMMERCIAL

## 2021-04-20 VITALS
SYSTOLIC BLOOD PRESSURE: 118 MMHG | TEMPERATURE: 97.7 F | OXYGEN SATURATION: 96 % | HEART RATE: 58 BPM | DIASTOLIC BLOOD PRESSURE: 74 MMHG | HEIGHT: 68 IN | RESPIRATION RATE: 18 BRPM | BODY MASS INDEX: 37.59 KG/M2 | WEIGHT: 248 LBS

## 2021-04-20 DIAGNOSIS — I10 ESSENTIAL HYPERTENSION, BENIGN: ICD-10-CM

## 2021-04-20 DIAGNOSIS — K50.00 CROHN'S DISEASE OF SMALL INTESTINE WITHOUT COMPLICATION (H): Chronic | ICD-10-CM

## 2021-04-20 DIAGNOSIS — M45.9 ANKYLOSING SPONDYLITIS, UNSPECIFIED SITE OF SPINE (H): Chronic | ICD-10-CM

## 2021-04-20 DIAGNOSIS — I10 ESSENTIAL HYPERTENSION, BENIGN: Primary | ICD-10-CM

## 2021-04-20 DIAGNOSIS — Z13.220 SCREENING FOR HYPERLIPIDEMIA: ICD-10-CM

## 2021-04-20 DIAGNOSIS — H10.13 ALLERGIC CONJUNCTIVITIS, BILATERAL: ICD-10-CM

## 2021-04-20 DIAGNOSIS — D64.9 ANEMIA, UNSPECIFIED TYPE: ICD-10-CM

## 2021-04-20 DIAGNOSIS — R73.09 ELEVATED GLUCOSE: ICD-10-CM

## 2021-04-20 DIAGNOSIS — N18.32 STAGE 3B CHRONIC KIDNEY DISEASE (H): ICD-10-CM

## 2021-04-20 LAB
ANION GAP SERPL CALCULATED.3IONS-SCNC: 4 MMOL/L (ref 3–14)
BUN SERPL-MCNC: 24 MG/DL (ref 7–30)
CALCIUM SERPL-MCNC: 9.4 MG/DL (ref 8.5–10.1)
CHLORIDE SERPL-SCNC: 107 MMOL/L (ref 94–109)
CHOLEST SERPL-MCNC: 231 MG/DL
CO2 SERPL-SCNC: 25 MMOL/L (ref 20–32)
CREAT SERPL-MCNC: 1.68 MG/DL (ref 0.66–1.25)
ERYTHROCYTE [DISTWIDTH] IN BLOOD BY AUTOMATED COUNT: 12.8 % (ref 10–15)
GFR SERPL CREATININE-BSD FRML MDRD: 43 ML/MIN/{1.73_M2}
GLUCOSE SERPL-MCNC: 96 MG/DL (ref 70–99)
HBA1C MFR BLD: 5.5 % (ref 0–5.6)
HCT VFR BLD AUTO: 44.5 % (ref 40–53)
HDLC SERPL-MCNC: 43 MG/DL
HGB BLD-MCNC: 14.4 G/DL (ref 13.3–17.7)
LDLC SERPL CALC-MCNC: 125 MG/DL
MCH RBC QN AUTO: 31.3 PG (ref 26.5–33)
MCHC RBC AUTO-ENTMCNC: 32.4 G/DL (ref 31.5–36.5)
MCV RBC AUTO: 97 FL (ref 78–100)
NONHDLC SERPL-MCNC: 188 MG/DL
PLATELET # BLD AUTO: 206 10E9/L (ref 150–450)
POTASSIUM SERPL-SCNC: 5.3 MMOL/L (ref 3.4–5.3)
RBC # BLD AUTO: 4.6 10E12/L (ref 4.4–5.9)
SODIUM SERPL-SCNC: 137 MMOL/L (ref 133–144)
TRIGL SERPL-MCNC: 316 MG/DL
WBC # BLD AUTO: 7.8 10E9/L (ref 4–11)

## 2021-04-20 PROCEDURE — 36415 COLL VENOUS BLD VENIPUNCTURE: CPT | Performed by: NURSE PRACTITIONER

## 2021-04-20 PROCEDURE — 85027 COMPLETE CBC AUTOMATED: CPT | Performed by: NURSE PRACTITIONER

## 2021-04-20 PROCEDURE — 80061 LIPID PANEL: CPT | Performed by: NURSE PRACTITIONER

## 2021-04-20 PROCEDURE — 0001A PR COVID VAC PFIZER DIL RECON 30 MCG/0.3 ML IM: CPT

## 2021-04-20 PROCEDURE — 83036 HEMOGLOBIN GLYCOSYLATED A1C: CPT | Performed by: NURSE PRACTITIONER

## 2021-04-20 PROCEDURE — 80048 BASIC METABOLIC PNL TOTAL CA: CPT | Performed by: NURSE PRACTITIONER

## 2021-04-20 PROCEDURE — 91300 PR COVID VAC PFIZER DIL RECON 30 MCG/0.3 ML IM: CPT

## 2021-04-20 PROCEDURE — 99215 OFFICE O/P EST HI 40 MIN: CPT | Performed by: NURSE PRACTITIONER

## 2021-04-20 RX ORDER — METOPROLOL SUCCINATE 100 MG/1
50 TABLET, EXTENDED RELEASE ORAL DAILY
Qty: 90 TABLET | Refills: 3 | Status: SHIPPED | OUTPATIENT
Start: 2021-04-20 | End: 2021-04-21

## 2021-04-20 RX ORDER — LISINOPRIL 10 MG/1
TABLET ORAL
Qty: 90 TABLET | Refills: 3 | Status: SHIPPED | OUTPATIENT
Start: 2021-04-20 | End: 2021-04-21

## 2021-04-20 ASSESSMENT — MIFFLIN-ST. JEOR: SCORE: 1899.42

## 2021-04-20 NOTE — TELEPHONE ENCOUNTER
I left voicemail for patient to return call to clinic. When he does clarify what dose metoprolol he is currently taking? 1/2 tab (50 mg) or full tablet daily. Then notify pharmacy. I want him to continue his current dose

## 2021-04-20 NOTE — PROGRESS NOTES
"    Assessment & Plan     Essential hypertension, benign  - Well controlled. Continue with same dose. After visit I called patient as previously on 100 mg and dose decreased to 50 mg after hospital discharge in February. Need to clarify what dose he is currently taking and then notified pharmacy  - metoprolol succinate ER (TOPROL-XL) 100 MG 24 hr tablet; Take 0.5 tablets (50 mg) by mouth daily  - Basic metabolic panel  (Ca, Cl, CO2, Creat, Gluc, K, Na, BUN)  - lisinopril (ZESTRIL) 10 MG tablet; Take 1 tablet daily in the night.    Crohn's disease of small intestine without complication (H)  - Follows with GI  - Will scheduled colonoscopy through [facility]    Screening for hyperlipidemia  - Lipid panel reflex to direct LDL Fasting    Ankylosing spondylitis, unspecified site of spine (H)  - Follows with rheum    Elevated glucose  - Hemoglobin A1c    Anemia, unspecified type  - Appears to have been post surgical blood loss, anemia in 2019 normalized  - CBC with platelets    Allergic conjunctivitis, bilateral  - Or try daily cetirizine. If no improvement, see opthalmology  - ketotifen (ZADITOR) 0.025 % ophthalmic solution; Place 1 drop into both eyes 2 times daily    Stage 3b chronic kidney disease  - GFR recently declined. If not improving, reach out to rheumatology. On daily NSAID. Consider nephrology referral in future. He reports father had renal failure    Review of external notes as documented elsewhere in note  49 minutes spent on the date of the encounter doing chart review, review of outside records, patient visit and documentation        BMI:   Estimated body mass index is 37.71 kg/m  as calculated from the following:    Height as of this encounter: 1.727 m (5' 8\").    Weight as of this encounter: 112.5 kg (248 lb).       See Patient Instructions    Return in about 6 months (around 10/20/2021) for Physical Exam.    TONYA Nassar [facility] LILIANEDONTAE King is a 61 year old who " "presents for the following health issues     History of Present Illness       He eats 0-1 servings of fruits and vegetables daily.He consumes 3 sweetened beverage(s) daily.He exercises with enough effort to increase his heart rate 9 or less minutes per day.  He exercises with enough effort to increase his heart rate 3 or less days per week.   He is taking medications regularly.       Establish care-    Pt would like ears cleaned out has hearing - has hearing aids     Hyperlipidemia Follow-Up      Are you regularly taking any medication or supplement to lower your cholesterol?   No    Are you having muscle aches or other side effects that you think could be caused by your cholesterol lowering medication?  No    Hypertension Follow-up      Do you check your blood pressure regularly outside of the clinic? Yes     Are you following a low salt diet? No    Are your blood pressures ever more than 140 on the top number (systolic) OR more   than 90 on the bottom number (diastolic), for example 140/90? Yes     HTN - on lisinopril 10 mg, metoprolol 50 mg BID  YORDY - wears bipap most days. Has on lowest setting for moisture. Has chronic runny nose  On Protonix - prescribed by [facility]    Unaware of any allergies  Itchy, watery eyes    Crohn's (colon resection 1993), ankylosing spondylitis - On Humira, diclofenac and misoprostol. On Asacol for Crohns.   Follows at [facility] for Crohns    Underwent L3-4 lumbar fusion Feb 2021. Hx ankylosing spondylitis. Follows at [facility]  Total left hip 2019. Once weekly physical therapy at [facility]    CKD - declining renal function  Drinks 3 cokes/day. Works for [employer]      Review of Systems   Constitutional, HEENT, cardiovascular, pulmonary, gi and gu systems are negative, except as otherwise noted.      Objective    /74 (BP Location: Right arm, Patient Position: Chair, Cuff Size: Adult Large)   Pulse 58   Temp 97.7  F (36.5  C) (Tympanic)   Resp 18   Ht 1.727 m (5' 8\")   Wt 112.5 kg (248 lb)   " SpO2 96%   BMI 37.71 kg/m    Body mass index is 37.71 kg/m .  Physical Exam   GENERAL: healthy, alert and no distress  HENT: normal cephalic/atraumatic, non occluding cerumen in bilateral ear canals  RESP: lungs clear to auscultation - no rales, rhonchi or wheezes  CV: regular rate and rhythm, normal S1 S2, no S3 or S4, no murmur, click or rub, no peripheral edema and peripheral pulses strong  ABDOMEN: soft, nontender, no hepatosplenomegaly, no masses and bowel sounds normal  MS: gait intact  SKIN: no suspicious lesions or rashes  PSYCH: mentation appears normal, affect normal/bright    Results for orders placed or performed in visit on 04/20/21 (from the past 24 hour(s))   Hemoglobin A1c   Result Value Ref Range    Hemoglobin A1C 5.5 0 - 5.6 %   CBC with platelets   Result Value Ref Range    WBC 7.8 4.0 - 11.0 10e9/L    RBC Count 4.60 4.4 - 5.9 10e12/L    Hemoglobin 14.4 13.3 - 17.7 g/dL    Hematocrit 44.5 40.0 - 53.0 %    MCV 97 78 - 100 fl    MCH 31.3 26.5 - 33.0 pg    MCHC 32.4 31.5 - 36.5 g/dL    RDW 12.8 10.0 - 15.0 %    Platelet Count 206 150 - 450 10e9/L

## 2021-04-20 NOTE — TELEPHONE ENCOUNTER
"Alison Melissa: Noted. Will call the pt on Thursday (next day I'm in clinic) to see if the pt responded or read message and contact if needed.      - Martínez \"Giovani\" KRISTIN Neri - Patient Advocate Liason (PAL)  [facility]    "

## 2021-04-20 NOTE — PATIENT INSTRUCTIONS
Schedule your colonoscopy    Try increasing the moisture on your bipap    For runny nose, watery eyes - try daily cetirizine (generic for Zyrtec). Try taking 10 mg daily for 1-2 weeks. If no improvement, you can stop taking    I sent in a prescription for Zaditor eye drops. 1 drop twice daily in left eye (or both). You can try this before taking the cetirizine.   If your eye symptoms don't get better, please schedule with ophthalmology    Try to cut back on coke as that's harmful for the kidneys

## 2021-04-21 DIAGNOSIS — N18.32 STAGE 3B CHRONIC KIDNEY DISEASE (H): Primary | ICD-10-CM

## 2021-04-21 RX ORDER — LISINOPRIL 40 MG/1
TABLET ORAL
Qty: 90 TABLET | Refills: 3 | Status: SHIPPED | OUTPATIENT
Start: 2021-04-21 | End: 2022-03-03

## 2021-04-21 RX ORDER — METOPROLOL SUCCINATE 100 MG/1
100 TABLET, EXTENDED RELEASE ORAL DAILY
Qty: 90 TABLET | Refills: 3 | Status: SHIPPED | OUTPATIENT
Start: 2021-04-21 | End: 2023-10-28

## 2021-04-21 NOTE — TELEPHONE ENCOUNTER
I should also clarify he should only be taking max dose of 40 mg lisinopril a day. So stop 10 mg dose.

## 2021-04-21 NOTE — TELEPHONE ENCOUNTER
Maximum dose of lisinopril is 40 mg daily.   I see he was on 40 mg in 2020 and then Dr. Mello added a 10 mg dose at bedtime. Then I'm not sure how the 40 mg was removed from his med list.   I sent in prescriptions for metoprolol  mg and lisinopril 40 mg daily. Can take both at same time or one in the morning and one in evening.

## 2021-04-21 NOTE — TELEPHONE ENCOUNTER
Pt replied with what he is taking for BP medications.    Metoprolol 100mg every day (pended new Rx)    Lisinopril 50mg (40mg + 10mg)   - only 10mg on med list.     Routed to PCP to review.

## 2021-04-27 ENCOUNTER — TRANSFERRED RECORDS (OUTPATIENT)
Dept: HEALTH INFORMATION MANAGEMENT | Facility: CLINIC | Age: 61
End: 2021-04-27

## 2021-05-11 ENCOUNTER — IMMUNIZATION (OUTPATIENT)
Dept: NURSING | Facility: CLINIC | Age: 61
End: 2021-05-11
Attending: INTERNAL MEDICINE
Payer: COMMERCIAL

## 2021-05-11 PROCEDURE — 0002A PR COVID VAC PFIZER DIL RECON 30 MCG/0.3 ML IM: CPT

## 2021-05-11 PROCEDURE — 91300 PR COVID VAC PFIZER DIL RECON 30 MCG/0.3 ML IM: CPT

## 2021-10-24 ENCOUNTER — HEALTH MAINTENANCE LETTER (OUTPATIENT)
Age: 61
End: 2021-10-24

## 2021-11-09 ENCOUNTER — MYC MEDICAL ADVICE (OUTPATIENT)
Dept: PEDIATRICS | Facility: CLINIC | Age: 61
End: 2021-11-09
Payer: COMMERCIAL

## 2021-11-09 DIAGNOSIS — N18.32 STAGE 3B CHRONIC KIDNEY DISEASE (H): Primary | ICD-10-CM

## 2021-11-09 NOTE — TELEPHONE ENCOUNTER
"Alison Melissa: See pt message and advise.    Most recent rheum visit I see is outside records from 05/04/2020, but pt was advised in April by you for them to reach out to rheum to discuss their kidneys.    Per OV on 04/20/21 with PCP:  Stage 3b chronic kidney disease  - GFR recently declined. If not improving, reach out to rheumatology. On daily NSAID. Consider nephrology referral in future. He reports father had renal failure        - Martínez \"Giovani\" KRISTIN Neri - Patient Advocate Liason (PAL)  [facility]    "

## 2021-12-22 ENCOUNTER — TRANSFERRED RECORDS (OUTPATIENT)
Dept: HEALTH INFORMATION MANAGEMENT | Facility: CLINIC | Age: 61
End: 2021-12-22
Payer: COMMERCIAL

## 2021-12-22 LAB
ALT SERPL-CCNC: 23 IU/L (ref 5–40)
AST SERPL-CCNC: 24 U/L (ref 5–34)
CREATININE (EXTERNAL): 1.33 MG/DL (ref 0.5–1.3)
GFR ESTIMATED (EXTERNAL): 57.9 ML/MIN/1.73M2

## 2022-02-13 ENCOUNTER — HEALTH MAINTENANCE LETTER (OUTPATIENT)
Age: 62
End: 2022-02-13

## 2022-03-01 DIAGNOSIS — I10 ESSENTIAL HYPERTENSION, BENIGN: ICD-10-CM

## 2022-03-03 RX ORDER — LISINOPRIL 40 MG/1
TABLET ORAL
Qty: 30 TABLET | Refills: 0 | Status: SHIPPED | OUTPATIENT
Start: 2022-03-03 | End: 2022-06-14

## 2022-03-03 NOTE — TELEPHONE ENCOUNTER
Routing refill request to provider for review/approval because:  Labs out of range:  CR  Akosua Moseley RN on 3/3/2022 at 9:49 AM

## 2022-03-22 ENCOUNTER — NURSE TRIAGE (OUTPATIENT)
Dept: NURSING | Facility: CLINIC | Age: 62
End: 2022-03-22
Payer: COMMERCIAL

## 2022-03-22 NOTE — TELEPHONE ENCOUNTER
Pt calling for triage.     Pt reports shoulder pain x 1 month, pain is present at rest, clearly made worse with movement and when laying on the L side.  He is taking Tylenol.   Pt denies injury, SOB, chest pain.     Triage disposition:  See a provider within 3 days, in-person advised.  Patient verbalized understanding and had no further questions.  Call transferred to scheduling.     COVID 19 Nurse Triage Plan/Patient Instructions    Please be aware that novel coronavirus (COVID-19) may be circulating in the community. If you develop symptoms such as fever, cough, or SOB or if you have concerns about the presence of another infection including coronavirus (COVID-19), please contact your health care provider or visit [email or web address].     Disposition/Instructions    In-Person Visit with provider recommended. Reference Visit Selection Guide.    Thank you for taking steps to prevent the spread of this virus.  o Limit your contact with others.  o Wear a simple mask to cover your cough.  o Wash your hands well and often.    Resources    [facility]: About COVID-19: [email or web address]    CDC: What to Do If You're Sick: www.cdc.gov/coronavirus/2019-ncov/about/steps-when-sick.html    CDC: Ending Home Isolation: www.cdc.gov/coronavirus/2019-ncov/hcp/disposition-in-home-patients.html     CDC: Caring for Someone: www.cdc.gov/coronavirus/2019-ncov/if-you-are-sick/care-for-someone.html     [facility]: Interim Guidance for Hospital Discharge to Home: www.health.[facility].[place]/diseases/coronavirus/hcp/hospdischarge.pdf    [facility] clinical trials (COVID-19 research studies): [email or web address]-clinical-trials     Below are the COVID-19 hotlines at the [facility] of Health ([facility]). Interpreters are available.   o For health questions: Call 473-257-8643 or 1-554.388.1714 (7 a.m. to 7 p.m.)  o For questions about schools and childcare: Call 856-263-3800 or 1-925.526.4394 (7 a.m.  "to 7 p.m.)             Harper STALEY [facility] Nurse Advisor          Reason for Disposition    [1] MODERATE pain (e.g., interferes with normal activities) AND [2] present > 3 days    Additional Information    Negative: Passed out (i.e., lost consciousness, collapsed and was not responding)    Negative: Shock suspected (e.g., cold/pale/clammy skin, too weak to stand, low BP, rapid pulse)    Negative: [1] Similar pain previously AND [2] it was from \"heart attack\"    Negative: [1] Similar pain previously AND [2] it was from \"angina\" AND [3] not relieved by nitroglycerin    Negative: Sounds like a life-threatening emergency to the triager    Negative: Followed a shoulder injury    Negative: Chest pain    Negative: Difficulty breathing or unusual sweating (e.g., sweating without exertion)    Negative: [1] Pain lasting > 5 minutes AND [2] pain also present in chest  (Exception: pain is clearly made worse by movement)    Negative: [1] Age > 40 AND [2] no obvious cause AND [3] pain even when not moving the arm    (Exception: pain is clearly made worse by moving arm or bending neck)    Negative: [1] SEVERE pain AND [2] not improved 2 hours after pain medicine    Negative: [1] Red area or streak AND [2] fever    Negative: [1] Swollen joint AND [2] fever    Negative: Patient sounds very sick or weak to the triager    Negative: Entire arm is swollen    Negative: Weakness (i.e., loss of strength) in hand or fingers    (Exception: not truly weak; hand feels weak because of pain)    Negative: [1] Shoulder pains with exertion (e.g., walking) AND [2] pain goes away on resting AND [3] not present now    Negative: [1] Painful rash AND [2] multiple small blisters grouped together (i.e., dermatomal distribution or \"band\" or \"stripe\")    Negative: Looks like a boil, infected sore, deep ulcer or other infected rash (spreading redness, pus)    Negative: [1] Localized rash is very painful AND [2] no fever    " Negative: Numbness (i.e., loss of sensation) in hand or fingers    Negative: [1] Unable to use arm at all AND [2] because of shoulder pain or stiffness    Protocols used: SHOULDER PAIN-A-AH

## 2022-08-01 ENCOUNTER — OFFICE VISIT (OUTPATIENT)
Dept: PEDIATRICS | Facility: CLINIC | Age: 62
End: 2022-08-01
Payer: COMMERCIAL

## 2022-08-01 VITALS
SYSTOLIC BLOOD PRESSURE: 110 MMHG | RESPIRATION RATE: 18 BRPM | WEIGHT: 246.9 LBS | HEART RATE: 117 BPM | OXYGEN SATURATION: 100 % | TEMPERATURE: 98.6 F | HEIGHT: 69 IN | DIASTOLIC BLOOD PRESSURE: 70 MMHG | BODY MASS INDEX: 36.57 KG/M2

## 2022-08-01 DIAGNOSIS — K50.00 CROHN'S DISEASE OF SMALL INTESTINE WITHOUT COMPLICATION (H): ICD-10-CM

## 2022-08-01 DIAGNOSIS — Z00.00 ROUTINE GENERAL MEDICAL EXAMINATION AT A HEALTH CARE FACILITY: Primary | ICD-10-CM

## 2022-08-01 DIAGNOSIS — I10 ESSENTIAL HYPERTENSION, BENIGN: ICD-10-CM

## 2022-08-01 DIAGNOSIS — Z12.11 SCREEN FOR COLON CANCER: ICD-10-CM

## 2022-08-01 DIAGNOSIS — M45.9 ANKYLOSING SPONDYLITIS, UNSPECIFIED SITE OF SPINE (H): ICD-10-CM

## 2022-08-01 DIAGNOSIS — D12.6 ADENOMATOUS POLYP OF COLON, UNSPECIFIED PART OF COLON: ICD-10-CM

## 2022-08-01 DIAGNOSIS — Z23 ENCOUNTER FOR ADMINISTRATION OF VACCINE: ICD-10-CM

## 2022-08-01 DIAGNOSIS — E78.5 HYPERLIPIDEMIA, UNSPECIFIED HYPERLIPIDEMIA TYPE: ICD-10-CM

## 2022-08-01 DIAGNOSIS — N18.32 STAGE 3B CHRONIC KIDNEY DISEASE (H): ICD-10-CM

## 2022-08-01 DIAGNOSIS — E66.01 MORBID OBESITY (H): ICD-10-CM

## 2022-08-01 DIAGNOSIS — G47.33 SLEEP APNEA, OBSTRUCTIVE: ICD-10-CM

## 2022-08-01 DIAGNOSIS — Z12.5 SPECIAL SCREENING FOR MALIGNANT NEOPLASM OF PROSTATE: ICD-10-CM

## 2022-08-01 PROCEDURE — 90471 IMMUNIZATION ADMIN: CPT | Performed by: NURSE PRACTITIONER

## 2022-08-01 PROCEDURE — 90677 PCV20 VACCINE IM: CPT | Performed by: NURSE PRACTITIONER

## 2022-08-01 PROCEDURE — 90472 IMMUNIZATION ADMIN EACH ADD: CPT | Performed by: NURSE PRACTITIONER

## 2022-08-01 PROCEDURE — 99213 OFFICE O/P EST LOW 20 MIN: CPT | Mod: 25 | Performed by: NURSE PRACTITIONER

## 2022-08-01 PROCEDURE — 99396 PREV VISIT EST AGE 40-64: CPT | Mod: 25 | Performed by: NURSE PRACTITIONER

## 2022-08-01 PROCEDURE — 90750 HZV VACC RECOMBINANT IM: CPT | Performed by: NURSE PRACTITIONER

## 2022-08-01 PROCEDURE — 82043 UR ALBUMIN QUANTITATIVE: CPT | Performed by: NURSE PRACTITIONER

## 2022-08-01 RX ORDER — LISINOPRIL 40 MG/1
TABLET ORAL
Qty: 90 TABLET | Refills: 3 | Status: SHIPPED | OUTPATIENT
Start: 2022-08-01 | End: 2023-06-16

## 2022-08-01 SDOH — HEALTH STABILITY: PHYSICAL HEALTH: ON AVERAGE, HOW MANY DAYS PER WEEK DO YOU ENGAGE IN MODERATE TO STRENUOUS EXERCISE (LIKE A BRISK WALK)?: 0 DAYS

## 2022-08-01 SDOH — ECONOMIC STABILITY: FOOD INSECURITY: WITHIN THE PAST 12 MONTHS, YOU WORRIED THAT YOUR FOOD WOULD RUN OUT BEFORE YOU GOT MONEY TO BUY MORE.: NEVER TRUE

## 2022-08-01 SDOH — ECONOMIC STABILITY: INCOME INSECURITY: IN THE LAST 12 MONTHS, WAS THERE A TIME WHEN YOU WERE NOT ABLE TO PAY THE MORTGAGE OR RENT ON TIME?: NO

## 2022-08-01 SDOH — HEALTH STABILITY: PHYSICAL HEALTH: ON AVERAGE, HOW MANY MINUTES DO YOU ENGAGE IN EXERCISE AT THIS LEVEL?: 0 MIN

## 2022-08-01 SDOH — ECONOMIC STABILITY: INCOME INSECURITY: HOW HARD IS IT FOR YOU TO PAY FOR THE VERY BASICS LIKE FOOD, HOUSING, MEDICAL CARE, AND HEATING?: NOT HARD AT ALL

## 2022-08-01 SDOH — ECONOMIC STABILITY: TRANSPORTATION INSECURITY
IN THE PAST 12 MONTHS, HAS LACK OF TRANSPORTATION KEPT YOU FROM MEETINGS, WORK, OR FROM GETTING THINGS NEEDED FOR DAILY LIVING?: NO

## 2022-08-01 SDOH — ECONOMIC STABILITY: FOOD INSECURITY: WITHIN THE PAST 12 MONTHS, THE FOOD YOU BOUGHT JUST DIDN'T LAST AND YOU DIDN'T HAVE MONEY TO GET MORE.: NEVER TRUE

## 2022-08-01 SDOH — ECONOMIC STABILITY: TRANSPORTATION INSECURITY
IN THE PAST 12 MONTHS, HAS THE LACK OF TRANSPORTATION KEPT YOU FROM MEDICAL APPOINTMENTS OR FROM GETTING MEDICATIONS?: NO

## 2022-08-01 ASSESSMENT — SOCIAL DETERMINANTS OF HEALTH (SDOH)
HOW OFTEN DO YOU ATTEND CHURCH OR RELIGIOUS SERVICES?: NEVER
HOW OFTEN DO YOU GET TOGETHER WITH FRIENDS OR RELATIVES?: ONCE A WEEK
IN A TYPICAL WEEK, HOW MANY TIMES DO YOU TALK ON THE PHONE WITH FAMILY, FRIENDS, OR NEIGHBORS?: ONCE A WEEK
DO YOU BELONG TO ANY CLUBS OR ORGANIZATIONS SUCH AS CHURCH GROUPS UNIONS, FRATERNAL OR ATHLETIC GROUPS, OR SCHOOL GROUPS?: NO

## 2022-08-01 ASSESSMENT — LIFESTYLE VARIABLES
HOW MANY STANDARD DRINKS CONTAINING ALCOHOL DO YOU HAVE ON A TYPICAL DAY: 1 OR 2
HOW OFTEN DO YOU HAVE A DRINK CONTAINING ALCOHOL: MONTHLY OR LESS
SKIP TO QUESTIONS 9-10: 1
AUDIT-C TOTAL SCORE: 1
HOW OFTEN DO YOU HAVE SIX OR MORE DRINKS ON ONE OCCASION: NEVER

## 2022-08-01 ASSESSMENT — ENCOUNTER SYMPTOMS
WEAKNESS: 0
DYSURIA: 0
FREQUENCY: 0
HEMATURIA: 0
NERVOUS/ANXIOUS: 0
CHILLS: 0
NAUSEA: 0
DIZZINESS: 0
SHORTNESS OF BREATH: 1
DIARRHEA: 0
CONSTIPATION: 0
EYE PAIN: 1
HEMATOCHEZIA: 0
HEADACHES: 1
FEVER: 0
MYALGIAS: 1
SORE THROAT: 0
PALPITATIONS: 0
ABDOMINAL PAIN: 0
COUGH: 0
PARESTHESIAS: 0
JOINT SWELLING: 0
HEARTBURN: 0
ARTHRALGIAS: 1

## 2022-08-01 ASSESSMENT — PAIN SCALES - GENERAL: PAINLEVEL: NO PAIN (0)

## 2022-08-01 ASSESSMENT — PATIENT HEALTH QUESTIONNAIRE - PHQ9
10. IF YOU CHECKED OFF ANY PROBLEMS, HOW DIFFICULT HAVE THESE PROBLEMS MADE IT FOR YOU TO DO YOUR WORK, TAKE CARE OF THINGS AT HOME, OR GET ALONG WITH OTHER PEOPLE: NOT DIFFICULT AT ALL
SUM OF ALL RESPONSES TO PHQ QUESTIONS 1-9: 6
SUM OF ALL RESPONSES TO PHQ QUESTIONS 1-9: 6

## 2022-08-01 NOTE — PROGRESS NOTES
SUBJECTIVE:   CC: Avi Bhatti is an 62 year old male who presents for preventative health visit.     Patient has been advised of split billing requirements and indicates understanding: Yes     Healthy Habits:     Getting at least 3 servings of Calcium per day:  Yes    Bi-annual eye exam:  NO    Dental care twice a year:  NO    Sleep apnea or symptoms of sleep apnea:  Sleep apnea    Diet:  Regular (no restrictions)    Frequency of exercise:  None    Taking medications regularly:  Yes    Medication side effects:  Other    PHQ-2 Total Score: 2    Additional concerns today:  Yes    History of ankylosing spondylitis in the setting of crohn's disease, follows with rheumatology. Also follows with GI, mainly for prescriptive management of GERD.     Chronic kidney disease, follows with nephrology.     Admits to dyspnea on exertion but denies chest pain. Has gained weight over the past 2 years which he attributes to his sedentary job and lifestyle habits.    Today's PHQ-2 Score:   PHQ-2 ( 1999 [employer]) 4/20/2021   Q1: Little interest or pleasure in doing things 2   Q2: Feeling down, depressed or hopeless 0   PHQ-2 Score 2   PHQ-2 Total Score (12-17 Years)- Positive if 3 or more points; Administer PHQ-A if positive 2   Q1: Little interest or pleasure in doing things More than half the days   Q2: Feeling down, depressed or hopeless Not at all   PHQ-2 Score 2     PHQ 1/22/2021 8/1/2022   PHQ-9 Total Score 12 6   Q9: Thoughts of better off dead/self-harm past 2 weeks Not at all Not at all       Social History     Tobacco Use     Smoking status: Never Smoker     Smokeless tobacco: Never Used   Substance Use Topics     Alcohol use: Yes     Comment: seldom         Alcohol Use 5/17/2018   Prescreen: >3 drinks/day or >7 drinks/week? No   Prescreen: >3 drinks/day or >7 drinks/week? -       Last PSA:   PSA   Date Value Ref Range Status   05/17/2018 0.88 0 - 4 ug/L Final     Comment:     Assay Method:  Chemiluminescence using Siemens  Vista analyzer       Reviewed orders with patient. Reviewed health maintenance and updated orders accordingly - Yes  BP Readings from Last 3 Encounters:   08/01/22 110/70   04/20/21 118/74   02/05/21 (!) 145/83    Wt Readings from Last 3 Encounters:   08/01/22 112 kg (246 lb 14.4 oz)   04/20/21 112.5 kg (248 lb)   02/02/21 110.2 kg (243 lb)           Reviewed and updated as needed this visit by clinical staff                  Reviewed and updated as needed this visit by Provider                   Patient Active Problem List   Diagnosis     Sleep apnea, obstructive     Ankylosing spondylitis (H)     Mixed hyperlipidemia     BMI 30-35     Essential hypertension, benign     Crohn's disease (H)     Adenomatous colon polyp     Cervicalgia     Intractable migraine without aura and without status migrainosus     Tension headache     Obesity (BMI 35.0-39.9) with comorbidity (H)     Muscle spasm     Degenerative disc disease, cervical     CKD (chronic kidney disease) stage 3, GFR 30-59 ml/min (H)     S/P total hip arthroplasty     S/P lumbar fusion     Past Medical History:   Diagnosis Date     Ankylosing spondylitis (H)      Arthritis      Crohn's colitis (H)      Gastroesophageal reflux disease      Hypertension      Migraine      Other chronic pain     headache and neck pain, receives botox injections Q 12 weeks     Sleep apnea     cpap     Past Surgical History:   Procedure Laterality Date     ARTHROPLASTY HIP Left 11/19/2019    Procedure: Left total hip arthroplasty;  Surgeon: Allen Coats MD;  Location:  OR     BOTOX CHRONIC MIGRAINE HEAD ACHES      for chronic headache and neck pain, injections every 12 weeks     COLONOSCOPY       EXTRACTION(S) DENTAL  12/18/2012    Procedure: EXTRACTION(S) DENTAL;  Extraction of Teeth 30, 19;  Surgeon: Sujey Cunningham DDS;  Location:  OR     OPTICAL TRACKING SYSTEM FUSION POSTERIOR SPINE LUMBAR N/A 2/2/2021    Procedure: Lumbar 3-4 posterior lumbar instrumented fusion  with interbody cage;  Surgeon: Jakub Velasquez MD;  Location: [facility] OR     repair fracture & insert pins left hand  1996     SMALL BOWEL RESECTION  1993     Family History   Problem Relation Age of Onset     Hypertension Father      Alcoholism Father      Other - See Comments Father      Coronary Artery Disease Father      Skin Cancer Father      Kidney failure Father      Social History     Socioeconomic History     Marital status:      Spouse name: Not on file     Number of children: Not on file     Years of education: Not on file     Highest education level: Not on file   Occupational History     Not on file   Tobacco Use     Smoking status: Never Smoker     Smokeless tobacco: Never Used   Vaping Use     Vaping Use: Never used   Substance and Sexual Activity     Alcohol use: Yes     Comment: seldom     Drug use: No     Sexual activity: Not Currently     Partners: Female   Other Topics Concern     Parent/sibling w/ CABG, MI or angioplasty before 65F 55M? No   Social History Narrative     Not on file     Social Determinants of Health     Financial Resource Strain: Low Risk      Difficulty of Paying Living Expenses: Not hard at all   Food Insecurity: No Food Insecurity     Worried About Running Out of Food in the Last Year: Never true     Ran Out of Food in the Last Year: Never true   Transportation Needs: No Transportation Needs     Lack of Transportation (Medical): No     Lack of Transportation (Non-Medical): No   Physical Activity: Inactive     Days of Exercise per Week: 0 days     Minutes of Exercise per Session: 0 min   Stress: No Stress Concern Present     Feeling of Stress : Only a little   Social Connections: Socially Isolated     Frequency of Communication with Friends and Family: Once a week     Frequency of Social Gatherings with Friends and Family: Once a week     Attends [employer] Services: Never     Active Member of Clubs or Organizations: No     Attends Club or Organization Meetings: Not on file      Marital Status:    Intimate Partner Violence: Not on file   Housing Stability: Low Risk      Unable to Pay for Housing in the Last Year: No     Number of Places Lived in the Last Year: 1     Unstable Housing in the Last Year: No     Current Outpatient Medications   Medication     lisinopril (ZESTRIL) 40 MG tablet     calcium carbonate-vitamin D (CALCIUM 600+D) 600-200 MG-UNIT TABS     DULoxetine (CYMBALTA) 60 MG EC capsule     gabapentin (NEURONTIN) 300 MG capsule     HUMIRA PEN 40 MG/0.8ML pen kit     ketotifen (ZADITOR) 0.025 % ophthalmic solution     metoprolol succinate ER (TOPROL-XL) 100 MG 24 hr tablet     misoprostol (CYTOTEC) 200 MCG tablet     multivitamin, therapeutic with minerals (MULTI-VITAMIN) TABS     pantoprazole (PROTONIX) 40 MG EC tablet     senna-docusate (SENOKOT-S/PERICOLACE) 8.6-50 MG tablet     TIZANIDINE HCL PO     Vitamin D3 (CHOLECALCIFEROL) 25 mcg (1000 units) tablet     No current facility-administered medications for this visit.        Allergies   Allergen Reactions     Prednisone Other (See Comments)     Elevated heart rate, has had without problems more recently     Reglan [Metoclopramide] Other (See Comments)     agitation     Unknown [No Clinical Screening - See Comments]      benny have not worked in past         Review of Systems   Constitutional: Negative for chills and fever.   HENT: Positive for hearing loss. Negative for congestion, ear pain and sore throat.    Eyes: Positive for pain and visual disturbance.   Respiratory: Positive for shortness of breath. Negative for cough.    Cardiovascular: Negative for chest pain, palpitations and peripheral edema.   Gastrointestinal: Negative for abdominal pain, constipation, diarrhea, heartburn, hematochezia and nausea.   Genitourinary: Negative for dysuria, frequency, genital sores, hematuria, impotence and urgency.   Musculoskeletal: Positive for arthralgias and myalgias. Negative for joint swelling.   Skin: Negative for rash.  "  Neurological: Positive for headaches. Negative for dizziness, weakness and paresthesias.   Psychiatric/Behavioral: Positive for mood changes. The patient is not nervous/anxious.        OBJECTIVE:   /70 (BP Location: Right arm, Patient Position: Sitting, Cuff Size: Adult Regular)   Pulse 117   Temp 98.6  F (37  C) (Tympanic)   Resp 18   Ht 1.745 m (5' 8.7\")   Wt 112 kg (246 lb 14.4 oz)   SpO2 100%   BMI 36.78 kg/m      Physical Exam  Constitutional: appears to be in no acute distress, comfortable, pleasant.   Eyes: anicteric, conjunctiva clear without drainage or erythema. ROHAN.   Ears, Nose and Throat: tympanic membranes gray with LR,  nose without nasal discharge. OP: no erythema to posterior pharynx, negative post nasal drainage, tonsils +1 no erythema or exudate.  Neck: supple, thyroid palpable,not enlarged, no nodules   Cardiovascular: regular rate and rhythm, normal S1 and S2, no murmurs, rubs or gallops, peripheral pulses full and symmetric; negative peripheral edema   Respiratory: Air entry throughout. Breathing pattern unlabored without the use of accessory muscles. Clear to auscultation A and P, no wheezes or crackles, normal breath sounds.    Gastrointestinal: rounded, soft. Positive bowel sounds x4, nontender, no masses.   Musculoskeletal: full range of motion, no edema.   Skin: pink, turgor smooth and elastic. Negative for lesions or dryness.  Neurological: normal gait, no tremor.   Psychological: appropriate mood and affect.   Lymphatic: no cervical, axillary, supraclavicular, or infraclavicular lymphadenopathy.    Diagnostic Test Results:  Labs reviewed in Epic    ASSESSMENT/PLAN:   (Z00.00) Routine general medical examination at a health care facility  (primary encounter diagnosis)  Age appropriate screening and preventative care have been addressed today. Vaccinations have been updated. Patient has been advised to follow a balanced diet with reduction in cholesterol, and reasonable " portion sizes. They have been advised to undertake routine aerobic activity and they were counseled on healthy weight maintenance. Recommend annual vision exams as well as biannual dental exams. They will follow up for annual physical again in one year.     (Z12.5) Special screening for malignant neoplasm of prostate  Prostate cancer screening guidelines were also discussed and after an informed decision making conversation the patient has elected to perform PSA testing at this time.  - PSA, screen          (Z12.11) Screen for colon cancer  (D12.6) Adenomatous polyp of colon, unspecified part of colon  Colonoscopy is reportedly up to date at this time. Requires screening every 5 years, reports his last colonoscopy was 1-2 years ago at [facility], reportedly normal results.     (I10) Essential hypertension, benign  Well controlled on lisinopril and metoprolol succinate. Recheck renal function, lytes, and microalbumin.   BP Readings from Last 3 Encounters:   08/01/22 110/70   04/20/21 118/74   02/05/21 (!) 145/83   - Albumin Random Urine Quantitative with Creat Ratio  - Comprehensive metabolic panel (BMP + Alb, Alk Phos, ALT, AST, Total. Bili, TP),   - lisinopril (ZESTRIL) 40 MG tablet  - OFFICE/OUTPT VISIT,EST,LEVL III    (N18.32) Stage 3b chronic kidney disease (H)  Scr 1.33-1.88.    (E78.5) Hyperlipidemia, unspecified hyperlipidemia type  Not fasting today, will return for fasting labs.  - Lipid panel reflex to direct LDL Fasting          (E66.01) Obesity (BMI 35.0-39.9) with comorbidity (H)  Body mass index is 36.78 kg/m . Reviewed healthy lifestyle - diet and exercise. Encouraged to make small changes initially with goal for 5-10% loss in body weight.     (G47.33) Sleep apnea, obstructive  On Bipap.     (K50.00) Crohn's disease of small intestine without complication (H)  On immunosuppressive therapy. Continue to follow with rheumatology and GI.   - PNEUMOCOCCAL 20 VALENT CONJUGATE (PREVNAR 20)         (M45.9)  "Ankylosing spondylitis, unspecified site of spine (H)  Managed on cymbalta, gabapentin, tizanidine. Continue to follow with rheumatology and neurology.     (Z23) Encounter for administration of vaccine  - ZOSTER VACCINE RECOMBINANT ADJUVANTED (SHINGRIX)  - PNEUMOCOCCAL 20 VALENT CONJUGATE (PREVNAR 20)           Follow-up:  - Return for fasting labs.       COUNSELING:   Reviewed preventive health counseling, as reflected in patient instructions  Special attention given to:        Regular exercise       Healthy diet/nutrition       Vision screening       Immunizations    Vaccinated for: Pneumococcal and Zoster         Colorectal cancer screening       Prostate cancer screening       One time pneumovax for smokers    Estimated body mass index is 37.71 kg/m  as calculated from the following:    Height as of 4/20/21: 1.727 m (5' 8\").    Weight as of 4/20/21: 112.5 kg (248 lb).     Weight management plan: Discussed healthy diet and exercise guidelines    He reports that he has never smoked. He has never used smokeless tobacco.      Counseling Resources:  ATP IV Guidelines  Pooled Cohorts Equation Calculator  FRAX Risk Assessment  ICSI Preventive Guidelines  Dietary Guidelines for Americans, 2010  USDA's MyPlate  ASA Prophylaxis  Lung CA Screening    TONYA Nguyen CNP  [facility]        "

## 2022-08-02 LAB
CREAT UR-MCNC: 124 MG/DL
MICROALBUMIN UR-MCNC: 8 MG/L
MICROALBUMIN/CREAT UR: 6.45 MG/G CR (ref 0–17)

## 2022-08-15 ENCOUNTER — TRANSFERRED RECORDS (OUTPATIENT)
Dept: PEDIATRICS | Facility: CLINIC | Age: 62
End: 2022-08-15

## 2022-08-17 ENCOUNTER — LAB (OUTPATIENT)
Dept: LAB | Facility: CLINIC | Age: 62
End: 2022-08-17
Payer: COMMERCIAL

## 2022-08-17 DIAGNOSIS — E78.5 HYPERLIPIDEMIA, UNSPECIFIED HYPERLIPIDEMIA TYPE: ICD-10-CM

## 2022-08-17 DIAGNOSIS — I10 ESSENTIAL HYPERTENSION, BENIGN: ICD-10-CM

## 2022-08-17 DIAGNOSIS — Z12.5 SPECIAL SCREENING FOR MALIGNANT NEOPLASM OF PROSTATE: ICD-10-CM

## 2022-08-17 LAB
ALBUMIN SERPL-MCNC: 3.5 G/DL (ref 3.4–5)
ALP SERPL-CCNC: 87 U/L (ref 40–150)
ALT SERPL W P-5'-P-CCNC: 21 U/L (ref 0–70)
ANION GAP SERPL CALCULATED.3IONS-SCNC: 8 MMOL/L (ref 3–14)
AST SERPL W P-5'-P-CCNC: 15 U/L (ref 0–45)
BILIRUB SERPL-MCNC: 0.4 MG/DL (ref 0.2–1.3)
BUN SERPL-MCNC: 22 MG/DL (ref 7–30)
CALCIUM SERPL-MCNC: 9 MG/DL (ref 8.5–10.1)
CHLORIDE BLD-SCNC: 103 MMOL/L (ref 94–109)
CHOLEST SERPL-MCNC: 206 MG/DL
CO2 SERPL-SCNC: 21 MMOL/L (ref 20–32)
CREAT SERPL-MCNC: 1.85 MG/DL (ref 0.66–1.25)
FASTING STATUS PATIENT QL REPORTED: YES
GFR SERPL CREATININE-BSD FRML MDRD: 40 ML/MIN/1.73M2
GLUCOSE BLD-MCNC: 114 MG/DL (ref 70–99)
HDLC SERPL-MCNC: 39 MG/DL
LDLC SERPL CALC-MCNC: 89 MG/DL
NONHDLC SERPL-MCNC: 167 MG/DL
POTASSIUM BLD-SCNC: 4.2 MMOL/L (ref 3.4–5.3)
PROT SERPL-MCNC: 7.5 G/DL (ref 6.8–8.8)
PSA SERPL-MCNC: 1.26 UG/L (ref 0–4)
SODIUM SERPL-SCNC: 132 MMOL/L (ref 133–144)
TRIGL SERPL-MCNC: 392 MG/DL

## 2022-08-17 PROCEDURE — 80053 COMPREHEN METABOLIC PANEL: CPT

## 2022-08-17 PROCEDURE — G0103 PSA SCREENING: HCPCS

## 2022-08-17 PROCEDURE — 36415 COLL VENOUS BLD VENIPUNCTURE: CPT

## 2022-08-17 PROCEDURE — 80061 LIPID PANEL: CPT

## 2022-09-13 ENCOUNTER — TRANSFERRED RECORDS (OUTPATIENT)
Dept: HEALTH INFORMATION MANAGEMENT | Facility: CLINIC | Age: 62
End: 2022-09-13

## 2022-10-16 ENCOUNTER — HEALTH MAINTENANCE LETTER (OUTPATIENT)
Age: 62
End: 2022-10-16

## 2023-02-09 ENCOUNTER — ANCILLARY PROCEDURE (OUTPATIENT)
Dept: GENERAL RADIOLOGY | Facility: CLINIC | Age: 63
End: 2023-02-09
Attending: FAMILY MEDICINE
Payer: COMMERCIAL

## 2023-02-09 ENCOUNTER — OFFICE VISIT (OUTPATIENT)
Dept: URGENT CARE | Facility: URGENT CARE | Age: 63
End: 2023-02-09
Payer: COMMERCIAL

## 2023-02-09 VITALS
TEMPERATURE: 98 F | RESPIRATION RATE: 18 BRPM | SYSTOLIC BLOOD PRESSURE: 156 MMHG | HEART RATE: 80 BPM | OXYGEN SATURATION: 98 % | DIASTOLIC BLOOD PRESSURE: 94 MMHG

## 2023-02-09 DIAGNOSIS — M79.672 LEFT FOOT PAIN: ICD-10-CM

## 2023-02-09 DIAGNOSIS — M79.672 LEFT FOOT PAIN: Primary | ICD-10-CM

## 2023-02-09 LAB — URATE SERPL-MCNC: 8.5 MG/DL (ref 3.4–7)

## 2023-02-09 PROCEDURE — 36415 COLL VENOUS BLD VENIPUNCTURE: CPT | Performed by: FAMILY MEDICINE

## 2023-02-09 PROCEDURE — 73630 X-RAY EXAM OF FOOT: CPT | Mod: TC | Performed by: RADIOLOGY

## 2023-02-09 PROCEDURE — 99214 OFFICE O/P EST MOD 30 MIN: CPT | Performed by: FAMILY MEDICINE

## 2023-02-09 PROCEDURE — 84550 ASSAY OF BLOOD/URIC ACID: CPT | Performed by: FAMILY MEDICINE

## 2023-02-09 RX ORDER — PREDNISONE 20 MG/1
40 TABLET ORAL DAILY
Qty: 10 TABLET | Refills: 0 | Status: SHIPPED | OUTPATIENT
Start: 2023-02-09 | End: 2023-02-14

## 2023-02-09 ASSESSMENT — PAIN SCALES - GENERAL: PAINLEVEL: EXTREME PAIN (8)

## 2023-02-09 NOTE — PROGRESS NOTES
SUBJECTIVE:  Chief Complaint   Patient presents with     Musculoskeletal Problem     L foot pain no injury X3 wks     Avi Bhatti is a 62 year old male who presents with a chief complaint of left foot pain X 3 weeks.    No specific injury.  Endorsed pain at big toe joint.  Not suppose to take NSAIDs due to CKD.      Worse when he bumps his foot.  Will feel better if he hangs the foot over the bed.    Denies any prior gout history, states that grandfather had gout     Past Medical History:   Diagnosis Date     Ankylosing spondylitis (H)      Arthritis      Crohn's colitis (H)      Gastroesophageal reflux disease      Hypertension      Migraine      Other chronic pain     headache and neck pain, receives botox injections Q 12 weeks     Sleep apnea     cpap     Current Outpatient Medications   Medication Sig Dispense Refill     calcium carbonate-vitamin D (CALCIUM 600+D) 600-200 MG-UNIT TABS Take by mouth 2 times daily        DULoxetine (CYMBALTA) 60 MG EC capsule 60 mg daily   0     gabapentin (NEURONTIN) 300 MG capsule Take 900 mg by mouth At Bedtime   1     HUMIRA PEN 40 MG/0.8ML pen kit Inject 40 mg Subcutaneous every 14 days   0     ketotifen (ZADITOR) 0.025 % ophthalmic solution Place 1 drop into both eyes 2 times daily 10 mL 3     lisinopril (ZESTRIL) 40 MG tablet TAKE ONE TABLET BY MOUTH NIGHTLY 90 tablet 3     metoprolol succinate ER (TOPROL-XL) 100 MG 24 hr tablet Take 1 tablet (100 mg) by mouth daily 90 tablet 3     misoprostol (CYTOTEC) 200 MCG tablet Take 200 mcg by mouth 2 times daily       multivitamin w/minerals (THERA-VIT-M) tablet Take 1 tablet by mouth daily.       pantoprazole (PROTONIX) 40 MG EC tablet Take 40 mg by mouth daily       senna-docusate (SENOKOT-S/PERICOLACE) 8.6-50 MG tablet Take 2 tablets by mouth 2 times daily 60 tablet 1     TIZANIDINE HCL PO Take 16 mg by mouth At Bedtime        Vitamin D3 (CHOLECALCIFEROL) 25 mcg (1000 units) tablet Take 1 tablet (25 mcg) by mouth 2 times  daily 180 tablet 0     Social History     Tobacco Use     Smoking status: Never     Smokeless tobacco: Never   Substance Use Topics     Alcohol use: Yes     Comment: seldom       ROS:  Review of systems negative except as stated above.    EXAM:   BP (!) 156/94   Pulse 80   Temp 98  F (36.7  C) (Tympanic)   Resp 18   SpO2 98%   M/S Exam:left foot - big toe with mild swelling and faint pink with tenderness at joint  GENERAL APPEARANCE: healthy, alert and no distress  EXTREMITIES: peripheral pulses normal  PSYCH:alert, affect bright    X-RAY was done - left foot - no acute fracture personally viewed by me      ASSESSMENT/PLAN:  (M79.672) Left foot pain  (primary encounter diagnosis)  Plan: XR Foot Left G/E 3 Views, Uric acid, predniSONE        (DELTASONE) 20 MG tablet            Location of pain at big toe is suspicious for gout.  Patient is unable to take NSAIDs due to CKD, will given RX prednisone burst for treatment.  Uric acid obtained, reviewed that if elevated level, this will confirm gout.      Follow up with primary provider in 1-2 weeks    Keagan Ni MD  February 9, 2023 1:10 PM

## 2023-02-10 ENCOUNTER — TELEPHONE (OUTPATIENT)
Dept: URGENT CARE | Facility: URGENT CARE | Age: 63
End: 2023-02-10
Payer: COMMERCIAL

## 2023-02-10 NOTE — TELEPHONE ENCOUNTER
SUBJECTIVE:  The February 9, 2023, Uric Acid level is elevated at 8.5 mg/dL (normal values are 3.4-7 mg/dL).      PLAN:  Please notify patient of this lab result.  Patient's foot pain is most likely due to gout in light of the elevated uric acid level.  He should continue the Prednisone.  follow up with the primary care provider in 1-2 weeks.      Bandar Ellis MD

## 2023-02-10 NOTE — NURSING NOTE
Pt was called and notified of elevated Uric Acid results.  Pt is was advised to continue prednisone as ordered and to follow up with PCP in 1-2 weeks.  Pt will call clinic with any questions or concerns.  Sonya John MA

## 2023-02-11 NOTE — TELEPHONE ENCOUNTER
Contact pt but he was unavailable. Will send a mychart msg to pt.     JADA Garay, [facility] February 10, 2023 7:06 PM

## 2023-02-21 ENCOUNTER — OFFICE VISIT (OUTPATIENT)
Dept: PEDIATRICS | Facility: CLINIC | Age: 63
End: 2023-02-21
Payer: COMMERCIAL

## 2023-02-21 VITALS
TEMPERATURE: 97.9 F | HEART RATE: 82 BPM | BODY MASS INDEX: 36.64 KG/M2 | RESPIRATION RATE: 20 BRPM | OXYGEN SATURATION: 96 % | WEIGHT: 246 LBS | SYSTOLIC BLOOD PRESSURE: 128 MMHG | DIASTOLIC BLOOD PRESSURE: 98 MMHG

## 2023-02-21 DIAGNOSIS — N18.30 STAGE 3 CHRONIC KIDNEY DISEASE, UNSPECIFIED WHETHER STAGE 3A OR 3B CKD (H): ICD-10-CM

## 2023-02-21 DIAGNOSIS — M10.9 PODAGRA: Primary | ICD-10-CM

## 2023-02-21 PROCEDURE — 99213 OFFICE O/P EST LOW 20 MIN: CPT | Performed by: PHYSICIAN ASSISTANT

## 2023-02-21 RX ORDER — PREDNISONE 10 MG/1
TABLET ORAL
Qty: 18 TABLET | Refills: 0 | Status: SHIPPED | OUTPATIENT
Start: 2023-02-21 | End: 2023-03-17

## 2023-02-21 ASSESSMENT — PAIN SCALES - GENERAL: PAINLEVEL: MODERATE PAIN (4)

## 2023-02-21 NOTE — PROGRESS NOTES
Assessment & Plan     Podagra  RICE and restart prednisone taper. Patient in agreement with plan.  - predniSONE (DELTASONE) 10 MG tablet; Take 3 tabs daily x 3 days, 2 tabs daily x 3 days, 1 tab daily x 3 days    CKD (chronic kidney disease) stage 3, GFR 30-59 ml/min (H)      TRISTIN [facility] LILIAN[facility] King is a 63 year old, presenting for the following health issues:  Arthritis      HPI   Pain History:  When did you first notice your pain? - Acute Pain   Have you seen anyone else for your pain? Yes - 02/09/2023  Where in your body do you have pain? Musculoskeletal problem/pain  Onset/Duration: for about 1 month    Uric acid increased  Description  Location: left joint leading up to the toe - left 1st MTP joint  Joint Swelling: YES  Redness: YES- mild  Pain: YES  Warmth: No  Intensity:  3-4/10  Progression of Symptoms:  Better but still hurts   Accompanying signs and symptoms:   Fevers: No  Numbness/tingling/weakness: YES  History  Trauma to the area: No  Recent illness:  No  Previous similar problem: YES  Previous evaluation:  YES- was seen at  on 02/09/23   Precipitating or alleviating factors:  Aggravating factors include: weight on foot or touch will aggravate it   Therapies tried and outcome: prednisone 40 mg daily- pain is reduced but still painful    Review of Systems   Constitutional, HEENT, cardiovascular, pulmonary, gi and gu systems are negative, except as otherwise noted.      Objective    BP (!) 128/98   Pulse 82   Temp 97.9  F (36.6  C) (Temporal)   Resp 20   Wt 111.6 kg (246 lb)   SpO2 96%   BMI 36.64 kg/m    Body mass index is 36.64 kg/m .  Physical Exam   GENERAL: healthy, alert and no distress  Left 1st MTP with localized erythema, edema. Mild warmth. Very tender.     No results found for any visits on 02/21/23.

## 2023-02-27 ENCOUNTER — TRANSFERRED RECORDS (OUTPATIENT)
Dept: HEALTH INFORMATION MANAGEMENT | Facility: CLINIC | Age: 63
End: 2023-02-27
Payer: COMMERCIAL

## 2023-03-17 ENCOUNTER — MYC MEDICAL ADVICE (OUTPATIENT)
Dept: PEDIATRICS | Facility: CLINIC | Age: 63
End: 2023-03-17
Payer: COMMERCIAL

## 2023-03-17 DIAGNOSIS — M10.9 PODAGRA: ICD-10-CM

## 2023-03-17 RX ORDER — PREDNISONE 10 MG/1
TABLET ORAL
Qty: 18 TABLET | Refills: 0 | Status: SHIPPED | OUTPATIENT
Start: 2023-03-17 | End: 2023-10-02

## 2023-03-17 NOTE — TELEPHONE ENCOUNTER
Routing to [facility] for advise.     KRISTIN Stuart  Patient Advocate Liason (PAL)  [facility]  Ph. [phone] / Fax. [phone]

## 2023-03-17 NOTE — TELEPHONE ENCOUNTER
Call placed to pt,     - Left a message to call back to CAITIE RN.     - Sent pt a [facility] message notifying him that a prescription was sent to the pharmacy for prednisone.     KRISTIN Khalil (Patient Advocate Liaison)  [facility]

## 2023-03-17 NOTE — TELEPHONE ENCOUNTER
Ok. I'm glad he contacted us as soon as symptoms began. This should give him relief sooner.   Begin oral prednisone taper once again. If he continues to have flares, we can discuss a prophylactic treatment with his PMD.   Cleopatra Hdz PA-C

## 2023-05-10 ENCOUNTER — TRANSFERRED RECORDS (OUTPATIENT)
Dept: HEALTH INFORMATION MANAGEMENT | Facility: CLINIC | Age: 63
End: 2023-05-10
Payer: COMMERCIAL

## 2023-05-10 LAB
ALT SERPL-CCNC: 26 IU/L (ref 5–40)
AST SERPL-CCNC: 25 U/L (ref 5–34)
CREATININE (EXTERNAL): 1.55 MG/DL (ref 0.5–1.3)
GFR ESTIMATED (EXTERNAL): 48.2 ML/MIN/1.73M2

## 2023-06-05 ENCOUNTER — TRANSFERRED RECORDS (OUTPATIENT)
Dept: HEALTH INFORMATION MANAGEMENT | Facility: CLINIC | Age: 63
End: 2023-06-05
Payer: COMMERCIAL

## 2023-06-13 DIAGNOSIS — I10 ESSENTIAL HYPERTENSION, BENIGN: ICD-10-CM

## 2023-06-13 NOTE — TELEPHONE ENCOUNTER
Pt is requesting a refill.  He has used up the script written 8/1/22. He filled 90 days on 8/1/22, 10/28/22, 1/4/2023, 3/31/23.    Thank you,  Shanon Winn, [facility]

## 2023-06-15 NOTE — TELEPHONE ENCOUNTER
Routing refill request to provider for review/approval because:  Labs out of range:  Cr  BP elevated  Creatinine   Date Value Ref Range Status   08/17/2022 1.85 (H) 0.66 - 1.25 mg/dL Final   04/20/2021 1.68 (H) 0.66 - 1.25 mg/dL Final     BP Readings from Last 3 Encounters:   02/21/23 (!) 128/98   02/09/23 (!) 156/94   08/01/22 110/70     Eulalia Garcia RN, BSN  [facility]

## 2023-06-16 RX ORDER — LISINOPRIL 40 MG/1
TABLET ORAL
Qty: 90 TABLET | Refills: 0 | Status: SHIPPED | OUTPATIENT
Start: 2023-06-16 | End: 2023-08-30

## 2023-06-16 NOTE — TELEPHONE ENCOUNTER
Due for wellness w/ HB or partner at the end of the summer.     REHAN Haynes MD  Internal Medicine-Pediatrics

## 2023-07-12 ENCOUNTER — TRANSFERRED RECORDS (OUTPATIENT)
Dept: HEALTH INFORMATION MANAGEMENT | Facility: CLINIC | Age: 63
End: 2023-07-12
Payer: COMMERCIAL

## 2023-07-12 LAB
ALT SERPL-CCNC: 21 IU/L (ref 5–40)
AST SERPL-CCNC: 22 U/L (ref 5–34)
CREATININE (EXTERNAL): 1.64 MG/DL (ref 0.5–1.3)
GFR ESTIMATED (EXTERNAL): 45.2 ML/MIN/1.73M2

## 2023-08-30 DIAGNOSIS — I10 ESSENTIAL HYPERTENSION, BENIGN: ICD-10-CM

## 2023-09-01 RX ORDER — LISINOPRIL 40 MG/1
TABLET ORAL
Qty: 30 TABLET | Refills: 0 | Status: ON HOLD | OUTPATIENT
Start: 2023-09-01 | End: 2023-10-29

## 2023-09-01 NOTE — TELEPHONE ENCOUNTER
Routing refill request to provider for review/approval because:  Anaya given x1 and patient did not follow up, please advise  Pt CANCELED 8/28/23 APPOINTMENT  Cheryl Mccartney RN, BSN  [facility]

## 2023-09-05 NOTE — TELEPHONE ENCOUNTER
Called patient and scheduled appointment.     Closing encounter at this time.     Nicole Stout, CMA

## 2023-10-02 ENCOUNTER — OFFICE VISIT (OUTPATIENT)
Dept: PEDIATRICS | Facility: CLINIC | Age: 63
End: 2023-10-02
Payer: COMMERCIAL

## 2023-10-02 VITALS
RESPIRATION RATE: 16 BRPM | OXYGEN SATURATION: 98 % | WEIGHT: 254.1 LBS | DIASTOLIC BLOOD PRESSURE: 108 MMHG | BODY MASS INDEX: 37.64 KG/M2 | TEMPERATURE: 98.5 F | SYSTOLIC BLOOD PRESSURE: 148 MMHG | HEART RATE: 82 BPM | HEIGHT: 69 IN

## 2023-10-02 DIAGNOSIS — Z12.5 SCREENING PSA (PROSTATE SPECIFIC ANTIGEN): ICD-10-CM

## 2023-10-02 DIAGNOSIS — G47.33 SLEEP APNEA, OBSTRUCTIVE: ICD-10-CM

## 2023-10-02 DIAGNOSIS — D12.6 ADENOMATOUS POLYP OF COLON, UNSPECIFIED PART OF COLON: ICD-10-CM

## 2023-10-02 DIAGNOSIS — I10 ESSENTIAL HYPERTENSION, BENIGN: ICD-10-CM

## 2023-10-02 DIAGNOSIS — I49.9 CARDIAC ARRHYTHMIA, UNSPECIFIED CARDIAC ARRHYTHMIA TYPE: ICD-10-CM

## 2023-10-02 DIAGNOSIS — Z23 NEED FOR VACCINATION: ICD-10-CM

## 2023-10-02 DIAGNOSIS — N18.30 STAGE 3 CHRONIC KIDNEY DISEASE, UNSPECIFIED WHETHER STAGE 3A OR 3B CKD (H): ICD-10-CM

## 2023-10-02 DIAGNOSIS — K50.00 CROHN'S DISEASE OF SMALL INTESTINE WITHOUT COMPLICATION (H): ICD-10-CM

## 2023-10-02 DIAGNOSIS — Z00.00 ROUTINE GENERAL MEDICAL EXAMINATION AT A HEALTH CARE FACILITY: Primary | ICD-10-CM

## 2023-10-02 DIAGNOSIS — E66.01 MORBID OBESITY (H): ICD-10-CM

## 2023-10-02 DIAGNOSIS — E78.5 HYPERLIPIDEMIA, UNSPECIFIED HYPERLIPIDEMIA TYPE: ICD-10-CM

## 2023-10-02 DIAGNOSIS — M45.9 ANKYLOSING SPONDYLITIS, UNSPECIFIED SITE OF SPINE (H): ICD-10-CM

## 2023-10-02 LAB — HGB BLD-MCNC: 15.1 G/DL (ref 13.3–17.7)

## 2023-10-02 PROCEDURE — 90472 IMMUNIZATION ADMIN EACH ADD: CPT | Performed by: NURSE PRACTITIONER

## 2023-10-02 PROCEDURE — 80061 LIPID PANEL: CPT | Mod: 59 | Performed by: NURSE PRACTITIONER

## 2023-10-02 PROCEDURE — 36415 COLL VENOUS BLD VENIPUNCTURE: CPT | Performed by: NURSE PRACTITIONER

## 2023-10-02 PROCEDURE — 99214 OFFICE O/P EST MOD 30 MIN: CPT | Mod: 25 | Performed by: NURSE PRACTITIONER

## 2023-10-02 PROCEDURE — 93000 ELECTROCARDIOGRAM COMPLETE: CPT | Performed by: NURSE PRACTITIONER

## 2023-10-02 PROCEDURE — 80053 COMPREHEN METABOLIC PANEL: CPT | Performed by: NURSE PRACTITIONER

## 2023-10-02 PROCEDURE — 99396 PREV VISIT EST AGE 40-64: CPT | Mod: 25 | Performed by: NURSE PRACTITIONER

## 2023-10-02 PROCEDURE — 90471 IMMUNIZATION ADMIN: CPT | Performed by: NURSE PRACTITIONER

## 2023-10-02 PROCEDURE — 82043 UR ALBUMIN QUANTITATIVE: CPT | Performed by: NURSE PRACTITIONER

## 2023-10-02 PROCEDURE — 85018 HEMOGLOBIN: CPT | Performed by: NURSE PRACTITIONER

## 2023-10-02 PROCEDURE — 90750 HZV VACC RECOMBINANT IM: CPT | Performed by: NURSE PRACTITIONER

## 2023-10-02 PROCEDURE — 83721 ASSAY OF BLOOD LIPOPROTEIN: CPT | Performed by: NURSE PRACTITIONER

## 2023-10-02 PROCEDURE — 90682 RIV4 VACC RECOMBINANT DNA IM: CPT | Performed by: NURSE PRACTITIONER

## 2023-10-02 PROCEDURE — G0103 PSA SCREENING: HCPCS | Performed by: NURSE PRACTITIONER

## 2023-10-02 PROCEDURE — 82570 ASSAY OF URINE CREATININE: CPT | Performed by: NURSE PRACTITIONER

## 2023-10-02 RX ORDER — FEBUXOSTAT 40 MG/1
40 TABLET, FILM COATED ORAL DAILY
COMMUNITY
End: 2023-11-25

## 2023-10-02 RX ORDER — COLCHICINE 0.6 MG/1
0.3 TABLET ORAL
Status: ON HOLD | COMMUNITY
Start: 2023-08-17 | End: 2023-10-29

## 2023-10-02 ASSESSMENT — ENCOUNTER SYMPTOMS
NAUSEA: 0
HEMATURIA: 0
CHILLS: 0
DIZZINESS: 0
PARESTHESIAS: 0
HEADACHES: 1
NERVOUS/ANXIOUS: 1
ABDOMINAL PAIN: 0
EYE PAIN: 1
PALPITATIONS: 0
MYALGIAS: 1
HEMATOCHEZIA: 0
ARTHRALGIAS: 1
SORE THROAT: 0
CONSTIPATION: 1
JOINT SWELLING: 0
WEAKNESS: 0
HEARTBURN: 0
DYSURIA: 0
FEVER: 0
DIARRHEA: 1
FREQUENCY: 0
COUGH: 0

## 2023-10-02 ASSESSMENT — PAIN SCALES - GENERAL: PAINLEVEL: NO PAIN (0)

## 2023-10-02 NOTE — PROGRESS NOTES
SUBJECTIVE:   CC: Fernando is an 63 year old who presents for preventative health visit.       10/2/2023     4:29 PM   Additional Questions   Roomed by Blanca   Accompanied by self         10/2/2023     4:29 PM   Patient Reported Additional Medications   Patient reports taking the following new medications no       Healthy Habits:     Getting at least 3 servings of Calcium per day:  NO    Bi-annual eye exam:  Yes    Dental care twice a year:  NO    Sleep apnea or symptoms of sleep apnea:  Sleep apnea    Diet:  Other    Frequency of exercise:  None    Taking medications regularly:  Yes    Medication side effects:  Not applicable    Additional concerns today:  Yes    Last dental exam a few years ago.    No exercise.    Diet has been poor.  Wife diagnosed with cancer and diet got worse.    Wt Readings from Last 4 Encounters:   10/02/23 115.3 kg (254 lb 1.6 oz)   02/21/23 111.6 kg (246 lb)   08/01/22 112 kg (246 lb 14.4 oz)   04/20/21 112.5 kg (248 lb)        Have you ever done Advance Care Planning? (For example, a Health Directive, POLST, or a discussion with a medical provider or your loved ones about your wishes): No, advance care planning information given to patient to review.  Advanced care planning was discussed at today's visit.    Social History     Tobacco Use    Smoking status: Never    Smokeless tobacco: Never   Substance Use Topics    Alcohol use: Yes     Comment: seldom             10/2/2023     4:25 PM   Alcohol Use   Prescreen: >3 drinks/day or >7 drinks/week? No     Last PSA:   PSA   Date Value Ref Range Status   05/17/2018 0.88 0 - 4 ug/L Final     Comment:     Assay Method:  Chemiluminescence using Siemens Vista analyzer     Prostate Specific Antigen Screen   Date Value Ref Range Status   10/02/2023 0.89 0.00 - 4.50 ng/mL Final   08/17/2022 1.26 0.00 - 4.00 ug/L Final     Colon cancer screening: per patient in the last 5 years. Followed by [facility].    YORDY:  -Wears BiPAP nightly  -Symptoms are  controlled.    HTN:  -Takes blood pressure medication daily.  Takes metoprolol and lisinopril  -Does not check blood pressure outside of clinic.  BP Readings from Last 3 Encounters:   10/02/23 (!) 148/108   02/21/23 (!) 128/98   02/09/23 (!) 156/94        CKD:  -Followed by nephrology through [facility]  -Visits every 6 months  -Last Cr 1.55, 5/2023  -Nephrologist leaving practice and needs a new one.    Crohns:  -Stable.  Followed by [facility]  -Takes Humira.  Possible medication change in the new year due to going on Medicare    Gout:  -Followed by rheumatology.  Takes Uloric daily and colchicine as needed.    Ankylosing spondylitis  -Takes duloxetine and gabapentin daily    Headaches/Neck pain:  -Does get headaches from neck pain and get Botox for this  -Takes tizanidine    GERD:  -Controlled with pantoprazole.    Eye itching.  Followed by opthalmalogist.     Reviewed orders with patient. Reviewed health maintenance and updated orders accordingly - Yes  BP Readings from Last 3 Encounters:   10/02/23 (!) 148/108   02/21/23 (!) 128/98   02/09/23 (!) 156/94    Wt Readings from Last 3 Encounters:   10/02/23 115.3 kg (254 lb 1.6 oz)   02/21/23 111.6 kg (246 lb)   08/01/22 112 kg (246 lb 14.4 oz)            Current Outpatient Medications   Medication Sig Dispense Refill    calcium carbonate-vitamin D (CALCIUM 600+D) 600-200 MG-UNIT TABS Take by mouth 2 times daily       colchicine (COLCRYS) 0.6 MG tablet Take 0.3 mg by mouth every 48 hours      DULoxetine (CYMBALTA) 60 MG EC capsule 60 mg daily   0    gabapentin (NEURONTIN) 300 MG capsule Take 900 mg by mouth At Bedtime   1    HUMIRA PEN 40 MG/0.8ML pen kit Inject 40 mg Subcutaneous every 14 days   0    lisinopril (ZESTRIL) 40 MG tablet TAKE ONE TABLET BY MOUTH NIGHTLY 30 tablet 0    metoprolol succinate ER (TOPROL-XL) 100 MG 24 hr tablet Take 1 tablet (100 mg) by mouth daily 90 tablet 3    misoprostol (CYTOTEC) 200 MCG tablet Take 200 mcg by mouth 2 times daily       "multivitamin w/minerals (THERA-VIT-M) tablet Take 1 tablet by mouth daily.      pantoprazole (PROTONIX) 40 MG EC tablet Take 40 mg by mouth daily      TIZANIDINE HCL PO Take 16 mg by mouth At Bedtime       Vitamin D3 (CHOLECALCIFEROL) 25 mcg (1000 units) tablet Take 1 tablet (25 mcg) by mouth 2 times daily 180 tablet 0    atorvastatin (LIPITOR) 20 MG tablet Take 1 tablet (20 mg) by mouth daily 90 tablet 1    febuxostat (ULORIC) 40 MG TABS tablet Take 40 mg by mouth daily         Reviewed and updated as needed this visit by clinical staff   Tobacco  Allergies  Meds              Reviewed and updated as needed this visit by Provider    Allergies  Meds               Review of Systems   Constitutional:  Negative for chills and fever.   HENT:  Positive for hearing loss. Negative for ear pain and sore throat.    Eyes:  Positive for pain and visual disturbance.   Respiratory:  Negative for cough.    Cardiovascular:  Negative for chest pain, palpitations and peripheral edema.   Gastrointestinal:  Positive for constipation and diarrhea. Negative for abdominal pain, heartburn, hematochezia and nausea.   Genitourinary:  Negative for dysuria, frequency, genital sores, hematuria, impotence, penile discharge and urgency.   Musculoskeletal:  Positive for arthralgias and myalgias. Negative for joint swelling.   Skin:  Positive for rash.   Neurological:  Positive for headaches. Negative for dizziness, weakness and paresthesias.   Psychiatric/Behavioral:  Positive for mood changes. The patient is nervous/anxious.      OBJECTIVE:   BP (!) 148/108 (BP Location: Right arm, Patient Position: Sitting, Cuff Size: Adult Large)   Pulse 82   Temp 98.5  F (36.9  C) (Tympanic)   Resp 16   Ht 1.753 m (5' 9\")   Wt 115.3 kg (254 lb 1.6 oz)   SpO2 98%   BMI 37.52 kg/m      Physical Exam  GENERAL: healthy, alert and no distress  EYES: Eyes grossly normal to inspection, PERRL and conjunctivae and sclerae normal  HENT: ear canals and TM's " normal, nose and mouth without ulcers or lesions  NECK: no adenopathy, no asymmetry, masses, or scars and thyroid normal to palpation  RESP: lungs clear to auscultation - no rales, rhonchi or wheezes  CV: occasional premature beats, no murmur, click or rub, peripheral pulses strong, and no peripheral edema  ABDOMEN: soft, nontender, no hepatosplenomegaly, no masses and bowel sounds normal  MS: no gross musculoskeletal defects noted, no edema  NEURO: Normal strength and tone, mentation intact and speech normal  PSYCH: mentation appears normal, affect normal/bright      ASSESSMENT/PLAN:     Routine general medical examination at a health care facility  Routine exam performed today. Age appropriate screening and preventative care have been addressed today.   Vaccinations have been updated per patient request.  Recommend annual vision exams as well as biannual dental exams. They will follow up for annual physical again in one year.      Essential hypertension, benign  Elevated today.  Did take blood pressure medication this morning but nervous/anxious at appointment today.  Repeat BP in 1 week.  BP Readings from Last 6 Encounters:   10/02/23 (!) 148/108   02/21/23 (!) 128/98   02/09/23 (!) 156/94   08/01/22 110/70   04/20/21 118/74   02/05/21 (!) 145/83      - Comprehensive metabolic panel (BMP + Alb, Alk Phos, ALT, AST, Total. Bili, TP)  - Albumin Random Urine Quantitative with Creat Ratio    Cardiac arrhythmia, unspecified cardiac arrhythmia type  Likely PAC/PVC.  NSR on EKG today.  Zio Patch ordered.  - EKG 12-lead complete w/read - Clinics    Hyperlipidemia, unspecified hyperlipidemia type  Labs updated today.   - Lipid panel reflex to direct LDL Non-fasting  - LDL cholesterol direct    Stage 3 chronic kidney disease, unspecified whether stage 3a or 3b CKD (H)  Stable and followed by nephrology.  Visits every 6 months  Nephrologist leaving practice next year and needs a new one.  - Adult Nephrology   "Referral  - Albumin Random Urine Quantitative with Creat Ratio  - Hemoglobin    Adenomatous polyp of colon, unspecified part of colon  Crohn's disease of small intestine without complication (H)  Stable and followed by [facility].  Colonoscopy within the last 5 years per patient.    Sleep apnea, obstructive  Stable.  Wears BiPAP nightly    Ankylosing spondylitis, unspecified site of spine (H)  Stable.  Uses duloxetine and gabapentin with relief.  Uses tizanidine as needed.    Morbid obesity (H)  Discussed the importance of weight loss to help with blood pressure, CKD, YORDY, back pain, and high cholesterol.  Encouraged increasing daily physical activity and following a well balanced diet.  Patient is working on this.     Need for vaccination  - ZOSTER RECOMBINANT ADJUVANTED (SHINGRIX)  - INFLUENZA VACCINE 18-64Y (FLUBLOK)    Screening PSA (prostate specific antigen)  - PSA, screen       Patient has been advised of split billing requirements and indicates understanding: Yes      COUNSELING:   Reviewed preventive health counseling, as reflected in patient instructions       Regular exercise       Healthy diet/nutrition       Vision screening       Hearing screening       Immunizations  Vaccinated for: Influenza and Zoster           Colorectal cancer screening       Prostate cancer screening       Osteoporosis prevention/bone health      BMI:   Estimated body mass index is 37.52 kg/m  as calculated from the following:    Height as of this encounter: 1.753 m (5' 9\").    Weight as of this encounter: 115.3 kg (254 lb 1.6 oz).   Weight management plan: Discussed healthy diet and exercise guidelines      He reports that he has never smoked. He has never used smokeless tobacco.            Livia Keller NP  [facility]  "

## 2023-10-02 NOTE — PATIENT INSTRUCTIONS
Preventive Health Recommendations  Male Ages 50 - 64    Yearly exam:             See your health care provider every year in order to  o   Review health changes.   o   Discuss preventive care.    o   Review your medicines if your doctor has prescribed any.   Have a cholesterol test every 5 years, or more frequently if you are at risk for high cholesterol/heart disease.   Have a diabetes test (fasting glucose) every three years. If you are at risk for diabetes, you should have this test more often.   Have a colonoscopy at age 45, or have a yearly FIT test (stool test). These exams will check for colon cancer.    Talk with your health care provider about whether or not a prostate cancer screening test (PSA) is right for you.  You should be tested each year for STDs (sexually transmitted diseases), if you re at risk.     Shots: Get a flu shot each year. Get a tetanus shot every 10 years.     Nutrition:  Eat at least 5 servings of fruits and vegetables daily.   Eat whole-grain bread, whole-wheat pasta and brown rice instead of white grains and rice.   Get adequate Calcium and Vitamin D.     Lifestyle  Exercise for at least 150 minutes a week (30 minutes a day, 5 days a week). This will help you control your weight and prevent disease.   Limit alcohol to one drink per day.   No smoking.   Wear sunscreen to prevent skin cancer.   See your dentist every six months for an exam and cleaning.   See your eye doctor every 1 to 2 years.    Preventive Health Recommendations  Male Ages 50 - 64    Yearly exam:             See your health care provider every year in order to  o   Review health changes.   o   Discuss preventive care.    o   Review your medicines if your doctor has prescribed any.   Have a cholesterol test every 5 years, or more frequently if you are at risk for high cholesterol/heart disease.   Have a diabetes test (fasting glucose) every three years. If you are at risk for diabetes, you should have this test more  often.   Have a colonoscopy at age 45, or have a yearly FIT test (stool test). These exams will check for colon cancer.    Talk with your health care provider about whether or not a prostate cancer screening test (PSA) is right for you.  You should be tested each year for STDs (sexually transmitted diseases), if you re at risk.     Shots: Get a flu shot each year. Get a tetanus shot every 10 years.     Nutrition:  Eat at least 5 servings of fruits and vegetables daily.   Eat whole-grain bread, whole-wheat pasta and brown rice instead of white grains and rice.   Get adequate Calcium and Vitamin D.     Lifestyle  Exercise for at least 150 minutes a week (30 minutes a day, 5 days a week). This will help you control your weight and prevent disease.   Limit alcohol to one drink per day.   No smoking.   Wear sunscreen to prevent skin cancer.   See your dentist every six months for an exam and cleaning.   See your eye doctor every 1 to 2 years.

## 2023-10-03 DIAGNOSIS — E78.5 HYPERLIPIDEMIA, UNSPECIFIED HYPERLIPIDEMIA TYPE: ICD-10-CM

## 2023-10-03 DIAGNOSIS — E87.5 HYPERKALEMIA: Primary | ICD-10-CM

## 2023-10-03 DIAGNOSIS — I49.9 IRREGULAR HEART BEAT: Primary | ICD-10-CM

## 2023-10-03 LAB
ALBUMIN SERPL BCG-MCNC: 4.4 G/DL (ref 3.5–5.2)
ALP SERPL-CCNC: 94 U/L (ref 40–129)
ALT SERPL W P-5'-P-CCNC: 18 U/L (ref 0–70)
ANION GAP SERPL CALCULATED.3IONS-SCNC: 10 MMOL/L (ref 7–15)
AST SERPL W P-5'-P-CCNC: 20 U/L (ref 0–45)
BILIRUB SERPL-MCNC: 0.4 MG/DL
BUN SERPL-MCNC: 25.5 MG/DL (ref 8–23)
CALCIUM SERPL-MCNC: 10.1 MG/DL (ref 8.8–10.2)
CHLORIDE SERPL-SCNC: 105 MMOL/L (ref 98–107)
CHOLEST SERPL-MCNC: 210 MG/DL
CREAT SERPL-MCNC: 1.29 MG/DL (ref 0.67–1.17)
CREAT UR-MCNC: 95.5 MG/DL
DEPRECATED HCO3 PLAS-SCNC: 24 MMOL/L (ref 22–29)
EGFRCR SERPLBLD CKD-EPI 2021: 62 ML/MIN/1.73M2
GLUCOSE SERPL-MCNC: 94 MG/DL (ref 70–99)
HDLC SERPL-MCNC: 38 MG/DL
LDLC SERPL CALC-MCNC: ABNORMAL MG/DL
LDLC SERPL DIRECT ASSAY-MCNC: 83 MG/DL
MICROALBUMIN UR-MCNC: <12 MG/L
MICROALBUMIN/CREAT UR: NORMAL MG/G{CREAT}
NONHDLC SERPL-MCNC: 172 MG/DL
POTASSIUM SERPL-SCNC: 5.5 MMOL/L (ref 3.4–5.3)
PROT SERPL-MCNC: 7.6 G/DL (ref 6.4–8.3)
PSA SERPL DL<=0.01 NG/ML-MCNC: 0.89 NG/ML (ref 0–4.5)
SODIUM SERPL-SCNC: 139 MMOL/L (ref 135–145)
TRIGL SERPL-MCNC: 497 MG/DL

## 2023-10-04 DIAGNOSIS — E78.5 HYPERLIPIDEMIA, UNSPECIFIED HYPERLIPIDEMIA TYPE: Primary | ICD-10-CM

## 2023-10-04 RX ORDER — ATORVASTATIN CALCIUM 20 MG/1
20 TABLET, FILM COATED ORAL DAILY
Qty: 90 TABLET | Refills: 1 | Status: SHIPPED | OUTPATIENT
Start: 2023-10-04 | End: 2024-03-27

## 2023-10-09 ENCOUNTER — TRANSFERRED RECORDS (OUTPATIENT)
Dept: HEALTH INFORMATION MANAGEMENT | Facility: CLINIC | Age: 63
End: 2023-10-09
Payer: COMMERCIAL

## 2023-10-09 LAB — RETINOPATHY: NORMAL

## 2023-10-11 ENCOUNTER — LAB (OUTPATIENT)
Dept: LAB | Facility: CLINIC | Age: 63
End: 2023-10-11
Payer: COMMERCIAL

## 2023-10-11 ENCOUNTER — ALLIED HEALTH/NURSE VISIT (OUTPATIENT)
Dept: PEDIATRICS | Facility: CLINIC | Age: 63
End: 2023-10-11
Payer: COMMERCIAL

## 2023-10-11 VITALS — SYSTOLIC BLOOD PRESSURE: 142 MMHG | OXYGEN SATURATION: 97 % | HEART RATE: 70 BPM | DIASTOLIC BLOOD PRESSURE: 94 MMHG

## 2023-10-11 DIAGNOSIS — I10 ESSENTIAL HYPERTENSION, BENIGN: Primary | ICD-10-CM

## 2023-10-11 DIAGNOSIS — E87.5 HYPERKALEMIA: ICD-10-CM

## 2023-10-11 DIAGNOSIS — E78.5 HYPERLIPIDEMIA, UNSPECIFIED HYPERLIPIDEMIA TYPE: ICD-10-CM

## 2023-10-11 LAB
CHOLEST SERPL-MCNC: 185 MG/DL
HDLC SERPL-MCNC: 35 MG/DL
LDLC SERPL CALC-MCNC: ABNORMAL MG/DL
LDLC SERPL DIRECT ASSAY-MCNC: 78 MG/DL
NONHDLC SERPL-MCNC: 150 MG/DL
POTASSIUM SERPL-SCNC: 5.2 MMOL/L (ref 3.4–5.3)
TRIGL SERPL-MCNC: 413 MG/DL

## 2023-10-11 PROCEDURE — 84132 ASSAY OF SERUM POTASSIUM: CPT

## 2023-10-11 PROCEDURE — 80061 LIPID PANEL: CPT

## 2023-10-11 PROCEDURE — 36415 COLL VENOUS BLD VENIPUNCTURE: CPT

## 2023-10-11 PROCEDURE — 99207 PR NO CHARGE NURSE ONLY: CPT

## 2023-10-11 PROCEDURE — 83721 ASSAY OF BLOOD LIPOPROTEIN: CPT | Mod: 59

## 2023-10-11 NOTE — PROGRESS NOTES
Avi Bhatti is a 63 year old year old patient who comes in today for a Blood Pressure check because of ongoing blood pressure monitoring.    Patient is taking medication as prescribed  Patient is tolerating medications well.  Patient is monitoring Blood Pressure at home.  Average readings if yes are hasn't taken in awhile. Noted higher than result today.   Current complaints: none  Disposition:  routed to Provider.     BP Readings from Last 3 Encounters:   10/11/23 (!) 142/94   10/02/23 (!) 148/108   02/21/23 (!) 128/98

## 2023-10-13 ENCOUNTER — E-VISIT (OUTPATIENT)
Dept: PEDIATRICS | Facility: CLINIC | Age: 63
End: 2023-10-13
Payer: COMMERCIAL

## 2023-10-13 DIAGNOSIS — F32.A DEPRESSION, UNSPECIFIED DEPRESSION TYPE: Primary | ICD-10-CM

## 2023-10-13 PROCEDURE — 99421 OL DIG E/M SVC 5-10 MIN: CPT | Performed by: NURSE PRACTITIONER

## 2023-10-13 ASSESSMENT — ANXIETY QUESTIONNAIRES
1. FEELING NERVOUS, ANXIOUS, OR ON EDGE: MORE THAN HALF THE DAYS
GAD7 TOTAL SCORE: 12
5. BEING SO RESTLESS THAT IT IS HARD TO SIT STILL: SEVERAL DAYS
GAD7 TOTAL SCORE: 12
6. BECOMING EASILY ANNOYED OR IRRITABLE: MORE THAN HALF THE DAYS
4. TROUBLE RELAXING: MORE THAN HALF THE DAYS
2. NOT BEING ABLE TO STOP OR CONTROL WORRYING: NEARLY EVERY DAY
7. FEELING AFRAID AS IF SOMETHING AWFUL MIGHT HAPPEN: NOT AT ALL
3. WORRYING TOO MUCH ABOUT DIFFERENT THINGS: MORE THAN HALF THE DAYS

## 2023-10-13 ASSESSMENT — PATIENT HEALTH QUESTIONNAIRE - PHQ9
SUM OF ALL RESPONSES TO PHQ QUESTIONS 1-9: 9
SUM OF ALL RESPONSES TO PHQ QUESTIONS 1-9: 9
10. IF YOU CHECKED OFF ANY PROBLEMS, HOW DIFFICULT HAVE THESE PROBLEMS MADE IT FOR YOU TO DO YOUR WORK, TAKE CARE OF THINGS AT HOME, OR GET ALONG WITH OTHER PEOPLE: EXTREMELY DIFFICULT

## 2023-10-13 NOTE — TELEPHONE ENCOUNTER
I may have mentioned in our last visit that I have been dealing with some depression.  Things seem to be getting worse and I'm wondering if I could possibly get something for it.  I have been trying to just deal with it the best I can, but it's getting to be to much.    Work/family balance as they call it and everything that goes with that.  Cheryl my wife has been dealing with cancer this last year which has added some stress.  I really thought I had turn the corner psychologically speaking and things are moving in the right direction with her.  The last thing I wanted was to add to the pile of pills I already take.  I'm already taking Duloxetine for the migraine headaches. My wife is also taking that for depression.   Could I possibly increase the dosage of that?  or maybe something else?   Thank you

## 2023-10-14 ASSESSMENT — ANXIETY QUESTIONNAIRES: GAD7 TOTAL SCORE: 12

## 2023-10-14 ASSESSMENT — PATIENT HEALTH QUESTIONNAIRE - PHQ9: SUM OF ALL RESPONSES TO PHQ QUESTIONS 1-9: 9

## 2023-10-15 DIAGNOSIS — I10 ESSENTIAL HYPERTENSION, BENIGN: Primary | ICD-10-CM

## 2023-10-15 DIAGNOSIS — E78.5 HYPERLIPIDEMIA, UNSPECIFIED HYPERLIPIDEMIA TYPE: ICD-10-CM

## 2023-10-16 RX ORDER — BUPROPION HYDROCHLORIDE 150 MG/1
150 TABLET ORAL EVERY MORNING
Qty: 90 TABLET | Refills: 0 | Status: CANCELLED | OUTPATIENT
Start: 2023-10-16

## 2023-10-16 NOTE — PROGRESS NOTES
Blood pressure is higher than we'd like.  I would like to start him on an additional medication.  Would he be willing to take this?  If so, I will place the medication at the pharmacy and we can repeat his blood pressure in 2-4 weeks.    Livia

## 2023-10-16 NOTE — PROGRESS NOTES
Patient called back. Pt is reluctantly open to additional medication, feels like he is already on a lot of BP medications. But ultimately is okay trying additional.   ---------------  Attempted to call, left VM for patient to return call to RN PAL.

## 2023-10-17 DIAGNOSIS — I10 ESSENTIAL HYPERTENSION, BENIGN: Primary | ICD-10-CM

## 2023-10-17 RX ORDER — AMLODIPINE BESYLATE 5 MG/1
5 TABLET ORAL DAILY
Qty: 90 TABLET | Refills: 1 | Status: SHIPPED | OUTPATIENT
Start: 2023-10-17 | End: 2023-10-28

## 2023-10-18 RX ORDER — DULOXETIN HYDROCHLORIDE 60 MG/1
CAPSULE, DELAYED RELEASE ORAL
Qty: 90 CAPSULE | Refills: 1 | Status: SHIPPED | OUTPATIENT
Start: 2023-10-18 | End: 2023-11-09

## 2023-10-18 RX ORDER — DULOXETIN HYDROCHLORIDE 30 MG/1
CAPSULE, DELAYED RELEASE ORAL
Qty: 90 CAPSULE | Refills: 0 | Status: SHIPPED | OUTPATIENT
Start: 2023-10-18 | End: 2023-11-09

## 2023-10-18 NOTE — PROGRESS NOTES
Please follow-up with patient:    I understand his hesitation on adding another blood pressure medication however he is above goal.  If he'd like to discuss his medication in detail I am more than happy to do this as a virtual visit.  I have place amlodipine at the pharmacy.  It would be great if he could return in 2 weeks for a repeat BP check.  Otherwise, have him keep his follow-up appointment with me 12/14/2023.    Can we postpone a message and call him in 2-4 weeks to see how he is tolerating the medication if he does not want to come in for a BP check?    Thanks!

## 2023-10-28 ENCOUNTER — APPOINTMENT (OUTPATIENT)
Dept: CT IMAGING | Facility: CLINIC | Age: 63
End: 2023-10-28
Attending: EMERGENCY MEDICINE
Payer: COMMERCIAL

## 2023-10-28 ENCOUNTER — HOSPITAL ENCOUNTER (INPATIENT)
Facility: CLINIC | Age: 63
LOS: 1 days | Discharge: HOME | End: 2023-10-31
Attending: EMERGENCY MEDICINE | Admitting: HOSPITALIST
Payer: COMMERCIAL

## 2023-10-28 DIAGNOSIS — I95.9 HYPOTENSION, UNSPECIFIED HYPOTENSION TYPE: ICD-10-CM

## 2023-10-28 DIAGNOSIS — I10 HYPERTENSION, UNSPECIFIED TYPE: Primary | ICD-10-CM

## 2023-10-28 PROBLEM — N17.9 AKI (ACUTE KIDNEY INJURY) (H): Status: ACTIVE | Noted: 2023-10-28

## 2023-10-28 LAB
ABO/RH(D): NORMAL
ALBUMIN UR-MCNC: NEGATIVE MG/DL
ANION GAP SERPL CALCULATED.3IONS-SCNC: 10 MMOL/L (ref 7–15)
ANTIBODY SCREEN: NEGATIVE
APPEARANCE UR: CLEAR
BASOPHILS # BLD AUTO: 0.1 10E3/UL (ref 0–0.2)
BASOPHILS NFR BLD AUTO: 1 %
BILIRUB UR QL STRIP: NEGATIVE
BUN SERPL-MCNC: 29.9 MG/DL (ref 8–23)
CALCIUM SERPL-MCNC: 9.1 MG/DL (ref 8.8–10.2)
CHLORIDE SERPL-SCNC: 105 MMOL/L (ref 98–107)
CK SERPL-CCNC: 152 U/L (ref 39–308)
COLOR UR AUTO: NORMAL
CREAT SERPL-MCNC: 2.58 MG/DL (ref 0.67–1.17)
CREAT UR-MCNC: 89.7 MG/DL
DEPRECATED HCO3 PLAS-SCNC: 23 MMOL/L (ref 22–29)
EGFRCR SERPLBLD CKD-EPI 2021: 27 ML/MIN/1.73M2
EOSINOPHIL # BLD AUTO: 0.3 10E3/UL (ref 0–0.7)
EOSINOPHIL NFR BLD AUTO: 4 %
ERYTHROCYTE [DISTWIDTH] IN BLOOD BY AUTOMATED COUNT: 13.2 % (ref 10–15)
FRACT EXCRET NA UR+SERPL-RTO: 2.3 %
GLUCOSE SERPL-MCNC: 98 MG/DL (ref 70–99)
GLUCOSE UR STRIP-MCNC: NEGATIVE MG/DL
HCT VFR BLD AUTO: 41 % (ref 40–53)
HGB BLD-MCNC: 13.3 G/DL (ref 13.3–17.7)
HGB UR QL STRIP: NEGATIVE
HYALINE CASTS: 1 /LPF
IMM GRANULOCYTES # BLD: 0 10E3/UL
IMM GRANULOCYTES NFR BLD: 0 %
INR PPP: 0.98 (ref 0.85–1.15)
KETONES UR STRIP-MCNC: NEGATIVE MG/DL
LEUKOCYTE ESTERASE UR QL STRIP: NEGATIVE
LYMPHOCYTES # BLD AUTO: 3 10E3/UL (ref 0.8–5.3)
LYMPHOCYTES NFR BLD AUTO: 32 %
MCH RBC QN AUTO: 31.7 PG (ref 26.5–33)
MCHC RBC AUTO-ENTMCNC: 32.4 G/DL (ref 31.5–36.5)
MCV RBC AUTO: 98 FL (ref 78–100)
MONOCYTES # BLD AUTO: 1 10E3/UL (ref 0–1.3)
MONOCYTES NFR BLD AUTO: 11 %
NEUTROPHILS # BLD AUTO: 5 10E3/UL (ref 1.6–8.3)
NEUTROPHILS NFR BLD AUTO: 52 %
NITRATE UR QL: NEGATIVE
NRBC # BLD AUTO: 0 10E3/UL
NRBC BLD AUTO-RTO: 0 /100
PH UR STRIP: 5 [PH] (ref 5–7)
PHOSPHATE SERPL-MCNC: 5.2 MG/DL (ref 2.5–4.5)
PLATELET # BLD AUTO: 166 10E3/UL (ref 150–450)
POTASSIUM SERPL-SCNC: 4.6 MMOL/L (ref 3.4–5.3)
RBC # BLD AUTO: 4.2 10E6/UL (ref 4.4–5.9)
RBC URINE: <1 /HPF
SODIUM SERPL-SCNC: 138 MMOL/L (ref 135–145)
SODIUM UR-SCNC: 112 MMOL/L
SP GR UR STRIP: 1.01 (ref 1–1.03)
SPECIMEN EXPIRATION DATE: NORMAL
TROPONIN T SERPL HS-MCNC: 13 NG/L
TROPONIN T SERPL HS-MCNC: 17 NG/L
UROBILINOGEN UR STRIP-MCNC: NORMAL MG/DL
WBC # BLD AUTO: 9.5 10E3/UL (ref 4–11)
WBC URINE: 1 /HPF

## 2023-10-28 PROCEDURE — 36415 COLL VENOUS BLD VENIPUNCTURE: CPT | Performed by: EMERGENCY MEDICINE

## 2023-10-28 PROCEDURE — 250N000013 HC RX MED GY IP 250 OP 250 PS 637: Performed by: INTERNAL MEDICINE

## 2023-10-28 PROCEDURE — 250N000011 HC RX IP 250 OP 636: Performed by: EMERGENCY MEDICINE

## 2023-10-28 PROCEDURE — 96360 HYDRATION IV INFUSION INIT: CPT | Mod: 59

## 2023-10-28 PROCEDURE — 81001 URINALYSIS AUTO W/SCOPE: CPT | Performed by: HOSPITALIST

## 2023-10-28 PROCEDURE — 250N000009 HC RX 250: Performed by: EMERGENCY MEDICINE

## 2023-10-28 PROCEDURE — 84300 ASSAY OF URINE SODIUM: CPT | Performed by: HOSPITALIST

## 2023-10-28 PROCEDURE — 250N000013 HC RX MED GY IP 250 OP 250 PS 637: Performed by: HOSPITALIST

## 2023-10-28 PROCEDURE — 86901 BLOOD TYPING SEROLOGIC RH(D): CPT | Performed by: EMERGENCY MEDICINE

## 2023-10-28 PROCEDURE — 258N000003 HC RX IP 258 OP 636: Performed by: EMERGENCY MEDICINE

## 2023-10-28 PROCEDURE — 80048 BASIC METABOLIC PNL TOTAL CA: CPT | Performed by: EMERGENCY MEDICINE

## 2023-10-28 PROCEDURE — G0378 HOSPITAL OBSERVATION PER HR: HCPCS

## 2023-10-28 PROCEDURE — 74177 CT ABD & PELVIS W/CONTRAST: CPT

## 2023-10-28 PROCEDURE — 86850 RBC ANTIBODY SCREEN: CPT | Performed by: EMERGENCY MEDICINE

## 2023-10-28 PROCEDURE — 84484 ASSAY OF TROPONIN QUANT: CPT | Performed by: EMERGENCY MEDICINE

## 2023-10-28 PROCEDURE — 96361 HYDRATE IV INFUSION ADD-ON: CPT

## 2023-10-28 PROCEDURE — 99207 PR NO CHARGE LOS: CPT | Performed by: INTERNAL MEDICINE

## 2023-10-28 PROCEDURE — 84100 ASSAY OF PHOSPHORUS: CPT | Performed by: HOSPITALIST

## 2023-10-28 PROCEDURE — 85014 HEMATOCRIT: CPT | Performed by: EMERGENCY MEDICINE

## 2023-10-28 PROCEDURE — 258N000003 HC RX IP 258 OP 636: Performed by: HOSPITALIST

## 2023-10-28 PROCEDURE — 99285 EMERGENCY DEPT VISIT HI MDM: CPT | Mod: 25

## 2023-10-28 PROCEDURE — 93005 ELECTROCARDIOGRAM TRACING: CPT

## 2023-10-28 PROCEDURE — 85610 PROTHROMBIN TIME: CPT | Performed by: EMERGENCY MEDICINE

## 2023-10-28 PROCEDURE — 99223 1ST HOSP IP/OBS HIGH 75: CPT | Performed by: HOSPITALIST

## 2023-10-28 PROCEDURE — 82550 ASSAY OF CK (CPK): CPT | Performed by: HOSPITALIST

## 2023-10-28 RX ORDER — AMOXICILLIN 250 MG
2 CAPSULE ORAL 2 TIMES DAILY PRN
Status: DISCONTINUED | OUTPATIENT
Start: 2023-10-28 | End: 2023-10-31 | Stop reason: HOSPADM

## 2023-10-28 RX ORDER — RIMEGEPANT SULFATE 75 MG/75MG
75 TABLET, ORALLY DISINTEGRATING ORAL DAILY PRN
COMMUNITY

## 2023-10-28 RX ORDER — AMOXICILLIN 250 MG
1 CAPSULE ORAL 2 TIMES DAILY PRN
Status: DISCONTINUED | OUTPATIENT
Start: 2023-10-28 | End: 2023-10-31 | Stop reason: HOSPADM

## 2023-10-28 RX ORDER — CARVEDILOL 3.12 MG/1
3.12 TABLET ORAL 2 TIMES DAILY WITH MEALS
Status: DISCONTINUED | OUTPATIENT
Start: 2023-10-28 | End: 2023-10-29

## 2023-10-28 RX ORDER — IOPAMIDOL 755 MG/ML
500 INJECTION, SOLUTION INTRAVASCULAR ONCE
Status: COMPLETED | OUTPATIENT
Start: 2023-10-28 | End: 2023-10-28

## 2023-10-28 RX ORDER — ONDANSETRON 4 MG/1
4 TABLET, ORALLY DISINTEGRATING ORAL EVERY 6 HOURS PRN
Status: DISCONTINUED | OUTPATIENT
Start: 2023-10-28 | End: 2023-10-31 | Stop reason: HOSPADM

## 2023-10-28 RX ORDER — GABAPENTIN 300 MG/1
300 CAPSULE ORAL EVERY MORNING
Status: DISCONTINUED | OUTPATIENT
Start: 2023-10-29 | End: 2023-10-31 | Stop reason: HOSPADM

## 2023-10-28 RX ORDER — BISACODYL 10 MG
10 SUPPOSITORY, RECTAL RECTAL DAILY PRN
Status: DISCONTINUED | OUTPATIENT
Start: 2023-10-28 | End: 2023-10-31 | Stop reason: HOSPADM

## 2023-10-28 RX ORDER — PANTOPRAZOLE SODIUM 40 MG/1
40 TABLET, DELAYED RELEASE ORAL 2 TIMES DAILY
COMMUNITY
End: 2023-11-25

## 2023-10-28 RX ORDER — SODIUM CHLORIDE 9 MG/ML
INJECTION, SOLUTION INTRAVENOUS CONTINUOUS
Status: DISCONTINUED | OUTPATIENT
Start: 2023-10-28 | End: 2023-10-29

## 2023-10-28 RX ORDER — ACETAMINOPHEN 500 MG
500 TABLET ORAL 3 TIMES DAILY
COMMUNITY

## 2023-10-28 RX ORDER — AMLODIPINE BESYLATE 5 MG/1
5 TABLET ORAL DAILY
Status: ON HOLD | COMMUNITY
End: 2023-10-29

## 2023-10-28 RX ORDER — AMLODIPINE BESYLATE 5 MG/1
5 TABLET ORAL DAILY
Status: DISCONTINUED | OUTPATIENT
Start: 2023-10-28 | End: 2023-10-29

## 2023-10-28 RX ORDER — ACETAMINOPHEN 325 MG/1
650 TABLET ORAL EVERY 6 HOURS PRN
Status: DISCONTINUED | OUTPATIENT
Start: 2023-10-28 | End: 2023-10-31 | Stop reason: HOSPADM

## 2023-10-28 RX ORDER — HYDRALAZINE HYDROCHLORIDE 20 MG/ML
10 INJECTION INTRAMUSCULAR; INTRAVENOUS EVERY 4 HOURS PRN
Status: DISCONTINUED | OUTPATIENT
Start: 2023-10-28 | End: 2023-10-31 | Stop reason: HOSPADM

## 2023-10-28 RX ORDER — DULOXETIN HYDROCHLORIDE 30 MG/1
60 CAPSULE, DELAYED RELEASE ORAL DAILY
Status: DISCONTINUED | OUTPATIENT
Start: 2023-10-28 | End: 2023-10-28 | Stop reason: ALTCHOICE

## 2023-10-28 RX ORDER — CARVEDILOL 12.5 MG/1
12.5 TABLET ORAL 2 TIMES DAILY WITH MEALS
Status: ON HOLD | COMMUNITY
End: 2023-10-29

## 2023-10-28 RX ORDER — METOPROLOL SUCCINATE 50 MG/1
100 TABLET, EXTENDED RELEASE ORAL DAILY
Status: DISCONTINUED | OUTPATIENT
Start: 2023-10-28 | End: 2023-10-28

## 2023-10-28 RX ORDER — POLYETHYLENE GLYCOL 3350 17 G/17G
17 POWDER, FOR SOLUTION ORAL DAILY PRN
Status: DISCONTINUED | OUTPATIENT
Start: 2023-10-28 | End: 2023-10-31 | Stop reason: HOSPADM

## 2023-10-28 RX ORDER — ONDANSETRON 2 MG/ML
4 INJECTION INTRAMUSCULAR; INTRAVENOUS EVERY 6 HOURS PRN
Status: DISCONTINUED | OUTPATIENT
Start: 2023-10-28 | End: 2023-10-31 | Stop reason: HOSPADM

## 2023-10-28 RX ORDER — GABAPENTIN 300 MG/1
300 CAPSULE ORAL EVERY MORNING
COMMUNITY
End: 2024-08-21

## 2023-10-28 RX ORDER — GABAPENTIN 300 MG/1
900 CAPSULE ORAL AT BEDTIME
Status: DISCONTINUED | OUTPATIENT
Start: 2023-10-28 | End: 2023-10-31 | Stop reason: HOSPADM

## 2023-10-28 RX ORDER — DULOXETIN HYDROCHLORIDE 30 MG/1
90 CAPSULE, DELAYED RELEASE ORAL DAILY
Status: DISCONTINUED | OUTPATIENT
Start: 2023-10-28 | End: 2023-10-31 | Stop reason: HOSPADM

## 2023-10-28 RX ADMIN — ACETAMINOPHEN 650 MG: 325 TABLET, FILM COATED ORAL at 16:29

## 2023-10-28 RX ADMIN — DULOXETINE HYDROCHLORIDE 90 MG: 30 CAPSULE, DELAYED RELEASE ORAL at 13:10

## 2023-10-28 RX ADMIN — SODIUM CHLORIDE: 9 INJECTION, SOLUTION INTRAVENOUS at 15:03

## 2023-10-28 RX ADMIN — TIZANIDINE 8 MG: 4 TABLET ORAL at 22:56

## 2023-10-28 RX ADMIN — SODIUM CHLORIDE: 9 INJECTION, SOLUTION INTRAVENOUS at 07:00

## 2023-10-28 RX ADMIN — ACETAMINOPHEN 650 MG: 325 TABLET, FILM COATED ORAL at 10:56

## 2023-10-28 RX ADMIN — SODIUM CHLORIDE 60 ML: 9 INJECTION, SOLUTION INTRAVENOUS at 05:02

## 2023-10-28 RX ADMIN — CARVEDILOL 3.12 MG: 3.12 TABLET, FILM COATED ORAL at 19:03

## 2023-10-28 RX ADMIN — CARVEDILOL 3.12 MG: 3.12 TABLET, FILM COATED ORAL at 14:17

## 2023-10-28 RX ADMIN — AMLODIPINE BESYLATE 5 MG: 5 TABLET ORAL at 11:49

## 2023-10-28 RX ADMIN — SODIUM CHLORIDE 1000 ML: 9 INJECTION, SOLUTION INTRAVENOUS at 03:20

## 2023-10-28 RX ADMIN — SODIUM CHLORIDE: 9 INJECTION, SOLUTION INTRAVENOUS at 22:56

## 2023-10-28 RX ADMIN — IOPAMIDOL 72 ML: 755 INJECTION, SOLUTION INTRAVENOUS at 05:02

## 2023-10-28 RX ADMIN — GABAPENTIN 900 MG: 300 CAPSULE ORAL at 21:40

## 2023-10-28 ASSESSMENT — ACTIVITIES OF DAILY LIVING (ADL)
ADLS_ACUITY_SCORE: 22
ADLS_ACUITY_SCORE: 35
ADLS_ACUITY_SCORE: 22
ADLS_ACUITY_SCORE: 35
ADLS_ACUITY_SCORE: 22
ADLS_ACUITY_SCORE: 35
ADLS_ACUITY_SCORE: 22

## 2023-10-28 ASSESSMENT — COLUMBIA-SUICIDE SEVERITY RATING SCALE - C-SSRS
1. IN THE PAST MONTH, HAVE YOU WISHED YOU WERE DEAD OR WISHED YOU COULD GO TO SLEEP AND NOT WAKE UP?: NO
6. HAVE YOU EVER DONE ANYTHING, STARTED TO DO ANYTHING, OR PREPARED TO DO ANYTHING TO END YOUR LIFE?: NO
2. HAVE YOU ACTUALLY HAD ANY THOUGHTS OF KILLING YOURSELF IN THE PAST MONTH?: NO

## 2023-10-28 NOTE — PHARMACY-ADMISSION MEDICATION HISTORY
Pharmacy Intern Admission Medication History    Admission medication history is complete. The information provided in this note is only as accurate as the sources available at the time of the update.    Information Source(s): Patient via in-person    Pertinent Information: Pt does not think he is taking amlodipine but states there are so many medications it's hard to keep track of. Amlodipine last fill was 10/18. Chart review shows pt needs to be on amlodipine due to uncontrolled BP    Changes made to PTA medication list:  Added: carvedilol, kareem/poly/dex, nurtec, tylenol  Deleted: Metoprolol, misoprostol  Changed: Pantoprazole, gabapentin    Medication Affordability:  Not including over the counter (OTC) medications, was there a time in the past 3 months when you did not take your medications as prescribed because of cost?: No    Allergies reviewed with patient and updates made in EHR: yes    Medication History Completed By: Negrita Espinoza 10/28/2023 10:58 AM    PTA Med List   Medication Sig Last Dose    acetaminophen (TYLENOL) 500 MG tablet Take 1,000 mg by mouth 2 times daily 10/27/2023 at pm    amLODIPine (NORVASC) 5 MG tablet Take 5 mg by mouth daily Unknown    atorvastatin (LIPITOR) 20 MG tablet Take 1 tablet (20 mg) by mouth daily 10/27/2023 at am    calcium carbonate-vitamin D (CALCIUM 600+D) 600-200 MG-UNIT TABS Take by mouth 2 times daily  10/27/2023 at pm    carvedilol (COREG) 12.5 MG tablet Take 12.5 mg by mouth 2 times daily (with meals) 10/27/2023 at pm    colchicine (COLCRYS) 0.6 MG tablet Take 0.3 mg by mouth every 48 hours 10/26/2023 at am    DULoxetine (CYMBALTA) 30 MG capsule Take with 60 mg to equal 90 mg daily 10/27/2023 at am    DULoxetine (CYMBALTA) 60 MG capsule Take with 30 mg capsule to equal 90 mg 10/27/2023 at am    febuxostat (ULORIC) 40 MG TABS tablet Take 40 mg by mouth daily 10/27/2023 at am    gabapentin (NEURONTIN) 300 MG capsule Take 300 mg by mouth every morning 10/27/2023 at am     gabapentin (NEURONTIN) 300 MG capsule Take 900 mg by mouth At Bedtime  10/27/2023 at pm    HUMIRA PEN 40 MG/0.8ML pen kit Inject 40 mg Subcutaneous every 14 days  10/18/2023 at pm    lisinopril (ZESTRIL) 40 MG tablet TAKE ONE TABLET BY MOUTH NIGHTLY 10/27/2023 at pm    multivitamin w/minerals (THERA-VIT-M) tablet Take 1 tablet by mouth daily. 10/27/2023 at am    neomycin-polymixin-dexamethasone (MAXITROL) ophthalmic ointment Place into both eyes daily Pea sized amount 10/27/2023 at am    pantoprazole (PROTONIX) 40 MG EC tablet Take 40 mg by mouth 2 times daily 10/27/2023 at pm    rimegepant (NURTEC) 75 MG ODT tablet Place 75 mg under the tongue daily as needed for migraine Past Week    TIZANIDINE HCL PO Take 16 mg by mouth At Bedtime  10/27/2023 at pm    Vitamin D3 (CHOLECALCIFEROL) 25 mcg (1000 units) tablet Take 1 tablet (25 mcg) by mouth 2 times daily 10/27/2023 at am

## 2023-10-28 NOTE — ED NOTES
Patient GFR 27 and Creat of 2.58 and BUN 29.9, However Dr. Desire Zuluaga, was ok to use contrast, I had to get it protocolled [place] Telephone number [phone],The radiologist, finally okayed the test with contrast but after evaluating the non-contrast first. Finally we gave 72 ML Isovue contrast for rule out of dissection.

## 2023-10-28 NOTE — H&P
[facility]  Hospitalist H&P    Name: Avi Bhatti      MRN: [id number]  YOB: 1960    Age: 63 year old  Date of admission: 10/28/2023  Primary care provider: Albertina Peña            Assessment and Plan:     Avi Bhatti is a 63 year old male with a history of hypertension, GERD, YORDY on nocturnal CPAP, Crohn's disease, ankylosing spondylitis, and chronic neck pain who presents with bilateral hand numbness found to have hypotension which resolved with fluids as well as acute kidney injury.    Problem list:  Acute kidney injury on stage II chronic kidney disease: Baseline creatinine appears to be about 1.3.  Current creatinine is 2.6.  Somewhat unclear etiology regarding his acute kidney injury but I suspect he was having some periods of possible hypotension at home given the recent initiation of carvedilol as well as his hypotension on arrival to the emergency department.  He does report normal urine output at home.  He does not take NSAIDs.  He is on lisinopril and as needed colchicine.  He received 1 L of fluids in the emergency department.  For now we will continue normal saline at 125 cc/h.  We will closely monitor urine output with strict I's and O's.  Given the contrast exposure he received in the emergency department he is at risk for contrast nephropathy.  I will check a FENa, urinalysis, phosphorus, and avoid further nephrotoxins.  Episode of hypotension: He showed me his blood pressure values which she takes on a daily basis for the past week and which look outstanding.  Systolic pressure is typically 120-130.  He does report starting carvedilol about 5 days ago.  Interestingly he was hypotensive on arrival to the emergency department with a systolic blood pressure of about 90.  This quickly responded to IV fluids.  He does think he may be dehydrated as he has been quite active recently.  He reports normal oral intake.  For now we will hold his carvedilol,  "lisinopril, and amlodipine until his blood pressures can be monitored a little bit longer.  Essential hypertension: Blood pressure is now well controlled.  Maintained on 3 drug therapy with carvedilol, lisinopril, and amlodipine which will be held for now.  GERD: Continue oral PPI therapy.  Hyperlipidemia: Continue prior to admission atorvastatin.  YORDY: Continue nocturnal CPAP.  Chronic neck pain: Receives toxin injections periodically.  We will hold his duloxetine given his acute kidney injury.  Chronic intra-abdominal mass: Patient again has a 6 x 4.6 x 7.4 cm lobulated partially calcified nonenhancing mass in the left upper quadrant seen on imaging which has not changed significantly since the prior MRI suggesting a benign etiology.    Clinically Significant Risk Factors Present on Admission                 # Acute Kidney Injury, unspecified: based on a >150% or 0.3 mg/dL increase in last creatinine compared to past 90 day average, will monitor renal function    # Hypertension: Noted on problem list        # Obesity: Estimated body mass index is 37.52 kg/m  as calculated from the following:    Height as of 10/2/23: 1.753 m (5' 9\").    Weight as of 10/2/23: 115.3 kg (254 lb 1.6 oz).              Code status: Full.  Admit to observation status.  Prophylaxis: PCD's.  Disposition: Home in 1 to 2 days pending renal function improvement.    80 MINUTES SPENT BY ME on the date of service doing chart review, history, exam, documentation & further activities per the note.          Chief Complaint:     Bilateral hand numbness.         History of Present Illness:   Avi Bhatti is a 63 year old male who presents with bilateral hand numbness.  History was obtained from my discussion with the patient at the bedside.  I also discussed the case with the ED provider.  The electronic medical record was also reviewed.    The patient reports over a year of periodic hand numbness apically located on the left hand but occasionally " on the right hand as well.  It is usually when he is sleeping on his left side that this occurs and he repositions himself and it quickly improves, suggesting a neuropathic condition.  Overnight the patient awoke at about 1 AM.  He noticed significant paresthesias of the bilateral fingertips.  His wife was unable to bring him to the emergency department so she called EMS.  Upon initial assessment EMS found him to be hypotensive.  He had a blood sugar of 142.  He was brought to the emergency department.  In the emergency department he initially complained of bilateral shoulder pain.  He denied chest pain or shortness of breath.  He denies fever or cough.  He denies any abdominal pain, dysuria, hematuria, pyuria, nausea, vomiting, or diarrhea.    Here in the hospital he is afebrile.  Heart rate is 65 with blood pressure initially of 87/67.  Respiratory is 18 and oxygen saturation 94% on room air.  Labs show a creatinine of 2.6 with prior level of 1.3.  GFR is 27.  The remainder of the basic metabolic panel and troponin are normal.  CBC is also normal.  CT aortic survey was performed showing no evidence for thoracic aortic dissection or aneurysm.  A chronic intra-abdominal mass was seen.  No other abnormalities.            Past Medical History:     Past Medical History:   Diagnosis Date    Ankylosing spondylitis (H)     Arthritis     Crohn's colitis (H)     Gastroesophageal reflux disease     Hypertension     Migraine     Other chronic pain     headache and neck pain, receives botox injections Q 12 weeks    Sleep apnea     cpap             Past Surgical History:     Past Surgical History:   Procedure Laterality Date    ARTHROPLASTY HIP Left 11/19/2019    Procedure: Left total hip arthroplasty;  Surgeon: Allen Coats MD;  Location: [facility] OR    BOTOX CHRONIC MIGRAINE HEAD ACHES      for chronic headache and neck pain, injections every 12 weeks    COLONOSCOPY      EXTRACTION(S) DENTAL  12/18/2012    Procedure:  EXTRACTION(S) DENTAL;  Extraction of Teeth 30, 19;  Surgeon: Sujey Cunningham DDS;  Location: [facility] OR    OPTICAL TRACKING SYSTEM FUSION POSTERIOR SPINE LUMBAR N/A 2/2/2021    Procedure: Lumbar 3-4 posterior lumbar instrumented fusion with interbody cage;  Surgeon: Jakub Velasquez MD;  Location: [facility] OR    repair fracture & insert pins left hand  1996    SMALL BOWEL RESECTION  1993             Social History:     Social History     Tobacco Use    Smoking status: Never    Smokeless tobacco: Never   Substance Use Topics    Alcohol use: Yes     Comment: seldom             Family History:   The family history was fully reviewed and non-contributory in this case.         Allergies:     Allergies   Allergen Reactions    Prednisone Other (See Comments)     Elevated heart rate, has had without problems more recently    Reglan [Metoclopramide] Other (See Comments)     agitation    Unknown [No Clinical Screening - See Comments]      benny have not worked in past             Medications:     Prior to Admission medications    Medication Sig Last Dose Taking? Auth Provider Long Term End Date   amLODIPine (NORVASC) 5 MG tablet Take 1 tablet (5 mg) by mouth daily   Livia Keller NP Yes    atorvastatin (LIPITOR) 20 MG tablet Take 1 tablet (20 mg) by mouth daily   Livia Keller NP Yes    calcium carbonate-vitamin D (CALCIUM 600+D) 600-200 MG-UNIT TABS Take by mouth 2 times daily    Reported, Patient     colchicine (COLCRYS) 0.6 MG tablet Take 0.3 mg by mouth every 48 hours   Reported, Patient     DULoxetine (CYMBALTA) 30 MG capsule Take with 60 mg to equal 90 mg daily   Haley Bonilla NP Yes    DULoxetine (CYMBALTA) 60 MG capsule Take with 30 mg capsule to equal 90 mg   Haley Bonilla NP Yes    febuxostat (ULORIC) 40 MG TABS tablet Take 40 mg by mouth daily   Reported, Patient     gabapentin (NEURONTIN) 300 MG capsule Take 900 mg by mouth At Bedtime    Reported, Patient Yes    HUMIRA PEN 40 MG/0.8ML pen kit Inject 40 mg  Subcutaneous every 14 days    Reported, Patient Yes    lisinopril (ZESTRIL) 40 MG tablet TAKE ONE TABLET BY MOUTH NIGHTLY   Livia Keller, CALLIE Yes    metoprolol succinate ER (TOPROL-XL) 100 MG 24 hr tablet Take 1 tablet (100 mg) by mouth daily   Alison Melissa MD Yes    misoprostol (CYTOTEC) 200 MCG tablet Take 200 mcg by mouth 2 times daily   Reported, Patient     multivitamin w/minerals (THERA-VIT-M) tablet Take 1 tablet by mouth daily.   Reported, Patient     pantoprazole (PROTONIX) 40 MG EC tablet Take 40 mg by mouth daily   Reported, Patient     TIZANIDINE HCL PO Take 16 mg by mouth At Bedtime    Reported, Patient     Vitamin D3 (CHOLECALCIFEROL) 25 mcg (1000 units) tablet Take 1 tablet (25 mcg) by mouth 2 times daily   Fayd Adkins PA-C               Review of Systems:     A Comprehensive greater than 10 system review of systems was carried out.  Pertinent positives and negatives are noted above.  Otherwise negative for contributory information.           Physical Exam:   Blood pressure 113/78, pulse 65, resp. rate 19, SpO2 98%.  Wt Readings from Last 1 Encounters:   10/02/23 115.3 kg (254 lb 1.6 oz)     Exam:  GENERAL: No apparent distress. Awake, alert, and fully oriented.  HEENT: Normocephalic, atraumatic. Extraocular movements intact.  CARDIOVASCULAR: Regular rate and rhythm without murmurs or rubs. No S3.  PULMONARY: Clear to auscultation bilaterally.  ABDOMINAL: Soft, non-tender, non-distended. Bowel sounds normoactive.   EXTREMITIES: No cyanosis or clubbing. No appreciable edema.  NEUROLOGICAL: CN 2-12 grossly intact, no focal neurological deficits.  DERMATOLOGICAL: No rash, ulcer, bruising, nor jaundice.          Data:   EKG:  Personally reviewed.  Sinus bradycardia with a rate of 50 and no ischemic findings otherwise normal intervals.    Laboratory:  Recent Labs   Lab 10/28/23  0315   WBC 9.5   HGB 13.3   HCT 41.0   MCV 98        Recent Labs   Lab 10/28/23  0315     "  POTASSIUM 4.6   CHLORIDE 105   CO2 23   ANIONGAP 10   GLC 98   BUN 29.9*   CR 2.58*   GFRESTIMATED 27*   GRACE 9.1     No results for input(s): \"CULT\" in the last 168 hours.    Imaging:  Recent Results (from the past 24 hour(s))   CT Aortic Survey w Contrast    Narrative    EXAM: CT AORTIC SURVEY WITH CONTRAST  LOCATION: [facility]  DATE: 10/28/2023    INDICATION: Pain between shoulders and right arm numbness.  COMPARISON: MRI abdomen 05/30/2018.  TECHNIQUE: CT angiogram chest abdomen pelvis during arterial phase of injection of IV contrast. 2D and 3D MIP reconstructions were performed by the CT technologist. Dose reduction techniques were used.   CONTRAST: 72 mL Isovue 370.    FINDINGS:   CT ANGIOGRAM CHEST, ABDOMEN, AND PELVIS: No evidence for thoracic aortic dissection or aneurysm. No abdominal aortic aneurysm or dissection. No evidence for great vessel aortic arch hemodynamically significant stenosis. No hemodynamically significant   stenosis involving the branch vessels of the abdominal aorta including the pelvic arteries.    LUNGS AND PLEURA: Dependent atelectasis in the posterior lung bases. Otherwise, lungs are clear. No pleural effusions.    MEDIASTINUM/AXILLAE: No lymphadenopathy. No thoracic aortic aneurysms. Moderate-sized hiatal hernia. No pericardial effusion.    CORONARY ARTERY CALCIFICATION: Moderate.    HEPATOBILIARY: No significant mass or bile duct dilatation. No calcified gallstones.     PANCREAS: No significant mass, duct dilatation, or inflammatory change.    SPLEEN: Normal.    ADRENAL GLANDS: Normal.    KIDNEYS/BLADDER: No significant mass, stone, or hydronephrosis.    BOWEL: Nonobstructive nonspecific bowel gas pattern. Scattered colonic diverticula without evidence for diverticulitis.    LYMPH NODES: 6.1 x 4.6 x 7.4 cm lobulated partially calcified nonenhancing mass in the left upper quadrant adjacent to the spleen and the stomach. This has not changed significantly " since the prior MRI. This is indeterminant but allowing for stability is   likely benign.    PELVIC ORGANS: No pelvic masses.    MUSCULOSKELETAL: Mild spondylosis. Postoperative changes of posterior dee dee and intervertebral body fusion at L3-L4.      Impression    IMPRESSION:  1.  No evidence for thoracic aortic dissection or aneurysm. No abdominal aortic aneurysm or dissection. No evidence for great vessel aortic arch hemodynamically significant stenosis. No hemodynamically significant stenosis involving the branch vessels of   the abdominal aorta including the pelvic arteries.    2.  6.1 x 4.6 x 7.4 cm lobulated partially calcified nonenhancing mass in the left upper quadrant adjacent to the spleen and the stomach. This has not changed significantly since the prior MRI. This is indeterminant but allowing for stability is likely   benign.    3.  Moderate hiatal hernia.    4.  Otherwise, unremarkable.         Jose D Anand DO MPH  [facility] Hospitalist  [address] [place] [address]  10/28/2023

## 2023-10-28 NOTE — PROGRESS NOTES
PRIMARY DIAGNOSIS: ACUTE KIDNEY INJURY/HYPOTENSION    OUTPATIENT/OBSERVATION GOALS TO BE MET BEFORE DISCHARGE  1. Pain Status: Improved-controlled with oral pain medications.    2. Tolerating adequate PO diet: Yes    3. Surgical Intervention planned: No    4. Cleared by consultants (if involved): No    5. Return to near baseline physical activity: Yes    Discharge Planner Nurse   Safe discharge environment identified: No  Barriers to discharge: Yes       Entered by: Kaity Nolasco RN 10/28/2023 4:18 PM     Please review provider order for any additional goals.   Nurse to notify provider when observation goals have been met and patient is ready for discharge.

## 2023-10-28 NOTE — PROGRESS NOTES
Progress note    Patient seen by me as well as Ann-Marie Schwartz medical student bedside.  Patient is here with some upper arm neurological changes and was found to have hypotension acute renal failure on admission.  Full H&P was done at 6 AM by Dr. Anand whom I agree with    Plan:  Hypertension/hypotension/bradycardia: Patient started develop hypertension in the emergency room so we will resume him on Norvasc as well as a lower dose of his Coreg as he had bradycardia in the 50s on admission.  We will hold his lisinopril given his acute renal failure.  Acute renal failure: We will continue the patient on his IV fluids repeating his chemistries in the morning.  Chronic pain syndrome: Patient Cymbalta and Neurontin will be resumed  Medications: Patient is observation status so most of his medications will be held at this point.  Disposition: Patient's goal is to go home tomorrow.  He works for [employer] is hoping to get back to work as soon as possible.  He will miss tomorrow's scheduled workday.    This is a nonbillable note as my partner saw the patient already earlier this morning.    Santy Moreno MD

## 2023-10-28 NOTE — ED PROVIDER NOTES
History     Chief Complaint:  Numbness       HPI   Avi Bhatti is a 63 year old male with past medical history of chronic kidney disease, ankylosing spondylolisthesis, Crohn's disease, hypertension, chronic migraines, obesity, chronic cervical degenerative disease and hyperlipidemia who presents to the emergency department with hypotension, back pain and bilateral arm numbness.  Patient reports that he has had some intermittent arm numbness and radicular symptoms due to his cervical radiculopathy but noticed some worsening arm numbness bilaterally last night when he woke up.  He also had some pain between his shoulder blades but no significant chest pain, abdominal pain, nausea, vomiting, fevers or chills.  He notes chronic neck pain and headache which is largely unchanged from previous.  When EMS arrived they noted him to be quite hypotensive with systolics in the 80s.  He was transported to our emergency department for further evaluation and treatment.  Patient explains that he recently got started on some new blood pressure medications and has been compliant with his blood pressure medications.  He notes some decreased p.o. intake but denies any vomiting or diarrhea.    Review of External Notes:   Reviewed recent visit for hypertension on 10/13/2023.  Patient was recently started on new blood pressure medication of amlodipine in addition to the lisinopril and metoprolol that he has previously been taking      Medications:    amLODIPine (NORVASC) 5 MG tablet  atorvastatin (LIPITOR) 20 MG tablet  calcium carbonate-vitamin D (CALCIUM 600+D) 600-200 MG-UNIT TABS  colchicine (COLCRYS) 0.6 MG tablet  DULoxetine (CYMBALTA) 30 MG capsule  DULoxetine (CYMBALTA) 60 MG capsule  febuxostat (ULORIC) 40 MG TABS tablet  gabapentin (NEURONTIN) 300 MG capsule  HUMIRA PEN 40 MG/0.8ML pen kit  lisinopril (ZESTRIL) 40 MG tablet  metoprolol succinate ER (TOPROL-XL) 100 MG 24 hr tablet  misoprostol (CYTOTEC) 200 MCG  tablet  multivitamin w/minerals (THERA-VIT-M) tablet  pantoprazole (PROTONIX) 40 MG EC tablet  TIZANIDINE HCL PO  Vitamin D3 (CHOLECALCIFEROL) 25 mcg (1000 units) tablet        Past Medical History:    Past Medical History:   Diagnosis Date    Ankylosing spondylitis (H)     Arthritis     Crohn's colitis (H)     Gastroesophageal reflux disease     Hypertension     Migraine     Other chronic pain     Sleep apnea        Past Surgical History:    Past Surgical History:   Procedure Laterality Date    ARTHROPLASTY HIP Left 11/19/2019    Procedure: Left total hip arthroplasty;  Surgeon: Allen Coats MD;  Location:  OR    BOTOX CHRONIC MIGRAINE HEAD ACHES      for chronic headache and neck pain, injections every 12 weeks    COLONOSCOPY      EXTRACTION(S) DENTAL  12/18/2012    Procedure: EXTRACTION(S) DENTAL;  Extraction of Teeth 30, 19;  Surgeon: Sujey Cunningham DDS;  Location:  OR    OPTICAL TRACKING SYSTEM FUSION POSTERIOR SPINE LUMBAR N/A 2/2/2021    Procedure: Lumbar 3-4 posterior lumbar instrumented fusion with interbody cage;  Surgeon: Jakub Velasquez MD;  Location:  OR    repair fracture & insert pins left hand  1996    SMALL BOWEL RESECTION  1993        Physical Exam   Patient Vitals for the past 24 hrs:   BP Pulse Resp SpO2   10/28/23 0606 113/78 -- -- 98 %   10/28/23 0551 127/87 65 -- 98 %   10/28/23 0536 123/84 62 -- 95 %   10/28/23 0521 116/84 63 -- 96 %   10/28/23 0406 95/61 59 19 98 %   10/28/23 0351 (!) 89/60 55 18 94 %   10/28/23 0345 (!) 89/66 55 25 94 %   10/28/23 0336 (!) 81/72 52 26 100 %   10/28/23 0335 (!) 85/64 52 24 96 %   10/28/23 0325 (!) 85/62 52 18 96 %   10/28/23 0321 (!) 83/55 (!) 49 21 97 %   10/28/23 0315 (!) 74/58 51 23 98 %   10/28/23 0305 (!) 88/61 (!) 49 15 97 %   10/28/23 0302 (!) 88/61 51 20 98 %        Physical Exam  General: Patient is awake, alert  Neck: Normal range of motion.   CV: Regular rate and rhythm.  Intact peripheral pulses in upper and lower  extremities.  Resp: Lungs are clear without wheezes or rales. No respiratory distress.   GI: Abdomen is soft, no rigidity, guarding, or rebound. No distension. No tenderness to palpation in any quadrant.  MS: Normal tone. Joints grossly normal without effusions. No asymmetric leg swelling, calf or thigh tenderness.    Skin: No rash or lesions noted. Normal capillary refill noted  Neuro: Speech is normal and fluent. Face is symmetric. Moving all extremities.   Psych:  Normal affect.  Appropriate interactions.     Emergency Department Course     ECG results from 10/28/23   EKG 12 lead     Value    Systolic Blood Pressure     Diastolic Blood Pressure     Ventricular Rate 50    Atrial Rate 50    KY Interval 128    QRS Duration 84        QTc 393    P Axis 24    R AXIS 4    T Axis 34    Interpretation ECG      Sinus bradycardia  Otherwise normal ECG  No previous ECGs available          Imaging:  CT Aortic Survey w Contrast   Final Result   IMPRESSION:   1.  No evidence for thoracic aortic dissection or aneurysm. No abdominal aortic aneurysm or dissection. No evidence for great vessel aortic arch hemodynamically significant stenosis. No hemodynamically significant stenosis involving the branch vessels of    the abdominal aorta including the pelvic arteries.      2.  6.1 x 4.6 x 7.4 cm lobulated partially calcified nonenhancing mass in the left upper quadrant adjacent to the spleen and the stomach. This has not changed significantly since the prior MRI. This is indeterminant but allowing for stability is likely    benign.      3.  Moderate hiatal hernia.      4.  Otherwise, unremarkable.                Laboratory:  Labs Ordered and Resulted from Time of ED Arrival to Time of ED Departure   BASIC METABOLIC PANEL - Abnormal       Result Value    Sodium 138      Potassium 4.6      Chloride 105      Carbon Dioxide (CO2) 23      Anion Gap 10      Urea Nitrogen 29.9 (*)     Creatinine 2.58 (*)     GFR Estimate 27 (*)      Calcium 9.1      Glucose 98     CBC WITH PLATELETS AND DIFFERENTIAL - Abnormal    WBC Count 9.5      RBC Count 4.20 (*)     Hemoglobin 13.3      Hematocrit 41.0      MCV 98      MCH 31.7      MCHC 32.4      RDW 13.2      Platelet Count 166      % Neutrophils 52      % Lymphocytes 32      % Monocytes 11      % Eosinophils 4      % Basophils 1      % Immature Granulocytes 0      NRBCs per 100 WBC 0      Absolute Neutrophils 5.0      Absolute Lymphocytes 3.0      Absolute Monocytes 1.0      Absolute Eosinophils 0.3      Absolute Basophils 0.1      Absolute Immature Granulocytes 0.0      Absolute NRBCs 0.0     PHOSPHORUS - Abnormal    Phosphorus 5.2 (*)    INR - Normal    INR 0.98     TROPONIN T, HIGH SENSITIVITY - Normal    Troponin T, High Sensitivity 17     CK TOTAL - Normal         TROPONIN T, HIGH SENSITIVITY   ROUTINE UA WITH MICROSCOPIC   TYPE AND SCREEN, ADULT    ABO/RH(D) O NEG      Antibody Screen Negative      SPECIMEN EXPIRATION DATE [id number]     ABO/RH TYPE AND SCREEN   FRACTIONAL EXCRETION OF SODIUM          Emergency Department Course & Assessments:             Interventions:  Medications   acetaminophen (TYLENOL) tablet 650 mg (has no administration in time range)     Or   acetaminophen (TYLENOL) Suppository 650 mg (has no administration in time range)   melatonin tablet 1 mg (has no administration in time range)   senna-docusate (SENOKOT-S/PERICOLACE) 8.6-50 MG per tablet 1 tablet (has no administration in time range)     Or   senna-docusate (SENOKOT-S/PERICOLACE) 8.6-50 MG per tablet 2 tablet (has no administration in time range)   polyethylene glycol (MIRALAX) Packet 17 g (has no administration in time range)   bisacodyl (DULCOLAX) suppository 10 mg (has no administration in time range)   ondansetron (ZOFRAN ODT) ODT tab 4 mg (has no administration in time range)     Or   ondansetron (ZOFRAN) injection 4 mg (has no administration in time range)   sodium chloride 0.9 % infusion (  Intravenous $New Bag 10/28/23 0700)   sodium chloride 0.9% BOLUS 1,000 mL (0 mLs Intravenous Stopped 10/28/23 0645)   CT scan flush (60 mLs Intravenous $Given 10/28/23 0502)   iopamidol (ISOVUE-370) solution 500 mL (72 mLs Intravenous $Given 10/28/23 0502)          Disposition:  The patient was admitted to the hospital under the care of Dr. Anand.     Impression & Plan      Medical Decision Making:  Avi Bhatti is a 63 year old male with past medical history of chronic kidney disease, ankylosing spondylolisthesis, Crohn's disease, hypertension, chronic migraines, obesity, chronic cervical degenerative disease and hyperlipidemia who presents to the emergency department with hypotension, back pain and bilateral arm numbness.  On initial evaluation here he is hypotensive with systolic blood pressures in the 80s.  He mildly bradycardic but afebrile.  He is oxygenating well on room air.  He does not have any focal neurological deficits that I can appreciate but does have some subjective numbness and tingling in his bilateral arms worse on the right when compared to the left.  No significant symptoms in the lower extremities.  Given his history, exam and presentation I was concerned about the possibility of or aortic pathology.  CTA of the aorta was ordered.  I discussions with radiology tech, radiologist and the patient regarding his low GFR and acute on chronic kidney injury that was revealed on his BMP.  Baseline creatinine appears to be around 1.2-1.5 however today is elevated at 2.5 with a GFR of 27.  I discussed the risks and benefits of performing the CT with contrast with the patient.  He understands the risks of contrast dye nephropathy and wishes to proceed with the imaging to rule out aortic dissection.  Fortunately the CTA does not reveal any evidence of aortic dissection or significant pathology.  There was evidence of a benign mass that is largely unchanged from previous.  Fortunately the patient's blood  pressures responded well to IV fluids.  And his symptoms are dramatically improved.  Cardiac equivalent was also considered but EKG shows no signs of ischemia and initial troponin was negative.  However given the patient's initial presentation with hypotension and acute on chronic kidney injury, I believe he warrants admission for further evaluation and treatment.  I discussed the case with Dr. Anand who very graciously accepted the patient under his care.      Diagnosis:    ICD-10-CM    1. Hypotension, unspecified hypotension type  I95.9              MD Desire Nixon Christopher Joseph, MD  10/28/23 0720

## 2023-10-28 NOTE — ED NOTES
[facility]  ED Nurse Handoff Report    ED Chief complaint: Numbness  . ED Diagnosis:   Final diagnoses:   Hypotension, unspecified hypotension type       Allergies:   Allergies   Allergen Reactions    Prednisone Other (See Comments)     Elevated heart rate, has had without problems more recently    Reglan [Metoclopramide] Other (See Comments)     agitation    Unknown [No Clinical Screening - See Comments]      benny have not worked in past       Code Status: Full Code    Activity level - Baseline/Home:  independent.  Activity Level - Current:   assist of 1.   Lift room needed: No.   Bariatric: No   Needed: No   Isolation: No.   Infection: Not Applicable.     Respiratory status: Room air    Vital Signs (within 30 minutes):   Vitals:    10/28/23 0521 10/28/23 0536 10/28/23 0551 10/28/23 0606   BP: 116/84 123/84 127/87 113/78   Pulse: 63 62 65    Resp:       SpO2: 96% 95% 98% 98%       Cardiac Rhythm:  ,      Pain level:    Patient confused: No.   Patient Falls Risk: activity supervised.   Elimination Status: Has voided     Patient Report - Initial Complaint: Pt to ER with c/o hypotension.   Focused Assessment: Pt to ER with c/o bilat shoulder pain with numbness down  bilat arms    Abnormal Results:   Labs Ordered and Resulted from Time of ED Arrival to Time of ED Departure   BASIC METABOLIC PANEL - Abnormal       Result Value    Sodium 138      Potassium 4.6      Chloride 105      Carbon Dioxide (CO2) 23      Anion Gap 10      Urea Nitrogen 29.9 (*)     Creatinine 2.58 (*)     GFR Estimate 27 (*)     Calcium 9.1      Glucose 98     CBC WITH PLATELETS AND DIFFERENTIAL - Abnormal    WBC Count 9.5      RBC Count 4.20 (*)     Hemoglobin 13.3      Hematocrit 41.0      MCV 98      MCH 31.7      MCHC 32.4      RDW 13.2      Platelet Count 166      % Neutrophils 52      % Lymphocytes 32      % Monocytes 11      % Eosinophils 4      % Basophils 1      % Immature Granulocytes 0      NRBCs per  100 WBC 0      Absolute Neutrophils 5.0      Absolute Lymphocytes 3.0      Absolute Monocytes 1.0      Absolute Eosinophils 0.3      Absolute Basophils 0.1      Absolute Immature Granulocytes 0.0      Absolute NRBCs 0.0     INR - Normal    INR 0.98     TROPONIN T, HIGH SENSITIVITY - Normal    Troponin T, High Sensitivity 17     TROPONIN T, HIGH SENSITIVITY   TYPE AND SCREEN, ADULT    ABO/RH(D) O NEG      Antibody Screen Negative      SPECIMEN EXPIRATION DATE [id number]     ABO/RH TYPE AND SCREEN   FRACTIONAL EXCRETION OF SODIUM        CT Aortic Survey w Contrast   Final Result   IMPRESSION:   1.  No evidence for thoracic aortic dissection or aneurysm. No abdominal aortic aneurysm or dissection. No evidence for great vessel aortic arch hemodynamically significant stenosis. No hemodynamically significant stenosis involving the branch vessels of    the abdominal aorta including the pelvic arteries.      2.  6.1 x 4.6 x 7.4 cm lobulated partially calcified nonenhancing mass in the left upper quadrant adjacent to the spleen and the stomach. This has not changed significantly since the prior MRI. This is indeterminant but allowing for stability is likely    benign.      3.  Moderate hiatal hernia.      4.  Otherwise, unremarkable.             Treatments provided: IVFs, CT   Family Comments: wife at bedside  OBS brochure/video discussed/provided to patient:  Yes  ED Medications:   Medications   acetaminophen (TYLENOL) tablet 650 mg (has no administration in time range)     Or   acetaminophen (TYLENOL) Suppository 650 mg (has no administration in time range)   melatonin tablet 1 mg (has no administration in time range)   senna-docusate (SENOKOT-S/PERICOLACE) 8.6-50 MG per tablet 1 tablet (has no administration in time range)     Or   senna-docusate (SENOKOT-S/PERICOLACE) 8.6-50 MG per tablet 2 tablet (has no administration in time range)   polyethylene glycol (MIRALAX) Packet 17 g (has no administration in time range)    bisacodyl (DULCOLAX) suppository 10 mg (has no administration in time range)   ondansetron (ZOFRAN ODT) ODT tab 4 mg (has no administration in time range)     Or   ondansetron (ZOFRAN) injection 4 mg (has no administration in time range)   sodium chloride 0.9 % infusion (has no administration in time range)   sodium chloride 0.9% BOLUS 1,000 mL (1,000 mLs Intravenous $New Bag 10/28/23 0320)   CT scan flush (60 mLs Intravenous $Given 10/28/23 0502)   iopamidol (ISOVUE-370) solution 500 mL (72 mLs Intravenous $Given 10/28/23 0502)       Drips infusing:  No  For the majority of the shift this patient was Green.   Interventions performed were N/A.    Sepsis treatment initiated: No    Cares/treatment/interventions/medications to be completed following ED care: See Epic    ED Nurse Name: Yesy Low RN  6:07 AM     RECEIVING UNIT ED HANDOFF REVIEW    Above ED Nurse Handoff Report was reviewed: Yes  Reviewed by: Kaity Nolasco RN on October 28, 2023 at 12:02 PM

## 2023-10-28 NOTE — PLAN OF CARE
[facility]    ED Boarding Nurse Handoff Addendum Report:    Date/time: 10/28/2023, 7:27 AM    Activity Level: assist of 1    Fall Risk: Yes:  patient and family education, assistive device/personal items within reach, and activity supervised    Active Infusions: NaCl 0.9 % @ 125 ml/hr    Current Meds Due: n/a    Current care needs: n/a    Oxygen requirements (liters/min and/or FiO2): n/a    Respiratory status: Room air    Vital signs (within last 30 minutes):    Vitals:    10/28/23 0521 10/28/23 0536 10/28/23 0551 10/28/23 0606   BP: 116/84 123/84 127/87 113/78   Pulse: 63 62 65    Resp:       SpO2: 96% 95% 98% 98%       Focused assessment within last 30 minutes:    Pt alert and oriented x 4. On RA. Tolerating regular diet. Not out of bed this shift. Urine sample pending.    ED Boarding Nurse name: Em Miner RN

## 2023-10-28 NOTE — ED TRIAGE NOTES
Pt to ER with c/o bilat shoulder and arm numbness , pt state started about 0100, pt states tingling into bilat fingertips, pt with low blood pressure per EMS, pt usually has HTN, pt has BS of 142

## 2023-10-28 NOTE — PROGRESS NOTES
"PRIMARY DIAGNOSIS: ACUTE KIDNEY INJURY/HYPOTENSION    OUTPATIENT/OBSERVATION GOALS TO BE MET BEFORE DISCHARGE  1. Pain Status: Improved-controlled with oral pain medications.    2. Tolerating adequate PO diet: Yes    3. Surgical Intervention planned: No    4. Cleared by consultants (if involved): No    5. Return to near baseline physical activity: Yes    Discharge Planner Nurse   Safe discharge environment identified: No  Barriers to discharge: Yes       Entered by: Kaity Nolasco RN 10/28/2023 4:23 PM   Pt A&O x 4. VSS. BP elevated. Coreg given, Up with SBA. On room air. BS+. LS clear. Denies nausea or SOB. C/o headache and abdominal pain.PRN Tylenol given PIV infusing NS at 125 mL/h. Tolerating regular diet. Monitor intake and output. Headache resolved. Had some relief from back pain.   BP (!) 160/101 (BP Location: Right arm, Patient Position: Chair, Cuff Size: Adult Regular)   Pulse 78   Temp 97.9  F (36.6  C) (Oral)   Resp 20   Ht 1.753 m (5' 9\")   Wt 116.7 kg (257 lb 4.4 oz)   SpO2 93%   BMI 37.99 kg/m        BP (!) 156/101 (BP Location: Right arm, Patient Position: Semi-Huang's)   Pulse 76   Temp 97.9  F (36.6  C) (Oral)   Resp 20   Ht 1.753 m (5' 9\")   Wt 116.7 kg (257 lb 4.4 oz)   SpO2 93%   BMI 37.99 kg/m        Please review provider order for any additional goals.   Nurse to notify provider when observation goals have been met and patient is ready for discharge.  "

## 2023-10-28 NOTE — PLAN OF CARE
PRIMARY DIAGNOSIS: Hypotension - JARRELL  OUTPATIENT/OBSERVATION GOALS TO BE MET BEFORE DISCHARGE:  ADLs back to baseline: Yes    Activity and level of assistance: Up with standby assistance.    Pain status: Pain free.    Return to near baseline physical activity: Yes     Discharge Planner Nurse   Safe discharge environment identified: Yes  Barriers to discharge: Yes       Entered by: Hipolito Tamez RN 10/28/2023 12:14 PM     Please review provider order for any additional goals.   Nurse to notify provider when observation goals have been met and patient is ready for discharge.    RN - VSS. Tele SR. BP beginning to increase - starting some PO BP medications this morning. Up with SBA, Voiding adequately. Plan pending stable BP medication routine.

## 2023-10-29 LAB
ANION GAP SERPL CALCULATED.3IONS-SCNC: 8 MMOL/L (ref 7–15)
BUN SERPL-MCNC: 17.1 MG/DL (ref 8–23)
CALCIUM SERPL-MCNC: 8.4 MG/DL (ref 8.8–10.2)
CHLORIDE SERPL-SCNC: 109 MMOL/L (ref 98–107)
CREAT SERPL-MCNC: 1.27 MG/DL (ref 0.67–1.17)
DEPRECATED HCO3 PLAS-SCNC: 20 MMOL/L (ref 22–29)
EGFRCR SERPLBLD CKD-EPI 2021: 63 ML/MIN/1.73M2
ERYTHROCYTE [DISTWIDTH] IN BLOOD BY AUTOMATED COUNT: 13 % (ref 10–15)
GLUCOSE SERPL-MCNC: 93 MG/DL (ref 70–99)
HCT VFR BLD AUTO: 38.5 % (ref 40–53)
HGB BLD-MCNC: 12.8 G/DL (ref 13.3–17.7)
MCH RBC QN AUTO: 32.1 PG (ref 26.5–33)
MCHC RBC AUTO-ENTMCNC: 33.2 G/DL (ref 31.5–36.5)
MCV RBC AUTO: 97 FL (ref 78–100)
PLATELET # BLD AUTO: 175 10E3/UL (ref 150–450)
POTASSIUM SERPL-SCNC: 4.6 MMOL/L (ref 3.4–5.3)
RBC # BLD AUTO: 3.99 10E6/UL (ref 4.4–5.9)
SODIUM SERPL-SCNC: 137 MMOL/L (ref 135–145)
WBC # BLD AUTO: 6.1 10E3/UL (ref 4–11)

## 2023-10-29 PROCEDURE — 250N000013 HC RX MED GY IP 250 OP 250 PS 637: Performed by: HOSPITALIST

## 2023-10-29 PROCEDURE — G0378 HOSPITAL OBSERVATION PER HR: HCPCS

## 2023-10-29 PROCEDURE — 82310 ASSAY OF CALCIUM: CPT | Performed by: HOSPITALIST

## 2023-10-29 PROCEDURE — 99232 SBSQ HOSP IP/OBS MODERATE 35: CPT | Performed by: HOSPITALIST

## 2023-10-29 PROCEDURE — 250N000013 HC RX MED GY IP 250 OP 250 PS 637: Performed by: INTERNAL MEDICINE

## 2023-10-29 PROCEDURE — 85027 COMPLETE CBC AUTOMATED: CPT | Performed by: HOSPITALIST

## 2023-10-29 PROCEDURE — 36415 COLL VENOUS BLD VENIPUNCTURE: CPT | Performed by: HOSPITALIST

## 2023-10-29 PROCEDURE — 258N000003 HC RX IP 258 OP 636: Performed by: HOSPITALIST

## 2023-10-29 RX ORDER — PANTOPRAZOLE SODIUM 40 MG/1
40 TABLET, DELAYED RELEASE ORAL
Status: DISCONTINUED | OUTPATIENT
Start: 2023-10-29 | End: 2023-10-31 | Stop reason: HOSPADM

## 2023-10-29 RX ORDER — AMLODIPINE BESYLATE 10 MG/1
10 TABLET ORAL DAILY
Qty: 30 TABLET | Refills: 0 | Status: SHIPPED | OUTPATIENT
Start: 2023-10-30 | End: 2023-11-09

## 2023-10-29 RX ORDER — CARVEDILOL 3.12 MG/1
3.12 TABLET ORAL 2 TIMES DAILY WITH MEALS
Qty: 60 TABLET | Refills: 0 | Status: SHIPPED | OUTPATIENT
Start: 2023-10-29 | End: 2023-10-31

## 2023-10-29 RX ORDER — CARVEDILOL 3.12 MG/1
6.25 TABLET ORAL 2 TIMES DAILY WITH MEALS
Status: DISCONTINUED | OUTPATIENT
Start: 2023-10-29 | End: 2023-10-31 | Stop reason: HOSPADM

## 2023-10-29 RX ORDER — AMLODIPINE BESYLATE 5 MG/1
10 TABLET ORAL DAILY
Status: DISCONTINUED | OUTPATIENT
Start: 2023-10-30 | End: 2023-10-31 | Stop reason: HOSPADM

## 2023-10-29 RX ORDER — AMLODIPINE BESYLATE 5 MG/1
5 TABLET ORAL ONCE
Status: COMPLETED | OUTPATIENT
Start: 2023-10-29 | End: 2023-10-29

## 2023-10-29 RX ADMIN — GABAPENTIN 300 MG: 300 CAPSULE ORAL at 08:33

## 2023-10-29 RX ADMIN — PANTOPRAZOLE SODIUM 40 MG: 40 TABLET, DELAYED RELEASE ORAL at 00:32

## 2023-10-29 RX ADMIN — AMLODIPINE BESYLATE 5 MG: 5 TABLET ORAL at 08:34

## 2023-10-29 RX ADMIN — AMLODIPINE BESYLATE 5 MG: 5 TABLET ORAL at 14:29

## 2023-10-29 RX ADMIN — DULOXETINE HYDROCHLORIDE 90 MG: 30 CAPSULE, DELAYED RELEASE ORAL at 08:33

## 2023-10-29 RX ADMIN — ACETAMINOPHEN 650 MG: 325 TABLET, FILM COATED ORAL at 07:00

## 2023-10-29 RX ADMIN — CARVEDILOL 3.12 MG: 3.12 TABLET, FILM COATED ORAL at 08:33

## 2023-10-29 RX ADMIN — PANTOPRAZOLE SODIUM 40 MG: 40 TABLET, DELAYED RELEASE ORAL at 08:34

## 2023-10-29 RX ADMIN — ACETAMINOPHEN 650 MG: 325 TABLET, FILM COATED ORAL at 20:53

## 2023-10-29 RX ADMIN — TIZANIDINE 8 MG: 4 TABLET ORAL at 22:30

## 2023-10-29 RX ADMIN — PANTOPRAZOLE SODIUM 40 MG: 40 TABLET, DELAYED RELEASE ORAL at 17:22

## 2023-10-29 RX ADMIN — ACETAMINOPHEN 650 MG: 325 TABLET, FILM COATED ORAL at 14:32

## 2023-10-29 RX ADMIN — CARVEDILOL 6.25 MG: 3.12 TABLET, FILM COATED ORAL at 17:22

## 2023-10-29 RX ADMIN — SODIUM CHLORIDE: 9 INJECTION, SOLUTION INTRAVENOUS at 07:11

## 2023-10-29 RX ADMIN — GABAPENTIN 900 MG: 300 CAPSULE ORAL at 22:29

## 2023-10-29 ASSESSMENT — ACTIVITIES OF DAILY LIVING (ADL)
ADLS_ACUITY_SCORE: 22

## 2023-10-29 NOTE — PROVIDER NOTIFICATION
"Provider paged at 2050: \"FYI page that pt blood pressure at 2021 was 162/129. Would you like to order anything new or should I continue to monitor? Coreg given at 1903.\"    Provider ordered IV Hydralazine to be given if SBP is above 180. This nurse will continue to monitor and administer this medication if parameters are met.   "

## 2023-10-29 NOTE — CARE PLAN
PRIMARY DIAGNOSIS: GENERALIZED WEAKNESS    OUTPATIENT/OBSERVATION GOALS TO BE MET BEFORE DISCHARGE  1. Orthostatic performed: N/A    2. Tolerating PO medications: Yes    3. Return to near baseline physical activity: Yes    4. Cleared for discharge by consultants (if involved): No    Discharge Planner Nurse   Safe discharge environment identified: Yes  Barriers to discharge: No    BP elevated, IV fluids stopped. Morning BP meds given, recheck was improved.         Entered by: Ashlee Low RN 10/29/2023 10:32 AM     Please review provider order for any additional goals.   Nurse to notify provider when observation goals have been met and patient is ready for discharge.

## 2023-10-29 NOTE — PLAN OF CARE
Goal Outcome Evaluation:    Orientation: Alert and oriented x4.   VSS. 95% on room air. BP elevated at start of shift, improved throughout shift. PRN Hydralazine ordered by provider if patient blood pressure meets parameters for medication to be given. Pt did not meet requirements for this medication this shift.   LS: Clear and equal bilaterally. Home CPAP in place overnight.   GI: Patient is passing gas. Normoactive BS. No BM this shift. Denies N/V. Good PO intake.   : Adequate urine output. Pt ambulated to the bathroom multiple times overnight as needed with SBA.   Skin: C/d/I. IV infusing in L hand, IV c/d/I. IV in L AC SL.   Activity: SBA. Ambulated to bathroom 4x overnight. Pt slept comfortably throughout shift between cares.   Pain: 0/10. Pt denies pain.   Updates/Plan: Monitor vital signs. Manage pain. Labs to be drawn this morning. Continue with current cares.

## 2023-10-29 NOTE — PROGRESS NOTES
"[facility]    Hospitalist Progress Note             Date of Admission:  10/28/2023                   Day of hospitalization: 0    Assessment and Plan:      Avi Bhatti is a 63 year old male with a history of hypertension, GERD, YORDY on nocturnal CPAP, Crohn's disease, ankylosing spondylitis, and chronic neck pain who presents with bilateral hand numbness found to have hypotension which resolved with fluids as well as acute kidney injury.      Acute kidney injury on stage II chronic kidney disease:   -Significant component of dehydration.  Function has improved with IV fluids we will hold off further IV fluids    Episode of hypotension  Now with hypertension  -Suspect a component of medication traction with Coreg and colchicine we will hold off further colchicine.    -Blood pressure still continue to be elevated will add amlodipine 10 mg, increase Coreg to 6.25 mg p.o. twice daily for tomorrow  -Hold lisinopril for now given renal dysfunction    Chronic medical issues:  GERD   hyperlipidemia   YORDY  Chronic intra-abdominal mass  Obesity complicates care  ---------     # Code status: Full   # Anticipated discharge date and Disposition:1-2 days  # DVT: SCDs  # IVF:  none                      Faith Alexis MD  Text Page (7am - 6pm, M-F)               Subjective   Chief Complaint:  Hypotension  Subjective:  Feels well minimal complaints no nausea vomiting abdominal pain chest pain shortness of diarrhea          Objective   BP (!) 172/108   Pulse 64   Temp 97.8  F (36.6  C) (Oral)   Resp 20   Ht 1.753 m (5' 9\")   Wt 117.1 kg (258 lb 1.6 oz)   SpO2 94%   BMI 38.11 kg/m       Physical Exam  General: Pt in NAD, normal appearance  HEENT: OP clear MMM, no JVD  Lungs: Clear to Auscultation Bilateral, normal breathing  without accessory muscle usage, no wheezing, rhonchi or crackles  Cardiac: +S1, S2, RRR, no MRG, no edema  Abdominal: normal bowel sounds, NT/ND, no hepatosplenomegaly  Skin: warm, " "dry, normal turgor, no rash  Psyche: A& O x3, appropriate affect             Intake/Output Summary (Last 24 hours) at 10/29/2023 1333  Last data filed at 10/29/2023 1030  Gross per 24 hour   Intake 750 ml   Output 3350 ml   Net -2600 ml           Labs and Imaging Results:      Recent Labs   Lab 10/29/23  0708 10/28/23  0315   WBC 6.1 9.5   HGB 12.8* 13.3    166        Recent Labs   Lab 10/29/23  0708 10/28/23  0315    138   CO2 20* 23   BUN 17.1 29.9*        Recent Labs   Lab 10/28/23  0315   INR 0.98      No results for input(s): \"CKMB\" in the last 168 hours.    Invalid input(s): \"TROPONINT\"   No results for input(s): \"ALBUMIN\", \"AST\", \"ALT\", \"ALKPHOS\", \"BILITOT\" in the last 168 hours.     Micro:     Radio:  CT Aortic Survey w Contrast   Final Result   IMPRESSION:   1.  No evidence for thoracic aortic dissection or aneurysm. No abdominal aortic aneurysm or dissection. No evidence for great vessel aortic arch hemodynamically significant stenosis. No hemodynamically significant stenosis involving the branch vessels of    the abdominal aorta including the pelvic arteries.      2.  6.1 x 4.6 x 7.4 cm lobulated partially calcified nonenhancing mass in the left upper quadrant adjacent to the spleen and the stomach. This has not changed significantly since the prior MRI. This is indeterminant but allowing for stability is likely    benign.      3.  Moderate hiatal hernia.      4.  Otherwise, unremarkable.                 Medications:      Scheduled Meds:     [START ON 10/30/2023] amLODIPine  10 mg Oral Daily    amLODIPine  5 mg Oral Once    carvedilol  3.125 mg Oral BID w/meals    DULoxetine  90 mg Oral Daily    gabapentin  300 mg Oral QAM    gabapentin  900 mg Oral At Bedtime    pantoprazole  40 mg Oral BID AC    tiZANidine  8 mg Oral At Bedtime     Continuous Infusions:    PRN Meds:  acetaminophen **OR** acetaminophen, bisacodyl, hydrALAZINE, melatonin, ondansetron **OR** ondansetron, polyethylene glycol, " senna-docusate **OR** senna-docusate

## 2023-10-29 NOTE — PLAN OF CARE
PRIMARY DIAGNOSIS: GENERALIZED WEAKNESS    OUTPATIENT/OBSERVATION GOALS TO BE MET BEFORE DISCHARGE  1. Orthostatic performed: N/A    2. Tolerating PO medications: Yes    3. Return to near baseline physical activity: Yes    4. Cleared for discharge by consultants (if involved): N/A    Discharge Planner Nurse   Safe discharge environment identified: Yes  Barriers to discharge: No    Patient up with standby assist to bathroom. Showered today. BP elevated, MD aware and made adjustments to BP meds. Appetite good.        Entered by: Ashlee Low RN 10/29/2023 5:40 PM     Please review provider order for any additional goals.   Nurse to notify provider when observation goals have been met and patient is ready for discharge.

## 2023-10-30 PROBLEM — I10 HYPERTENSION, UNSPECIFIED TYPE: Status: ACTIVE | Noted: 2023-10-30

## 2023-10-30 LAB
ATRIAL RATE - MUSE: 50 BPM
DIASTOLIC BLOOD PRESSURE - MUSE: NORMAL MMHG
INTERPRETATION ECG - MUSE: NORMAL
P AXIS - MUSE: 24 DEGREES
PR INTERVAL - MUSE: 128 MS
QRS DURATION - MUSE: 84 MS
QT - MUSE: 432 MS
QTC - MUSE: 393 MS
R AXIS - MUSE: 4 DEGREES
SYSTOLIC BLOOD PRESSURE - MUSE: NORMAL MMHG
T AXIS - MUSE: 34 DEGREES
VENTRICULAR RATE- MUSE: 50 BPM

## 2023-10-30 PROCEDURE — 250N000013 HC RX MED GY IP 250 OP 250 PS 637: Performed by: INTERNAL MEDICINE

## 2023-10-30 PROCEDURE — 250N000013 HC RX MED GY IP 250 OP 250 PS 637: Performed by: HOSPITALIST

## 2023-10-30 PROCEDURE — 99232 SBSQ HOSP IP/OBS MODERATE 35: CPT | Performed by: HOSPITALIST

## 2023-10-30 PROCEDURE — G0378 HOSPITAL OBSERVATION PER HR: HCPCS

## 2023-10-30 PROCEDURE — 120N000004 HC R&B MS OVERFLOW

## 2023-10-30 RX ORDER — HYDRALAZINE HYDROCHLORIDE 10 MG/1
10 TABLET, FILM COATED ORAL 4 TIMES DAILY
Status: DISCONTINUED | OUTPATIENT
Start: 2023-10-30 | End: 2023-10-30

## 2023-10-30 RX ORDER — HYDRALAZINE HYDROCHLORIDE 25 MG/1
25 TABLET, FILM COATED ORAL 4 TIMES DAILY
Status: DISCONTINUED | OUTPATIENT
Start: 2023-10-30 | End: 2023-10-30

## 2023-10-30 RX ORDER — CLONIDINE HYDROCHLORIDE 0.1 MG/1
0.1 TABLET ORAL ONCE
Status: COMPLETED | OUTPATIENT
Start: 2023-10-30 | End: 2023-10-30

## 2023-10-30 RX ORDER — HYDRALAZINE HYDROCHLORIDE 25 MG/1
25 TABLET, FILM COATED ORAL EVERY 8 HOURS SCHEDULED
Status: DISCONTINUED | OUTPATIENT
Start: 2023-10-30 | End: 2023-10-31 | Stop reason: HOSPADM

## 2023-10-30 RX ADMIN — ACETAMINOPHEN 650 MG: 325 TABLET, FILM COATED ORAL at 21:40

## 2023-10-30 RX ADMIN — CARVEDILOL 6.25 MG: 3.12 TABLET, FILM COATED ORAL at 18:24

## 2023-10-30 RX ADMIN — ACETAMINOPHEN 650 MG: 325 TABLET, FILM COATED ORAL at 10:17

## 2023-10-30 RX ADMIN — PANTOPRAZOLE SODIUM 40 MG: 40 TABLET, DELAYED RELEASE ORAL at 09:25

## 2023-10-30 RX ADMIN — CLONIDINE HYDROCHLORIDE 0.1 MG: 0.1 TABLET ORAL at 23:31

## 2023-10-30 RX ADMIN — HYDRALAZINE HYDROCHLORIDE 25 MG: 25 TABLET, FILM COATED ORAL at 21:40

## 2023-10-30 RX ADMIN — PANTOPRAZOLE SODIUM 40 MG: 40 TABLET, DELAYED RELEASE ORAL at 16:53

## 2023-10-30 RX ADMIN — TIZANIDINE 8 MG: 4 TABLET ORAL at 22:21

## 2023-10-30 RX ADMIN — AMLODIPINE BESYLATE 10 MG: 5 TABLET ORAL at 09:24

## 2023-10-30 RX ADMIN — ACETAMINOPHEN 650 MG: 325 TABLET, FILM COATED ORAL at 04:28

## 2023-10-30 RX ADMIN — GABAPENTIN 300 MG: 300 CAPSULE ORAL at 09:25

## 2023-10-30 RX ADMIN — DULOXETINE HYDROCHLORIDE 90 MG: 30 CAPSULE, DELAYED RELEASE ORAL at 09:25

## 2023-10-30 RX ADMIN — CARVEDILOL 6.25 MG: 3.12 TABLET, FILM COATED ORAL at 09:26

## 2023-10-30 RX ADMIN — GABAPENTIN 900 MG: 300 CAPSULE ORAL at 21:40

## 2023-10-30 RX ADMIN — HYDRALAZINE HYDROCHLORIDE 10 MG: 10 TABLET, FILM COATED ORAL at 13:08

## 2023-10-30 ASSESSMENT — ACTIVITIES OF DAILY LIVING (ADL)
ADLS_ACUITY_SCORE: 22

## 2023-10-30 NOTE — PLAN OF CARE
Goal Outcome Evaluation:      Plan of Care Reviewed With: patient    Overall Patient Progress: no changeOverall Patient Progress: no change         Elevated BP throughout shift; provider notified and po hydralazine ordered.  Dose increased following no change to BP with first administration.  Pt complained of headache much of the day; receiving tylenol and ice packs for discomfort as his home medication was not available to him until brought in by wife.  Home Nurtec verified by pharmacy and dose finally administered at 1654.  Pt declined wanting any other medications for headache when suggesting we could call the provider. Pt flushed; states he is usually flushed but family thinks he is more red than normal. Tolerating diet. Voiding.  Family at bedside and supportive.

## 2023-10-30 NOTE — PROGRESS NOTES
"[facility]    Hospitalist Progress Note             Date of Admission:  10/28/2023                   Day of hospitalization: 0    Assessment and Plan:      Avi Bhatti is a 63 year old male with a history of hypertension, GERD, YORDY on nocturnal CPAP, Crohn's disease, ankylosing spondylitis, and chronic neck pain who presents with bilateral hand numbness found to have hypotension which resolved with fluids as well as acute kidney injury.      Acute kidney injury on stage II chronic kidney disease:   -Significant component of dehydration.  Function has improved with IV fluids we will hold off further IV fluids    Episode of hypotension  Now with hypertension  -Suspect a component of medication traction with Coreg and colchicine we will hold off further colchicine.    -Blood pressure still continue to be elevated will add amlodipine 10 mg, increase Coreg to 6.25 mg p.o. twice daily, added hydralazin today  -Hold lisinopril for now given renal dysfunction    Chronic medical issues:  GERD   hyperlipidemia   YORDY  Chronic intra-abdominal mass  Obesity complicates care  ---------     # Code status: Full   # Anticipated discharge date and Disposition:1-2 days  # DVT: SCDs  # IVF:  none                      Faith Alexis MD  Text Page (7am - 6pm, M-F)               Subjective   Chief Complaint:  Hypotension  Subjective:  Feels well minimal complaints no nausea vomiting abdominal pain chest pain shortness of diarrhea          Objective   BP (!) 187/106   Pulse 77   Temp 98.2  F (36.8  C) (Oral)   Resp 20   Ht 1.753 m (5' 9\")   Wt 114.5 kg (252 lb 6.4 oz)   SpO2 96%   BMI 37.27 kg/m       Physical Exam  General: Pt in NAD, normal appearance  HEENT: OP clear MMM, no JVD  Lungs: Clear to Auscultation Bilateral, normal breathing  without accessory muscle usage, no wheezing, rhonchi or crackles  Cardiac: +S1, S2, RRR, no MRG, no edema  Abdominal: normal bowel sounds, NT/ND, no " "hepatosplenomegaly  Skin: warm, dry, normal turgor, no rash  Psyche: A& O x3, appropriate affect             Intake/Output Summary (Last 24 hours) at 10/29/2023 1333  Last data filed at 10/29/2023 1030  Gross per 24 hour   Intake 750 ml   Output 3350 ml   Net -2600 ml           Labs and Imaging Results:      Recent Labs   Lab 10/29/23  0708 10/28/23  0315   WBC 6.1 9.5   HGB 12.8* 13.3    166        Recent Labs   Lab 10/29/23  0708 10/28/23  0315    138   CO2 20* 23   BUN 17.1 29.9*        Recent Labs   Lab 10/28/23  0315   INR 0.98      No results for input(s): \"CKMB\" in the last 168 hours.    Invalid input(s): \"TROPONINT\"   No results for input(s): \"ALBUMIN\", \"AST\", \"ALT\", \"ALKPHOS\", \"BILITOT\" in the last 168 hours.     Micro:     Radio:  CT Aortic Survey w Contrast   Final Result   IMPRESSION:   1.  No evidence for thoracic aortic dissection or aneurysm. No abdominal aortic aneurysm or dissection. No evidence for great vessel aortic arch hemodynamically significant stenosis. No hemodynamically significant stenosis involving the branch vessels of    the abdominal aorta including the pelvic arteries.      2.  6.1 x 4.6 x 7.4 cm lobulated partially calcified nonenhancing mass in the left upper quadrant adjacent to the spleen and the stomach. This has not changed significantly since the prior MRI. This is indeterminant but allowing for stability is likely    benign.      3.  Moderate hiatal hernia.      4.  Otherwise, unremarkable.                 Medications:      Scheduled Meds:     amLODIPine  10 mg Oral Daily    carvedilol  6.25 mg Oral BID w/meals    DULoxetine  90 mg Oral Daily    gabapentin  300 mg Oral QAM    gabapentin  900 mg Oral At Bedtime    hydrALAZINE  10 mg Oral 4x Daily    pantoprazole  40 mg Oral BID AC    tiZANidine  8 mg Oral At Bedtime     Continuous Infusions:    PRN Meds:  acetaminophen **OR** acetaminophen, bisacodyl, hydrALAZINE, melatonin, ondansetron **OR** ondansetron, " polyethylene glycol, senna-docusate **OR** senna-docusate

## 2023-10-30 NOTE — PLAN OF CARE
Goal Outcome Evaluation:    Orientation: Alert and oriented x4  VSS. 95% on home cpap.   LS: clear and equal bilaterally.   GI: Passing gas. no BM. Denies N/V.   : Adequate urine output.   Skin: intact  Activity: Independent. Pt slept comfortably throughout shift.   Pain: 6/10 headache. Tylenol x1.   Updates/Plan: Pt continues to have elevated BPs. Plan to increase BP meds today. Possible discharge home later today HTN management plan. Continue with current cares.

## 2023-10-30 NOTE — UTILIZATION REVIEW
[facility] Utilization Review  Admission Status; Secondary Review Determination     Admission Date: 10/28/2023  2:56 AM      Under the authority of the Utilization Management Committee, the utilization review process indicated a secondary review on the above patient.  The review outcome is based on review of the medical records, discussions with staff, and applying clinical experience noted on the date of the review.        (X)      Inpatient Status Appropriate - This patient's medical care is consistent with medical management for inpatient care and reasonable inpatient medical practice.          RATIONALE FOR DETERMINATION   Avi Bhatti is a 63 year old male with past medical history of hypotension, obesity, CKD stage II, YORDY, Crohn's disease, who presented with bilateral arm numbness and is registered to observation with hypotension, JARRELL on CKD.  Hypotension and JARRELL improved with IV fluids but is now hypertension with difficult to control blood pressure.  Started on antihypertensives which are being uptitrated but blood pressure remains elevated.  Plan to adjust medications overnight and if well controlled, discharge to home in 1 to 2 days.  Cares are rendered complex due to CKD, obesity and recent hypotension.  Anticipated length of stay is more than 2 midnights and with failed observation cares and need for ongoing management and adjustment of blood pressure medications, criteria for inpatient admission is met.  Recommendation is communicated to Dr. Alexis.        The information on this document is developed by the utilization review team in order for the business office to ensure compliance.  This only denotes the appropriateness of proper admission status and does not reflect the quality of care rendered.              Sincerely,       Luther Becerra MD, MS  Physician Advisor  Utilization Review-[place]    Phone: [phone]

## 2023-10-30 NOTE — PLAN OF CARE
Goal Outcome Evaluation:    Orientation: Alert and oriented x4.   VSS except high blood pressure. 97% on room air.   LS: Clear and equal bilaterally.   GI: Patient is passing gas. One BM this shift. Denies N/V. Normoactive BS. Good PO intake.   : Adequate urine output. Pt ambulating to the bathroom as needed.   Skin: C/d/I. IV in L hand SL, c/d/I.   Activity: SBA. Pt rested comfortably throughout shift between cares.   Pain: 3/10 headache pain. Tylenol given once with relief per pt.   Updates/Plan: Monitor vital signs. Manage pain. Continue with current cares.

## 2023-10-31 ENCOUNTER — MYC MEDICAL ADVICE (OUTPATIENT)
Dept: PEDIATRICS | Facility: CLINIC | Age: 63
End: 2023-10-31
Payer: COMMERCIAL

## 2023-10-31 VITALS
HEART RATE: 93 BPM | HEIGHT: 69 IN | DIASTOLIC BLOOD PRESSURE: 100 MMHG | WEIGHT: 248.5 LBS | RESPIRATION RATE: 18 BRPM | SYSTOLIC BLOOD PRESSURE: 144 MMHG | BODY MASS INDEX: 36.81 KG/M2 | OXYGEN SATURATION: 94 % | TEMPERATURE: 98 F

## 2023-10-31 PROCEDURE — 250N000013 HC RX MED GY IP 250 OP 250 PS 637: Performed by: HOSPITALIST

## 2023-10-31 PROCEDURE — 250N000013 HC RX MED GY IP 250 OP 250 PS 637: Performed by: INTERNAL MEDICINE

## 2023-10-31 PROCEDURE — 99239 HOSP IP/OBS DSCHRG MGMT >30: CPT | Performed by: HOSPITALIST

## 2023-10-31 RX ORDER — CARVEDILOL 6.25 MG/1
6.25 TABLET ORAL 2 TIMES DAILY WITH MEALS
Qty: 60 TABLET | Refills: 0 | Status: SHIPPED | OUTPATIENT
Start: 2023-10-31 | End: 2023-11-09

## 2023-10-31 RX ORDER — HYDRALAZINE HYDROCHLORIDE 25 MG/1
25 TABLET, FILM COATED ORAL ONCE
Status: DISCONTINUED | OUTPATIENT
Start: 2023-10-31 | End: 2023-10-31

## 2023-10-31 RX ORDER — HYDRALAZINE HYDROCHLORIDE 25 MG/1
25 TABLET, FILM COATED ORAL EVERY 8 HOURS
Qty: 180 TABLET | Refills: 0 | Status: SHIPPED | OUTPATIENT
Start: 2023-10-31 | End: 2023-10-31

## 2023-10-31 RX ORDER — HYDRALAZINE HYDROCHLORIDE 25 MG/1
25 TABLET, FILM COATED ORAL EVERY 8 HOURS
Qty: 180 TABLET | Refills: 0 | Status: SHIPPED | OUTPATIENT
Start: 2023-10-31 | End: 2023-11-27

## 2023-10-31 RX ORDER — HYDRALAZINE HYDROCHLORIDE 25 MG/1
50 TABLET, FILM COATED ORAL EVERY 8 HOURS
Qty: 180 TABLET | Refills: 0 | Status: SHIPPED | OUTPATIENT
Start: 2023-10-31 | End: 2023-10-31

## 2023-10-31 RX ADMIN — HYDRALAZINE HYDROCHLORIDE 25 MG: 25 TABLET, FILM COATED ORAL at 06:08

## 2023-10-31 RX ADMIN — ACETAMINOPHEN 650 MG: 325 TABLET, FILM COATED ORAL at 04:20

## 2023-10-31 RX ADMIN — AMLODIPINE BESYLATE 10 MG: 5 TABLET ORAL at 08:57

## 2023-10-31 RX ADMIN — GABAPENTIN 300 MG: 300 CAPSULE ORAL at 08:57

## 2023-10-31 RX ADMIN — PANTOPRAZOLE SODIUM 40 MG: 40 TABLET, DELAYED RELEASE ORAL at 08:57

## 2023-10-31 RX ADMIN — DULOXETINE HYDROCHLORIDE 90 MG: 30 CAPSULE, DELAYED RELEASE ORAL at 08:57

## 2023-10-31 RX ADMIN — CARVEDILOL 6.25 MG: 3.12 TABLET, FILM COATED ORAL at 08:57

## 2023-10-31 ASSESSMENT — ACTIVITIES OF DAILY LIVING (ADL)
ADLS_ACUITY_SCORE: 22

## 2023-10-31 NOTE — PLAN OF CARE
Goal Outcome Evaluation:      Plan of Care Reviewed With: patient    Overall Patient Progress: improving    Patient had elevated blood pressure of 154/107, provider aware, otherwise vitally stable.  Rechecked prior to discharge 144/100.  Headache rated 4/10, denied dizziness,  numbness/ tingling, chest pain/ tightness.  PRN Nurtec given at 1130.  Showered independently.   Discharge education provided to patient and wife, both verbalized understanding.  IV removed.  Patient discharged home with belongings and patient medications.

## 2023-10-31 NOTE — PLAN OF CARE
Goal Outcome Evaluation:  Vital signs: BP at 2140 was 184/118; gave scheduled oral hydralazine; BP at 2220 was 170/122; provider was notified; other vitals stable  Pain/comfort: Headache 4/10 despite taking home medication for migraines; PRN Tylenol given  Assessment: Neuros intact; alert and orientated; no changes  Nutrition: Tolerating general diet  Output: Voiding  Activity/ambulation: Up independently  Social: Family visited earlier  Plan: Continue monitoring blood pressures; pain control for headache

## 2023-10-31 NOTE — DISCHARGE SUMMARY
Discharge Summary  Hospitalist Service      Avi Bhatti MRN# [id number]   YOB: 1960 Age: 63 year old     Date of Admission:  10/28/2023  Date of Discharge:  10/31/2023  Admitting Physician:  Faith Alexis MD  Discharge Physician: Faith Alexis MD   Discharging Service: Hospitalist Service     Primary Provider: Albertina Peña  Primary Care Physician Phone Number: [phone]         Discharge Diagnoses/Problem Oriented Hospital Course (Providers):      Discharge Diagnoses   Hypertension  Hypotension  Gout    Hospital Course   arsen Bhatti is a 63 year old male with a history of hypertension, GERD, YORDY on nocturnal CPAP, Crohn's disease, ankylosing spondylitis, and chronic neck pain who presents with bilateral hand numbness found to have hypotension which resolved with fluids as well as acute kidney injury.      Acute kidney injury on stage II chronic kidney disease:   -Significant component of dehydration.Has improved with IV fluids we will hold off further IV fluids     Episode of hypotension  Now with hypertension  -Suspect a component of medication traction with Coreg and colchicine we will hold off further colchicine.    -Blood pressure still continue to be elevated will add amlodipine 10 mg, increase Coreg to 6.25 mg p.o. twice daily, added hydralazin as well. Follow up with PCP to consider switching to lisinopril if renal function stable  -Hold lisinopril for now given renal dysfunction     Chronic medical issues:  GERD   hyperlipidemia   OYRDY  Chronic intra-abdominal mass  Obesity complicates care  Gout- colchicine stopped due to interaction possibly with coreg         Code Status:      Full Code         Important Results:                  Pending Results:        Unresulted Labs Ordered in the Past 30 Days of this Admission       No orders found from 9/28/2023 to 10/29/2023.                 Discharge Instructions and Follow-Up:      Follow-up Appointments     Follow-up and  recommended labs and tests       Follow up with primary care provider, Albertina Peña, within 7 days for   hospital follow- up.  Follow up BMP with primary care provider                 Discharge Disposition:        Discharged to home          Discharge Medications:        Current Discharge Medication List        START taking these medications    Details   hydrALAZINE (APRESOLINE) 25 MG tablet Take 1 tablet (25 mg) by mouth every 8 hours  Qty: 180 tablet, Refills: 0    Associated Diagnoses: Hypertension, unspecified type           CONTINUE these medications which have CHANGED    Details   amLODIPine (NORVASC) 10 MG tablet Take 1 tablet (10 mg) by mouth daily for 30 days  Qty: 30 tablet, Refills: 0    Associated Diagnoses: Hypertension, unspecified type      carvedilol (COREG) 6.25 MG tablet Take 1 tablet (6.25 mg) by mouth 2 times daily (with meals) for 30 days  Qty: 60 tablet, Refills: 0    Associated Diagnoses: Hypertension, unspecified type           CONTINUE these medications which have NOT CHANGED    Details   acetaminophen (TYLENOL) 500 MG tablet Take 1,000 mg by mouth 2 times daily      atorvastatin (LIPITOR) 20 MG tablet Take 1 tablet (20 mg) by mouth daily  Qty: 90 tablet, Refills: 1    Associated Diagnoses: Hyperlipidemia, unspecified hyperlipidemia type      calcium carbonate-vitamin D (CALCIUM 600+D) 600-200 MG-UNIT TABS Take by mouth 2 times daily       !! DULoxetine (CYMBALTA) 30 MG capsule Take with 60 mg to equal 90 mg daily  Qty: 90 capsule, Refills: 0    Associated Diagnoses: Depression, unspecified depression type      !! DULoxetine (CYMBALTA) 60 MG capsule Take with 30 mg capsule to equal 90 mg  Qty: 90 capsule, Refills: 1    Associated Diagnoses: Depression, unspecified depression type      febuxostat (ULORIC) 40 MG TABS tablet Take 40 mg by mouth daily      !! gabapentin (NEURONTIN) 300 MG capsule Take 300 mg by mouth every morning      !! gabapentin (NEURONTIN) 300 MG capsule Take 900 mg by  "mouth At Bedtime   Refills: 1      HUMIRA PEN 40 MG/0.8ML pen kit Inject 40 mg Subcutaneous every 14 days   Refills: 0      multivitamin w/minerals (THERA-VIT-M) tablet Take 1 tablet by mouth daily.      neomycin-polymixin-dexamethasone (MAXITROL) ophthalmic ointment Place into both eyes daily Pea sized amount      pantoprazole (PROTONIX) 40 MG EC tablet Take 40 mg by mouth 2 times daily      rimegepant (NURTEC) 75 MG ODT tablet Place 75 mg under the tongue daily as needed for migraine      tiZANidine (ZANAFLEX) 4 MG tablet Take 16 mg by mouth at bedtime      Vitamin D3 (CHOLECALCIFEROL) 25 mcg (1000 units) tablet Take 1 tablet (25 mcg) by mouth 2 times daily  Qty: 180 tablet, Refills: 0    Associated Diagnoses: S/P lumbar fusion       !! - Potential duplicate medications found. Please discuss with provider.        STOP taking these medications       colchicine (COLCRYS) 0.6 MG tablet Comments:   Reason for Stopping:         lisinopril (ZESTRIL) 40 MG tablet Comments:   Reason for Stopping:                    Allergies:         Allergies   Allergen Reactions    Prednisone Other (See Comments)     Elevated heart rate, has had without problems more recently    Reglan [Metoclopramide] Other (See Comments)     agitation    Unknown [No Clinical Screening - See Comments]      benny have not worked in past            Consultations This Hospital Stay:        No consultations were requested during this admission          Condition and Physical Exam on Discharge:        Discharge condition: Stable   Discharge vitals: Blood pressure (!) 144/100, pulse 93, temperature 98  F (36.7  C), temperature source Oral, resp. rate 18, height 1.753 m (5' 9\"), weight 112.7 kg (248 lb 8 oz), SpO2 94%.   General: Pt in NAD, normal appearance  HEENT: OP clear MMM, no JVD  Lungs: Clear to Auscultation Bilateral, normal breathing  without accessory muscle usage, no wheezing, rhonchi or crackles  Cardiac: +S1, S2, RRR, no MRG, no " edema  Abdominal: normal bowel sounds, NT/ND, no hepatosplenomegaly  Skin: warm, dry, normal turgor, no rash  Psyche: A& O x3, appropriate affect            Discharge Orders for Skilled Facility (from Discharge Orders):        After Care Instructions       Activity      Your activity upon discharge: activity as tolerated        Diet      Follow this diet upon discharge: Orders Placed This Encounter      Regular Diet Adult                     Rehab orders for Skilled Facility (from Discharge Orders):               Discharge Time:      Greater than 30 minutes.        Image Results From This Hospital Stay (For Non-EPIC Providers):        Results for orders placed or performed during the hospital encounter of 10/28/23   CT Aortic Survey w Contrast    Narrative    EXAM: CT AORTIC SURVEY WITH CONTRAST  LOCATION: [facility]  DATE: 10/28/2023    INDICATION: Pain between shoulders and right arm numbness.  COMPARISON: MRI abdomen 05/30/2018.  TECHNIQUE: CT angiogram chest abdomen pelvis during arterial phase of injection of IV contrast. 2D and 3D MIP reconstructions were performed by the CT technologist. Dose reduction techniques were used.   CONTRAST: 72 mL Isovue 370.    FINDINGS:   CT ANGIOGRAM CHEST, ABDOMEN, AND PELVIS: No evidence for thoracic aortic dissection or aneurysm. No abdominal aortic aneurysm or dissection. No evidence for great vessel aortic arch hemodynamically significant stenosis. No hemodynamically significant   stenosis involving the branch vessels of the abdominal aorta including the pelvic arteries.    LUNGS AND PLEURA: Dependent atelectasis in the posterior lung bases. Otherwise, lungs are clear. No pleural effusions.    MEDIASTINUM/AXILLAE: No lymphadenopathy. No thoracic aortic aneurysms. Moderate-sized hiatal hernia. No pericardial effusion.    CORONARY ARTERY CALCIFICATION: Moderate.    HEPATOBILIARY: No significant mass or bile duct dilatation. No calcified gallstones.      PANCREAS: No significant mass, duct dilatation, or inflammatory change.    SPLEEN: Normal.    ADRENAL GLANDS: Normal.    KIDNEYS/BLADDER: No significant mass, stone, or hydronephrosis.    BOWEL: Nonobstructive nonspecific bowel gas pattern. Scattered colonic diverticula without evidence for diverticulitis.    LYMPH NODES: 6.1 x 4.6 x 7.4 cm lobulated partially calcified nonenhancing mass in the left upper quadrant adjacent to the spleen and the stomach. This has not changed significantly since the prior MRI. This is indeterminant but allowing for stability is   likely benign.    PELVIC ORGANS: No pelvic masses.    MUSCULOSKELETAL: Mild spondylosis. Postoperative changes of posterior dee dee and intervertebral body fusion at L3-L4.      Impression    IMPRESSION:  1.  No evidence for thoracic aortic dissection or aneurysm. No abdominal aortic aneurysm or dissection. No evidence for great vessel aortic arch hemodynamically significant stenosis. No hemodynamically significant stenosis involving the branch vessels of   the abdominal aorta including the pelvic arteries.    2.  6.1 x 4.6 x 7.4 cm lobulated partially calcified nonenhancing mass in the left upper quadrant adjacent to the spleen and the stomach. This has not changed significantly since the prior MRI. This is indeterminant but allowing for stability is likely   benign.    3.  Moderate hiatal hernia.    4.  Otherwise, unremarkable.             Most Recent Lab Results In EPIC (For Non-EPIC Providers):    Most Recent 3 CBC's:  Recent Labs   Lab Test 10/29/23  0708 10/28/23  0315 10/02/23  1740 04/20/21  0833   WBC 6.1 9.5  --  7.8   HGB 12.8* 13.3 15.1 14.4   MCV 97 98  --  97    166  --  206      Most Recent 3 BMP's:  Recent Labs   Lab Test 10/29/23  0708 10/28/23  0315 10/11/23  1015 10/02/23  1740    138  --  139   POTASSIUM 4.6 4.6 5.2 5.5*   CHLORIDE 109* 105  --  105   CO2 20* 23  --  24   BUN 17.1 29.9*  --  25.5*   CR 1.27* 2.58*  --  1.29*    ANIONGAP 8 10  --  10   GRACE 8.4* 9.1  --  10.1   GLC 93 98  --  94     Most Recent 3 Troponin's:No lab results found.  Most Recent 3 INR's:  Recent Labs   Lab Test 10/28/23  0315 10/17/15  1330   INR 0.98 0.93     Most Recent 2 LFT's:  Recent Labs   Lab Test 10/02/23  1740 08/17/22  0857   AST 20 15   ALT 18 21   ALKPHOS 94 87   BILITOTAL 0.4 0.4     Most Recent Cholesterol Panel:  Recent Labs   Lab Test 10/11/23  1015   CHOL 185   LDL 78   HDL 35*   TRIG 413*     Most Recent 6 Bacteria Isolates From Any Culture (See EPIC Reports for Culture Details):No lab results found.  Most Recent TSH, T4 and HgbA1c:  Recent Labs   Lab Test 04/20/21  0833 12/26/19  1018 11/20/17  0810   TSH  --   --  1.41   A1C 5.5   < >  --     < > = values in this interval not displayed.

## 2023-11-01 ENCOUNTER — PATIENT OUTREACH (OUTPATIENT)
Dept: CARE COORDINATION | Facility: CLINIC | Age: 63
End: 2023-11-01
Payer: COMMERCIAL

## 2023-11-01 NOTE — TELEPHONE ENCOUNTER
Notified patient. He made an appt. For 11/9/23 at 9:40 with . He requested  to be his primary.  Rosa, [facility]

## 2023-11-01 NOTE — PROGRESS NOTES
Clinic Care Coordination Contact  [facility]: Post-Discharge Note  SITUATION                                                      Admission:    Admission Date: 10/28/23   Reason for Admission: Acute Kidney injury and hypotension.  Discharge:   Discharge Date: 10/31/23  Discharge Diagnosis: Acute Kidney injury and hypotension.    BACKGROUND                                                      Per hospital discharge summary and inpatient provider notes:    Avi Bhatti is a 63 year old male who presents with bilateral hand numbness.  History was obtained from my discussion with the patient at the bedside.  I also discussed the case with the ED provider.  The electronic medical record was also reviewed.     The patient reports over a year of periodic hand numbness apically located on the left hand but occasionally on the right hand as well.  It is usually when he is sleeping on his left side that this occurs and he repositions himself and it quickly improves, suggesting a neuropathic condition.  Overnight the patient awoke at about 1 AM.  He noticed significant paresthesias of the bilateral fingertips.  His wife was unable to bring him to the emergency department so she called EMS.  Upon initial assessment EMS found him to be hypotensive.  He had a blood sugar of 142.  He was brought to the emergency department.  In the emergency department he initially complained of bilateral shoulder pain.  He denied chest pain or shortness of breath.  He denies fever or cough.  He denies any abdominal pain, dysuria, hematuria, pyuria, nausea, vomiting, or diarrhea.    ASSESSMENT           Discharge Assessment  How are you doing now that you are home?: I am doing fine.  How are your symptoms? (Red Flag symptoms escalate to triage hotline per guidelines): Improved  Do you feel your condition is stable enough to be safe at home until your provider visit?: Yes  Does the patient have their discharge instructions? : Yes  Does  the patient have questions regarding their discharge instructions? : No  Were you started on any new medications or were there changes to any of your previous medications? : Yes  Does the patient have all of their medications?: Yes  Do you have questions regarding any of your medications? : No  Discharge follow-up appointment scheduled within 14 calendar days? : Yes (Pt has a BP check on 11/9 and is trying to get a follow up for the same day)  Discharge Follow Up Appointment Date: 11/09/23                PLAN                                                      Outpatient Plan: Follow up with primary care provider, Albertina Peña, within 7 days for  hospital follow- up. Follow up BMP with primary care provider    Future Appointments   Date Time Provider Department Center   11/9/2023  8:30 AM ANGELICA MORSE/LPN [facility]   12/13/2023 10:00 AM Livia Keller NP [facility]         For any urgent concerns, please contact our 24 hour nurse triage line: [phone] ([address] [place] [address])         SIRIA Remy  [phone]  Connected Care Resource [facility]

## 2023-11-09 ENCOUNTER — OFFICE VISIT (OUTPATIENT)
Dept: PEDIATRICS | Facility: CLINIC | Age: 63
End: 2023-11-09
Payer: COMMERCIAL

## 2023-11-09 VITALS
HEIGHT: 69 IN | BODY MASS INDEX: 38.36 KG/M2 | WEIGHT: 259 LBS | TEMPERATURE: 98.6 F | DIASTOLIC BLOOD PRESSURE: 95 MMHG | HEART RATE: 109 BPM | OXYGEN SATURATION: 95 % | SYSTOLIC BLOOD PRESSURE: 128 MMHG | RESPIRATION RATE: 24 BRPM

## 2023-11-09 DIAGNOSIS — N18.2 CKD (CHRONIC KIDNEY DISEASE) STAGE 2, GFR 60-89 ML/MIN: ICD-10-CM

## 2023-11-09 DIAGNOSIS — I10 HYPERTENSION, UNSPECIFIED TYPE: Primary | ICD-10-CM

## 2023-11-09 DIAGNOSIS — F32.A DEPRESSION, UNSPECIFIED DEPRESSION TYPE: ICD-10-CM

## 2023-11-09 DIAGNOSIS — E78.5 HYPERLIPIDEMIA, UNSPECIFIED HYPERLIPIDEMIA TYPE: ICD-10-CM

## 2023-11-09 DIAGNOSIS — M10.9 GOUT, UNSPECIFIED CAUSE, UNSPECIFIED CHRONICITY, UNSPECIFIED SITE: ICD-10-CM

## 2023-11-09 LAB
ALBUMIN SERPL BCG-MCNC: 4.3 G/DL (ref 3.5–5.2)
ALP SERPL-CCNC: 90 U/L (ref 40–129)
ALT SERPL W P-5'-P-CCNC: 25 U/L (ref 0–70)
ANION GAP SERPL CALCULATED.3IONS-SCNC: 11 MMOL/L (ref 7–15)
AST SERPL W P-5'-P-CCNC: 24 U/L (ref 0–45)
BILIRUB DIRECT SERPL-MCNC: <0.2 MG/DL (ref 0–0.3)
BILIRUB SERPL-MCNC: 0.4 MG/DL
BUN SERPL-MCNC: 16.6 MG/DL (ref 8–23)
CALCIUM SERPL-MCNC: 10.1 MG/DL (ref 8.8–10.2)
CHLORIDE SERPL-SCNC: 104 MMOL/L (ref 98–107)
CHOLEST SERPL-MCNC: 134 MG/DL
CREAT SERPL-MCNC: 1.26 MG/DL (ref 0.67–1.17)
DEPRECATED HCO3 PLAS-SCNC: 24 MMOL/L (ref 22–29)
EGFRCR SERPLBLD CKD-EPI 2021: 64 ML/MIN/1.73M2
GLUCOSE SERPL-MCNC: 96 MG/DL (ref 70–99)
HDLC SERPL-MCNC: 47 MG/DL
LDLC SERPL CALC-MCNC: 28 MG/DL
NONHDLC SERPL-MCNC: 87 MG/DL
POTASSIUM SERPL-SCNC: 4.8 MMOL/L (ref 3.4–5.3)
PROT SERPL-MCNC: 7.3 G/DL (ref 6.4–8.3)
SODIUM SERPL-SCNC: 139 MMOL/L (ref 135–145)
TRIGL SERPL-MCNC: 296 MG/DL
URATE SERPL-MCNC: 4.9 MG/DL (ref 3.4–7)

## 2023-11-09 PROCEDURE — 82248 BILIRUBIN DIRECT: CPT | Performed by: INTERNAL MEDICINE

## 2023-11-09 PROCEDURE — 80053 COMPREHEN METABOLIC PANEL: CPT | Performed by: INTERNAL MEDICINE

## 2023-11-09 PROCEDURE — 80061 LIPID PANEL: CPT | Performed by: INTERNAL MEDICINE

## 2023-11-09 PROCEDURE — 99495 TRANSJ CARE MGMT MOD F2F 14D: CPT | Performed by: INTERNAL MEDICINE

## 2023-11-09 PROCEDURE — 84550 ASSAY OF BLOOD/URIC ACID: CPT | Performed by: INTERNAL MEDICINE

## 2023-11-09 PROCEDURE — 36415 COLL VENOUS BLD VENIPUNCTURE: CPT | Performed by: INTERNAL MEDICINE

## 2023-11-09 RX ORDER — AMLODIPINE BESYLATE 10 MG/1
10 TABLET ORAL DAILY
Qty: 90 TABLET | Refills: 3 | Status: SHIPPED | OUTPATIENT
Start: 2023-11-09 | End: 2023-12-06

## 2023-11-09 RX ORDER — DULOXETIN HYDROCHLORIDE 30 MG/1
CAPSULE, DELAYED RELEASE ORAL
Qty: 90 CAPSULE | Refills: 3 | Status: SHIPPED | OUTPATIENT
Start: 2023-11-09

## 2023-11-09 RX ORDER — LISINOPRIL 20 MG/1
20 TABLET ORAL DAILY
Qty: 90 TABLET | Refills: 3 | Status: SHIPPED | OUTPATIENT
Start: 2023-11-09 | End: 2023-12-06

## 2023-11-09 RX ORDER — CARVEDILOL 6.25 MG/1
6.25 TABLET ORAL 2 TIMES DAILY WITH MEALS
Qty: 180 TABLET | Refills: 3 | Status: SHIPPED | OUTPATIENT
Start: 2023-11-09 | End: 2023-12-06

## 2023-11-09 RX ORDER — DULOXETIN HYDROCHLORIDE 60 MG/1
CAPSULE, DELAYED RELEASE ORAL
Qty: 90 CAPSULE | Refills: 3 | Status: SHIPPED | OUTPATIENT
Start: 2023-11-09

## 2023-11-09 ASSESSMENT — PAIN SCALES - GENERAL: PAINLEVEL: MODERATE PAIN (4)

## 2023-11-09 NOTE — PROGRESS NOTES
Assessment & Plan     (I10) Hypertension, unspecified type  (primary encounter diagnosis)  Comment: Recent medication changes during hospitalization.  Patient is having difficulty remembering to take hydralazine 3 times daily.  Discontinue hydralazine, resume lisinopril 20 mg daily and return for nurse blood pressure check 2 weeks.  Plan: amLODIPine (NORVASC) 10 MG tablet, carvedilol         (COREG) 6.25 MG tablet, lisinopril (ZESTRIL) 20        MG tablet, Basic metabolic panel  (Ca, Cl, CO2,        Creat, Gluc, K, Na, BUN)          (N18.2) CKD (chronic kidney disease) stage 2, GFR 60-89 ml/min  Comment:   Plan:   Recent JARRELL secondary to dehydration, creatinine stable.  Lab Results   Component Value Date    CR 1.26 11/09/2023    CR 1.27 10/29/2023     (F32.A) Depression, unspecified depression type  Comment: Adequate control.  Continue current medications  Plan: DULoxetine (CYMBALTA) 30 MG capsule, DULoxetine        (CYMBALTA) 60 MG capsule          (M10.9) Gout, unspecified cause, unspecified chronicity, unspecified site  Comment:   Colchicine discontinued  Plan: Uric acid           MED REC REQUIRED  Post Medication Reconciliation Status: discharge medications reconciled and changed, per note/orders      Joby Billingsley MD  [facility] LILIANE King is a 63 year old, presenting for the following health issues:  Hospital F/U      11/9/2023     9:59 AM   Additional Questions   Roomed by Chelsy   Accompanied by Self         11/9/2023     9:59 AM   Patient Reported Additional Medications   Patient reports taking the following new medications no       History of Present Illness       CKD: He uses over the counter pain medication, including Tylonal, two times daily.    Hypertension: He presents for follow up of hypertension.  He does check blood pressure  regularly outside of the clinic. Outside blood pressures have been over 140/90. He does not follow a low salt diet.     He eats 0-1 servings  of fruits and vegetables daily.He consumes 3 sweetened beverage(s) daily.He exercises with enough effort to increase his heart rate 9 or less minutes per day.  He exercises with enough effort to increase his heart rate 3 or less days per week.   He is taking medications regularly.     Hospital follow-up.  Presented to ED with hypotensive symptoms, resolved with IV fluids.  Work-up consistent with JARRELL.    1-JARRELL: Mild dehydration resolved with IV fluids.    2-hypertension follow-up.  Medication changes during hospital course: Amlodipine 10 mg added, carvedilol increased to 6.25 mg twice daily.  Hydralazine added 3 times daily.  Previously on lisinopril 40 mg-held during hospital course secondary to rising creatinine.    3-History of gout.  Colchicine discontinued during hospital course/possible interaction with carvedilol.          11/1/2023    11:44 AM   Post Discharge Outreach   Admission Date 10/28/2023   Reason for Admission Acute Kidney injury and hypotension.   Discharge Date 10/31/2023   Discharge Diagnosis Acute Kidney injury and hypotension.   How are you doing now that you are home? I am doing fine.   How are your symptoms? (Red Flag symptoms escalate to triage hotline per guidelines) Improved   Do you feel your condition is stable enough to be safe at home until your provider visit? Yes   Does the patient have their discharge instructions?  Yes   Does the patient have questions regarding their discharge instructions?  No   Were you started on any new medications or were there changes to any of your previous medications?  Yes   Does the patient have all of their medications? Yes   Do you have questions regarding any of your medications?  No   Discharge follow-up appointment scheduled within 14 calendar days?  Yes   Discharge Follow Up Appointment Date 11/9/2023     Hospital Follow-up Visit:    Hospital/Nursing Home/IP Rehab Facility: [facility]  Date of Admission: 10/28/23  Date of  Discharge: 10/31/23  Reason(s) for Admission: JARRELL, Hypotention    Was your hospitalization related to COVID-19? No   Problems taking medications regularly:  None  Medication changes since discharge: None  Problems adhering to non-medication therapy:  None    Summary of hospitalization:  [facility] discharge summary reviewed  Diagnostic Tests/Treatments reviewed.  Follow up needed: none  Other Healthcare Providers Involved in Patient s Care:           Update since discharge: improved.         Plan of care communicated with patient           Patient Active Problem List   Diagnosis    Sleep apnea, obstructive    Ankylosing spondylitis (H)    Mixed hyperlipidemia    BMI 30-35    Essential hypertension, benign    Crohn's disease (H)    Adenomatous colon polyp    Cervicalgia    Intractable migraine without aura and without status migrainosus    Tension headache    Obesity (BMI 35.0-39.9) with comorbidity (H)    Muscle spasm    Degenerative disc disease, cervical    CKD (chronic kidney disease) stage 3, GFR 30-59 ml/min (H)    S/P total hip arthroplasty    S/P lumbar fusion    JARRELL (acute kidney injury) (H24)    Hypotension, unspecified hypotension type    Hypertension, unspecified type       Current Outpatient Medications   Medication Sig Dispense Refill    acetaminophen (TYLENOL) 500 MG tablet Take 1,000 mg by mouth 2 times daily      amLODIPine (NORVASC) 10 MG tablet Take 1 tablet (10 mg) by mouth daily 90 tablet 3    atorvastatin (LIPITOR) 20 MG tablet Take 1 tablet (20 mg) by mouth daily 90 tablet 1    calcium carbonate-vitamin D (CALCIUM 600+D) 600-200 MG-UNIT TABS Take by mouth 2 times daily       carvedilol (COREG) 6.25 MG tablet Take 1 tablet (6.25 mg) by mouth 2 times daily (with meals) 180 tablet 3    DULoxetine (CYMBALTA) 30 MG capsule Take with 60 mg to equal 90 mg daily 90 capsule 3    DULoxetine (CYMBALTA) 60 MG capsule Take with 30 mg capsule to equal 90 mg 90 capsule 3    febuxostat (ULORIC)  "40 MG TABS tablet Take 40 mg by mouth daily      gabapentin (NEURONTIN) 300 MG capsule Take 300 mg by mouth every morning      gabapentin (NEURONTIN) 300 MG capsule Take 900 mg by mouth At Bedtime   1    HUMIRA PEN 40 MG/0.8ML pen kit Inject 40 mg Subcutaneous every 14 days   0    hydrALAZINE (APRESOLINE) 25 MG tablet Take 1 tablet (25 mg) by mouth every 8 hours 180 tablet 0    lisinopril (ZESTRIL) 20 MG tablet Take 1 tablet (20 mg) by mouth daily 90 tablet 3    multivitamin w/minerals (THERA-VIT-M) tablet Take 1 tablet by mouth daily.      neomycin-polymixin-dexamethasone (MAXITROL) ophthalmic ointment Place into both eyes daily Pea sized amount      pantoprazole (PROTONIX) 40 MG EC tablet Take 40 mg by mouth 2 times daily      rimegepant (NURTEC) 75 MG ODT tablet Place 75 mg under the tongue daily as needed for migraine      tiZANidine (ZANAFLEX) 4 MG tablet Take 16 mg by mouth at bedtime      Vitamin D3 (CHOLECALCIFEROL) 25 mcg (1000 units) tablet Take 1 tablet (25 mcg) by mouth 2 times daily (Patient not taking: Reported on 11/9/2023) 180 tablet 0        Review of Systems         Objective    BP (!) 134/92 (BP Location: Right arm, Patient Position: Sitting, Cuff Size: Adult Large)   Pulse 109   Temp 98.6  F (37  C) (Tympanic)   Resp 24   Ht 1.753 m (5' 9\")   Wt 117.5 kg (259 lb)   SpO2 95%   BMI 38.25 kg/m    Body mass index is 38.25 kg/m .  Physical Exam   GENERAL: healthy, alert and no distress  EYES: Eyes grossly normal to inspection, PERRL and conjunctivae and sclerae normal  NECK: no adenopathy, no asymmetry, masses, or scars and thyroid normal to palpation  RESP: lungs clear to auscultation - no rales, rhonchi or wheezes  CV: regular rate and rhythm, normal S1 S2, no S3 or S4, no murmur, click or rub, no peripheral edema and peripheral pulses strong  MS: no gross musculoskeletal defects noted, no edema  PSYCH: mentation appears normal, affect normal/bright                "

## 2023-11-12 PROBLEM — N18.2 CKD (CHRONIC KIDNEY DISEASE) STAGE 2, GFR 60-89 ML/MIN: Status: ACTIVE | Noted: 2023-11-12

## 2023-11-25 ENCOUNTER — MYC REFILL (OUTPATIENT)
Dept: PEDIATRICS | Facility: CLINIC | Age: 63
End: 2023-11-25
Payer: COMMERCIAL

## 2023-11-25 DIAGNOSIS — K21.00 GASTROESOPHAGEAL REFLUX DISEASE WITH ESOPHAGITIS, UNSPECIFIED WHETHER HEMORRHAGE: ICD-10-CM

## 2023-11-25 DIAGNOSIS — M10.9 GOUT, UNSPECIFIED CAUSE, UNSPECIFIED CHRONICITY, UNSPECIFIED SITE: ICD-10-CM

## 2023-11-25 DIAGNOSIS — Z98.1 S/P LUMBAR FUSION: Primary | ICD-10-CM

## 2023-11-25 DIAGNOSIS — I10 HYPERTENSION, UNSPECIFIED TYPE: ICD-10-CM

## 2023-11-25 RX ORDER — GABAPENTIN 300 MG/1
300 CAPSULE ORAL EVERY MORNING
Status: CANCELLED | OUTPATIENT
Start: 2023-11-25

## 2023-11-25 RX ORDER — AMLODIPINE BESYLATE 10 MG/1
10 TABLET ORAL DAILY
Qty: 90 TABLET | Refills: 3 | Status: CANCELLED | OUTPATIENT
Start: 2023-11-25

## 2023-11-27 ENCOUNTER — OFFICE VISIT (OUTPATIENT)
Dept: PEDIATRICS | Facility: CLINIC | Age: 63
End: 2023-11-27
Payer: COMMERCIAL

## 2023-11-27 ENCOUNTER — ANCILLARY PROCEDURE (OUTPATIENT)
Dept: GENERAL RADIOLOGY | Facility: CLINIC | Age: 63
End: 2023-11-27
Attending: NURSE PRACTITIONER
Payer: COMMERCIAL

## 2023-11-27 ENCOUNTER — MYC MEDICAL ADVICE (OUTPATIENT)
Dept: PEDIATRICS | Facility: CLINIC | Age: 63
End: 2023-11-27

## 2023-11-27 VITALS
TEMPERATURE: 97.9 F | HEIGHT: 69 IN | WEIGHT: 263.8 LBS | DIASTOLIC BLOOD PRESSURE: 90 MMHG | RESPIRATION RATE: 20 BRPM | SYSTOLIC BLOOD PRESSURE: 138 MMHG | HEART RATE: 94 BPM | OXYGEN SATURATION: 98 % | BODY MASS INDEX: 39.07 KG/M2

## 2023-11-27 DIAGNOSIS — M25.474 BILATERAL SWELLING OF FEET AND ANKLES: ICD-10-CM

## 2023-11-27 DIAGNOSIS — M25.475 BILATERAL SWELLING OF FEET AND ANKLES: ICD-10-CM

## 2023-11-27 DIAGNOSIS — I10 HYPERTENSION, UNSPECIFIED TYPE: ICD-10-CM

## 2023-11-27 DIAGNOSIS — M25.471 BILATERAL SWELLING OF FEET AND ANKLES: ICD-10-CM

## 2023-11-27 DIAGNOSIS — R06.09 DYSPNEA ON EXERTION: Primary | ICD-10-CM

## 2023-11-27 DIAGNOSIS — M25.472 BILATERAL SWELLING OF FEET AND ANKLES: ICD-10-CM

## 2023-11-27 LAB
ALBUMIN SERPL BCG-MCNC: 4.2 G/DL (ref 3.5–5.2)
ALP SERPL-CCNC: 102 U/L (ref 40–150)
ALT SERPL W P-5'-P-CCNC: 35 U/L (ref 0–70)
ANION GAP SERPL CALCULATED.3IONS-SCNC: 10 MMOL/L (ref 7–15)
AST SERPL W P-5'-P-CCNC: 28 U/L (ref 0–45)
BASOPHILS # BLD AUTO: 0.1 10E3/UL (ref 0–0.2)
BASOPHILS NFR BLD AUTO: 1 %
BILIRUB SERPL-MCNC: 0.3 MG/DL
BUN SERPL-MCNC: 16.7 MG/DL (ref 8–23)
CALCIUM SERPL-MCNC: 9.5 MG/DL (ref 8.8–10.2)
CHLORIDE SERPL-SCNC: 105 MMOL/L (ref 98–107)
CREAT SERPL-MCNC: 1.19 MG/DL (ref 0.67–1.17)
DEPRECATED HCO3 PLAS-SCNC: 22 MMOL/L (ref 22–29)
EGFRCR SERPLBLD CKD-EPI 2021: 68 ML/MIN/1.73M2
EOSINOPHIL # BLD AUTO: 0.2 10E3/UL (ref 0–0.7)
EOSINOPHIL NFR BLD AUTO: 3 %
ERYTHROCYTE [DISTWIDTH] IN BLOOD BY AUTOMATED COUNT: 12.7 % (ref 10–15)
GLUCOSE SERPL-MCNC: 107 MG/DL (ref 70–99)
HCT VFR BLD AUTO: 44.8 % (ref 40–53)
HGB BLD-MCNC: 14.9 G/DL (ref 13.3–17.7)
IMM GRANULOCYTES # BLD: 0 10E3/UL
IMM GRANULOCYTES NFR BLD: 0 %
LYMPHOCYTES # BLD AUTO: 2.2 10E3/UL (ref 0.8–5.3)
LYMPHOCYTES NFR BLD AUTO: 28 %
MCH RBC QN AUTO: 31.4 PG (ref 26.5–33)
MCHC RBC AUTO-ENTMCNC: 33.3 G/DL (ref 31.5–36.5)
MCV RBC AUTO: 95 FL (ref 78–100)
MONOCYTES # BLD AUTO: 0.7 10E3/UL (ref 0–1.3)
MONOCYTES NFR BLD AUTO: 9 %
NEUTROPHILS # BLD AUTO: 4.5 10E3/UL (ref 1.6–8.3)
NEUTROPHILS NFR BLD AUTO: 59 %
NT-PROBNP SERPL-MCNC: <36 PG/ML (ref 0–900)
PLATELET # BLD AUTO: 190 10E3/UL (ref 150–450)
POTASSIUM SERPL-SCNC: 4.3 MMOL/L (ref 3.4–5.3)
PROT SERPL-MCNC: 7.4 G/DL (ref 6.4–8.3)
RBC # BLD AUTO: 4.74 10E6/UL (ref 4.4–5.9)
SODIUM SERPL-SCNC: 137 MMOL/L (ref 135–145)
TSH SERPL DL<=0.005 MIU/L-ACNC: 1.6 UIU/ML (ref 0.3–4.2)
WBC # BLD AUTO: 7.6 10E3/UL (ref 4–11)

## 2023-11-27 PROCEDURE — 80053 COMPREHEN METABOLIC PANEL: CPT | Performed by: NURSE PRACTITIONER

## 2023-11-27 PROCEDURE — 71046 X-RAY EXAM CHEST 2 VIEWS: CPT | Mod: TC | Performed by: RADIOLOGY

## 2023-11-27 PROCEDURE — 83880 ASSAY OF NATRIURETIC PEPTIDE: CPT | Performed by: NURSE PRACTITIONER

## 2023-11-27 PROCEDURE — 99214 OFFICE O/P EST MOD 30 MIN: CPT | Performed by: NURSE PRACTITIONER

## 2023-11-27 PROCEDURE — 36415 COLL VENOUS BLD VENIPUNCTURE: CPT | Performed by: NURSE PRACTITIONER

## 2023-11-27 PROCEDURE — 84443 ASSAY THYROID STIM HORMONE: CPT | Performed by: NURSE PRACTITIONER

## 2023-11-27 PROCEDURE — 85025 COMPLETE CBC W/AUTO DIFF WBC: CPT | Performed by: NURSE PRACTITIONER

## 2023-11-27 RX ORDER — FEBUXOSTAT 40 MG/1
40 TABLET, FILM COATED ORAL DAILY
Qty: 90 TABLET | Refills: 3 | Status: SHIPPED | OUTPATIENT
Start: 2023-11-27

## 2023-11-27 RX ORDER — GABAPENTIN 300 MG/1
900 CAPSULE ORAL AT BEDTIME
Qty: 270 CAPSULE | Refills: 3 | Status: SHIPPED | OUTPATIENT
Start: 2023-11-27 | End: 2024-09-12

## 2023-11-27 RX ORDER — AMLODIPINE BESYLATE 5 MG/1
5 TABLET ORAL DAILY
Qty: 90 TABLET | Refills: 0 | Status: SHIPPED | OUTPATIENT
Start: 2023-11-27 | End: 2023-12-06

## 2023-11-27 RX ORDER — PANTOPRAZOLE SODIUM 40 MG/1
40 TABLET, DELAYED RELEASE ORAL 2 TIMES DAILY
Qty: 180 TABLET | Refills: 3 | Status: SHIPPED | OUTPATIENT
Start: 2023-11-27 | End: 2024-09-03

## 2023-11-27 RX ORDER — CARVEDILOL 12.5 MG/1
12.5 TABLET ORAL 2 TIMES DAILY WITH MEALS
Qty: 180 TABLET | Refills: 0 | Status: SHIPPED | OUTPATIENT
Start: 2023-11-27 | End: 2024-01-10

## 2023-11-27 ASSESSMENT — PAIN SCALES - GENERAL: PAINLEVEL: NO PAIN (0)

## 2023-11-27 ASSESSMENT — ENCOUNTER SYMPTOMS: SHORTNESS OF BREATH: 1

## 2023-11-27 NOTE — PATIENT INSTRUCTIONS
It was nice seeing you today.    1) decrease amlodipine from 10mg to 5mg  2) stop carvedilol 6.25mg morning and night.  START carvedilol 12.5mg morning and night.  3) Come in for nurse only BP check in 2 weeks.  4) Check BP once daily--1 hour after morning medication.    Please let me know if you have any questions regarding today's visit!    Take care,    HEATHER Keller, AMAURY  Family Medicine

## 2023-11-27 NOTE — PROGRESS NOTES
Hospitalizd 10/28  Medication changes during hospital course: Amlodipine 10 mg added, carvedilol increased to 6.25 mg twice daily.  Hydralazine added 3 times daily.  Previously on lisinopril 40 mg-held during hospital course secondary to rising creatinine.

## 2023-11-27 NOTE — TELEPHONE ENCOUNTER
Spoke with patient.     Having new shortness of breath with exertion. Does recover.  Having new ankle swelling. Able to put on shoes but harder to get on. His toe was swollen more yesterday too.    Denies chest pain, dizziness, lightheadedness, headache, neuro symptoms, leg pain, redness or swelling.     Advised if symptoms change or worsen today to go to the ER.     Patient agrees with appointment today.   Scheduled with Livia Keller NP for 1:10pm.

## 2023-11-27 NOTE — TELEPHONE ENCOUNTER
Should have refills of amlodipine already on file.     Miguel HUMPHREY RN 11/27/2023 at 10:15 AM

## 2023-11-27 NOTE — PROGRESS NOTES
Assessment & Plan     Dyspnea on exertion  Worsening.  Echocardiogram ordered today along with a cardiology referral.  Labs obtained.  ER precautions given.  - Echocardiogram Complete; Future  - BNP-N terminal pro  - Adult Cardiology Eval  Referral; Future  - CBC with Platelets & Differential  - TSH with free T4 reflex  - XR Chest 2 Views  - Comprehensive metabolic panel    Bilateral swelling of feet and ankles  Hypertension, unspecified type  Possibly due to amlodipine.  Decrease amlodipine from 10mg to 5mg daily.  Increase carvedilol from 6.25mg BID to 12.5mg BID  Follow-up in 2 weeks for nurse only BP.  Check BP at home and message in results.  - Echocardiogram Complete; Future  - BNP-N terminal pro  - Adult Cardiology Eval  Referral; Future  - amLODIPine (NORVASC) 5 MG tablet; Take 1 tablet (5 mg) by mouth daily      Livia Keller NP  [facility] LILIANE King is a 63 year old, presenting for the following health issues:    Hypertension, Shortness of Breath, and Joint Swelling        11/27/2023     1:27 PM   Additional Questions   Roomed by Brittany Milian   Accompanied by BRIONNA         11/27/2023     1:27 PM   Patient Reported Additional Medications   Patient reports taking the following new medications No- per patient       Shortness of Breath    History of Present Illness       Reason for visit:  Swelling of ankles, BP    He eats 2-3 servings of fruits and vegetables daily.He consumes 3 sweetened beverage(s) daily.He exercises with enough effort to increase his heart rate 9 or less minutes per day.  He exercises with enough effort to increase his heart rate 3 or less days per week.   He is taking medications regularly.       Acute Illness  Acute illness concerns: Shortness of breath   Onset/Duration: One week  Symptoms:  Fever: No  Chills/Sweats: YES  Headache (location?): No  Sinus Pressure: No  Conjunctivitis:  No  Ear Pain: no  Rhinorrhea: No  Congestion: No  Sore  "Throat: No  Cough: no  Wheeze: No  Decreased Appetite: No  Nausea: No  Vomiting: No  Diarrhea: No  Dysuria/Freq.: No  Dysuria or Hematuria: No  Fatigue/Achiness: YES  Sick/Strep Exposure: No  Therapies tried and outcome: Resting    SOB with activity.  Chronic but worsening over the last month.  Recent hospitalization 10/28.    Medication changes during hospital course: Amlodipine 10 mg added, carvedilol increased to 6.25 mg twice daily.  Hydralazine added 3 times daily.  Previously on lisinopril 40 mg-held during hospital course secondary to rising creatinine.    Recent PCP visit 11/9 stopped hydralazine and added back lisinopril 20mg due to stable Cr.  Ankle and feet swelling started a few days ago.  No diet changes.  However has been eating more over the holiday.    No COPD.  No asthma.  No chest pain  No palpitations  No numbness and tingling.  No sweating or N/V.      Review of Systems   Respiratory:  Positive for shortness of breath.           Objective    BP (!) 138/90 (BP Location: Right arm, Patient Position: Sitting, Cuff Size: Adult Large)   Pulse 94   Temp 97.9  F (36.6  C) (Oral)   Resp 20   Ht 1.76 m (5' 9.29\")   Wt 119.7 kg (263 lb 12.8 oz)   SpO2 98%   BMI 38.63 kg/m    Body mass index is 38.63 kg/m .      Physical Exam   GENERAL: healthy, alert and no distress  NECK: no adenopathy, no asymmetry, masses, or scars and thyroid normal to palpation  RESP: lungs clear to auscultation - no rales, rhonchi or wheezes  CV: regular rate and rhythm, normal S1 S2, no S3 or S4, no murmur, click or rub, no peripheral edema and peripheral pulses strong  MS: 2+ edema to bilateral ankle and feet.  PSYCH: mentation appears normal, affect normal/bright                "

## 2023-11-28 ENCOUNTER — TELEPHONE (OUTPATIENT)
Dept: PEDIATRICS | Facility: CLINIC | Age: 63
End: 2023-11-28

## 2023-11-28 DIAGNOSIS — R91.1 LUNG NODULE: Primary | ICD-10-CM

## 2023-11-28 NOTE — TELEPHONE ENCOUNTER
Prior Authorization Retail Medication Request    Medication/Dose: Pantoprazole 40mg  Diagnosis and ICD code (if different than what is on RX):    New/renewal/insurance change PA/secondary ins. PA:  Previously Tried and Failed:    Rationale:      Insurance   Primary: [employer] [place] [employer]  Insurance ID:  [id number]    Secondary (if applicable):  Insurance ID:      Pharmacy Information (if different than what is on RX)  Name:  [facility]  Phone:  [phone]  Fax:[phone]    STEP THERAPY REQ'D/QUANTITY LIMITS APPLY  MAXIMUM DAILY DOSE OF [phone]    Previous PA .    Thank you,  Shanon Winn CPhT  [facility] [place]

## 2023-11-30 NOTE — TELEPHONE ENCOUNTER
Central Prior Authorization Team   Phone: [phone]    PA Initiation    Medication: PANTOPRAZOLE SODIUM 40 MG PO [facility]  Insurance Company: [facility] - Phone [phone] Fax [phone]  Pharmacy Filling the Rx: [place] [facility] - [address]   Filling Pharmacy Phone: [phone]  Filling Pharmacy Fax:    Start Date: 11/30/2023

## 2023-11-30 NOTE — TELEPHONE ENCOUNTER
Prior Authorization Approval    Medication: PANTOPRAZOLE SODIUM 40 MG PO [facility]  Authorization Effective Date: 11/30/2023  Authorization Expiration Date: 11/30/2024  Approved Dose/Quantity:   Reference #:     Insurance Company: [employer] [place] - Phone [phone] Fax [phone]  Expected CoPay: $    CoPay Card Available:      Financial Assistance Needed:   Which Pharmacy is filling the prescription: [facility] [place], [place] - [address]   Pharmacy Notified: Yes  Patient Notified:

## 2023-12-04 NOTE — TELEPHONE ENCOUNTER
Huddled with Dr. Billingsley - Recommend appointment to reassess. Ok for Wednesday.   Left voicemail updating the patient. Asked that he call back so I can help schedule.    Shortness of breath with activity. Same not worse than last week.     Swelling of the ankles last week.   Bilateral feet started to swell where he couldn't wear his shoes over the weekend.     Doesn't take weights.   No swelling elsewhere.

## 2023-12-05 ENCOUNTER — HOSPITAL ENCOUNTER (OUTPATIENT)
Dept: CARDIOLOGY | Facility: CLINIC | Age: 63
Discharge: HOME | End: 2023-12-05
Attending: NURSE PRACTITIONER | Admitting: NURSE PRACTITIONER
Payer: COMMERCIAL

## 2023-12-05 DIAGNOSIS — R06.09 DYSPNEA ON EXERTION: ICD-10-CM

## 2023-12-05 DIAGNOSIS — M25.472 BILATERAL SWELLING OF FEET AND ANKLES: ICD-10-CM

## 2023-12-05 DIAGNOSIS — M25.474 BILATERAL SWELLING OF FEET AND ANKLES: ICD-10-CM

## 2023-12-05 DIAGNOSIS — M25.471 BILATERAL SWELLING OF FEET AND ANKLES: ICD-10-CM

## 2023-12-05 DIAGNOSIS — M25.475 BILATERAL SWELLING OF FEET AND ANKLES: ICD-10-CM

## 2023-12-05 LAB — LVEF ECHO: NORMAL

## 2023-12-05 PROCEDURE — 93306 TTE W/DOPPLER COMPLETE: CPT | Mod: 26 | Performed by: INTERNAL MEDICINE

## 2023-12-05 PROCEDURE — 93306 TTE W/DOPPLER COMPLETE: CPT

## 2023-12-06 ENCOUNTER — OFFICE VISIT (OUTPATIENT)
Dept: PEDIATRICS | Facility: CLINIC | Age: 63
End: 2023-12-06
Payer: COMMERCIAL

## 2023-12-06 VITALS
BODY MASS INDEX: 38.54 KG/M2 | DIASTOLIC BLOOD PRESSURE: 80 MMHG | TEMPERATURE: 97.7 F | SYSTOLIC BLOOD PRESSURE: 122 MMHG | WEIGHT: 263.2 LBS | RESPIRATION RATE: 16 BRPM | HEART RATE: 66 BPM

## 2023-12-06 DIAGNOSIS — R06.09 DYSPNEA ON EXERTION: ICD-10-CM

## 2023-12-06 DIAGNOSIS — M25.471 BILATERAL SWELLING OF FEET AND ANKLES: ICD-10-CM

## 2023-12-06 DIAGNOSIS — M25.475 BILATERAL SWELLING OF FEET AND ANKLES: ICD-10-CM

## 2023-12-06 DIAGNOSIS — M25.474 BILATERAL SWELLING OF FEET AND ANKLES: ICD-10-CM

## 2023-12-06 DIAGNOSIS — M25.472 BILATERAL SWELLING OF FEET AND ANKLES: ICD-10-CM

## 2023-12-06 DIAGNOSIS — I10 ESSENTIAL HYPERTENSION, BENIGN: Primary | ICD-10-CM

## 2023-12-06 PROCEDURE — 99214 OFFICE O/P EST MOD 30 MIN: CPT | Performed by: NURSE PRACTITIONER

## 2023-12-06 RX ORDER — LOSARTAN POTASSIUM 50 MG/1
TABLET ORAL
Qty: 90 TABLET | Refills: 0 | Status: SHIPPED | OUTPATIENT
Start: 2023-12-06 | End: 2024-01-22

## 2023-12-06 ASSESSMENT — PAIN SCALES - GENERAL: PAINLEVEL: MODERATE PAIN (5)

## 2023-12-06 NOTE — PROGRESS NOTES
Assessment & Plan     Essential hypertension, benign  Bilateral swelling of feet and ankles  Improving blood pressure and stable ankle swelling.  Medication changed due to potential adverse affect of lower leg swelling.  Stopped amlodipine 5mg today along with lisinopril 20mg.  Started losartan 50mg today and will increase to 100mg.  Follow-up in 2 weeks for labs and repeat BP.  Keep cardiology appointment 1/2024 along with PCP follow-up in 1/2024.  Message in BP readings.  ER precautions given.  Diet restrictions given.  - Basic metabolic panel  (Ca, Cl, CO2, Creat, Gluc, K, Na, BUN); Future  - losartan (COZAAR) 50 MG tablet; Take 50mg daily for 1 week, then 100mg daily thereafter    Dyspnea on exertion  - Basic metabolic panel  (Ca, Cl, CO2, Creat, Gluc, K, Na, BUN); Future      Livia Keller NP  [facility] LILIANE King is a 63 year old, presenting for the following health issues:    Follow Up        12/6/2023     9:19 AM   Additional Questions   Roomed by Nicole Stout CMA   Accompanied by BRIONNA       History of Present Illness       Reason for visit:  Swelling of ankles, BP    He eats 2-3 servings of fruits and vegetables daily.He consumes 3 sweetened beverage(s) daily.He exercises with enough effort to increase his heart rate 9 or less minutes per day.  He exercises with enough effort to increase his heart rate 3 or less days per week.   He is taking medications regularly.       Here for follow-up on swelling of his lower extremities:    -Swelling started after hospitalization 10/28  -11/27 decreased amlodipine from 10mg to 5mg daily.  Increase carvedilol from 6.25mg BID to 12.5mg BID  -Swelling did not improve.    -Cardiology consult 1/2024.  -Echo done 12/5 showed LV mildly dilated, aortic sclerosis and normal EF    -SOB still occurs with activity.  Such as walking up stairs.  Symptoms are stable but have worsened over the last few month  -Recent hospitalization 10/28 due to chronic  use of colchicine and medication interactions which cause JARRELL  -Medication changes during hospital course: Amlodipine 10 mg added, carvedilol increased to 6.25 mg twice daily.  Hydralazine added 3 times daily.  Previously on lisinopril 40 mg-held during hospital course secondary to rising creatinine.    -PCP visit 11/9 stopped hydralazine and added back lisinopril 20mg due to stable Cr.    -Diet is high in salt per patient and wife.  -Chest xray normal.     No COPD.  No asthma.  No chest pain  No palpitations  No numbness and tingling.  No sweating or N/V.      Review of Systems   Constitutional, HEENT, cardiovascular, pulmonary, gi and gu systems are negative, except as otherwise noted.      Objective    /80 (BP Location: Right arm, Patient Position: Sitting, Cuff Size: Adult Regular)   Pulse 66   Temp 97.7  F (36.5  C) (Tympanic)   Resp 16   Wt 119.4 kg (263 lb 3.2 oz)   BMI 38.54 kg/m    Body mass index is 38.54 kg/m .    Physical Exam   GENERAL: healthy, alert and no distress  RESP: lungs clear to auscultation - no rales, rhonchi or wheezes  CV: regular rates and rhythm, normal S1 S2, no S3 or S4, no murmur, click or rub, and 2+peripheral edema  PSYCH: mentation appears normal, affect normal/bright

## 2023-12-06 NOTE — PATIENT INSTRUCTIONS
It was nice seeing you today.    STOP taking amlodipine  STOP taking lisinopril  START taking losartan 50mg one pill daily for 1 week, then increase to 2 pills which is 100mg       Please let me know if you have any questions regarding today's visit!    Take HEATHER weaver, [facility]  Family Medicine

## 2023-12-11 ENCOUNTER — ANCILLARY PROCEDURE (OUTPATIENT)
Dept: GENERAL RADIOLOGY | Facility: CLINIC | Age: 63
End: 2023-12-11
Attending: NURSE PRACTITIONER
Payer: COMMERCIAL

## 2023-12-11 DIAGNOSIS — R91.1 LUNG NODULE: ICD-10-CM

## 2023-12-11 PROCEDURE — 71046 X-RAY EXAM CHEST 2 VIEWS: CPT | Mod: TC | Performed by: RADIOLOGY

## 2023-12-20 ENCOUNTER — ALLIED HEALTH/NURSE VISIT (OUTPATIENT)
Dept: PEDIATRICS | Facility: CLINIC | Age: 63
End: 2023-12-20
Payer: COMMERCIAL

## 2023-12-20 DIAGNOSIS — M25.471 BILATERAL SWELLING OF FEET AND ANKLES: ICD-10-CM

## 2023-12-20 DIAGNOSIS — M25.474 BILATERAL SWELLING OF FEET AND ANKLES: ICD-10-CM

## 2023-12-20 DIAGNOSIS — M25.472 BILATERAL SWELLING OF FEET AND ANKLES: ICD-10-CM

## 2023-12-20 DIAGNOSIS — M25.475 BILATERAL SWELLING OF FEET AND ANKLES: ICD-10-CM

## 2023-12-20 DIAGNOSIS — I10 ESSENTIAL HYPERTENSION, BENIGN: ICD-10-CM

## 2023-12-20 DIAGNOSIS — R06.09 DYSPNEA ON EXERTION: ICD-10-CM

## 2023-12-20 LAB
ANION GAP SERPL CALCULATED.3IONS-SCNC: 13 MMOL/L (ref 7–15)
BUN SERPL-MCNC: 19.5 MG/DL (ref 8–23)
CALCIUM SERPL-MCNC: 9.4 MG/DL (ref 8.8–10.2)
CHLORIDE SERPL-SCNC: 105 MMOL/L (ref 98–107)
CREAT SERPL-MCNC: 1.61 MG/DL (ref 0.67–1.17)
DEPRECATED HCO3 PLAS-SCNC: 23 MMOL/L (ref 22–29)
EGFRCR SERPLBLD CKD-EPI 2021: 47 ML/MIN/1.73M2
GLUCOSE SERPL-MCNC: 105 MG/DL (ref 70–99)
POTASSIUM SERPL-SCNC: 4.7 MMOL/L (ref 3.4–5.3)
SODIUM SERPL-SCNC: 141 MMOL/L (ref 135–145)

## 2023-12-20 PROCEDURE — 36415 COLL VENOUS BLD VENIPUNCTURE: CPT

## 2023-12-20 PROCEDURE — 80048 BASIC METABOLIC PNL TOTAL CA: CPT

## 2023-12-20 PROCEDURE — 99207 PR NO CHARGE NURSE ONLY: CPT

## 2023-12-20 NOTE — PROGRESS NOTES
Patient here for BP check and labs.    Avi Bhatti is a 63 year old patient who comes in today for a Blood Pressure check.  Initial BP:  /82 (BP Location: Right arm, Cuff Size: Adult Large)   Pulse 71   SpO2 98%      Data Unavailable  Disposition: results routed to provider    Brianna Low CMA

## 2023-12-21 ENCOUNTER — TRANSFERRED RECORDS (OUTPATIENT)
Dept: HEALTH INFORMATION MANAGEMENT | Facility: CLINIC | Age: 63
End: 2023-12-21
Payer: COMMERCIAL

## 2023-12-21 ENCOUNTER — TELEPHONE (OUTPATIENT)
Dept: PEDIATRICS | Facility: CLINIC | Age: 63
End: 2023-12-21
Payer: COMMERCIAL

## 2023-12-21 VITALS — OXYGEN SATURATION: 98 % | SYSTOLIC BLOOD PRESSURE: 124 MMHG | DIASTOLIC BLOOD PRESSURE: 82 MMHG | HEART RATE: 71 BPM

## 2023-12-21 DIAGNOSIS — I10 ESSENTIAL HYPERTENSION, BENIGN: Primary | ICD-10-CM

## 2023-12-21 NOTE — TELEPHONE ENCOUNTER
"Routing to Livia Keller NP to update.     BP not taken the last few days.   Takes in the mornings - sometimes before or after meds.     12/9 120/79   12/12 147/98   12/13 163/94  12/16 152/97    Doesn't document the heart rates.    256lbs today at Rheum visit. BP was \"140 something\".   No scale at home.    Swelling has improved.   Left foot still swollen but better. Not able to wear shoes. Only able to wear socks for a short time.   Right foot is nearing normal. Rheum assessed still slight swelling.     Rheum is treating for left foot gout flare.   Receiving Prednisone 15mg daily x 1 week then 10mg daily x 1 week then 5mg until he sees Rheumin 5-6 weeks.     He is still taking Losartan 100mg daily & Coreg 12.5mg bid    Lab appointment scheduled for 12/27/2023  "

## 2023-12-21 NOTE — TELEPHONE ENCOUNTER
Outreached to patient to follow up on lab results.   He was going to his Rheumatologists office.   I will call him back in an hour or so.

## 2023-12-27 ENCOUNTER — LAB (OUTPATIENT)
Dept: LAB | Facility: CLINIC | Age: 63
End: 2023-12-27
Payer: COMMERCIAL

## 2023-12-27 DIAGNOSIS — I10 ESSENTIAL HYPERTENSION, BENIGN: ICD-10-CM

## 2023-12-27 PROCEDURE — 80048 BASIC METABOLIC PNL TOTAL CA: CPT

## 2023-12-27 PROCEDURE — 36415 COLL VENOUS BLD VENIPUNCTURE: CPT

## 2023-12-28 LAB
ANION GAP SERPL CALCULATED.3IONS-SCNC: 10 MMOL/L (ref 7–15)
BUN SERPL-MCNC: 15.2 MG/DL (ref 8–23)
CALCIUM SERPL-MCNC: 9.3 MG/DL (ref 8.8–10.2)
CHLORIDE SERPL-SCNC: 102 MMOL/L (ref 98–107)
CREAT SERPL-MCNC: 1.17 MG/DL (ref 0.67–1.17)
DEPRECATED HCO3 PLAS-SCNC: 26 MMOL/L (ref 22–29)
EGFRCR SERPLBLD CKD-EPI 2021: 70 ML/MIN/1.73M2
GLUCOSE SERPL-MCNC: 140 MG/DL (ref 70–99)
POTASSIUM SERPL-SCNC: 5 MMOL/L (ref 3.4–5.3)
SODIUM SERPL-SCNC: 138 MMOL/L (ref 135–145)

## 2024-01-08 ENCOUNTER — ANCILLARY PROCEDURE (OUTPATIENT)
Dept: GENERAL RADIOLOGY | Facility: CLINIC | Age: 64
End: 2024-01-08
Attending: NURSE PRACTITIONER
Payer: COMMERCIAL

## 2024-01-08 ENCOUNTER — OFFICE VISIT (OUTPATIENT)
Dept: URGENT CARE | Facility: URGENT CARE | Age: 64
End: 2024-01-08
Payer: COMMERCIAL

## 2024-01-08 VITALS
OXYGEN SATURATION: 95 % | BODY MASS INDEX: 38.7 KG/M2 | TEMPERATURE: 97.5 F | SYSTOLIC BLOOD PRESSURE: 163 MMHG | HEART RATE: 71 BPM | DIASTOLIC BLOOD PRESSURE: 113 MMHG | WEIGHT: 264.3 LBS

## 2024-01-08 DIAGNOSIS — M25.572 PAIN IN JOINT INVOLVING ANKLE AND FOOT, LEFT: ICD-10-CM

## 2024-01-08 DIAGNOSIS — M25.572 PAIN IN JOINT INVOLVING ANKLE AND FOOT, LEFT: Primary | ICD-10-CM

## 2024-01-08 DIAGNOSIS — M79.672 LEFT FOOT PAIN: ICD-10-CM

## 2024-01-08 PROCEDURE — 99213 OFFICE O/P EST LOW 20 MIN: CPT | Performed by: NURSE PRACTITIONER

## 2024-01-08 PROCEDURE — 73630 X-RAY EXAM OF FOOT: CPT | Mod: TC | Performed by: RADIOLOGY

## 2024-01-08 NOTE — PROGRESS NOTES
Assessment & Plan     Pain in joint involving ankle and foot, left  - XR Foot Left G/E 3 Views  - Orthopedic  Referral    Left foot pain  - Orthopedic  Referral     Patient Instructions     Results for orders placed or performed in visit on 01/08/24   XR Foot Left G/E 3 Views     Status: None    Narrative    EXAM: XR FOOT LEFT G/E 3 VIEWS  DATE/TIME: 1/8/2024 4:19 PM     INDICATION: Left foot pain.  COMPARISON: 2/9/2023.      Impression    IMPRESSION: Normal joint spacing and alignment.  No fracture.  Soft  tissue swelling dorsal forefoot, unchanged.    PADMA BRITT MD         SYSTEM ID:  [id number]     Normal foot XR  Follow up with ortho/podiatry as we discussed.          Return in about 1 week (around 1/15/2024) for with regular provider if symptoms persist.    TONYA Silveira [facility] LILIANE King is a 63 year old male who presents to clinic today for the following health issues:  Chief Complaint   Patient presents with    Arthritis     Start more than 3 months sx left big toe- ball and over the top, swelling of the foot and pain, not sleeping well due to sx's hx working with HTN medications  tx febuxostat and prednisone at 30 mg      HPI    MS Injury/Pain    Onset of symptoms was 3 month(s) ago.  Location: left toe(s) great  Context:       The injury happened while unknown      Mechanism: unknown      Patient experienced delayed pain, delayed swelling, was able to bear weight directly after injury, no deformity was noted by the patient  Course of symptoms is worsening.    Severity moderate  Current and Associated symptoms: Pain, Swelling, and Decreased range of motion  Denies  Bruising, Warmth, and Redness  Aggravating Factors: walking, weight-bearing, and movement  Therapies to improve symptoms include: rest and elevation  This is the first time this type of problem has occurred for this patient.       Different than the gout he has had in  the past.    Never red or hot.    Only this foot/toe.      Review of Systems  Constitutional, HEENT, cardiovascular, pulmonary, GI, , musculoskeletal, neuro, skin, endocrine and psych systems are negative, except as otherwise noted.      Objective    BP (!) 163/113   Pulse 71   Temp 97.5  F (36.4  C)   Wt 119.9 kg (264 lb 4.8 oz)   SpO2 95%   BMI 38.70 kg/m    Physical Exam   GENERAL: alert and no distress  RESP: lungs clear to auscultation - no rales, rhonchi or wheezes  CV: regular rate and rhythm, normal S1 S2, no S3 or S4, no murmur, click or rub, no peripheral edema  MS: LLE exam shows normal strength and muscle mass, no deformities, and left great toe with edema and nodular swelling at the MTP joint   SKIN: no suspicious lesions or rashes

## 2024-01-10 ENCOUNTER — OFFICE VISIT (OUTPATIENT)
Dept: CARDIOLOGY | Facility: CLINIC | Age: 64
End: 2024-01-10
Payer: COMMERCIAL

## 2024-01-10 ENCOUNTER — PATIENT OUTREACH (OUTPATIENT)
Dept: GASTROENTEROLOGY | Facility: CLINIC | Age: 64
End: 2024-01-10

## 2024-01-10 VITALS
BODY MASS INDEX: 39.34 KG/M2 | DIASTOLIC BLOOD PRESSURE: 88 MMHG | SYSTOLIC BLOOD PRESSURE: 144 MMHG | HEART RATE: 83 BPM | HEIGHT: 69 IN | OXYGEN SATURATION: 96 % | WEIGHT: 265.6 LBS

## 2024-01-10 DIAGNOSIS — R06.09 DYSPNEA ON EXERTION: ICD-10-CM

## 2024-01-10 DIAGNOSIS — M25.474 BILATERAL SWELLING OF FEET AND ANKLES: ICD-10-CM

## 2024-01-10 DIAGNOSIS — I10 ESSENTIAL HYPERTENSION, BENIGN: Primary | ICD-10-CM

## 2024-01-10 DIAGNOSIS — M25.475 BILATERAL SWELLING OF FEET AND ANKLES: ICD-10-CM

## 2024-01-10 DIAGNOSIS — I10 HYPERTENSION, UNSPECIFIED TYPE: ICD-10-CM

## 2024-01-10 DIAGNOSIS — M25.471 BILATERAL SWELLING OF FEET AND ANKLES: ICD-10-CM

## 2024-01-10 DIAGNOSIS — M25.472 BILATERAL SWELLING OF FEET AND ANKLES: ICD-10-CM

## 2024-01-10 PROCEDURE — 99204 OFFICE O/P NEW MOD 45 MIN: CPT | Performed by: INTERNAL MEDICINE

## 2024-01-10 PROCEDURE — 93000 ELECTROCARDIOGRAM COMPLETE: CPT | Performed by: INTERNAL MEDICINE

## 2024-01-10 RX ORDER — PREDNISONE 20 MG/1
1 TABLET ORAL DAILY
COMMUNITY
Start: 2023-02-27 | End: 2024-03-09 | Stop reason: ALTCHOICE

## 2024-01-10 RX ORDER — CARVEDILOL 25 MG/1
25 TABLET ORAL 2 TIMES DAILY WITH MEALS
Qty: 180 TABLET | Refills: 3 | Status: SHIPPED | OUTPATIENT
Start: 2024-01-10 | End: 2024-01-19

## 2024-01-10 NOTE — LETTER
1/10/2024    Joby Billingsley MD  [address] [place] [address]    RE: Avi Bhatti       Dear Colleague,     I had the pleasure of seeing Avi Bhatti in the [facility].  HISTORY:    Avi Bhatti is a pleasant 63 year old male accompanied by his wife today.  He has a longstanding history of hypertension, YORDY using CPAP nightly, hyperlipidemia, obesity, migraine HA, Crohn's disease, ankylosing spondylitis.  He was asked to see cardiology for complaints of dyspnea on exertion and because of difficult to control hypertension.    Fernando reports that he has had high blood pressure for many years and has struggled with this.  He has had episodes of hypotension due to drug interactions in the past.  He is currently using carvedilol 12.5 mg twice daily and losartan 100 mg daily for blood pressure control.  He checks his own blood pressure at home and brought in a list of readings.  His lowest is 120 and his highest is 160 with most values being in the 140s and 150s systolic.  He was recently on amlodipine but complained of some edema and this was discontinued.  His edema improved but did not completely resolve.    Fernando has not had any problems with exertional chest arm neck shoulder or jaw discomfort, PND/orthopnea, syncope or near syncope, strokelike symptoms, palpitations, or symptoms of claudication.  He is, however, short of breath and he attributes this to a 30 pound weight gain over the last year or so.  He can walk up 1 flight of steps and is short of breath at the top but states that he could still hold a conversation with his wife after this activity.  He also acknowledges that he is very inactive recently because of a lot of back pain and particularly some new as of yet undiagnosed foot pain, still possibly considered to be gout.  For this he has been using colchicine and steroids with limited results.  He is following with other caregivers for this problem    An  "echocardiogram was recently completed and I reviewed those results with him.  It showed a normal ejection fraction and no significant valvular abnormalities.  Most importantly it showed normal diastolic function and normal left ventricular wall thickness.  He has also had a BNP drawn and a TSH, both normal.    ECG done today shows normal sinus rhythm with some mild nonspecific ST segment changes that could represent anterior ischemia.      Cardiac risk factors include hyperlipidemia which is currently treated successfully with Lipitor, current LDL 28.  His blood pressure is elevated.  He is not diabetic and is not a smoker.  He does not have any significant family history of coronary artery disease although his father did have vascular disease involving his carotid at later ages.      ASSESSMENT/PLAN:    1.  Hypertension.  Blood pressure is not yet well-controlled and I have asked him to increase his carvedilol to 25 mg twice daily.  I would eventually plan on going up to 50 mg twice daily if blood pressure and heart rate will tolerate this.  Also would be a good candidate for diuretics but I like to make sure he does not have gout before we start him on this since it could exacerbate that problem.  Also, he was on amlodipine in the past and we might add that back at low-dose since the edema caused by amlodipine was not severe and did not completely resolved when it was discontinued.  Finally, the fact that this patient has normal LV wall thickness makes me suspicious that he may have a normal resting blood pressure and \"blood pressure testing anxiety\".  I will arrange a 24-hour blood pressure monitor to evaluate for this possibility.  2.  Dyspnea on exertion.  I do not see any evidence that this is cardiac related.  He has normal LV function, normal valvular function and normal diastolic function.  It is still possible that coronary disease could cause this since he does have some mild nonspecific T wave changes on " his ECG.  I will arrange a Lexiscan study to evaluate for coronary disease (he is not able to walk and treadmill briskly).    Thank you for inviting me to participate in the care of your patient.  Please don't hesitate to call if I can be of further assistance.  55 minutes were spent today reviewing the chart and other records, interviewing and examining the patient, and documenting our visit.    Chart documentation was completed, in part, with Predict[employer] voice-recognition software. Even though reviewed, some grammatical, spelling, and word errors may remain.       Orders Placed This Encounter   Procedures    Follow-Up with Cardiology    EKG 12-lead complete w/read - Clinics    24 Hour Blood Pressure Monitor - Adult     Orders Placed This Encounter   Medications    predniSONE (DELTASONE) 20 MG tablet     Sig: Take 1 tablet by mouth daily    carvedilol (COREG) 25 MG tablet     Sig: Take 1 tablet (25 mg) by mouth 2 times daily (with meals)     Dispense:  180 tablet     Refill:  3     Medications Discontinued During This Encounter   Medication Reason    carvedilol (COREG) 12.5 MG tablet Reorder (No AVS)       10 year ASCVD risk: The 10-year ASCVD risk score (Eulalia NOEL, et al., 2019) is: 13%    Values used to calculate the score:      Age: 63 years      Sex: Male      Is Non- : No      Diabetic: No      Tobacco smoker: No      Systolic Blood Pressure: 144 mmHg      Is BP treated: Yes      HDL Cholesterol: 47 mg/dL      Total Cholesterol: 134 mg/dL    Encounter Diagnoses   Name Primary?    Dyspnea on exertion     Bilateral swelling of feet and ankles     Essential hypertension, benign Yes    Hypertension, unspecified type        CURRENT MEDICATIONS:  Current Outpatient Medications   Medication Sig Dispense Refill    acetaminophen (TYLENOL) 500 MG tablet Take 500 mg by mouth 3 times daily      atorvastatin (LIPITOR) 20 MG tablet Take 1 tablet (20 mg) by mouth daily 90 tablet 1    calcium  carbonate-vitamin D (CALCIUM 600+D) 600-200 MG-UNIT TABS Take by mouth 2 times daily       carvedilol (COREG) 25 MG tablet Take 1 tablet (25 mg) by mouth 2 times daily (with meals) 180 tablet 3    DULoxetine (CYMBALTA) 30 MG capsule Take with 60 mg to equal 90 mg daily 90 capsule 3    DULoxetine (CYMBALTA) 60 MG capsule Take with 30 mg capsule to equal 90 mg 90 capsule 3    febuxostat (ULORIC) 40 MG TABS tablet Take 1 tablet (40 mg) by mouth daily 90 tablet 3    gabapentin (NEURONTIN) 300 MG capsule Take 3 capsules (900 mg) by mouth at bedtime 270 capsule 3    gabapentin (NEURONTIN) 300 MG capsule Take 300 mg by mouth every morning      HUMIRA PEN 40 MG/0.8ML pen kit Inject 40 mg Subcutaneous every 14 days   0    losartan (COZAAR) 50 MG tablet Take 50mg daily for 1 week, then 100mg daily thereafter (Patient taking differently: 100 mg daily Take 50mg daily for 1 week, then 100mg daily thereafter) 90 tablet 0    multivitamin w/minerals (THERA-VIT-M) tablet Take 1 tablet by mouth daily.      neomycin-polymixin-dexamethasone (MAXITROL) ophthalmic ointment Place into both eyes daily Pea sized amount      pantoprazole (PROTONIX) 40 MG EC tablet Take 1 tablet (40 mg) by mouth 2 times daily 180 tablet 3    predniSONE (DELTASONE) 20 MG tablet Take 1 tablet by mouth daily      rimegepant (NURTEC) 75 MG ODT tablet Place 75 mg under the tongue daily as needed for migraine      tiZANidine (ZANAFLEX) 4 MG tablet Take 16 mg by mouth at bedtime      Vitamin D3 (CHOLECALCIFEROL) 25 mcg (1000 units) tablet Take 1 tablet (25 mcg) by mouth 2 times daily 180 tablet 0       ALLERGIES     Allergies   Allergen Reactions    Prednisone Other (See Comments)     Elevated heart rate, has had without problems more recently    Reglan [Metoclopramide] Other (See Comments)     agitation    Unknown [No Clinical Screening - See Comments]      benny have not worked in past       PAST MEDICAL HISTORY:  Past Medical History:   Diagnosis Date     Ankylosing spondylitis (H)     Arthritis     Crohn's colitis (H)     Gastroesophageal reflux disease     Hypertension     Migraine     Other chronic pain     headache and neck pain, receives botox injections Q 12 weeks    Sleep apnea     cpap       PAST SURGICAL HISTORY:  Past Surgical History:   Procedure Laterality Date    ARTHROPLASTY HIP Left 11/19/2019    Procedure: Left total hip arthroplasty;  Surgeon: Allen Coats MD;  Location: [facility] OR    BOTOX CHRONIC MIGRAINE HEAD ACHES      for chronic headache and neck pain, injections every 12 weeks    COLONOSCOPY      EXTRACTION(S) DENTAL  12/18/2012    Procedure: EXTRACTION(S) DENTAL;  Extraction of Teeth 30, 19;  Surgeon: Sujey Cunningham DDS;  Location:  OR    OPTICAL TRACKING SYSTEM FUSION POSTERIOR SPINE LUMBAR N/A 2/2/2021    Procedure: Lumbar 3-4 posterior lumbar instrumented fusion with interbody cage;  Surgeon: Jakub Velasquez MD;  Location:  OR    repair fracture & insert pins left hand  1996    SMALL BOWEL RESECTION  1993       FAMILY HISTORY:  Family History   Problem Relation Age of Onset    Hypertension Father     Alcoholism Father     Other - See Comments Father     Coronary Artery Disease Father     Skin Cancer Father     Kidney failure Father        SOCIAL HISTORY:  Social History     Socioeconomic History    Marital status:      Spouse name: None    Number of children: None    Years of education: None    Highest education level: None   Tobacco Use    Smoking status: Never    Smokeless tobacco: Never   Vaping Use    Vaping Use: Never used   Substance and Sexual Activity    Alcohol use: Yes     Comment: seldom    Drug use: No    Sexual activity: Not Currently     Partners: Female   Other Topics Concern    Parent/sibling w/ CABG, MI or angioplasty before 65F 55M? No   Social History Narrative    Lives in [facility].     Twin boys, age 32. Daughter is 23.     , wife's name is Cheryl.     Three dogs.     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         Albertina Peña, DNP, APRN, CNP    08/01/22     Social Determinants of Health     Financial Resource Strain: Low Risk  (11/27/2023)    Financial Resource Strain     Within the past 12 months, have you or your family members you live with been unable to get utilities (heat, electricity) when it was really needed?: No   Food Insecurity: Low Risk  (11/27/2023)    Food Insecurity     Within the past 12 months, did you worry that your food would run out before you got money to buy more?: No     Within the past 12 months, did the food you bought just not last and you didn t have money to get more?: No   Transportation Needs: Low Risk  (11/27/2023)    Transportation Needs     Within the past 12 months, has lack of transportation kept you from medical appointments, getting your medicines, non-medical meetings or appointments, work, or from getting things that you need?: No   Physical Activity: Inactive (8/1/2022)    Exercise Vital Sign     Days of Exercise per Week: 0 days     Minutes of Exercise per Session: 0 min   Stress: No Stress Concern Present (8/1/2022)    [employer] - Occupational Stress Questionnaire     Feeling of Stress : Only a little   Social Connections: Socially Isolated (8/1/2022)    Social Connection and Isolation Panel [NHANES]     Frequency of Communication with Friends and Family: Once a week     Frequency of Social Gatherings with Friends and Family: Once a week     Attends Hinduism Services: Never     Active Member of Clubs or Organizations: No     Marital Status:    Interpersonal Safety: Low Risk  (10/2/2023)    Interpersonal Safety     Do you feel physically and emotionally safe where you currently live?: Yes     Within the past 12 months, have you been hit, slapped, kicked or otherwise physically hurt by someone?: No     Within the past 12 months, have you been humiliated or emotionally abused in other ways by  "your partner or ex-partner?: No   Housing Stability: Low Risk  (11/27/2023)    Housing Stability     Do you have housing? : Yes     Are you worried about losing your housing?: No       Review of Systems:  Skin:  Negative     Eyes:  Positive for glasses  ENT:  Negative    Respiratory:  Positive for shortness of breath;dyspnea on exertion  Cardiovascular:    Positive for;edema  Gastroenterology: Negative    Genitourinary:  Negative    Musculoskeletal:  Positive for arthritis;joint pain;back pain;gout;foot pain  Neurologic:  Positive for headaches;migraine headaches  Psychiatric:  Positive for depression  Heme/Lymph/Imm:  Negative    Endocrine:  Negative      Physical Exam:  Vitals: BP (!) 144/88   Pulse 83   Ht 1.76 m (5' 9.29\")   Wt 120.5 kg (265 lb 9.6 oz)   SpO2 96%   BMI 38.89 kg/m      Constitutional:           Skin:           Head:           Eyes:           ENT:           Neck:           Chest:           Cardiac:                    Abdomen:           Vascular:                                        Extremities and Muscular Skeletal:              Neurological:           Psych:        Recent Lab Results:  LIPID RESULTS:  Lab Results   Component Value Date    CHOL 134 11/09/2023    CHOL 231 (H) 04/20/2021    HDL 47 11/09/2023    HDL 43 04/20/2021    LDL 28 11/09/2023     (H) 04/20/2021    TRIG 296 (H) 11/09/2023    TRIG 316 (H) 04/20/2021       LIVER ENZYME RESULTS:  Lab Results   Component Value Date    AST 28 11/27/2023    AST 17 01/22/2021    ALT 35 11/27/2023    ALT 36 01/22/2021       CBC RESULTS:  Lab Results   Component Value Date    WBC 7.6 11/27/2023    WBC 7.8 04/20/2021    RBC 4.74 11/27/2023    RBC 4.60 04/20/2021    HGB 14.9 11/27/2023    HGB 14.4 04/20/2021    HCT 44.8 11/27/2023    HCT 44.5 04/20/2021    MCV 95 11/27/2023    MCV 97 04/20/2021    MCH 31.4 11/27/2023    MCH 31.3 04/20/2021    MCHC 33.3 11/27/2023    MCHC 32.4 04/20/2021    RDW 12.7 11/27/2023    RDW 12.8 04/20/2021    PLT " 190 11/27/2023     04/20/2021       BMP RESULTS:  Lab Results   Component Value Date     12/27/2023     04/20/2021    POTASSIUM 5.0 12/27/2023    POTASSIUM 4.2 08/17/2022    POTASSIUM 5.3 04/20/2021    CHLORIDE 102 12/27/2023    CHLORIDE 103 08/17/2022    CHLORIDE 107 04/20/2021    CO2 26 12/27/2023    CO2 21 08/17/2022    CO2 25 04/20/2021    ANIONGAP 10 12/27/2023    ANIONGAP 8 08/17/2022    ANIONGAP 4 04/20/2021     (H) 12/27/2023     (H) 08/17/2022    GLC 96 04/20/2021    BUN 15.2 12/27/2023    BUN 22 08/17/2022    BUN 24 04/20/2021    CR 1.17 12/27/2023    CR 1.68 (H) 04/20/2021    GFRESTIMATED 70 12/27/2023    GFRESTIMATED 43 (L) 04/20/2021    GFRESTBLACK 50 (L) 04/20/2021    GRACE 9.3 12/27/2023    GRACE 9.4 04/20/2021        A1C RESULTS:  Lab Results   Component Value Date    A1C 5.5 04/20/2021       INR RESULTS:  Lab Results   Component Value Date    INR 0.98 10/28/2023    INR 0.93 10/17/2015         Reg David MD, [facility]    CC  Livia Keller, NP  [address] [place] [address]        Thank you for allowing me to participate in the care of your patient.      Sincerely,     Reg David MD     [facility]

## 2024-01-10 NOTE — PROGRESS NOTES
HISTORY:    Avi Bhatti is a pleasant 63 year old male accompanied by his wife today.  He has a longstanding history of hypertension, YORDY using CPAP nightly, hyperlipidemia, obesity, migraine HA, Crohn's disease, ankylosing spondylitis.  He was asked to see cardiology for complaints of dyspnea on exertion and because of difficult to control hypertension.    Fernando reports that he has had high blood pressure for many years and has struggled with this.  He has had episodes of hypotension due to drug interactions in the past.  He is currently using carvedilol 12.5 mg twice daily and losartan 100 mg daily for blood pressure control.  He checks his own blood pressure at home and brought in a list of readings.  His lowest is 120 and his highest is 160 with most values being in the 140s and 150s systolic.  He was recently on amlodipine but complained of some edema and this was discontinued.  His edema improved but did not completely resolve.    Fernando has not had any problems with exertional chest arm neck shoulder or jaw discomfort, PND/orthopnea, syncope or near syncope, strokelike symptoms, palpitations, or symptoms of claudication.  He is, however, short of breath and he attributes this to a 30 pound weight gain over the last year or so.  He can walk up 1 flight of steps and is short of breath at the top but states that he could still hold a conversation with his wife after this activity.  He also acknowledges that he is very inactive recently because of a lot of back pain and particularly some new as of yet undiagnosed foot pain, still possibly considered to be gout.  For this he has been using colchicine and steroids with limited results.  He is following with other caregivers for this problem    An echocardiogram was recently completed and I reviewed those results with him.  It showed a normal ejection fraction and no significant valvular abnormalities.  Most importantly it showed normal diastolic function and normal  "left ventricular wall thickness.  He has also had a BNP drawn and a TSH, both normal.    ECG done today shows normal sinus rhythm with some mild nonspecific ST segment changes that could represent anterior ischemia.      Cardiac risk factors include hyperlipidemia which is currently treated successfully with Lipitor, current LDL 28.  His blood pressure is elevated.  He is not diabetic and is not a smoker.  He does not have any significant family history of coronary artery disease although his father did have vascular disease involving his carotid at later ages.      ASSESSMENT/PLAN:    1.  Hypertension.  Blood pressure is not yet well-controlled and I have asked him to increase his carvedilol to 25 mg twice daily.  I would eventually plan on going up to 50 mg twice daily if blood pressure and heart rate will tolerate this.  Also would be a good candidate for diuretics but I like to make sure he does not have gout before we start him on this since it could exacerbate that problem.  Also, he was on amlodipine in the past and we might add that back at low-dose since the edema caused by amlodipine was not severe and did not completely resolved when it was discontinued.  Finally, the fact that this patient has normal LV wall thickness makes me suspicious that he may have a normal resting blood pressure and \"blood pressure testing anxiety\".  I will arrange a 24-hour blood pressure monitor to evaluate for this possibility.  2.  Dyspnea on exertion.  I do not see any evidence that this is cardiac related.  He has normal LV function, normal valvular function and normal diastolic function.  It is still possible that coronary disease could cause this since he does have some mild nonspecific T wave changes on his ECG.  I will arrange a Lexiscan study to evaluate for coronary disease (he is not able to walk and treadmill briskly).    Thank you for inviting me to participate in the care of your patient.  Please don't hesitate to " call if I can be of further assistance.  55 minutes were spent today reviewing the chart and other records, interviewing and examining the patient, and documenting our visit.    Chart documentation was completed, in part, with Mideo[employer] voice-recognition software. Even though reviewed, some grammatical, spelling, and word errors may remain.       Orders Placed This Encounter   Procedures    Follow-Up with Cardiology    EKG 12-lead complete w/read - Clinics    24 Hour Blood Pressure Monitor - Adult     Orders Placed This Encounter   Medications    predniSONE (DELTASONE) 20 MG tablet     Sig: Take 1 tablet by mouth daily    carvedilol (COREG) 25 MG tablet     Sig: Take 1 tablet (25 mg) by mouth 2 times daily (with meals)     Dispense:  180 tablet     Refill:  3     Medications Discontinued During This Encounter   Medication Reason    carvedilol (COREG) 12.5 MG tablet Reorder (No AVS)       10 year ASCVD risk: The 10-year ASCVD risk score (Eulalia NOEL, et al., 2019) is: 13%    Values used to calculate the score:      Age: 63 years      Sex: Male      Is Non- : No      Diabetic: No      Tobacco smoker: No      Systolic Blood Pressure: 144 mmHg      Is BP treated: Yes      HDL Cholesterol: 47 mg/dL      Total Cholesterol: 134 mg/dL    Encounter Diagnoses   Name Primary?    Dyspnea on exertion     Bilateral swelling of feet and ankles     Essential hypertension, benign Yes    Hypertension, unspecified type        CURRENT MEDICATIONS:  Current Outpatient Medications   Medication Sig Dispense Refill    acetaminophen (TYLENOL) 500 MG tablet Take 500 mg by mouth 3 times daily      atorvastatin (LIPITOR) 20 MG tablet Take 1 tablet (20 mg) by mouth daily 90 tablet 1    calcium carbonate-vitamin D (CALCIUM 600+D) 600-200 MG-UNIT TABS Take by mouth 2 times daily       carvedilol (COREG) 25 MG tablet Take 1 tablet (25 mg) by mouth 2 times daily (with meals) 180 tablet 3    DULoxetine (CYMBALTA) 30 MG capsule  Take with 60 mg to equal 90 mg daily 90 capsule 3    DULoxetine (CYMBALTA) 60 MG capsule Take with 30 mg capsule to equal 90 mg 90 capsule 3    febuxostat (ULORIC) 40 MG TABS tablet Take 1 tablet (40 mg) by mouth daily 90 tablet 3    gabapentin (NEURONTIN) 300 MG capsule Take 3 capsules (900 mg) by mouth at bedtime 270 capsule 3    gabapentin (NEURONTIN) 300 MG capsule Take 300 mg by mouth every morning      HUMIRA PEN 40 MG/0.8ML pen kit Inject 40 mg Subcutaneous every 14 days   0    losartan (COZAAR) 50 MG tablet Take 50mg daily for 1 week, then 100mg daily thereafter (Patient taking differently: 100 mg daily Take 50mg daily for 1 week, then 100mg daily thereafter) 90 tablet 0    multivitamin w/minerals (THERA-VIT-M) tablet Take 1 tablet by mouth daily.      neomycin-polymixin-dexamethasone (MAXITROL) ophthalmic ointment Place into both eyes daily Pea sized amount      pantoprazole (PROTONIX) 40 MG EC tablet Take 1 tablet (40 mg) by mouth 2 times daily 180 tablet 3    predniSONE (DELTASONE) 20 MG tablet Take 1 tablet by mouth daily      rimegepant (NURTEC) 75 MG ODT tablet Place 75 mg under the tongue daily as needed for migraine      tiZANidine (ZANAFLEX) 4 MG tablet Take 16 mg by mouth at bedtime      Vitamin D3 (CHOLECALCIFEROL) 25 mcg (1000 units) tablet Take 1 tablet (25 mcg) by mouth 2 times daily 180 tablet 0       ALLERGIES     Allergies   Allergen Reactions    Prednisone Other (See Comments)     Elevated heart rate, has had without problems more recently    Reglan [Metoclopramide] Other (See Comments)     agitation    Unknown [No Clinical Screening - See Comments]      benny have not worked in past       PAST MEDICAL HISTORY:  Past Medical History:   Diagnosis Date    Ankylosing spondylitis (H)     Arthritis     Crohn's colitis (H)     Gastroesophageal reflux disease     Hypertension     Migraine     Other chronic pain     headache and neck pain, receives botox injections Q 12 weeks    Sleep apnea      cpap       PAST SURGICAL HISTORY:  Past Surgical History:   Procedure Laterality Date    ARTHROPLASTY HIP Left 11/19/2019    Procedure: Left total hip arthroplasty;  Surgeon: Allen Coats MD;  Location:  OR    BOTOX CHRONIC MIGRAINE HEAD ACHES      for chronic headache and neck pain, injections every 12 weeks    COLONOSCOPY      EXTRACTION(S) DENTAL  12/18/2012    Procedure: EXTRACTION(S) DENTAL;  Extraction of Teeth 30, 19;  Surgeon: Sujey Cunningham DDS;  Location:  OR    OPTICAL TRACKING SYSTEM FUSION POSTERIOR SPINE LUMBAR N/A 2/2/2021    Procedure: Lumbar 3-4 posterior lumbar instrumented fusion with interbody cage;  Surgeon: Jakub Velasquez MD;  Location: [facility] OR    repair fracture & insert pins left hand  1996    SMALL BOWEL RESECTION  1993       FAMILY HISTORY:  Family History   Problem Relation Age of Onset    Hypertension Father     Alcoholism Father     Other - See Comments Father     Coronary Artery Disease Father     Skin Cancer Father     Kidney failure Father        SOCIAL HISTORY:  Social History     Socioeconomic History    Marital status:      Spouse name: None    Number of children: None    Years of education: None    Highest education level: None   Tobacco Use    Smoking status: Never    Smokeless tobacco: Never   Vaping Use    Vaping Use: Never used   Substance and Sexual Activity    Alcohol use: Yes     Comment: seldom    Drug use: No    Sexual activity: Not Currently     Partners: Female   Other Topics Concern    Parent/sibling w/ CABG, MI or angioplasty before 65F 55M? No   Social History Narrative    Lives in [facility].     Twin boys, age 32. Daughter is 23.     , wife's name is Cheryl.     Three dogs.     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         Albertina Peña, DNP, APRN, CNP    08/01/22     Social Determinants of Health     Financial Resource Strain: Low Risk  (11/27/2023)    Financial Resource Strain     Within the past 12 months,  have you or your family members you live with been unable to get utilities (heat, electricity) when it was really needed?: No   Food Insecurity: Low Risk  (11/27/2023)    Food Insecurity     Within the past 12 months, did you worry that your food would run out before you got money to buy more?: No     Within the past 12 months, did the food you bought just not last and you didn t have money to get more?: No   Transportation Needs: Low Risk  (11/27/2023)    Transportation Needs     Within the past 12 months, has lack of transportation kept you from medical appointments, getting your medicines, non-medical meetings or appointments, work, or from getting things that you need?: No   Physical Activity: Inactive (8/1/2022)    Exercise Vital Sign     Days of Exercise per Week: 0 days     Minutes of Exercise per Session: 0 min   Stress: No Stress Concern Present (8/1/2022)    [employer] - Occupational Stress Questionnaire     Feeling of Stress : Only a little   Social Connections: Socially Isolated (8/1/2022)    Social Connection and Isolation Panel [NHANES]     Frequency of Communication with Friends and Family: Once a week     Frequency of Social Gatherings with Friends and Family: Once a week     Attends Voodoo Services: Never     Active Member of Clubs or Organizations: No     Marital Status:    Interpersonal Safety: Low Risk  (10/2/2023)    Interpersonal Safety     Do you feel physically and emotionally safe where you currently live?: Yes     Within the past 12 months, have you been hit, slapped, kicked or otherwise physically hurt by someone?: No     Within the past 12 months, have you been humiliated or emotionally abused in other ways by your partner or ex-partner?: No   Housing Stability: Low Risk  (11/27/2023)    Housing Stability     Do you have housing? : Yes     Are you worried about losing your housing?: No       Review of Systems:  Skin:  Negative     Eyes:  Positive  "for glasses  ENT:  Negative    Respiratory:  Positive for shortness of breath;dyspnea on exertion  Cardiovascular:    Positive for;edema  Gastroenterology: Negative    Genitourinary:  Negative    Musculoskeletal:  Positive for arthritis;joint pain;back pain;gout;foot pain  Neurologic:  Positive for headaches;migraine headaches  Psychiatric:  Positive for depression  Heme/Lymph/Imm:  Negative    Endocrine:  Negative      Physical Exam:  Vitals: BP (!) 144/88   Pulse 83   Ht 1.76 m (5' 9.29\")   Wt 120.5 kg (265 lb 9.6 oz)   SpO2 96%   BMI 38.89 kg/m      Constitutional:           Skin:           Head:           Eyes:           ENT:           Neck:           Chest:           Cardiac:                    Abdomen:           Vascular:                                        Extremities and Muscular Skeletal:              Neurological:           Psych:        Recent Lab Results:  LIPID RESULTS:  Lab Results   Component Value Date    CHOL 134 11/09/2023    CHOL 231 (H) 04/20/2021    HDL 47 11/09/2023    HDL 43 04/20/2021    LDL 28 11/09/2023     (H) 04/20/2021    TRIG 296 (H) 11/09/2023    TRIG 316 (H) 04/20/2021       LIVER ENZYME RESULTS:  Lab Results   Component Value Date    AST 28 11/27/2023    AST 17 01/22/2021    ALT 35 11/27/2023    ALT 36 01/22/2021       CBC RESULTS:  Lab Results   Component Value Date    WBC 7.6 11/27/2023    WBC 7.8 04/20/2021    RBC 4.74 11/27/2023    RBC 4.60 04/20/2021    HGB 14.9 11/27/2023    HGB 14.4 04/20/2021    HCT 44.8 11/27/2023    HCT 44.5 04/20/2021    MCV 95 11/27/2023    MCV 97 04/20/2021    MCH 31.4 11/27/2023    MCH 31.3 04/20/2021    MCHC 33.3 11/27/2023    MCHC 32.4 04/20/2021    RDW 12.7 11/27/2023    RDW 12.8 04/20/2021     11/27/2023     04/20/2021       BMP RESULTS:  Lab Results   Component Value Date     12/27/2023     04/20/2021    POTASSIUM 5.0 12/27/2023    POTASSIUM 4.2 08/17/2022    POTASSIUM 5.3 04/20/2021    CHLORIDE 102 " 12/27/2023    CHLORIDE 103 08/17/2022    CHLORIDE 107 04/20/2021    CO2 26 12/27/2023    CO2 21 08/17/2022    CO2 25 04/20/2021    ANIONGAP 10 12/27/2023    ANIONGAP 8 08/17/2022    ANIONGAP 4 04/20/2021     (H) 12/27/2023     (H) 08/17/2022    GLC 96 04/20/2021    BUN 15.2 12/27/2023    BUN 22 08/17/2022    BUN 24 04/20/2021    CR 1.17 12/27/2023    CR 1.68 (H) 04/20/2021    GFRESTIMATED 70 12/27/2023    GFRESTIMATED 43 (L) 04/20/2021    GFRESTBLACK 50 (L) 04/20/2021    GRACE 9.3 12/27/2023    GRACE 9.4 04/20/2021        A1C RESULTS:  Lab Results   Component Value Date    A1C 5.5 04/20/2021       INR RESULTS:  Lab Results   Component Value Date    INR 0.98 10/28/2023    INR 0.93 10/17/2015         Reg David MD, FACC    CC  Livia Keller, NP  [address] [place] [address]

## 2024-01-10 NOTE — PATIENT INSTRUCTIONS
It was a pleasure seeing you today and thank you for allowing me to be a part of your health care team.  Should   you have any questions regarding your visit or future needs please feel free to reach out to my care team for assistance.      Thank you, Dr. Reg David        **Nursing: [phone]       **Scheduling: [phone]

## 2024-01-11 ENCOUNTER — HOSPITAL ENCOUNTER (OUTPATIENT)
Dept: CARDIOLOGY | Facility: CLINIC | Age: 64
Discharge: HOME | End: 2024-01-11
Attending: INTERNAL MEDICINE | Admitting: INTERNAL MEDICINE
Payer: COMMERCIAL

## 2024-01-11 DIAGNOSIS — I10 ESSENTIAL HYPERTENSION, BENIGN: ICD-10-CM

## 2024-01-11 PROCEDURE — 93790 AMBL BP MNTR W/SW I&R: CPT | Performed by: INTERNAL MEDICINE

## 2024-01-11 PROCEDURE — 93786 AMBL BP MNTR W/SW REC ONLY: CPT

## 2024-01-12 ENCOUNTER — OFFICE VISIT (OUTPATIENT)
Dept: PODIATRY | Facility: CLINIC | Age: 64
End: 2024-01-12
Attending: NURSE PRACTITIONER
Payer: COMMERCIAL

## 2024-01-12 VITALS — WEIGHT: 265 LBS | SYSTOLIC BLOOD PRESSURE: 144 MMHG | BODY MASS INDEX: 38.81 KG/M2 | DIASTOLIC BLOOD PRESSURE: 88 MMHG

## 2024-01-12 DIAGNOSIS — M25.572 PAIN IN JOINT INVOLVING ANKLE AND FOOT, LEFT: ICD-10-CM

## 2024-01-12 DIAGNOSIS — M79.672 LEFT FOOT PAIN: ICD-10-CM

## 2024-01-12 PROCEDURE — 99204 OFFICE O/P NEW MOD 45 MIN: CPT | Performed by: PODIATRIST

## 2024-01-12 RX ORDER — ACETAMINOPHEN AND CODEINE PHOSPHATE 300; 30 MG/1; MG/1
1 TABLET ORAL EVERY 6 HOURS PRN
Qty: 10 TABLET | Refills: 0 | Status: SHIPPED | OUTPATIENT
Start: 2024-01-12 | End: 2024-02-05

## 2024-01-12 NOTE — PROGRESS NOTES
"Foot & Ankle Surgery  January 12, 2024    CC: \"foot pain extream\"    I was asked to see Avi JOSUE Bhatti regarding the chief complaint by: Urgent care    HPI:  Pt is a 63 year old male who presents with above complaint.  Multiple month history of pain focused at the left first metatarsophalangeal joint.  The patient was seen by rheumatology, \"the rheumatologist said ' it is definitely gout'\", and he was placed on prednisone with little to no improvement in symptoms, \"it is just not getting any better\".  He was seen in urgent care where x-rays were unremarkable other than showing mild swelling on the left first metatarsal phalangeal joint.  He states it hurts from the big toe joint to the top of the foot.  It \"blows up\" with shoes, \"it made me essentially immobile\".  The patient sees rheumatology for ankylosing spondylitis.    ROS:   Pos for CC.  The patient denies current nausea, vomiting, chills, fevers, belly pain, calf pain, chest pain or SOB.  Complete remainder of ROS is otherwise neg.    VITALS:  There were no vitals filed for this visit.    PMH:    Past Medical History:   Diagnosis Date    Ankylosing spondylitis (H)     Arthritis     Crohn's colitis (H)     Gastroesophageal reflux disease     Hypertension     Migraine     Other chronic pain     headache and neck pain, receives botox injections Q 12 weeks    Sleep apnea     cpap       SXHX:    Past Surgical History:   Procedure Laterality Date    ARTHROPLASTY HIP Left 11/19/2019    Procedure: Left total hip arthroplasty;  Surgeon: Allen Coats MD;  Location:  OR    BOTOX CHRONIC MIGRAINE HEAD ACHES      for chronic headache and neck pain, injections every 12 weeks    COLONOSCOPY      EXTRACTION(S) DENTAL  12/18/2012    Procedure: EXTRACTION(S) DENTAL;  Extraction of Teeth 30, 19;  Surgeon: Sujey Cunningham DDS;  Location:  OR    OPTICAL TRACKING SYSTEM FUSION POSTERIOR SPINE LUMBAR N/A 2/2/2021    Procedure: Lumbar 3-4 posterior lumbar " instrumented fusion with interbody cage;  Surgeon: Jakub Velasquez MD;  Location: [facility] OR    repair fracture & insert pins left hand  1996    SMALL BOWEL RESECTION  1993        MEDS:    Current Outpatient Medications   Medication    acetaminophen (TYLENOL) 500 MG tablet    atorvastatin (LIPITOR) 20 MG tablet    calcium carbonate-vitamin D (CALCIUM 600+D) 600-200 MG-UNIT TABS    carvedilol (COREG) 25 MG tablet    DULoxetine (CYMBALTA) 30 MG capsule    DULoxetine (CYMBALTA) 60 MG capsule    febuxostat (ULORIC) 40 MG TABS tablet    gabapentin (NEURONTIN) 300 MG capsule    gabapentin (NEURONTIN) 300 MG capsule    HUMIRA PEN 40 MG/0.8ML pen kit    losartan (COZAAR) 50 MG tablet    multivitamin w/minerals (THERA-VIT-M) tablet    neomycin-polymixin-dexamethasone (MAXITROL) ophthalmic ointment    pantoprazole (PROTONIX) 40 MG EC tablet    predniSONE (DELTASONE) 20 MG tablet    rimegepant (NURTEC) 75 MG ODT tablet    tiZANidine (ZANAFLEX) 4 MG tablet    Vitamin D3 (CHOLECALCIFEROL) 25 mcg (1000 units) tablet     No current facility-administered medications for this visit.       ALL:     Allergies   Allergen Reactions    Prednisone Other (See Comments)     Elevated heart rate, has had without problems more recently    Reglan [Metoclopramide] Other (See Comments)     agitation    Unknown [No Clinical Screening - See Comments]      benny have not worked in past       FMH:    Family History   Problem Relation Age of Onset    Hypertension Father     Alcoholism Father     Other - See Comments Father     Coronary Artery Disease Father     Skin Cancer Father     Kidney failure Father        SocHx:    Social History     Socioeconomic History    Marital status:      Spouse name: Not on file    Number of children: Not on file    Years of education: Not on file    Highest education level: Not on file   Occupational History    Not on file   Tobacco Use    Smoking status: Never    Smokeless tobacco: Never   Vaping Use    Vaping Use:  Never used   Substance and Sexual Activity    Alcohol use: Yes     Comment: seldom    Drug use: No    Sexual activity: Not Currently     Partners: Female   Other Topics Concern    Parent/sibling w/ CABG, MI or angioplasty before 65F 55M? No   Social History Narrative    Lives in [facility].     Twin boys, age 32. Daughter is 23.     , wife's name is Cheryl.     Three dogs.     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         Albertina Peña, DNP, APRN, CNP    08/01/22     Social Determinants of Health     Financial Resource Strain: Low Risk  (11/27/2023)    Financial Resource Strain     Within the past 12 months, have you or your family members you live with been unable to get utilities (heat, electricity) when it was really needed?: No   Food Insecurity: Low Risk  (11/27/2023)    Food Insecurity     Within the past 12 months, did you worry that your food would run out before you got money to buy more?: No     Within the past 12 months, did the food you bought just not last and you didn t have money to get more?: No   Transportation Needs: Low Risk  (11/27/2023)    Transportation Needs     Within the past 12 months, has lack of transportation kept you from medical appointments, getting your medicines, non-medical meetings or appointments, work, or from getting things that you need?: No   Physical Activity: Inactive (8/1/2022)    Exercise Vital Sign     Days of Exercise per Week: 0 days     Minutes of Exercise per Session: 0 min   Stress: No Stress Concern Present (8/1/2022)    [employer] - Occupational Stress Questionnaire     Feeling of Stress : Only a little   Social Connections: Socially Isolated (8/1/2022)    Social Connection and Isolation Panel [NHANES]     Frequency of Communication with Friends and Family: Once a week     Frequency of Social Gatherings with Friends and Family: Once a week     Attends Temple Services: Never     Active Member of Clubs  or Organizations: No     Attends Club or Organization Meetings: Not on file     Marital Status:    Interpersonal Safety: Low Risk  (10/2/2023)    Interpersonal Safety     Do you feel physically and emotionally safe where you currently live?: Yes     Within the past 12 months, have you been hit, slapped, kicked or otherwise physically hurt by someone?: No     Within the past 12 months, have you been humiliated or emotionally abused in other ways by your partner or ex-partner?: No   Housing Stability: Low Risk  (11/27/2023)    Housing Stability     Do you have housing? : Yes     Are you worried about losing your housing?: No           EXAMINATION:  Gen:   No apparent distress  Neuro:   A&Ox3, no deficits  Psych:    Answering questions appropriately for age and situation with normal affect  Head:    NCAT  Eye:    Visual scanning without deficit  Ear:    Response to auditory stimuli wnl  Lung:    Non-labored breathing on RA noted  Abd:    NTND per patient report  Lymph:    The left first metatarsophalangeal joint is mildly erythematous and edematous, especially dorsomedially  Vasc:    Pulses palpable, CFT minimally delayed  Neuro:    Light touch sensation intact to all sensory nerve distributions without paresthesias  Derm:    Neg for nodules, lesions or ulcerations  MSK:    Pain on palpation at the dorsal medial left first metatarsophalangeal joint.  Passive range of motion the joint shows minimal discomfort today.  No extensor tendon or pain across the dorsal foot is seen today  Calf:    Neg for redness, swelling or tenderness      Imaging: X-ray left foot 1/8/2024 - IMPRESSION: Normal joint spacing and alignment.  No fracture.  Soft    Assessment:  63 year old male with pain, redness and swelling left first metatarsophalangeal joint, recalcitrant to oral steroid therapy      Medical Decision Making/Plan:  Discussed etiologies, anatomy and options  1.  Pain, redness, swelling left first metatarsophalangeal  joint, recalcitrant to oral steroid therapy  -I personally reviewed and interpreted the patient's lower extremity history pertinent to today's visit, including imaging/labs, in preparation for initiating a treatment program.  -I personally reviewed and interpreted the 1/8/2024 x-rays  -Based on pain levels, and the fact patient's not able to take NSAIDs secondary to renal issues, he was given a prescription for Tylenol 3, 1 tablet every 6 hours as needed pain.  Okay to alternate with Tylenol but do not take with Tylenol  -An image guided joint aspiration was ordered to assess for bacteria/crystals.  -An image guided steroid injection was ordered for the left first metatarsophalangeal joint, to be completed if aspirate shows no evidence of infection  -If this fails to provide sufficient relief, I will order an MRI of the foot for further assessment      Follow up: As needed based on above plan or sooner with acute issues      Patient's medical history was reviewed today      Simone Vo DPM FACFAS FACFAOM  Podiatric Foot & Ankle Surgeon  [facility]  [phone]    Disclaimer: This note consists of symbols derived from keyboarding, dictation and/or voice recognition software. As a result, there may be errors in the script that have gone undetected. Please consider this when interpreting information found in this chart.

## 2024-01-12 NOTE — LETTER
"    1/12/2024         RE: Avi Bhatti  [address] [facility] [address]        Dear Colleague,    Thank you for referring your patient, Avi Bhatti, to the [facility]. Please see a copy of my visit note below.    Foot & Ankle Surgery  January 12, 2024    CC: \"foot pain extream\"    I was asked to see Avi Bhatti regarding the chief complaint by: Urgent care    HPI:  Pt is a 63 year old male who presents with above complaint.  Multiple month history of pain focused at the left first metatarsophalangeal joint.  The patient was seen by rheumatology, \"the rheumatologist said ' it is definitely gout'\", and he was placed on prednisone with little to no improvement in symptoms, \"it is just not getting any better\".  He was seen in urgent care where x-rays were unremarkable other than showing mild swelling on the left first metatarsal phalangeal joint.  He states it hurts from the big toe joint to the top of the foot.  It \"blows up\" with shoes, \"it made me essentially immobile\".  The patient sees rheumatology for ankylosing spondylitis.    ROS:   Pos for CC.  The patient denies current nausea, vomiting, chills, fevers, belly pain, calf pain, chest pain or SOB.  Complete remainder of ROS is otherwise neg.    VITALS:  There were no vitals filed for this visit.    PMH:    Past Medical History:   Diagnosis Date     Ankylosing spondylitis (H)      Arthritis      Crohn's colitis (H)      Gastroesophageal reflux disease      Hypertension      Migraine      Other chronic pain     headache and neck pain, receives botox injections Q 12 weeks     Sleep apnea     cpap       SXHX:    Past Surgical History:   Procedure Laterality Date     ARTHROPLASTY HIP Left 11/19/2019    Procedure: Left total hip arthroplasty;  Surgeon: Allen Coats MD;  Location: [facility] OR     BOTOX CHRONIC MIGRAINE HEAD ACHES      for chronic headache and neck pain, injections every 12 weeks     COLONOSCOPY   "     EXTRACTION(S) DENTAL  12/18/2012    Procedure: EXTRACTION(S) DENTAL;  Extraction of Teeth 30, 19;  Surgeon: Sujey Cunningham DDS;  Location: [facility] OR     OPTICAL TRACKING SYSTEM FUSION POSTERIOR SPINE LUMBAR N/A 2/2/2021    Procedure: Lumbar 3-4 posterior lumbar instrumented fusion with interbody cage;  Surgeon: Jakub Velasquez MD;  Location: [facility] OR     repair fracture & insert pins left hand  1996     SMALL BOWEL RESECTION  1993        MEDS:    Current Outpatient Medications   Medication     acetaminophen (TYLENOL) 500 MG tablet     atorvastatin (LIPITOR) 20 MG tablet     calcium carbonate-vitamin D (CALCIUM 600+D) 600-200 MG-UNIT TABS     carvedilol (COREG) 25 MG tablet     DULoxetine (CYMBALTA) 30 MG capsule     DULoxetine (CYMBALTA) 60 MG capsule     febuxostat (ULORIC) 40 MG TABS tablet     gabapentin (NEURONTIN) 300 MG capsule     gabapentin (NEURONTIN) 300 MG capsule     HUMIRA PEN 40 MG/0.8ML pen kit     losartan (COZAAR) 50 MG tablet     multivitamin w/minerals (THERA-VIT-M) tablet     neomycin-polymixin-dexamethasone (MAXITROL) ophthalmic ointment     pantoprazole (PROTONIX) 40 MG EC tablet     predniSONE (DELTASONE) 20 MG tablet     rimegepant (NURTEC) 75 MG ODT tablet     tiZANidine (ZANAFLEX) 4 MG tablet     Vitamin D3 (CHOLECALCIFEROL) 25 mcg (1000 units) tablet     No current facility-administered medications for this visit.       ALL:     Allergies   Allergen Reactions     Prednisone Other (See Comments)     Elevated heart rate, has had without problems more recently     Reglan [Metoclopramide] Other (See Comments)     agitation     Unknown [No Clinical Screening - See Comments]      benny have not worked in past       FMH:    Family History   Problem Relation Age of Onset     Hypertension Father      Alcoholism Father      Other - See Comments Father      Coronary Artery Disease Father      Skin Cancer Father      Kidney failure Father        SocHx:    Social History     Socioeconomic History      Marital status:      Spouse name: Not on file     Number of children: Not on file     Years of education: Not on file     Highest education level: Not on file   Occupational History     Not on file   Tobacco Use     Smoking status: Never     Smokeless tobacco: Never   Vaping Use     Vaping Use: Never used   Substance and Sexual Activity     Alcohol use: Yes     Comment: seldom     Drug use: No     Sexual activity: Not Currently     Partners: Female   Other Topics Concern     Parent/sibling w/ CABG, MI or angioplasty before 65F 55M? No   Social History Narrative    Lives in [place].     Twin boys, age 32. Daughter is 23.     , wife's name is Cheryl.     Three dogs.     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         Albertina Peña, DNP, APRN, CNP    08/01/22     Social Determinants of Health     Financial Resource Strain: Low Risk  (11/27/2023)    Financial Resource Strain      Within the past 12 months, have you or your family members you live with been unable to get utilities (heat, electricity) when it was really needed?: No   Food Insecurity: Low Risk  (11/27/2023)    Food Insecurity      Within the past 12 months, did you worry that your food would run out before you got money to buy more?: No      Within the past 12 months, did the food you bought just not last and you didn t have money to get more?: No   Transportation Needs: Low Risk  (11/27/2023)    Transportation Needs      Within the past 12 months, has lack of transportation kept you from medical appointments, getting your medicines, non-medical meetings or appointments, work, or from getting things that you need?: No   Physical Activity: Inactive (8/1/2022)    Exercise Vital Sign      Days of Exercise per Week: 0 days      Minutes of Exercise per Session: 0 min   Stress: No Stress Concern Present (8/1/2022)    [employer] - Occupational Stress Questionnaire      Feeling of Stress : Only  a little   Social Connections: Socially Isolated (8/1/2022)    Social Connection and Isolation Panel [NHANES]      Frequency of Communication with Friends and Family: Once a week      Frequency of Social Gatherings with Friends and Family: Once a week      Attends Islam Services: Never      Active Member of Clubs or Organizations: No      Attends Club or Organization Meetings: Not on file      Marital Status:    Interpersonal Safety: Low Risk  (10/2/2023)    Interpersonal Safety      Do you feel physically and emotionally safe where you currently live?: Yes      Within the past 12 months, have you been hit, slapped, kicked or otherwise physically hurt by someone?: No      Within the past 12 months, have you been humiliated or emotionally abused in other ways by your partner or ex-partner?: No   Housing Stability: Low Risk  (11/27/2023)    Housing Stability      Do you have housing? : Yes      Are you worried about losing your housing?: No           EXAMINATION:  Gen:   No apparent distress  Neuro:   A&Ox3, no deficits  Psych:    Answering questions appropriately for age and situation with normal affect  Head:    NCAT  Eye:    Visual scanning without deficit  Ear:    Response to auditory stimuli wnl  Lung:    Non-labored breathing on RA noted  Abd:    NTND per patient report  Lymph:    The left first metatarsophalangeal joint is mildly erythematous and edematous, especially dorsomedially  Vasc:    Pulses palpable, CFT minimally delayed  Neuro:    Light touch sensation intact to all sensory nerve distributions without paresthesias  Derm:    Neg for nodules, lesions or ulcerations  MSK:    Pain on palpation at the dorsal medial left first metatarsophalangeal joint.  Passive range of motion the joint shows minimal discomfort today.  No extensor tendon or pain across the dorsal foot is seen today  Calf:    Neg for redness, swelling or tenderness      Imaging: X-ray left foot 1/8/2024 - IMPRESSION: Normal joint  spacing and alignment.  No fracture.  Soft    Assessment:  63 year old male with pain, redness and swelling left first metatarsophalangeal joint, recalcitrant to oral steroid therapy      Medical Decision Making/Plan:  Discussed etiologies, anatomy and options  1.  Pain, redness, swelling left first metatarsophalangeal joint, recalcitrant to oral steroid therapy  -I personally reviewed and interpreted the patient's lower extremity history pertinent to today's visit, including imaging/labs, in preparation for initiating a treatment program.  -I personally reviewed and interpreted the 1/8/2024 x-rays  -Based on pain levels, and the fact patient's not able to take NSAIDs secondary to renal issues, he was given a prescription for Tylenol 3, 1 tablet every 6 hours as needed pain.  Okay to alternate with Tylenol but do not take with Tylenol  -An image guided joint aspiration was ordered to assess for bacteria/crystals.  -An image guided steroid injection was ordered for the left first metatarsophalangeal joint, to be completed if aspirate shows no evidence of infection  -If this fails to provide sufficient relief, I will order an MRI of the foot for further assessment      Follow up: As needed based on above plan or sooner with acute issues      Patient's medical history was reviewed today      Simone Vo DPM FACFAS FACFAOM  Podiatric Foot & Ankle Surgeon  [facility]  [phone]    Disclaimer: This note consists of symbols derived from keyboarding, dictation and/or voice recognition software. As a result, there may be errors in the script that have gone undetected. Please consider this when interpreting information found in this chart.          Again, thank you for allowing me to participate in the care of your patient.        Sincerely,        Simone Vo DPM, KENZIE

## 2024-01-12 NOTE — PATIENT INSTRUCTIONS
Thank you for choosing [facility] Podiatry / Foot & Ankle Surgery!    DR. GRIMALDO'S CLINIC LOCATIONS:     [facility] (Friday) TRIAGE LINE: [phone] [address] [facility]  APPOINTMENTS: [phone]   [place] [address] RADIOLOGY: [phone]    PHYSICAL THERAPY: [phone]    SET UP SURGERY: [phone]   [facility] (Mon-Tues AM-Thurs) BILLING QUESTIONS: [phone] [address] FAX: [phone]   [place] [address] [place] [facility]: [phone]     You were seen for severe pain in the left first metatarsophalangeal joint.  X-rays are unremarkable.  You are given a prescription for a few pain pills to help alleviate pain.  Do not take these with Tylenol.    I have placed an order for a joint aspiration of the left big toe joint, to assess for crystals (gout) or bacteria (infection).  Call the above radiology number to schedule the imaging study.  I have also placed an order for an image guided steroid injection (to be done as long as no bacteria are seen).  I will follow-up with you with the results on the aspiration.  We will consider an MRI if either this fails to show improvement or the aspiration shows no definitive diagnosis

## 2024-01-17 ENCOUNTER — HOSPITAL ENCOUNTER (OUTPATIENT)
Dept: GENERAL RADIOLOGY | Facility: CLINIC | Age: 64
Discharge: HOME | End: 2024-01-17
Attending: PODIATRIST
Payer: COMMERCIAL

## 2024-01-17 VITALS
DIASTOLIC BLOOD PRESSURE: 100 MMHG | HEART RATE: 78 BPM | SYSTOLIC BLOOD PRESSURE: 151 MMHG | OXYGEN SATURATION: 98 % | RESPIRATION RATE: 16 BRPM

## 2024-01-17 DIAGNOSIS — M25.572 PAIN IN JOINT INVOLVING ANKLE AND FOOT, LEFT: ICD-10-CM

## 2024-01-17 DIAGNOSIS — M79.672 LEFT FOOT PAIN: Primary | ICD-10-CM

## 2024-01-17 LAB
GRAM STAIN RESULT: NORMAL
GRAM STAIN RESULT: NORMAL

## 2024-01-17 PROCEDURE — 87205 SMEAR GRAM STAIN: CPT | Performed by: PHYSICIAN ASSISTANT

## 2024-01-17 PROCEDURE — 20600 DRAIN/INJ JOINT/BURSA W/O US: CPT | Mod: LT

## 2024-01-17 PROCEDURE — 87070 CULTURE OTHR SPECIMN AEROBIC: CPT

## 2024-01-17 PROCEDURE — 250N000009 HC RX 250

## 2024-01-17 RX ORDER — LIDOCAINE HYDROCHLORIDE 10 MG/ML
INJECTION, SOLUTION EPIDURAL; INFILTRATION; INTRACAUDAL; PERINEURAL
Status: COMPLETED
Start: 2024-01-17 | End: 2024-01-17

## 2024-01-17 RX ADMIN — LIDOCAINE HYDROCHLORIDE 20 MG: 10 INJECTION, SOLUTION EPIDURAL; INFILTRATION; INTRACAUDAL; PERINEURAL at 15:50

## 2024-01-17 NOTE — PROGRESS NOTES
Assisted TRACY Murray in left 1st metatarsophalangeal joint aspiration. Medications given per MAR.  Pt tolerated procedure well. Injection site covered with dressing. Dressing clean, dry and intact at time of discharge. Discharge instructions given and all questions answered. Pt left ambulatory in stable condition.

## 2024-01-19 ENCOUNTER — TELEPHONE (OUTPATIENT)
Dept: CARDIOLOGY | Facility: CLINIC | Age: 64
End: 2024-01-19
Payer: COMMERCIAL

## 2024-01-19 DIAGNOSIS — I10 HYPERTENSION, UNSPECIFIED TYPE: Primary | ICD-10-CM

## 2024-01-19 RX ORDER — CARVEDILOL 25 MG/1
50 TABLET ORAL 2 TIMES DAILY WITH MEALS
Qty: 120 TABLET | Refills: 3 | Status: SHIPPED | OUTPATIENT
Start: 2024-01-19 | End: 2024-05-22

## 2024-01-19 NOTE — TELEPHONE ENCOUNTER
Have him increase the carvedilol to 50 mg bid and have him follow up with ARSENIO in a month to further adjust meds per my note, if nece     Contacted patient and he has agreed to increase Coreg to 50mg BID RX sent to [facility] in [facility], orders placed for ARSENIO visit in 1 mos.

## 2024-01-19 NOTE — TELEPHONE ENCOUNTER
"----- Message from Brooklyn Sylvester RN sent at 1/18/2024  2:19 PM CST -----    ----- Message -----  From: Reg David MD  Sent: 1/17/2024   3:38 PM CST  To: Adeline Hernandez RN    Have him increase the carvedilol to 50 mg bid and have him follow up with ARSENIO in a month to further adjust meds per my note, if necessary.  ----- Message -----  From: Adeline Hernandez RN  Sent: 1/16/2024  10:26 AM CST  To: Reg David MD    24 hr BP monitor noted.   Last OV 1/10/24 with , \"Blood pressure is not yet well-controlled and I have asked him to increase his carvedilol to 25 mg twice daily. I would eventually plan on going up to 50 mg twice daily if blood pressure and heart rate will tolerate this. Also would be a good candidate for diuretics but I like to make sure he does not have gout before we start him on this since it could exacerbate that problem. Also, he was on amlodipine in the past and we might add that back at low-dose since the edema caused by amlodipine was not severe and did not completely resolved when it was discontinued. Finally, the fact that this patient has normal LV wall thickness makes me suspicious that he may have a normal resting blood pressure and \"blood pressure testing anxiety\". I will arrange a 24-hour blood pressure monitor to evaluate for this possibility\".     Lexiscan scheduled 1/24/24. OV w/  on 4/3/24. Routing to ordering provider. KRISTIN Ortiz        "

## 2024-01-22 ENCOUNTER — VIRTUAL VISIT (OUTPATIENT)
Dept: PEDIATRICS | Facility: CLINIC | Age: 64
End: 2024-01-22
Payer: COMMERCIAL

## 2024-01-22 ENCOUNTER — HOSPITAL ENCOUNTER (OUTPATIENT)
Dept: GENERAL RADIOLOGY | Facility: CLINIC | Age: 64
Discharge: HOME | End: 2024-01-22
Attending: PODIATRIST | Admitting: PODIATRIST
Payer: COMMERCIAL

## 2024-01-22 DIAGNOSIS — I10 HYPERTENSION, UNSPECIFIED TYPE: Primary | ICD-10-CM

## 2024-01-22 DIAGNOSIS — M79.672 LEFT FOOT PAIN: Primary | ICD-10-CM

## 2024-01-22 DIAGNOSIS — M79.675 PAIN OF TOE OF LEFT FOOT: ICD-10-CM

## 2024-01-22 PROBLEM — I95.9 HYPOTENSION, UNSPECIFIED HYPOTENSION TYPE: Status: RESOLVED | Noted: 2023-10-28 | Resolved: 2024-01-22

## 2024-01-22 PROBLEM — N17.9 AKI (ACUTE KIDNEY INJURY) (H): Status: RESOLVED | Noted: 2023-10-28 | Resolved: 2024-01-22

## 2024-01-22 LAB — BACTERIA SNV CULT: NO GROWTH

## 2024-01-22 PROCEDURE — 20600 DRAIN/INJ JOINT/BURSA W/O US: CPT | Mod: LT

## 2024-01-22 PROCEDURE — 255N000002 HC RX 255 OP 636: Performed by: PODIATRIST

## 2024-01-22 PROCEDURE — 250N000009 HC RX 250: Performed by: PODIATRIST

## 2024-01-22 PROCEDURE — 99213 OFFICE O/P EST LOW 20 MIN: CPT | Mod: 95 | Performed by: INTERNAL MEDICINE

## 2024-01-22 PROCEDURE — 250N000011 HC RX IP 250 OP 636: Performed by: PODIATRIST

## 2024-01-22 RX ORDER — LOSARTAN POTASSIUM 100 MG/1
100 TABLET ORAL DAILY
Qty: 90 TABLET | Refills: 3 | Status: SHIPPED | OUTPATIENT
Start: 2024-01-22 | End: 2024-04-03

## 2024-01-22 RX ORDER — LIDOCAINE HYDROCHLORIDE 10 MG/ML
30 INJECTION, SOLUTION EPIDURAL; INFILTRATION; INTRACAUDAL; PERINEURAL ONCE
Status: COMPLETED | OUTPATIENT
Start: 2024-01-22 | End: 2024-01-22

## 2024-01-22 RX ORDER — IOPAMIDOL 408 MG/ML
10 INJECTION, SOLUTION INTRATHECAL ONCE
Status: COMPLETED | OUTPATIENT
Start: 2024-01-22 | End: 2024-01-22

## 2024-01-22 RX ORDER — TRIAMCINOLONE ACETONIDE 40 MG/ML
40 INJECTION, SUSPENSION INTRA-ARTICULAR; INTRAMUSCULAR ONCE
Status: COMPLETED | OUTPATIENT
Start: 2024-01-22 | End: 2024-01-22

## 2024-01-22 RX ADMIN — LIDOCAINE HYDROCHLORIDE 3 ML: 10 INJECTION, SOLUTION EPIDURAL; INFILTRATION; INTRACAUDAL; PERINEURAL at 14:00

## 2024-01-22 RX ADMIN — IOPAMIDOL 0.5 ML: 408 INJECTION, SOLUTION INTRATHECAL at 14:02

## 2024-01-22 RX ADMIN — TRIAMCINOLONE ACETONIDE 40 MG: 40 INJECTION, SUSPENSION INTRA-ARTICULAR; INTRAMUSCULAR at 14:00

## 2024-01-22 NOTE — DISCHARGE INSTRUCTIONS
JOINT STEROID INJECTION DISCHARGE INSTRUCTIONS    What to expect  After the procedure, you may feel some temporary relief from the local anesthetic, but that will likely wear off within a few hours. Your symptoms may then return to pre-procedure level, or even be temporarily worse for a day or two.  For many people, the steroid begins to provide some relief within 2-3 days, but it can take up to 2 weeks. If you have no improvement in your symptoms after two weeks, please contact your referring provider to discuss next steps.  What to do  Minimize your activity today. You may resume your normal activity tomorrow as tolerated. Avoid vigorous or strenuous activity until your symptoms improve, or as directed by your referring provider.   You may shower tomorrow, but do not submerge in bathtub, hot tub or pool for 48 hours.    Use ice packs as needed for discomfort.  You may resume all medications, including blood thinners.  You may take over the counter acetaminophen (Tylenol) or ibuprofen (Motrin, Advil) for mild discomfort after the procedure.    What to watch for  Bruising or slight swelling at the puncture site can be normal. If you begin to develop redness or excessive swelling at the site, fever over 1010F, notable increase in pain, weakness, or numbness, please contact your primary care or referring provider.  Side effects from the steroid are often mild and go away in a few days. Common side effects may include facial flushing, restlessness, irritability, difficulty sleeping, elevated blood pressure, and increased blood sugar.   If you have diabetes, monitor your blood sugar closely. Contact the provider who manages your diabetes to help you control your blood sugar if needed.  If you have questions or concerns  You may contact the [facility] Radiology Department at [phone] between 8am-4:30pm Monday through Friday.  If you have urgent questions outside of these normal business hours, please contact  the [facility] on-call physician at [phone].    The provider who performed your procedure was Terry Rai PA-C

## 2024-01-22 NOTE — PROGRESS NOTES
Fernando is a 63 year old who is being evaluated via telephone visit    Assessment & Plan     (I10) Hypertension, unspecified type  (primary encounter diagnosis)  Comment: rx reviewed/upcoming follow-up with cardiology  Plan: losartan (COZAAR) 100 MG tablet          (M79.675) Pain of toe of left foot  Comment:   Plan: Status post joint aspiration/injection, first MTP today.  Following up with podiatry and rheumatology.       Subjective   Fernando is a 63 year old, presenting for the following health issues:    HPI     Hypertension-recent visit with cardiology, tolerating current regimen.  Request losartan refill.    Recurrent pain/swelling left first MTP.  Recently underwent arthrocentesis today/results pending.    Patient Active Problem List   Diagnosis    Sleep apnea, obstructive    Ankylosing spondylitis (H)    Mixed hyperlipidemia    BMI 30-35    Essential hypertension, benign    Crohn's disease (H)    Adenomatous colon polyp    Cervicalgia    Intractable migraine without aura and without status migrainosus    Tension headache    Obesity (BMI 35.0-39.9) with comorbidity (H)    Muscle spasm    Degenerative disc disease, cervical    S/P total hip arthroplasty    S/P lumbar fusion    Hypertension, unspecified type    CKD (chronic kidney disease) stage 2, GFR 60-89 ml/min     Current Outpatient Medications   Medication Sig Dispense Refill    losartan (COZAAR) 100 MG tablet Take 1 tablet (100 mg) by mouth daily 90 tablet 3    acetaminophen (TYLENOL) 500 MG tablet Take 500 mg by mouth 3 times daily      acetaminophen-codeine (TYLENOL #3) 300-30 MG per tablet Take 1 tablet by mouth every 6 hours as needed for severe pain 10 tablet 0    atorvastatin (LIPITOR) 20 MG tablet Take 1 tablet (20 mg) by mouth daily 90 tablet 1    calcium carbonate-vitamin D (CALCIUM 600+D) 600-200 MG-UNIT TABS Take by mouth 2 times daily       carvedilol (COREG) 25 MG tablet Take 2 tablets (50 mg) by mouth 2 times daily (with meals) 120 tablet 3     DULoxetine (CYMBALTA) 30 MG capsule Take with 60 mg to equal 90 mg daily 90 capsule 3    DULoxetine (CYMBALTA) 60 MG capsule Take with 30 mg capsule to equal 90 mg 90 capsule 3    febuxostat (ULORIC) 40 MG TABS tablet Take 1 tablet (40 mg) by mouth daily 90 tablet 3    gabapentin (NEURONTIN) 300 MG capsule Take 3 capsules (900 mg) by mouth at bedtime 270 capsule 3    gabapentin (NEURONTIN) 300 MG capsule Take 300 mg by mouth every morning      HUMIRA PEN 40 MG/0.8ML pen kit Inject 40 mg Subcutaneous every 14 days   0    multivitamin w/minerals (THERA-VIT-M) tablet Take 1 tablet by mouth daily.      neomycin-polymixin-dexamethasone (MAXITROL) ophthalmic ointment Place into both eyes daily Pea sized amount      pantoprazole (PROTONIX) 40 MG EC tablet Take 1 tablet (40 mg) by mouth 2 times daily 180 tablet 3    predniSONE (DELTASONE) 20 MG tablet Take 1 tablet by mouth daily      rimegepant (NURTEC) 75 MG ODT tablet Place 75 mg under the tongue daily as needed for migraine      tiZANidine (ZANAFLEX) 4 MG tablet Take 16 mg by mouth at bedtime      Vitamin D3 (CHOLECALCIFEROL) 25 mcg (1000 units) tablet Take 1 tablet (25 mcg) by mouth 2 times daily 180 tablet 0              Objective           Vitals:  No vitals were obtained today due to virtual visit.    Physical Exam   none          Time spent by phone: 7min

## 2024-01-22 NOTE — PROCEDURES
[facility]    Procedure: Left 1st MTP joint steroid injection    Date/Time: 1/22/2024 2:33 PM    Performed by: Terry Rai PA-C  Authorized by: Terry Rai PA-C      UNIVERSAL PROTOCOL   Site Marked: Yes  Prior Images Obtained and Reviewed:  Yes  Required items: Required blood products, implants, devices and special equipment available    Patient identity confirmed:  Verbally with patient  Patient was reevaluated immediately before administering moderate or deep sedation or anesthesia  Confirmation Checklist:  Patient's identity using two indicators, relevant allergies, procedure was appropriate and matched the consent or emergent situation and correct equipment/implants were available  Time out: Immediately prior to the procedure a time out was called    Universal Protocol: the Joint Commission Universal Protocol was followed    Preparation: Patient was prepped and draped in usual sterile fashion       ANESTHESIA    Local Anesthetic:  Lidocaine 1% without epinephrine      SEDATION    Patient Sedated: No    See dictated procedure note for full details.    PROCEDURE  Describe Procedure: Obtained a small amount of fluid for crystal analysis.  Patient Tolerance:  Patient tolerated the procedure well with no immediate complications  Length of time physician/provider present for 1:1 monitoring during sedation: 0

## 2024-01-25 ENCOUNTER — HOSPITAL ENCOUNTER (OUTPATIENT)
Dept: NUCLEAR MEDICINE | Facility: CLINIC | Age: 64
Setting detail: NUCLEAR MEDICINE
Discharge: HOME | End: 2024-01-25
Attending: INTERNAL MEDICINE
Payer: COMMERCIAL

## 2024-01-25 ENCOUNTER — HOSPITAL ENCOUNTER (OUTPATIENT)
Dept: CARDIOLOGY | Facility: CLINIC | Age: 64
Discharge: HOME | End: 2024-01-25
Attending: INTERNAL MEDICINE
Payer: COMMERCIAL

## 2024-01-25 PROCEDURE — 93018 CV STRESS TEST I&R ONLY: CPT | Performed by: INTERNAL MEDICINE

## 2024-01-25 PROCEDURE — 78452 HT MUSCLE IMAGE SPECT MULT: CPT | Mod: 26 | Performed by: INTERNAL MEDICINE

## 2024-01-25 PROCEDURE — 78452 HT MUSCLE IMAGE SPECT MULT: CPT

## 2024-01-25 PROCEDURE — 93016 CV STRESS TEST SUPVJ ONLY: CPT | Performed by: INTERNAL MEDICINE

## 2024-01-25 PROCEDURE — 343N000001 HC RX 343: Performed by: INTERNAL MEDICINE

## 2024-01-25 PROCEDURE — 250N000011 HC RX IP 250 OP 636

## 2024-01-25 PROCEDURE — 93017 CV STRESS TEST TRACING ONLY: CPT

## 2024-01-25 PROCEDURE — A9500 TC99M SESTAMIBI: HCPCS | Performed by: INTERNAL MEDICINE

## 2024-01-25 RX ORDER — REGADENOSON 0.08 MG/ML
INJECTION, SOLUTION INTRAVENOUS
Status: COMPLETED
Start: 2024-01-25 | End: 2024-01-25

## 2024-01-25 RX ORDER — ACYCLOVIR 200 MG/1
0-1 CAPSULE ORAL
Status: DISCONTINUED | OUTPATIENT
Start: 2024-01-25 | End: 2024-01-26 | Stop reason: HOSPADM

## 2024-01-25 RX ORDER — ALBUTEROL SULFATE 90 UG/1
2 AEROSOL, METERED RESPIRATORY (INHALATION) EVERY 5 MIN PRN
Status: DISCONTINUED | OUTPATIENT
Start: 2024-01-25 | End: 2024-01-26 | Stop reason: HOSPADM

## 2024-01-25 RX ORDER — REGADENOSON 0.08 MG/ML
0.4 INJECTION, SOLUTION INTRAVENOUS ONCE
Status: COMPLETED | OUTPATIENT
Start: 2024-01-25 | End: 2024-01-25

## 2024-01-25 RX ORDER — AMINOPHYLLINE 25 MG/ML
50-100 INJECTION, SOLUTION INTRAVENOUS
Status: DISCONTINUED | OUTPATIENT
Start: 2024-01-25 | End: 2024-01-26 | Stop reason: HOSPADM

## 2024-01-25 RX ADMIN — Medication 32.1 MILLICURIE: at 09:25

## 2024-01-25 RX ADMIN — REGADENOSON 0.4 MG: 0.08 INJECTION, SOLUTION INTRAVENOUS at 09:20

## 2024-01-25 RX ADMIN — Medication 10.5 MILLICURIE: at 08:00

## 2024-02-05 ENCOUNTER — ANCILLARY PROCEDURE (OUTPATIENT)
Dept: GENERAL RADIOLOGY | Facility: CLINIC | Age: 64
End: 2024-02-05
Attending: NURSE PRACTITIONER
Payer: COMMERCIAL

## 2024-02-05 ENCOUNTER — OFFICE VISIT (OUTPATIENT)
Dept: URGENT CARE | Facility: URGENT CARE | Age: 64
End: 2024-02-05
Payer: COMMERCIAL

## 2024-02-05 VITALS
OXYGEN SATURATION: 96 % | DIASTOLIC BLOOD PRESSURE: 96 MMHG | HEART RATE: 68 BPM | TEMPERATURE: 96.9 F | SYSTOLIC BLOOD PRESSURE: 135 MMHG | RESPIRATION RATE: 16 BRPM

## 2024-02-05 DIAGNOSIS — M25.572 PAIN IN JOINT INVOLVING ANKLE AND FOOT, LEFT: ICD-10-CM

## 2024-02-05 DIAGNOSIS — M79.672 LEFT FOOT PAIN: ICD-10-CM

## 2024-02-05 DIAGNOSIS — M25.571 PAIN IN JOINT INVOLVING ANKLE AND FOOT, RIGHT: Primary | ICD-10-CM

## 2024-02-05 DIAGNOSIS — M25.571 PAIN IN JOINT INVOLVING ANKLE AND FOOT, RIGHT: ICD-10-CM

## 2024-02-05 PROCEDURE — 73630 X-RAY EXAM OF FOOT: CPT | Mod: TC | Performed by: RADIOLOGY

## 2024-02-05 PROCEDURE — 99213 OFFICE O/P EST LOW 20 MIN: CPT | Performed by: NURSE PRACTITIONER

## 2024-02-05 RX ORDER — ACETAMINOPHEN AND CODEINE PHOSPHATE 300; 30 MG/1; MG/1
1 TABLET ORAL EVERY 6 HOURS PRN
Qty: 6 TABLET | Refills: 0 | Status: SHIPPED | OUTPATIENT
Start: 2024-02-05 | End: 2024-09-27

## 2024-02-05 NOTE — PROGRESS NOTES
Assessment & Plan     Pain in joint involving ankle and foot, right  - XR Foot Right G/E 3 Views  - acetaminophen-codeine (TYLENOL #3) 300-30 MG per tablet  Dispense: 6 tablet; Refill: 0       Patient Instructions   No results found for any visits on 02/05/24.    Xray interpreted as negative in the clinic.  Pending radiologist review.     Small amount tylenol with codeine given for severe pain.    Rest the affected painful area as much as possible.    Apply ice for 15-20 minutes intermittently as needed and especially after any offending activity.   Daily stretching.    As pain recedes, begin normal activities slowly as tolerated.        Return in about 1 week (around 2/12/2024) for with regular provider if symptoms persist.    TONYA Silveira [facility] LILIANE King is a 63 year old male who presents to clinic today for the following health issues:  Chief Complaint   Patient presents with    Ankle Pain     2 days-no injury, right ankle radiating to top foot, constant pain worse when flexing     HPI    MS Injury/Pain    Onset of symptoms was 2 day(s) ago.  Location: right ankle  Context:   No known injury or trauma      Course of symptoms is same.    Severity moderate  Current and Associated symptoms: Pain, Decreased range of motion, and Stiffness  Denies  Swelling, Bruising, Warmth, and Redness  Aggravating Factors: walking, weight-bearing, repetitive motion, and flexion/extension  Therapies to improve symptoms include: ice and Tylenol  This is the first time this type of problem has occurred for this patient.       Review of Systems  Constitutional, HEENT, cardiovascular, pulmonary, GI, , musculoskeletal, neuro, skin, endocrine and psych systems are negative, except as otherwise noted.      Objective    BP (!) 135/96   Pulse 68   Temp 96.9  F (36.1  C)   Resp 16   SpO2 96%   Physical Exam   GENERAL: alert and no distress  NECK: no adenopathy, no asymmetry,  masses, or scars  RESP: lungs clear to auscultation - no rales, rhonchi or wheezes  CV: regular rate and rhythm, normal S1 S2, no S3 or S4, no murmur, click or rub, no peripheral edema  MS: right ankle with severe pain weight bearing.  No pain at rest.  normal muscle tone, normal range of motion, no edema, and peripheral pulses normal  SKIN: no suspicious lesions or rashes

## 2024-02-05 NOTE — PATIENT INSTRUCTIONS
No results found for any visits on 02/05/24.    Xray interpreted as negative in the clinic.  Pending radiologist review.     Small amount tylenol with codeine given for severe pain.    Rest the affected painful area as much as possible.    Apply ice for 15-20 minutes intermittently as needed and especially after any offending activity.   Daily stretching.    As pain recedes, begin normal activities slowly as tolerated.

## 2024-02-22 ENCOUNTER — TRANSFERRED RECORDS (OUTPATIENT)
Dept: HEALTH INFORMATION MANAGEMENT | Facility: CLINIC | Age: 64
End: 2024-02-22
Payer: MEDICARE

## 2024-02-22 LAB
ALT SERPL-CCNC: 21 IU/L (ref 5–40)
AST SERPL-CCNC: 19 U/L (ref 5–34)
CREATININE (EXTERNAL): 1.19 MG/DL (ref 0.5–1.3)

## 2024-03-09 ENCOUNTER — OFFICE VISIT (OUTPATIENT)
Dept: URGENT CARE | Facility: URGENT CARE | Age: 64
End: 2024-03-09
Payer: COMMERCIAL

## 2024-03-09 VITALS
RESPIRATION RATE: 20 BRPM | SYSTOLIC BLOOD PRESSURE: 170 MMHG | TEMPERATURE: 97.4 F | OXYGEN SATURATION: 97 % | DIASTOLIC BLOOD PRESSURE: 98 MMHG | HEART RATE: 67 BPM

## 2024-03-09 DIAGNOSIS — M45.9 ANKYLOSING SPONDYLITIS, UNSPECIFIED SITE OF SPINE (H): Chronic | ICD-10-CM

## 2024-03-09 DIAGNOSIS — I10 HYPERTENSION, UNSPECIFIED TYPE: ICD-10-CM

## 2024-03-09 DIAGNOSIS — J01.90 ACUTE SINUSITIS WITH COEXISTING CONDITION REQUIRING PROPHYLACTIC TREATMENT: Primary | ICD-10-CM

## 2024-03-09 DIAGNOSIS — K50.00 CROHN'S DISEASE OF SMALL INTESTINE WITHOUT COMPLICATION (H): Chronic | ICD-10-CM

## 2024-03-09 PROCEDURE — 99214 OFFICE O/P EST MOD 30 MIN: CPT | Performed by: PHYSICIAN ASSISTANT

## 2024-03-09 NOTE — PATIENT INSTRUCTIONS
March 9, 2024 Urgent Care Plan      - Augmentin antibiotic for Sinus Infection   -Home supportive care   -Encourage extra fluids   -Follow-up with primary care if no improvement after 5 days of antibiotics, if not fully resolved in 10 days, and sooner if worsening  -Avoid over the counter decongestants due to your high blood pressure   -Keep taking your blood pressure medications. Check your home blood pressure--if it continues to be high contact your primary care provider on Monday to discuss your high blood pressure.

## 2024-03-09 NOTE — PROGRESS NOTES
ASSESSMENT/PLAN:     (J01.90) Acute sinusitis with coexisting condition requiring prophylactic treatment  (primary encounter diagnosis)    MDM: Prolonged sinusitis symptoms and 64-year-old male with multiple immunocompromising medical conditions and medications.  Please see below discharge summary/plan that includes start of antibiotic, discontinuation of over-the-counter decongestants, home blood pressure checks and follow-up with primary care provider regarding elevated blood pressure if it persists. Additionally, the below was reviewed with patient verbally and provided in [facility] for home review).    Plan: amoxicillin-clavulanate (AUGMENTIN) 875-125 MG         tablet          After Visit Summary/Plan:     March 9, 2024 Urgent Care Plan      - Augmentin antibiotic for Sinus Infection   -Home supportive care   -Encourage extra fluids   -Follow-up with primary care if no improvement after 5 days of antibiotics, if not fully resolved in 10 days, and sooner if worsening  -Avoid over the counter decongestants due to your high blood pressure   -Keep taking your blood pressure medications. Check your home blood pressure--if it continues to be high contact your primary care provider on Monday to discuss your high blood pressure.       (K50.00) Crohn's disease of small intestine without complication (H)    (M45.9) Ankylosing spondylitis, unspecified site of spine (H)    (I10) Hypertension, unspecified type    This progress note has been dictated, with use of voice recognition software. Any grammatical, typographical, or context errors are unintentional and inherent to use of voice recognition software.  ---------------------    Chief Complaint   Patient presents with    Cough     1.5 weeks, sinus congestion/headache, fatigue, neg covid       SUBJECTIVE:   Avi Bhatti is a 64-year-old male with a past medical history that includes immunocompromise related to his ankylosing spondylitis (states he just had an  injection of Humira this morning and is weaning off a course of steroids for severe gout episode--now on prednisone 4 mg/day by his report) and Crohn's disease, and hypertension (please see below for full past medical history) presenting to urgent care today for evaluation of approximately 1.5-week history of progressively worsening nasal congestion, purulent rhinorrhea, post-nasal drainage, waxing and waning scratchy throat, sinus pain/pressure, and on/off facial pressure.     Of Note: Patient reports home COVID testing was negative    Illness Contact: No known specific close contact illness exposure    ROS:     CONSITUTIONAL: Positive for malaise.  No acute onset fever, chills or severe weakness/fatigue   HEENT: Positive nasal and sinus congestion.   RESP: Positive cough that patient relates to post-nasal drip.  No limitations to activities of daily living due to cough or shortness of breath.  No associated wheezing.  No coughing up of blood.    CARDIAC: Patient's blood pressure was noted to be elevated at 170/98 today.  He has reportedly been taking his blood pressure medications as prescribed.  He does admit to taking over-the-counter decongestants.  No acute chest pain or acute decrease in baseline exertional tolerance.  No syncope or near syncope.    GI: No acute onset N/V/D No abdominal pain.   SKIN: No acute systemic rash or hives   NEURO: Positive for waxing and waning sinus headache. No severe headaches. No neck stiffness, mental status changes or lethargy       Past Medical History:   Diagnosis Date    Ankylosing spondylitis (H)     Arthritis     Crohn's colitis (H)     Gastroesophageal reflux disease     Hypertension     Migraine     Other chronic pain     headache and neck pain, receives botox injections Q 12 weeks    Sleep apnea     cpap       Patient Active Problem List   Diagnosis    Sleep apnea, obstructive    Ankylosing spondylitis (H)    Mixed hyperlipidemia    BMI 30-35    Essential hypertension,  benign    Crohn's disease (H)    Adenomatous colon polyp    Cervicalgia    Intractable migraine without aura and without status migrainosus    Tension headache    Obesity (BMI 35.0-39.9) with comorbidity (H)    Muscle spasm    Degenerative disc disease, cervical    S/P total hip arthroplasty    S/P lumbar fusion    Hypertension, unspecified type    CKD (chronic kidney disease) stage 2, GFR 60-89 ml/min     Current Outpatient Medications   Medication    acetaminophen (TYLENOL) 500 MG tablet    atorvastatin (LIPITOR) 20 MG tablet    calcium carbonate-vitamin D (CALCIUM 600+D) 600-200 MG-UNIT TABS    carvedilol (COREG) 25 MG tablet    DULoxetine (CYMBALTA) 30 MG capsule    DULoxetine (CYMBALTA) 60 MG capsule    febuxostat (ULORIC) 40 MG TABS tablet    gabapentin (NEURONTIN) 300 MG capsule    gabapentin (NEURONTIN) 300 MG capsule    HUMIRA PEN 40 MG/0.8ML pen kit    losartan (COZAAR) 100 MG tablet    multivitamin w/minerals (THERA-VIT-M) tablet    pantoprazole (PROTONIX) 40 MG EC tablet    rimegepant (NURTEC) 75 MG ODT tablet    tiZANidine (ZANAFLEX) 4 MG tablet    Vitamin D3 (CHOLECALCIFEROL) 25 mcg (1000 units) tablet    acetaminophen-codeine (TYLENOL #3) 300-30 MG per tablet    neomycin-polymixin-dexamethasone (MAXITROL) ophthalmic ointment     No current facility-administered medications for this visit.           Allergies   Allergen Reactions    Prednisone Other (See Comments)     Elevated heart rate, has had without problems more recently    Reglan [Metoclopramide] Other (See Comments)     agitation    Unknown [No Clinical Screening - See Comments]      benny have not worked in past            OBJECTIVE:   BP (!) 176/118   Pulse 67   Temp 97.4  F (36.3  C)   Resp 20   SpO2 97%     BP (!) 170/98   Pulse 67   Temp 97.4  F (36.3  C)   Resp 20   SpO2 97%       General appearance: alert and no apparent distress   Skin color is uniform in color and without rash.   HEENT:   Conjunctiva not injected. Sclera  clear.   Left TM is normal: no effusions, no erythema, and normal landmarks.   Right TM is normal: no effusions, no erythema, and normal landmarks.   Nasal mucosa is red and swollen.  Maxillary sinuses are mildly tender to finger tap percussion bilaterally today.  Oropharyngeal exam is normal other than evidence of post-nasal drip: no lesions, erythema, adenopathy or exudate. Uvula is midline. No trismus.    NECK: Trachea is midline.  Neck is supple, full range of motion. No adenopathy. No lateral neck swelling.   CARDIAC:NORMAL - regular rate and rhythm without murmur.   RESP: Normal - CTA without rales, rhonchi, or wheezing.   NEURO: Alert and oriented.  Normal speech and mentation.  CN II/XII grossly intact.  Gait within normal limits.

## 2024-03-11 DIAGNOSIS — E78.5 HYPERLIPIDEMIA, UNSPECIFIED HYPERLIPIDEMIA TYPE: ICD-10-CM

## 2024-03-12 RX ORDER — ATORVASTATIN CALCIUM 20 MG/1
20 TABLET, FILM COATED ORAL DAILY
Qty: 90 TABLET | Refills: 1 | OUTPATIENT
Start: 2024-03-12

## 2024-03-12 NOTE — TELEPHONE ENCOUNTER
Patient requests new Rx: Please send new rx for Atorvastatin, Pt has switched pharmacies to us and no refills remained.

## 2024-03-22 NOTE — PATIENT INSTRUCTIONS
Results for orders placed or performed in visit on 01/08/24   XR Foot Left G/E 3 Views     Status: None    Narrative    EXAM: XR FOOT LEFT G/E 3 VIEWS  DATE/TIME: 1/8/2024 4:19 PM     INDICATION: Left foot pain.  COMPARISON: 2/9/2023.      Impression    IMPRESSION: Normal joint spacing and alignment.  No fracture.  Soft  tissue swelling dorsal forefoot, unchanged.    PADMA BRITT MD         SYSTEM ID:  [id number]     Normal foot XR  Follow up with ortho/podiatry as we discussed.

## 2024-03-26 ENCOUNTER — MYC MEDICAL ADVICE (OUTPATIENT)
Dept: PEDIATRICS | Facility: CLINIC | Age: 64
End: 2024-03-26
Payer: MEDICARE

## 2024-03-26 DIAGNOSIS — E78.5 HYPERLIPIDEMIA, UNSPECIFIED HYPERLIPIDEMIA TYPE: ICD-10-CM

## 2024-03-27 RX ORDER — ATORVASTATIN CALCIUM 20 MG/1
20 TABLET, FILM COATED ORAL DAILY
Qty: 90 TABLET | Refills: 1 | Status: SHIPPED | OUTPATIENT
Start: 2024-03-27 | End: 2024-09-03

## 2024-04-03 ENCOUNTER — OFFICE VISIT (OUTPATIENT)
Dept: CARDIOLOGY | Facility: CLINIC | Age: 64
End: 2024-04-03
Attending: INTERNAL MEDICINE
Payer: COMMERCIAL

## 2024-04-03 VITALS
HEIGHT: 69 IN | HEART RATE: 78 BPM | OXYGEN SATURATION: 98 % | SYSTOLIC BLOOD PRESSURE: 130 MMHG | WEIGHT: 264.6 LBS | BODY MASS INDEX: 39.19 KG/M2 | DIASTOLIC BLOOD PRESSURE: 88 MMHG

## 2024-04-03 DIAGNOSIS — R06.09 DYSPNEA ON EXERTION: Primary | ICD-10-CM

## 2024-04-03 DIAGNOSIS — E78.2 MIXED HYPERLIPIDEMIA: ICD-10-CM

## 2024-04-03 DIAGNOSIS — I10 ESSENTIAL HYPERTENSION, BENIGN: ICD-10-CM

## 2024-04-03 DIAGNOSIS — R06.09 DYSPNEA ON EXERTION: ICD-10-CM

## 2024-04-03 DIAGNOSIS — I10 HYPERTENSION, UNSPECIFIED TYPE: ICD-10-CM

## 2024-04-03 PROCEDURE — 99214 OFFICE O/P EST MOD 30 MIN: CPT | Performed by: INTERNAL MEDICINE

## 2024-04-03 PROCEDURE — G2211 COMPLEX E/M VISIT ADD ON: HCPCS | Performed by: INTERNAL MEDICINE

## 2024-04-03 RX ORDER — OLMESARTAN MEDOXOMIL 40 MG/1
40 TABLET ORAL DAILY
Qty: 90 TABLET | Refills: 3 | Status: SHIPPED | OUTPATIENT
Start: 2024-04-03

## 2024-04-03 NOTE — PROGRESS NOTES
Awake HISTORY:    Avi Bhatti is a 64-year-old gentleman with a history of hypertension, hyperlipidemia, Crohn's disease, ankylosing spondylitis, obesity, and obstructive sleep apnea using CPAP.  He was seen in cardiology for assistance in management of his hypertension and for complaints of dyspnea in January 2024.  He is here today for follow-up.    Fernando reports that he continues to check his blood pressure regularly at home and its generally in the 150s and 160s.  His blood pressure in the office is 130/88.  He has never brought his machine in to make sure that it is correctly calibrated and I asked him to do so.    We arranged a 24-hour home blood pressure monitor for him at our last visit and this demonstrated mild daytime hypertension.  His blood pressure really was not as high on these readings as the numbers that he gets with his home machine which makes me think that his home machine is inaccurate or the act of measuring his blood pressure causes a rise in blood pressure for him.    He has dyspnea on exertion but last meeting he attributed this to a 30 pound weight gain over the last year.  His echocardiogram showed normal LV function, normal valvular function, and normal diastolic function.  Nuclear stress test was done to evaluate for possible ischemia as a cause of his dyspnea and that study was negative.      ASSESSMENT/PLAN:    1.  Hypertension.  Still not very well-controlled, currently using carvedilol 50 mg twice daily along with losartan 100 mg daily.  For today I will switch him over to olmesartan rather than losartan (better blood pressure control properties).  He will continue to check his home blood pressure and let me know in 2 or 3 weeks if his blood pressure is still elevated.  As mentioned above we will also make sure that his home blood pressure machine is accurate.  If he remains hypertensive our next change will be to add spironolactone choose this agent as a diuretic because he has a  history of gout.  Once we get him on a diuretic I think we can safely try adding back a calcium channel blocker (which previously caused mild peripheral edema).  Although the calcium channel was not particularly effective in the past, it may be considerably more effective in combination with his current medications.  Finally, we will arrange for a 3-month follow-up visit hopefully by that time will have his blood pressure under good control and can sign off on his long-term care.  He has no active cardiac problems.  2.  Dyspnea on exertion.  This is due to weight and deconditioning and is not cardiac related.  Continued increase in physical activity should be helpful for him.    Thank you for inviting me to participate in the care of your patient.  Please don't hesitate to call if I can be of further assistance.  30 minutes were spent today reviewing the chart and other records, interviewing and examining the patient, and documenting our visit.    The longitudinal plan of care for the diagnosis(es)/condition(s) as documented were addressed during this visit. Due to the added complexity in care, I will continue to support Fernando in the subsequent management and with ongoing continuity of care.     Chart documentation was completed, in part, with [employer] voice-recognition software. Even though reviewed, some grammatical, spelling, and word errors may remain.       Orders Placed This Encounter   Procedures    Follow-Up with Cardiology ARSENIO     Orders Placed This Encounter   Medications    olmesartan (BENICAR) 40 MG tablet     Sig: Take 1 tablet (40 mg) by mouth daily     Dispense:  90 tablet     Refill:  3     Medications Discontinued During This Encounter   Medication Reason    losartan (COZAAR) 100 MG tablet        10 year ASCVD risk: The 10-year ASCVD risk score (Eulalia NOEL, et al., 2019) is: 11.9%    Values used to calculate the score:      Age: 64 years      Sex: Male      Is Non- : No       Diabetic: No      Tobacco smoker: No      Systolic Blood Pressure: 130 mmHg      Is BP treated: Yes      HDL Cholesterol: 47 mg/dL      Total Cholesterol: 134 mg/dL    Encounter Diagnoses   Name Primary?    Dyspnea on exertion     Essential hypertension, benign     Hypertension, unspecified type        CURRENT MEDICATIONS:  Current Outpatient Medications   Medication Sig Dispense Refill    acetaminophen (TYLENOL) 500 MG tablet Take 500 mg by mouth 3 times daily      atorvastatin (LIPITOR) 20 MG tablet Take 1 tablet (20 mg) by mouth daily 90 tablet 1    calcium carbonate-vitamin D (CALCIUM 600+D) 600-200 MG-UNIT TABS Take by mouth 2 times daily       carvedilol (COREG) 25 MG tablet Take 2 tablets (50 mg) by mouth 2 times daily (with meals) 120 tablet 3    DULoxetine (CYMBALTA) 30 MG capsule Take with 60 mg to equal 90 mg daily 90 capsule 3    DULoxetine (CYMBALTA) 60 MG capsule Take with 30 mg capsule to equal 90 mg 90 capsule 3    febuxostat (ULORIC) 40 MG TABS tablet Take 1 tablet (40 mg) by mouth daily 90 tablet 3    gabapentin (NEURONTIN) 300 MG capsule Take 3 capsules (900 mg) by mouth at bedtime 270 capsule 3    gabapentin (NEURONTIN) 300 MG capsule Take 300 mg by mouth every morning      HUMIRA PEN 40 MG/0.8ML pen kit Inject 40 mg Subcutaneous every 14 days   0    multivitamin w/minerals (THERA-VIT-M) tablet Take 1 tablet by mouth daily.      olmesartan (BENICAR) 40 MG tablet Take 1 tablet (40 mg) by mouth daily 90 tablet 3    pantoprazole (PROTONIX) 40 MG EC tablet Take 1 tablet (40 mg) by mouth 2 times daily 180 tablet 3    rimegepant (NURTEC) 75 MG ODT tablet Place 75 mg under the tongue daily as needed for migraine      tiZANidine (ZANAFLEX) 4 MG tablet Take 16 mg by mouth at bedtime      Vitamin D3 (CHOLECALCIFEROL) 25 mcg (1000 units) tablet Take 1 tablet (25 mcg) by mouth 2 times daily 180 tablet 0    acetaminophen-codeine (TYLENOL #3) 300-30 MG per tablet Take 1 tablet by mouth every 6 hours as needed  for severe pain (Patient not taking: Reported on 3/9/2024) 6 tablet 0    neomycin-polymixin-dexamethasone (MAXITROL) ophthalmic ointment Place into both eyes daily Pea sized amount (Patient not taking: Reported on 3/9/2024)         ALLERGIES     Allergies   Allergen Reactions    Prednisone Other (See Comments)     Elevated heart rate, has had without problems more recently    Reglan [Metoclopramide] Other (See Comments)     agitation    Unknown [No Clinical Screening - See Comments]      benny have not worked in past       PAST MEDICAL HISTORY:  Past Medical History:   Diagnosis Date    Ankylosing spondylitis (H)     Arthritis     Crohn's colitis (H)     Gastroesophageal reflux disease     Hypertension     Migraine     Other chronic pain     headache and neck pain, receives botox injections Q 12 weeks    Sleep apnea     cpap       PAST SURGICAL HISTORY:  Past Surgical History:   Procedure Laterality Date    ARTHROPLASTY HIP Left 11/19/2019    Procedure: Left total hip arthroplasty;  Surgeon: Allen Coats MD;  Location:  OR    BOTOX CHRONIC MIGRAINE HEAD ACHES      for chronic headache and neck pain, injections every 12 weeks    COLONOSCOPY      EXTRACTION(S) DENTAL  12/18/2012    Procedure: EXTRACTION(S) DENTAL;  Extraction of Teeth 30, 19;  Surgeon: Sujey Cunningham DDS;  Location:  OR    OPTICAL TRACKING SYSTEM FUSION POSTERIOR SPINE LUMBAR N/A 2/2/2021    Procedure: Lumbar 3-4 posterior lumbar instrumented fusion with interbody cage;  Surgeon: Jakub Velasquez MD;  Location:  OR    repair fracture & insert pins left hand  1996    SMALL BOWEL RESECTION  1993       FAMILY HISTORY:  Family History   Problem Relation Age of Onset    Hypertension Father     Alcoholism Father     Other - See Comments Father     Coronary Artery Disease Father     Skin Cancer Father     Kidney failure Father        SOCIAL HISTORY:  Social History     Socioeconomic History    Marital status:      Spouse name: None     Number of children: None    Years of education: None    Highest education level: None   Tobacco Use    Smoking status: Never    Smokeless tobacco: Never   Vaping Use    Vaping Use: Never used   Substance and Sexual Activity    Alcohol use: Yes     Comment: seldom    Drug use: No    Sexual activity: Not Currently     Partners: Female   Other Topics Concern    Parent/sibling w/ CABG, MI or angioplasty before 65F 55M? No   Social History Narrative    Lives in [facility].     Twin boys, age 32. Daughter is 23.     , wife's name is Cheryl.     Three dogs.     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         Albertina Peña, DNP, APRN, CNP    08/01/22     Social Determinants of Health     Financial Resource Strain: Low Risk  (11/27/2023)    Financial Resource Strain     Within the past 12 months, have you or your family members you live with been unable to get utilities (heat, electricity) when it was really needed?: No   Food Insecurity: Low Risk  (11/27/2023)    Food Insecurity     Within the past 12 months, did you worry that your food would run out before you got money to buy more?: No     Within the past 12 months, did the food you bought just not last and you didn t have money to get more?: No   Transportation Needs: Low Risk  (11/27/2023)    Transportation Needs     Within the past 12 months, has lack of transportation kept you from medical appointments, getting your medicines, non-medical meetings or appointments, work, or from getting things that you need?: No   Physical Activity: Inactive (8/1/2022)    Exercise Vital Sign     Days of Exercise per Week: 0 days     Minutes of Exercise per Session: 0 min   Stress: No Stress Concern Present (8/1/2022)    [place] [place] [employer] - Occupational Stress Questionnaire     Feeling of Stress : Only a little   Social Connections: Socially Isolated (8/1/2022)    Social Connection and Isolation Panel [NHANES]     Frequency of  "Communication with Friends and Family: Once a week     Frequency of Social Gatherings with Friends and Family: Once a week     Attends Evangelical Services: Never     Active Member of Clubs or Organizations: No     Marital Status:    Interpersonal Safety: Low Risk  (10/2/2023)    Interpersonal Safety     Do you feel physically and emotionally safe where you currently live?: Yes     Within the past 12 months, have you been hit, slapped, kicked or otherwise physically hurt by someone?: No     Within the past 12 months, have you been humiliated or emotionally abused in other ways by your partner or ex-partner?: No   Housing Stability: Low Risk  (11/27/2023)    Housing Stability     Do you have housing? : Yes     Are you worried about losing your housing?: No       Review of Systems:  Skin:  Negative     Eyes:  Positive for glasses  ENT:  Negative    Respiratory:  Positive for shortness of breath;dyspnea on exertion  Cardiovascular:    Positive for;edema  Gastroenterology: Negative    Genitourinary:  Negative    Musculoskeletal:  Positive for arthritis;joint pain;back pain;gout;foot pain  Neurologic:  Positive for headaches;migraine headaches  Psychiatric:  Positive for depression  Heme/Lymph/Imm:  Negative    Endocrine:  Negative      Physical Exam:  Vitals: /88   Pulse 78   Ht 1.753 m (5' 9\")   Wt 120 kg (264 lb 9.6 oz)   SpO2 98%   BMI 39.07 kg/m      Constitutional:           Skin:           Head:           Eyes:           ENT:           Neck:           Chest:           Cardiac:                    Abdomen:           Vascular:                                        Extremities and Muscular Skeletal:              Neurological:           Psych:        Recent Lab Results:  LIPID RESULTS:  Lab Results   Component Value Date    CHOL 134 11/09/2023    CHOL 231 (H) 04/20/2021    HDL 47 11/09/2023    HDL 43 04/20/2021    LDL 28 11/09/2023     (H) 04/20/2021    TRIG 296 (H) 11/09/2023    TRIG 316 (H) " 04/20/2021       LIVER ENZYME RESULTS:  Lab Results   Component Value Date    AST 28 11/27/2023    AST 17 01/22/2021    ALT 35 11/27/2023    ALT 36 01/22/2021       CBC RESULTS:  Lab Results   Component Value Date    WBC 7.6 11/27/2023    WBC 7.8 04/20/2021    RBC 4.74 11/27/2023    RBC 4.60 04/20/2021    HGB 14.9 11/27/2023    HGB 14.4 04/20/2021    HCT 44.8 11/27/2023    HCT 44.5 04/20/2021    MCV 95 11/27/2023    MCV 97 04/20/2021    MCH 31.4 11/27/2023    MCH 31.3 04/20/2021    MCHC 33.3 11/27/2023    MCHC 32.4 04/20/2021    RDW 12.7 11/27/2023    RDW 12.8 04/20/2021     11/27/2023     04/20/2021       BMP RESULTS:  Lab Results   Component Value Date     12/27/2023     04/20/2021    POTASSIUM 5.0 12/27/2023    POTASSIUM 4.2 08/17/2022    POTASSIUM 5.3 04/20/2021    CHLORIDE 102 12/27/2023    CHLORIDE 103 08/17/2022    CHLORIDE 107 04/20/2021    CO2 26 12/27/2023    CO2 21 08/17/2022    CO2 25 04/20/2021    ANIONGAP 10 12/27/2023    ANIONGAP 8 08/17/2022    ANIONGAP 4 04/20/2021     (H) 12/27/2023     (H) 08/17/2022    GLC 96 04/20/2021    BUN 15.2 12/27/2023    BUN 22 08/17/2022    BUN 24 04/20/2021    CR 1.17 12/27/2023    CR 1.68 (H) 04/20/2021    GFRESTIMATED 70 12/27/2023    GFRESTIMATED 43 (L) 04/20/2021    GFRESTBLACK 50 (L) 04/20/2021    GRACE 9.3 12/27/2023    GRACE 9.4 04/20/2021        A1C RESULTS:  Lab Results   Component Value Date    A1C 5.5 04/20/2021       INR RESULTS:  Lab Results   Component Value Date    INR 0.98 10/28/2023    INR 0.93 10/17/2015         Reg David MD, FACC    CC  Reg David MD  [address] [place] [id number]

## 2024-04-03 NOTE — LETTER
4/3/2024    Joby Billingsley MD  [address] [place] [address]    RE: Avi Bhatti       Dear Colleague,     I had the pleasure of seeing Avi Bhatti in the [facility].  HISTORY:    Avi Bahtti is a 64-year-old gentleman with a history of hypertension, hyperlipidemia, Crohn's disease, ankylosing spondylitis, obesity, and obstructive sleep apnea using CPAP.  He was seen in cardiology for assistance in management of his hypertension and for complaints of dyspnea in January 2024.  He is here today for follow-up.    Fernando reports that he continues to check his blood pressure regularly at home and its generally in the 150s and 160s.  His blood pressure in the office is 130/88.  He has never brought his machine in to make sure that it is correctly calibrated and I asked him to do so.    We arranged a 24-hour home blood pressure monitor for him at our last visit and this demonstrated mild daytime hypertension.  His blood pressure really was not as high on these readings as the numbers that he gets with his home machine which makes me think that his home machine is inaccurate or the act of measuring his blood pressure causes a rise in blood pressure for him.    He has dyspnea on exertion but last meeting he attributed this to a 30 pound weight gain over the last year.  His echocardiogram showed normal LV function, normal valvular function, and normal diastolic function.  Nuclear stress test was done to evaluate for possible ischemia as a cause of his dyspnea and that study was negative.      ASSESSMENT/PLAN:    1.  Hypertension.  Still not very well-controlled, currently using carvedilol 50 mg twice daily along with losartan 100 mg daily.  For today I will switch him over to olmesartan rather than losartan (better blood pressure control properties).  He will continue to check his home blood pressure and let me know in 2 or 3 weeks if his blood pressure is still elevated.  As  mentioned above we will also make sure that his home blood pressure machine is accurate.  If he remains hypertensive our next change will be to add spironolactone choose this agent as a diuretic because he has a history of gout.  Once we get him on a diuretic I think we can safely try adding back a calcium channel blocker (which previously caused mild peripheral edema).  Although the calcium channel was not particularly effective in the past, it may be considerably more effective in combination with his current medications.  Finally, we will arrange for a 3-month follow-up visit hopefully by that time will have his blood pressure under good control and can sign off on his long-term care.  He has no active cardiac problems.  2.  Dyspnea on exertion.  This is due to weight and deconditioning and is not cardiac related.  Continued increase in physical activity should be helpful for him.    Thank you for inviting me to participate in the care of your patient.  Please don't hesitate to call if I can be of further assistance.  30 minutes were spent today reviewing the chart and other records, interviewing and examining the patient, and documenting our visit.    The longitudinal plan of care for the diagnosis(es)/condition(s) as documented were addressed during this visit. Due to the added complexity in care, I will continue to support Fernando in the subsequent management and with ongoing continuity of care.     Chart documentation was completed, in part, with Web and Rank voice-recognition software. Even though reviewed, some grammatical, spelling, and word errors may remain.       Orders Placed This Encounter   Procedures    Follow-Up with Cardiology ARSENIO     Orders Placed This Encounter   Medications    olmesartan (BENICAR) 40 MG tablet     Sig: Take 1 tablet (40 mg) by mouth daily     Dispense:  90 tablet     Refill:  3     Medications Discontinued During This Encounter   Medication Reason    losartan (COZAAR) 100 MG tablet         10 year ASCVD risk: The 10-year ASCVD risk score (Eulalia NOEL, et al., 2019) is: 11.9%    Values used to calculate the score:      Age: 64 years      Sex: Male      Is Non- : No      Diabetic: No      Tobacco smoker: No      Systolic Blood Pressure: 130 mmHg      Is BP treated: Yes      HDL Cholesterol: 47 mg/dL      Total Cholesterol: 134 mg/dL    Encounter Diagnoses   Name Primary?    Dyspnea on exertion     Essential hypertension, benign     Hypertension, unspecified type        CURRENT MEDICATIONS:  Current Outpatient Medications   Medication Sig Dispense Refill    acetaminophen (TYLENOL) 500 MG tablet Take 500 mg by mouth 3 times daily      atorvastatin (LIPITOR) 20 MG tablet Take 1 tablet (20 mg) by mouth daily 90 tablet 1    calcium carbonate-vitamin D (CALCIUM 600+D) 600-200 MG-UNIT TABS Take by mouth 2 times daily       carvedilol (COREG) 25 MG tablet Take 2 tablets (50 mg) by mouth 2 times daily (with meals) 120 tablet 3    DULoxetine (CYMBALTA) 30 MG capsule Take with 60 mg to equal 90 mg daily 90 capsule 3    DULoxetine (CYMBALTA) 60 MG capsule Take with 30 mg capsule to equal 90 mg 90 capsule 3    febuxostat (ULORIC) 40 MG TABS tablet Take 1 tablet (40 mg) by mouth daily 90 tablet 3    gabapentin (NEURONTIN) 300 MG capsule Take 3 capsules (900 mg) by mouth at bedtime 270 capsule 3    gabapentin (NEURONTIN) 300 MG capsule Take 300 mg by mouth every morning      HUMIRA PEN 40 MG/0.8ML pen kit Inject 40 mg Subcutaneous every 14 days   0    multivitamin w/minerals (THERA-VIT-M) tablet Take 1 tablet by mouth daily.      olmesartan (BENICAR) 40 MG tablet Take 1 tablet (40 mg) by mouth daily 90 tablet 3    pantoprazole (PROTONIX) 40 MG EC tablet Take 1 tablet (40 mg) by mouth 2 times daily 180 tablet 3    rimegepant (NURTEC) 75 MG ODT tablet Place 75 mg under the tongue daily as needed for migraine      tiZANidine (ZANAFLEX) 4 MG tablet Take 16 mg by mouth at bedtime      Vitamin D3  (CHOLECALCIFEROL) 25 mcg (1000 units) tablet Take 1 tablet (25 mcg) by mouth 2 times daily 180 tablet 0    acetaminophen-codeine (TYLENOL #3) 300-30 MG per tablet Take 1 tablet by mouth every 6 hours as needed for severe pain (Patient not taking: Reported on 3/9/2024) 6 tablet 0    neomycin-polymixin-dexamethasone (MAXITROL) ophthalmic ointment Place into both eyes daily Pea sized amount (Patient not taking: Reported on 3/9/2024)         ALLERGIES     Allergies   Allergen Reactions    Prednisone Other (See Comments)     Elevated heart rate, has had without problems more recently    Reglan [Metoclopramide] Other (See Comments)     agitation    Unknown [No Clinical Screening - See Comments]      benny have not worked in past       PAST MEDICAL HISTORY:  Past Medical History:   Diagnosis Date    Ankylosing spondylitis (H)     Arthritis     Crohn's colitis (H)     Gastroesophageal reflux disease     Hypertension     Migraine     Other chronic pain     headache and neck pain, receives botox injections Q 12 weeks    Sleep apnea     cpap       PAST SURGICAL HISTORY:  Past Surgical History:   Procedure Laterality Date    ARTHROPLASTY HIP Left 11/19/2019    Procedure: Left total hip arthroplasty;  Surgeon: Allen Coats MD;  Location:  OR    BOTOX CHRONIC MIGRAINE HEAD ACHES      for chronic headache and neck pain, injections every 12 weeks    COLONOSCOPY      EXTRACTION(S) DENTAL  12/18/2012    Procedure: EXTRACTION(S) DENTAL;  Extraction of Teeth 30, 19;  Surgeon: Sujey Cunningham DDS;  Location:  OR    OPTICAL TRACKING SYSTEM FUSION POSTERIOR SPINE LUMBAR N/A 2/2/2021    Procedure: Lumbar 3-4 posterior lumbar instrumented fusion with interbody cage;  Surgeon: Jakub Velasquez MD;  Location:  OR    repair fracture & insert pins left hand  1996    SMALL BOWEL RESECTION  1993       FAMILY HISTORY:  Family History   Problem Relation Age of Onset    Hypertension Father     Alcoholism Father     Other - See  Comments Father     Coronary Artery Disease Father     Skin Cancer Father     Kidney failure Father        SOCIAL HISTORY:  Social History     Socioeconomic History    Marital status:      Spouse name: None    Number of children: None    Years of education: None    Highest education level: None   Tobacco Use    Smoking status: Never    Smokeless tobacco: Never   Vaping Use    Vaping Use: Never used   Substance and Sexual Activity    Alcohol use: Yes     Comment: seldom    Drug use: No    Sexual activity: Not Currently     Partners: Female   Other Topics Concern    Parent/sibling w/ CABG, MI or angioplasty before 65F 55M? No   Social History Narrative    Lives in [facility].     Twin boys, age 32. Daughter is 23.     , wife's name is Cheryl.     [employer] dogs.     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         Albertina Peña, DNP, APRN, CNP    08/01/22     Social Determinants of Health     Financial Resource Strain: Low Risk  (11/27/2023)    Financial Resource Strain     Within the past 12 months, have you or your family members you live with been unable to get utilities (heat, electricity) when it was really needed?: No   Food Insecurity: Low Risk  (11/27/2023)    Food Insecurity     Within the past 12 months, did you worry that your food would run out before you got money to buy more?: No     Within the past 12 months, did the food you bought just not last and you didn t have money to get more?: No   Transportation Needs: Low Risk  (11/27/2023)    Transportation Needs     Within the past 12 months, has lack of transportation kept you from medical appointments, getting your medicines, non-medical meetings or appointments, work, or from getting things that you need?: No   Physical Activity: Inactive (8/1/2022)    Exercise Vital Sign     Days of Exercise per Week: 0 days     Minutes of Exercise per Session: 0 min   Stress: No Stress Concern Present (8/1/2022)    [place] of  "Occupational Health - Occupational Stress Questionnaire     Feeling of Stress : Only a little   Social Connections: Socially Isolated (8/1/2022)    Social Connection and Isolation Panel [NHANES]     Frequency of Communication with Friends and Family: Once a week     Frequency of Social Gatherings with Friends and Family: Once a week     Attends Restoration Services: Never     Active Member of Clubs or Organizations: No     Marital Status:    Interpersonal Safety: Low Risk  (10/2/2023)    Interpersonal Safety     Do you feel physically and emotionally safe where you currently live?: Yes     Within the past 12 months, have you been hit, slapped, kicked or otherwise physically hurt by someone?: No     Within the past 12 months, have you been humiliated or emotionally abused in other ways by your partner or ex-partner?: No   Housing Stability: Low Risk  (11/27/2023)    Housing Stability     Do you have housing? : Yes     Are you worried about losing your housing?: No       Review of Systems:  Skin:  Negative     Eyes:  Positive for glasses  ENT:  Negative    Respiratory:  Positive for shortness of breath;dyspnea on exertion  Cardiovascular:    Positive for;edema  Gastroenterology: Negative    Genitourinary:  Negative    Musculoskeletal:  Positive for arthritis;joint pain;back pain;gout;foot pain  Neurologic:  Positive for headaches;migraine headaches  Psychiatric:  Positive for depression  Heme/Lymph/Imm:  Negative    Endocrine:  Negative      Physical Exam:  Vitals: /88   Pulse 78   Ht 1.753 m (5' 9\")   Wt 120 kg (264 lb 9.6 oz)   SpO2 98%   BMI 39.07 kg/m      Constitutional:           Skin:           Head:           Eyes:           ENT:           Neck:           Chest:           Cardiac:                    Abdomen:           Vascular:                                        Extremities and Muscular Skeletal:              Neurological:           Psych:        Recent Lab Results:  LIPID RESULTS:  Lab " Results   Component Value Date    CHOL 134 11/09/2023    CHOL 231 (H) 04/20/2021    HDL 47 11/09/2023    HDL 43 04/20/2021    LDL 28 11/09/2023     (H) 04/20/2021    TRIG 296 (H) 11/09/2023    TRIG 316 (H) 04/20/2021       LIVER ENZYME RESULTS:  Lab Results   Component Value Date    AST 28 11/27/2023    AST 17 01/22/2021    ALT 35 11/27/2023    ALT 36 01/22/2021       CBC RESULTS:  Lab Results   Component Value Date    WBC 7.6 11/27/2023    WBC 7.8 04/20/2021    RBC 4.74 11/27/2023    RBC 4.60 04/20/2021    HGB 14.9 11/27/2023    HGB 14.4 04/20/2021    HCT 44.8 11/27/2023    HCT 44.5 04/20/2021    MCV 95 11/27/2023    MCV 97 04/20/2021    MCH 31.4 11/27/2023    MCH 31.3 04/20/2021    MCHC 33.3 11/27/2023    MCHC 32.4 04/20/2021    RDW 12.7 11/27/2023    RDW 12.8 04/20/2021     11/27/2023     04/20/2021       BMP RESULTS:  Lab Results   Component Value Date     12/27/2023     04/20/2021    POTASSIUM 5.0 12/27/2023    POTASSIUM 4.2 08/17/2022    POTASSIUM 5.3 04/20/2021    CHLORIDE 102 12/27/2023    CHLORIDE 103 08/17/2022    CHLORIDE 107 04/20/2021    CO2 26 12/27/2023    CO2 21 08/17/2022    CO2 25 04/20/2021    ANIONGAP 10 12/27/2023    ANIONGAP 8 08/17/2022    ANIONGAP 4 04/20/2021     (H) 12/27/2023     (H) 08/17/2022    GLC 96 04/20/2021    BUN 15.2 12/27/2023    BUN 22 08/17/2022    BUN 24 04/20/2021    CR 1.17 12/27/2023    CR 1.68 (H) 04/20/2021    GFRESTIMATED 70 12/27/2023    GFRESTIMATED 43 (L) 04/20/2021    GFRESTBLACK 50 (L) 04/20/2021    GRACE 9.3 12/27/2023    GRACE 9.4 04/20/2021        A1C RESULTS:  Lab Results   Component Value Date    A1C 5.5 04/20/2021       INR RESULTS:  Lab Results   Component Value Date    INR 0.98 10/28/2023    INR 0.93 10/17/2015         Reg David MD, [facility]    CC  Reg David MD  [address] [place] [id number]      Thank you for allowing me to participate in the care of your patient.      Sincerely,      Reg David MD     [facility]

## 2024-04-18 ENCOUNTER — ALLIED HEALTH/NURSE VISIT (OUTPATIENT)
Dept: CARDIOLOGY | Facility: CLINIC | Age: 64
End: 2024-04-18
Payer: COMMERCIAL

## 2024-04-18 ENCOUNTER — TELEPHONE (OUTPATIENT)
Dept: CARDIOLOGY | Facility: CLINIC | Age: 64
End: 2024-04-18

## 2024-04-18 VITALS — DIASTOLIC BLOOD PRESSURE: 84 MMHG | SYSTOLIC BLOOD PRESSURE: 130 MMHG

## 2024-04-18 DIAGNOSIS — E78.2 MIXED HYPERLIPIDEMIA: ICD-10-CM

## 2024-04-18 DIAGNOSIS — R06.09 DYSPNEA ON EXERTION: ICD-10-CM

## 2024-04-18 DIAGNOSIS — I10 ESSENTIAL HYPERTENSION, BENIGN: ICD-10-CM

## 2024-04-18 PROCEDURE — 99207 PR NO CHARGE LOS: CPT

## 2024-04-18 NOTE — PROGRESS NOTES
ALLIED HEALTH BLOOD PRESSURE CHECK     Last office visit: 4/3/24    Previous blood pressure: 130/88 mm Hg  Previous heart rate: 78 bpm      Time of visit: 10 AM    Morning medications were taken at: 830 am     Today's blood pressure: 130/84 mm Hg  clinic bp machine read 133/82  Today's heart rate: 57 bpm     Home monitor blood pressure: 135/94 mmHg (during visit)  Home monitor heart rate: 71 bpm      Additional Comments: pt taking olmesartan 40 mg/daily and carvedilol 50 mg BID.       Results routed to: BV nursing      Ordering Provider: Dr David  In clinic Provider: Dr Wayne

## 2024-04-19 NOTE — TELEPHONE ENCOUNTER
Joann, MD Eliceo Saenz Karin, RN; [facility] Heart Nursing Team15 hours ago (5:16 PM)     MT  BP well controlled, continue current meds     Contacted patient to review comments from Dr. David. Patient verbalized understanding and agreed with plan of care.

## 2024-05-22 DIAGNOSIS — I10 HYPERTENSION, UNSPECIFIED TYPE: ICD-10-CM

## 2024-05-22 RX ORDER — CARVEDILOL 25 MG/1
50 TABLET ORAL 2 TIMES DAILY WITH MEALS
Qty: 120 TABLET | Refills: 3 | Status: SHIPPED | OUTPATIENT
Start: 2024-05-22

## 2024-05-23 ENCOUNTER — TRANSFERRED RECORDS (OUTPATIENT)
Dept: HEALTH INFORMATION MANAGEMENT | Facility: CLINIC | Age: 64
End: 2024-05-23
Payer: MEDICARE

## 2024-07-03 ENCOUNTER — TRANSFERRED RECORDS (OUTPATIENT)
Dept: HEALTH INFORMATION MANAGEMENT | Facility: CLINIC | Age: 64
End: 2024-07-03
Payer: MEDICARE

## 2024-08-09 ENCOUNTER — TRANSFERRED RECORDS (OUTPATIENT)
Dept: HEALTH INFORMATION MANAGEMENT | Facility: CLINIC | Age: 64
End: 2024-08-09
Payer: MEDICARE

## 2024-08-12 ENCOUNTER — TRANSFERRED RECORDS (OUTPATIENT)
Dept: HEALTH INFORMATION MANAGEMENT | Facility: CLINIC | Age: 64
End: 2024-08-12
Payer: MEDICARE

## 2024-08-12 ENCOUNTER — TELEPHONE (OUTPATIENT)
Dept: PEDIATRICS | Facility: CLINIC | Age: 64
End: 2024-08-12
Payer: MEDICARE

## 2024-08-12 NOTE — TELEPHONE ENCOUNTER
Prior Authorization Specialty Medication Request    Medication/Dose:  febuxostat (ULORIC) 40 MG TABS tablet 90 tablet 3 11/27/2023 -- No   Sig - Route: Take 1 tablet (40 mg) by mouth daily - Oral     Diagnosis and ICD code (if different than what is on RX):   Gout, unspecified cause, unspecified chronicity, unspecified site [M10.9]        Primary: [employer]  Insurance ID:  [id number]    Pharmacy Information (if different than what is on RX)  Name:  [facility]  Phone:  [phone]  Fax: [phone]

## 2024-08-14 NOTE — TELEPHONE ENCOUNTER
Central Prior Authorization Team   Phone: [phone]    PA Initiation    Medication: febuxostat (ULORIC) 40 MG TABS tablet  Insurance Company: [email or web address] - Phone [phone] Fax [phone]  Pharmacy Filling the Rx: [facility] - [address]   Filling Pharmacy Phone: [phone]  Filling Pharmacy Fax:    Start Date: 8/14/2024

## 2024-08-14 NOTE — TELEPHONE ENCOUNTER
Prior Authorization Approval    Authorization Effective Date: 5/16/2024  Authorization Expiration Date: 8/14/2025  Medication: febuxostat (ULORIC) 40 MG TABS tablet  Reference #:     Insurance Company: [employer] - Phone [phone] Fax [phone]  Which Pharmacy is filling the prescription (Not needed for infusion/clinic administered): [facility] - [address]   Pharmacy Notified: Yes  Patient Notified: Instructed pharmacy to notify patient when script is ready to /ship.

## 2024-08-21 ENCOUNTER — MYC REFILL (OUTPATIENT)
Dept: PEDIATRICS | Facility: CLINIC | Age: 64
End: 2024-08-21
Payer: MEDICARE

## 2024-08-21 DIAGNOSIS — M45.9 ANKYLOSING SPONDYLITIS, UNSPECIFIED SITE OF SPINE (H): Primary | ICD-10-CM

## 2024-08-21 RX ORDER — GABAPENTIN 300 MG/1
300 CAPSULE ORAL EVERY MORNING
Qty: 90 CAPSULE | Refills: 3 | Status: SHIPPED | OUTPATIENT
Start: 2024-08-21 | End: 2024-09-12

## 2024-08-30 DIAGNOSIS — Z79.899 OTHER LONG TERM (CURRENT) DRUG THERAPY: ICD-10-CM

## 2024-08-30 DIAGNOSIS — M10.9 GOUT: Primary | ICD-10-CM

## 2024-08-31 ENCOUNTER — LAB (OUTPATIENT)
Dept: LAB | Facility: CLINIC | Age: 64
End: 2024-08-31
Payer: MEDICARE

## 2024-08-31 DIAGNOSIS — M10.9 GOUT: ICD-10-CM

## 2024-08-31 DIAGNOSIS — Z79.899 OTHER LONG TERM (CURRENT) DRUG THERAPY: ICD-10-CM

## 2024-08-31 LAB
BASOPHILS # BLD AUTO: 0.1 10E3/UL (ref 0–0.2)
BASOPHILS NFR BLD AUTO: 1 %
EOSINOPHIL # BLD AUTO: 0.2 10E3/UL (ref 0–0.7)
EOSINOPHIL NFR BLD AUTO: 3 %
ERYTHROCYTE [DISTWIDTH] IN BLOOD BY AUTOMATED COUNT: 14.4 % (ref 10–15)
HCT VFR BLD AUTO: 41.1 % (ref 40–53)
HGB BLD-MCNC: 13.6 G/DL (ref 13.3–17.7)
IMM GRANULOCYTES # BLD: 0 10E3/UL
IMM GRANULOCYTES NFR BLD: 0 %
LYMPHOCYTES # BLD AUTO: 2.3 10E3/UL (ref 0.8–5.3)
LYMPHOCYTES NFR BLD AUTO: 26 %
MCH RBC QN AUTO: 32.4 PG (ref 26.5–33)
MCHC RBC AUTO-ENTMCNC: 33.1 G/DL (ref 31.5–36.5)
MCV RBC AUTO: 98 FL (ref 78–100)
MONOCYTES # BLD AUTO: 0.9 10E3/UL (ref 0–1.3)
MONOCYTES NFR BLD AUTO: 11 %
NEUTROPHILS # BLD AUTO: 5.3 10E3/UL (ref 1.6–8.3)
NEUTROPHILS NFR BLD AUTO: 60 %
PLATELET # BLD AUTO: 180 10E3/UL (ref 150–450)
RBC # BLD AUTO: 4.2 10E6/UL (ref 4.4–5.9)
WBC # BLD AUTO: 8.8 10E3/UL (ref 4–11)

## 2024-08-31 PROCEDURE — 84520 ASSAY OF UREA NITROGEN: CPT

## 2024-08-31 PROCEDURE — 84075 ASSAY ALKALINE PHOSPHATASE: CPT

## 2024-08-31 PROCEDURE — 84550 ASSAY OF BLOOD/URIC ACID: CPT

## 2024-08-31 PROCEDURE — 82565 ASSAY OF CREATININE: CPT

## 2024-08-31 PROCEDURE — 36415 COLL VENOUS BLD VENIPUNCTURE: CPT

## 2024-08-31 PROCEDURE — 86140 C-REACTIVE PROTEIN: CPT

## 2024-08-31 PROCEDURE — 85025 COMPLETE CBC W/AUTO DIFF WBC: CPT

## 2024-08-31 PROCEDURE — 84460 ALANINE AMINO (ALT) (SGPT): CPT

## 2024-08-31 PROCEDURE — 84450 TRANSFERASE (AST) (SGOT): CPT

## 2024-08-31 PROCEDURE — 82040 ASSAY OF SERUM ALBUMIN: CPT

## 2024-09-01 LAB
ALBUMIN SERPL BCG-MCNC: 4.1 G/DL (ref 3.5–5.2)
ALP SERPL-CCNC: 90 U/L (ref 40–150)
ALT SERPL W P-5'-P-CCNC: 19 U/L (ref 0–70)
AST SERPL W P-5'-P-CCNC: 21 U/L (ref 0–45)
BUN SERPL-MCNC: 17 MG/DL (ref 8–23)
CREAT SERPL-MCNC: 1.16 MG/DL (ref 0.67–1.17)
CRP SERPL-MCNC: 4.92 MG/L
EGFRCR SERPLBLD CKD-EPI 2021: 70 ML/MIN/1.73M2
URATE SERPL-MCNC: 5.6 MG/DL (ref 3.4–7)

## 2024-09-03 DIAGNOSIS — K21.00 GASTROESOPHAGEAL REFLUX DISEASE WITH ESOPHAGITIS, UNSPECIFIED WHETHER HEMORRHAGE: ICD-10-CM

## 2024-09-03 DIAGNOSIS — E78.5 HYPERLIPIDEMIA, UNSPECIFIED HYPERLIPIDEMIA TYPE: ICD-10-CM

## 2024-09-03 RX ORDER — PANTOPRAZOLE SODIUM 40 MG/1
40 TABLET, DELAYED RELEASE ORAL 2 TIMES DAILY
Qty: 180 TABLET | Refills: 0 | OUTPATIENT
Start: 2024-09-03

## 2024-09-03 RX ORDER — ATORVASTATIN CALCIUM 20 MG/1
20 TABLET, FILM COATED ORAL DAILY
Qty: 90 TABLET | Refills: 0 | Status: SHIPPED | OUTPATIENT
Start: 2024-09-03

## 2024-09-03 RX ORDER — PANTOPRAZOLE SODIUM 40 MG/1
40 TABLET, DELAYED RELEASE ORAL 2 TIMES DAILY
Qty: 180 TABLET | Refills: 0 | Status: SHIPPED | OUTPATIENT
Start: 2024-09-03

## 2024-09-03 NOTE — TELEPHONE ENCOUNTER
Change in pharmacy request, resent refill remaining to new pharmacy per refill protocol.     Miguel HUMPHREY RN 9/3/2024 at 1:24 PM

## 2024-09-10 ENCOUNTER — MYC MEDICAL ADVICE (OUTPATIENT)
Dept: PEDIATRICS | Facility: CLINIC | Age: 64
End: 2024-09-10
Payer: MEDICARE

## 2024-09-10 DIAGNOSIS — Z98.1 S/P LUMBAR FUSION: ICD-10-CM

## 2024-09-10 DIAGNOSIS — M45.9 ANKYLOSING SPONDYLITIS, UNSPECIFIED SITE OF SPINE (H): Primary | Chronic | ICD-10-CM

## 2024-09-10 NOTE — TELEPHONE ENCOUNTER
See the [facility] message from pt. Pended new rx to reflect rx below. Please review & sign, if appropriate. If signing the pended med, please discontinue the previous 2 rx from med list. Thanks.    gabapentin (NEURONTIN) 300 MG capsule 270 capsule 3 11/27/2023 -- No   Sig - Route: Take 3 capsules (900 mg) by mouth at bedtime - Oral     gabapentin (NEURONTIN) 300 MG capsule 90 capsule 3 8/21/2024 -- No   Sig - Route: Take 1 capsule (300 mg) by mouth every morning. - Oral     Farideh Meyer RN  [facility]

## 2024-09-11 ENCOUNTER — TRANSFERRED RECORDS (OUTPATIENT)
Dept: HEALTH INFORMATION MANAGEMENT | Facility: CLINIC | Age: 64
End: 2024-09-11
Payer: MEDICARE

## 2024-09-11 LAB
ALT SERPL-CCNC: 24 IU/L (ref 0–44)
AST SERPL-CCNC: 19 IU/L (ref 0–40)
CREATININE (EXTERNAL): 1.23 MG/DL (ref 0.76–1.27)
GFR ESTIMATED (EXTERNAL): 65 ML/MIN/1.73
GLUCOSE (EXTERNAL): 92 MG/DL (ref 70–99)
POTASSIUM (EXTERNAL): 4.7 MMOL/L (ref 3.5–5.2)

## 2024-09-11 RX ORDER — GABAPENTIN 300 MG/1
CAPSULE ORAL
Qty: 360 CAPSULE | Refills: 1 | Status: SHIPPED | OUTPATIENT
Start: 2024-09-11

## 2024-09-25 ENCOUNTER — MYC MEDICAL ADVICE (OUTPATIENT)
Dept: PEDIATRICS | Facility: CLINIC | Age: 64
End: 2024-09-25
Payer: MEDICARE

## 2024-09-25 NOTE — TELEPHONE ENCOUNTER
See my chart - requesting sedative for upcoming GI procedure     Advised to send Kain Armenta, Registered Nurse  [facility]

## 2024-09-26 ENCOUNTER — E-VISIT (OUTPATIENT)
Dept: PEDIATRICS | Facility: CLINIC | Age: 64
End: 2024-09-26
Payer: MEDICARE

## 2024-09-26 DIAGNOSIS — F40.240 CLAUSTROPHOBIA: Primary | ICD-10-CM

## 2024-09-26 PROCEDURE — 99421 OL DIG E/M SVC 5-10 MIN: CPT | Performed by: INTERNAL MEDICINE

## 2024-09-27 RX ORDER — LORAZEPAM 0.5 MG/1
TABLET ORAL
Qty: 2 TABLET | Refills: 0 | Status: SHIPPED | OUTPATIENT
Start: 2024-09-27

## 2024-10-03 ENCOUNTER — HOSPITAL ENCOUNTER (OUTPATIENT)
Dept: MRI IMAGING | Facility: CLINIC | Age: 64
Discharge: HOME | End: 2024-10-03
Attending: PHYSICIAN ASSISTANT | Admitting: PHYSICIAN ASSISTANT
Payer: MEDICARE

## 2024-10-03 DIAGNOSIS — K21.9 GASTROESOPHAGEAL REFLUX DISEASE WITHOUT ESOPHAGITIS: ICD-10-CM

## 2024-10-03 DIAGNOSIS — K50.80 CROHN'S DISEASE OF BOTH SMALL AND LARGE INTESTINE WITHOUT COMPLICATION (H): ICD-10-CM

## 2024-10-03 PROCEDURE — 255N000002 HC RX 255 OP 636: Performed by: PHYSICIAN ASSISTANT

## 2024-10-03 PROCEDURE — 72197 MRI PELVIS W/O & W/DYE: CPT

## 2024-10-03 PROCEDURE — A9585 GADOBUTROL INJECTION: HCPCS | Performed by: PHYSICIAN ASSISTANT

## 2024-10-03 RX ORDER — GADOBUTROL 604.72 MG/ML
12 INJECTION INTRAVENOUS ONCE
Status: COMPLETED | OUTPATIENT
Start: 2024-10-03 | End: 2024-10-03

## 2024-10-03 RX ADMIN — GADOBUTROL 12 ML: 604.72 INJECTION INTRAVENOUS at 11:19

## 2024-10-21 ENCOUNTER — TRANSFERRED RECORDS (OUTPATIENT)
Dept: HEALTH INFORMATION MANAGEMENT | Facility: CLINIC | Age: 64
End: 2024-10-21
Payer: MEDICARE

## 2024-10-23 ENCOUNTER — TRANSFERRED RECORDS (OUTPATIENT)
Dept: HEALTH INFORMATION MANAGEMENT | Facility: CLINIC | Age: 64
End: 2024-10-23
Payer: MEDICARE

## 2024-10-31 ENCOUNTER — OFFICE VISIT (OUTPATIENT)
Dept: PEDIATRICS | Facility: CLINIC | Age: 64
End: 2024-10-31
Payer: MEDICARE

## 2024-10-31 VITALS
DIASTOLIC BLOOD PRESSURE: 80 MMHG | RESPIRATION RATE: 16 BRPM | WEIGHT: 261.1 LBS | HEART RATE: 63 BPM | HEIGHT: 70 IN | BODY MASS INDEX: 37.38 KG/M2 | TEMPERATURE: 98.5 F | OXYGEN SATURATION: 95 % | SYSTOLIC BLOOD PRESSURE: 118 MMHG

## 2024-10-31 DIAGNOSIS — K31.89 GASTRIC MASS: ICD-10-CM

## 2024-10-31 DIAGNOSIS — G47.33 SLEEP APNEA, OBSTRUCTIVE: ICD-10-CM

## 2024-10-31 DIAGNOSIS — I10 ESSENTIAL HYPERTENSION, BENIGN: ICD-10-CM

## 2024-10-31 DIAGNOSIS — K50.00 CROHN'S DISEASE OF SMALL INTESTINE WITHOUT COMPLICATION (H): Chronic | ICD-10-CM

## 2024-10-31 DIAGNOSIS — Z01.818 PREOP GENERAL PHYSICAL EXAM: Primary | ICD-10-CM

## 2024-10-31 PROCEDURE — 90662 IIV NO PRSV INCREASED AG IM: CPT | Performed by: INTERNAL MEDICINE

## 2024-10-31 PROCEDURE — G0008 ADMIN INFLUENZA VIRUS VAC: HCPCS | Performed by: INTERNAL MEDICINE

## 2024-10-31 PROCEDURE — 99214 OFFICE O/P EST MOD 30 MIN: CPT | Mod: 25 | Performed by: INTERNAL MEDICINE

## 2024-10-31 ASSESSMENT — PAIN SCALES - GENERAL: PAINLEVEL_OUTOF10: NO PAIN (0)

## 2024-10-31 NOTE — PATIENT INSTRUCTIONS
Take carvedilol and olmesartan as scheduled morning of procedure with sip of water      Preparing for Your Surgery  For Adults  Getting started  In most cases, a nurse will call to review your health history and instructions. They will give you an arrival time based on your scheduled surgery time. Please be ready to share:  Your doctor's clinic name and phone number  Your medical, surgical, and anesthesia history  A list of allergies and sensitivities  A list of medicines, including herbal treatments and over-the-counter drugs  Whether the patient has a legal guardian (ask how to send us the papers in advance)  Note: You may not receive a call if you were seen at our PAC (Preoperative Assessment Center).  Please tell us if you're pregnant--or if there's any chance you might be pregnant. Some surgeries may injure a fetus (unborn baby), so they require a pregnancy test. Surgeries that are safe for a fetus don't always need a test, and you can choose whether to have one.   Preparing for surgery  Within 10 to 30 days of surgery: Have a pre-op exam (sometimes called an H&P, or History and Physical). This can be done at a clinic or pre-operative center.  If you're having a , you may not need this exam. Talk to your care team.  At your pre-op exam, talk to your care team about all medicines you take. (This includes CBD oil and any drugs, such as THC, marijuana, and other forms of cannabis.) If you need to stop any medicine before surgery, ask when to start taking it again.  This is for your safety. Many medicines and drugs can make you bleed too much during surgery. Some change how well surgery (anesthesia) drugs work.  Call your insurance company to let them know you're having surgery. (If you don't have insurance, call [phone].)  Call your clinic if there's any change in your health. This includes a scrape or scratch near the surgery site, or any signs of a cold (sore throat, runny nose, cough, rash,  fever).  Eating and drinking guidelines  For your safety: Unless your surgeon tells you otherwise, follow the guidelines below.  Eat and drink as normal until 8 hours before you arrive for surgery. After that, no food or milk. You can spit out gum when you arrive.  Drink clear liquids until 2 hours before you arrive. These are liquids you can see through, like water, Gatorade, and Propel Water. They also include plain black coffee and tea (no cream or milk).  No alcohol for 24 hours before you arrive. The night before surgery, stop any drinks that contain THC.  If your care team tells you to take medicine on the morning of surgery, it's okay to take it with a sip of water. No other medicines or drugs are allowed (including CBD oil)--follow your care team's instructions.  If you have questions the day of surgery, call your hospital or surgery center.   Preventing infection  Shower or bathe the night before and the morning of surgery. Follow the instructions your clinic gave you. (If no instructions, use regular soap.)  Don't shave or clip hair near your surgery site. We'll remove the hair if needed.  Don't smoke or vape the morning of surgery. No chewing tobacco for 6 hours before you arrive. A nicotine patch is okay. You may spit out nicotine gum when you arrive.  For some surgeries, the surgeon will tell you to fully quit smoking and nicotine.  We will make every effort to keep you safe from infection. We will:  Clean our hands often with soap and water (or an alcohol-based hand rub).  Clean the skin at your surgery site with a special soap that kills germs.  Give you a special gown to keep you warm. (Cold raises the risk of infection.)  Wear hair covers, masks, gowns, and gloves during surgery.  Give antibiotic medicine, if prescribed. Not all surgeries need this medicine.  What to bring on the day of surgery  Photo ID and insurance card  Copy of your health care directive, if you have one  Glasses and hearing  aids (bring cases)  You can't wear contacts during surgery  Inhaler and eye drops, if you use them (tell us about these when you arrive)  CPAP machine or breathing device, if you use them  A few personal items, if spending the night  If you have . . .  A pacemaker, ICD (cardiac defibrillator), or other implant: Bring the ID card.  An implanted stimulator: Bring the remote control.  A legal guardian: Bring a copy of the certified (court-stamped) guardianship papers.  Please remove any jewelry, including body piercings. Leave jewelry and other valuables at home.  If you're going home the day of surgery  You must have a responsible adult drive you home. They should stay with you overnight as well.  If you don't have someone to stay with you, and you aren't safe to go home alone, we may keep you overnight. Insurance often won't pay for this.  After surgery  If it's hard to control your pain or you need more pain medicine, please call your surgeon's office.  Questions?   If you have any questions for your care team, list them here:   ____________________________________________________________________________________________________________________________________________________________________________________________________________________________________________________________  For informational purposes only. Not to replace the advice of your health care provider. Copyright   2003, 2019 [facility]. All rights reserved. Clinically reviewed by Micheal Richards MD. [employer] [id number] - REV 08/24.

## 2024-10-31 NOTE — PROGRESS NOTES
Preoperative Evaluation  [facility] [place]  [address]  [address]  [place] [address]  Phone: [phone]  Fax: [phone]  Primary Provider: Joby Billingsley MD  Pre-op Performing Provider: Joby Billingsley MD  Oct 31, 2024             10/31/2024   Surgical Information   What procedure is being done? US/ENDOSCOPY    Facility or Hospital where procedure/surgery will be performed: [facility]    Who is doing the procedure / surgery? Charity Ochoa MD    Date of surgery / procedure: 11/04/2024    Time of surgery / procedure: 11:15 AM    Where do you plan to recover after surgery? at home with family       Fax number for surgical facility: [phone] to [facility]    Assessment & Plan     The proposed surgical procedure is considered LOW risk.    (Z01.818) Preop general physical exam  (primary encounter diagnosis)    (K31.89) Gastric mass    (I10) Essential hypertension, benign  Comment:   Plan: take carvedilol, olmesartan as scheduled morning of procedure    (G47.33) Sleep apnea, obstructive  Comment:   Plan: continue CPAP.  postoperative CR monitor. increased risk for postoperative hypoxia and arrhythmias.     (Z68.38) BMI 38.0-38.9,adult  Comment:   Plan: History of obesity with comorbid YODRY, HTN, hyperlipidemia.         - No identified additional risk factors other than previously addressed    Recommendation  Approval given to proceed with proposed procedure, without further diagnostic evaluation.    Regis King is a 64 year old, presenting for the following: preop        10/31/2024    10:59 AM   Additional Questions   Roomed by Blanca   Accompanied by self         10/31/2024    10:59 AM   Patient Reported Additional Medications   Patient reports taking the following new medications no     HPI related to upcoming procedure:   Avi Bhatti is a 64 year old who presents for preoperative evaluation as requested by Dr Ochoa prior to endoscopic ultrasound  to evaluate left upper quadrant mass..  MR enterography 10/3/2024:  IMPRESSION:  1.  7.5 cm exophytic mass off the gastric fundus, indeterminate,  possibly GIST or leiomyoma. Note this is stable since at least 2016        10/31/2024   Pre-Op Questionnaire   Have you ever had a heart attack or stroke? No    Have you ever had surgery on your heart or blood vessels, such as a stent placement, a coronary artery bypass, or surgery on an artery in your head, neck, heart, or legs? No    Do you have chest pain with activity? No    Do you have a history of heart failure? No    Do you currently have a cold, bronchitis or symptoms of other infection? No    Do you have a cough, shortness of breath, or wheezing? No    Do you or anyone in your family have previous history of blood clots? (!) No hx of DVT   Do you or does anyone in your family have a serious bleeding problem such as prolonged bleeding following surgeries or cuts? No    Have you ever had problems with anemia or been told to take iron pills? (!) YES -anemia    Have you had any abnormal blood loss such as black, tarry or bloody stools? No    Have you ever had a blood transfusion? No    Are you willing to have a blood transfusion if it is medically needed before, during, or after your surgery? Yes    Have you or any of your relatives ever had problems with anesthesia? No    Do you have sleep apnea, excessive snoring or daytime drowsiness? (!) YES YORDY   Do you have a CPAP machine? Yes    Do you have any artifical heart valves or other implanted medical devices like a pacemaker, defibrillator, or continuous glucose monitor? No    Do you have artificial joints? (!) YES    Are you allergic to latex? No        Patient-reported       Preoperative Review of    reviewed - no record of controlled substances prescribed.          Patient Active Problem List    Diagnosis Date Noted    CKD (chronic kidney disease) stage 2, GFR 60-89 ml/min 11/12/2023     Priority: Medium     Hypertension, unspecified type 10/30/2023     Priority: Medium    S/P lumbar fusion 02/02/2021     Priority: Medium    S/P total hip arthroplasty 11/19/2019     Priority: Medium    Obesity (BMI 35.0-39.9) with comorbidity (H) 07/25/2019     Priority: Medium    Intractable migraine without aura and without status migrainosus 11/30/2017     Priority: Medium    Degenerative disc disease, cervical 11/30/2017     Priority: Medium    Adenomatous colon polyp 10/23/2015     Priority: Medium    Essential hypertension, benign 04/21/2015     Priority: Medium    Crohn's disease (H) 04/21/2015     Priority: Medium    BMI 30-35 04/20/2015     Priority: Medium    Mixed hyperlipidemia 10/16/2014     Priority: Medium    Cervicalgia 07/03/2013     Priority: Medium    Tension headache 07/03/2013     Priority: Medium    Muscle spasm 07/03/2013     Priority: Medium    Sleep apnea, obstructive 12/14/2012     Priority: Medium    Ankylosing spondylitis (H) 12/14/2012     Priority: Medium      Past Medical History:   Diagnosis Date    Ankylosing spondylitis (H)     Arthritis     Crohn's colitis (H)     Gastroesophageal reflux disease     Hypertension     Migraine     Other chronic pain     headache and neck pain, receives botox injections Q 12 weeks    Sleep apnea     cpap     Past Surgical History:   Procedure Laterality Date    ARTHROPLASTY HIP Left 11/19/2019    Procedure: Left total hip arthroplasty;  Surgeon: Allen Coats MD;  Location:  OR    BOTOX CHRONIC MIGRAINE HEAD ACHES      for chronic headache and neck pain, injections every 12 weeks    COLONOSCOPY      EXTRACTION(S) DENTAL  12/18/2012    Procedure: EXTRACTION(S) DENTAL;  Extraction of Teeth 30, 19;  Surgeon: Sujey Cunningham DDS;  Location:  OR    OPTICAL TRACKING SYSTEM FUSION POSTERIOR SPINE LUMBAR N/A 2/2/2021    Procedure: Lumbar 3-4 posterior lumbar instrumented fusion with interbody cage;  Surgeon: Jakub Velasquez MD;  Location:  OR    repair fracture &  insert pins left hand  1996    SMALL BOWEL RESECTION  1993     Current Outpatient Medications   Medication Sig Dispense Refill    acetaminophen (TYLENOL) 500 MG tablet Take 500 mg by mouth 3 times daily      atorvastatin (LIPITOR) 20 MG tablet Take 1 tablet (20 mg) by mouth daily. 90 tablet 0    calcium carbonate-vitamin D (CALCIUM 600+D) 600-200 MG-UNIT TABS Take by mouth 2 times daily       carvedilol (COREG) 25 MG tablet Take 2 tablets (50 mg) by mouth 2 times daily (with meals) 120 tablet 3    DULoxetine (CYMBALTA) 30 MG capsule Take with 60 mg to equal 90 mg daily 90 capsule 3    DULoxetine (CYMBALTA) 60 MG capsule Take with 30 mg capsule to equal 90 mg 90 capsule 3    febuxostat (ULORIC) 40 MG TABS tablet Take 1 tablet (40 mg) by mouth daily 90 tablet 3    gabapentin (NEURONTIN) 300 MG capsule Take 1 capsule(300 mg) every morning & 3 capsules(900 mg) at bedtime 360 capsule 1    HUMIRA PEN 40 MG/0.8ML pen kit Inject 40 mg Subcutaneous every 14 days   0    LORazepam (ATIVAN) 0.5 MG tablet Take 1/2 to 1 tablet 1-2 hours prior to MRI and may repeat times one if needed before scan 2 tablet 0    multivitamin w/minerals (THERA-VIT-M) tablet Take 1 tablet by mouth daily.      neomycin-polymixin-dexamethasone (MAXITROL) ophthalmic ointment Place into both eyes daily. Pea sized amount      olmesartan (BENICAR) 40 MG tablet Take 1 tablet (40 mg) by mouth daily 90 tablet 3    pantoprazole (PROTONIX) 40 MG EC tablet Take 1 tablet (40 mg) by mouth 2 times daily. 180 tablet 0    rimegepant (NURTEC) 75 MG ODT tablet Place 75 mg under the tongue daily as needed for migraine      tiZANidine (ZANAFLEX) 4 MG tablet Take 16 mg by mouth at bedtime      Vitamin D3 (CHOLECALCIFEROL) 25 mcg (1000 units) tablet Take 1 tablet (25 mcg) by mouth 2 times daily 180 tablet 0       Allergies   Allergen Reactions    Prednisone Other (See Comments)     Elevated heart rate, has had without problems more recently    Reglan [Metoclopramide] Other  "(See Comments)     agitation    Unknown [No Clinical Screening - See Comments]      benny have not worked in past        Social History     Tobacco Use    Smoking status: Never    Smokeless tobacco: Never   Substance Use Topics    Alcohol use: Yes     Comment: seldom       History   Drug Use No             Review of Systems  Constitutional, neuro, ENT, endocrine, pulmonary, cardiac, gastrointestinal, genitourinary, musculoskeletal, integument and psychiatric systems are negative, except as otherwise noted.    Objective    /80 (BP Location: Right arm, Patient Position: Sitting, Cuff Size: Adult Large)   Pulse 63   Temp 98.5  F (36.9  C) (Tympanic)   Resp 16   Ht 1.765 m (5' 9.5\")   Wt 118.4 kg (261 lb 1.6 oz)   SpO2 95%   BMI 38.00 kg/m     Estimated body mass index is 38 kg/m  as calculated from the following:    Height as of this encounter: 1.765 m (5' 9.5\").    Weight as of this encounter: 118.4 kg (261 lb 1.6 oz).  Physical Exam  GENERAL: alert and no distress  EYES: Eyes grossly normal to inspection, PERRL and conjunctivae and sclerae normal  HENT: ear canals and TM's normal, nose and mouth without ulcers or lesions  NECK: no adenopathy, no asymmetry, masses, or scars  RESP: lungs clear to auscultation - no rales, rhonchi or wheezes  CV: regular rate and rhythm, normal S1 S2, no S3 or S4, no murmur, click or rub, no peripheral edema  ABDOMEN: soft, nontender, no hepatosplenomegaly, no masses and bowel sounds normal  MS: no gross musculoskeletal defects noted, no edema  SKIN: no suspicious lesions or rashes  NEURO: Normal strength and tone, mentation intact and speech normal  PSYCH: mentation appears normal, affect normal/bright    Recent Labs   Lab Test 08/31/24  1337 12/27/23  1626 12/20/23  0911 11/27/23  1422   HGB 13.6  --   --  14.9     --   --  190   NA  --  138 141 137   POTASSIUM  --  5.0 4.7 4.3   CR 1.16 1.17 1.61* 1.19*        Diagnostics  No labs were ordered during this visit. "   No EKG required, no history of coronary heart disease, significant arrhythmia, peripheral arterial disease or other structural heart disease.    Revised Cardiac Risk Index (RCRI)  The patient has the following serious cardiovascular risks for perioperative complications:   - No serious cardiac risks = 0 points     RCRI Interpretation: 0 points: Class I (very low risk - 0.4% complication rate)         Signed Electronically by: Joby iBllingsley MD  A copy of this evaluation report is provided to the requesting physician.

## 2024-11-04 ENCOUNTER — TRANSFERRED RECORDS (OUTPATIENT)
Dept: HEALTH INFORMATION MANAGEMENT | Facility: CLINIC | Age: 64
End: 2024-11-04
Payer: MEDICARE

## 2024-11-06 ENCOUNTER — TRANSFERRED RECORDS (OUTPATIENT)
Dept: HEALTH INFORMATION MANAGEMENT | Facility: CLINIC | Age: 64
End: 2024-11-06
Payer: MEDICARE

## 2024-11-07 ENCOUNTER — PATIENT OUTREACH (OUTPATIENT)
Dept: ONCOLOGY | Facility: CLINIC | Age: 64
End: 2024-11-07
Payer: MEDICARE

## 2024-11-07 ENCOUNTER — TRANSCRIBE ORDERS (OUTPATIENT)
Dept: OTHER | Age: 64
End: 2024-11-07

## 2024-11-07 DIAGNOSIS — C49.A2: Primary | ICD-10-CM

## 2024-11-07 DIAGNOSIS — D21.4 GIST, NON-MALIGNANT: Primary | ICD-10-CM

## 2024-11-07 DIAGNOSIS — C49.A2: ICD-10-CM

## 2024-11-07 NOTE — PROGRESS NOTES
New Patient Hematology / Oncology Nurse Navigator Note     Referral Date: 11/7/2024    Referring provider: Dr LIAT Ochoa    Referring Clinic/Organization: [facility]      Referred to: Surgical Oncology    Requested provider (if applicable): Dr. Villalba    Evaluation for :      Clinical History (per Nurse review of records provided):    10/3/24 imaging -- BOOKMARKED  Pathology per above, not in chart yet, will be in Media tab -- BOOKMARKED    Clinical Assessment / Barriers to Care (Per Nurse):    N/A      Records Location: Care Everywhere   Faxed - Media tab/Scanned     Records Needed:     See Pre-Visit encounter from CSS team for additional details on records collection. Additional questions on records needed for initial consult can be sent to P ONC ADULT NEW PATIENT RECORDS [[id number]] with a copy to  CANCER CARE NURSE NAVIGATION [[id number]]. Please include diagnosis and urgency in subject line.    Additional testing needed prior to consult:     N/A    Referral updates and Plan:     Triage instructions updated and sent to NPS for completion    Yesy Sylvester, NEERAJN, RN, PHN, OCN Hematology/Oncology Nurse Navigator  [facility]  [phone]  [email or web address]

## 2024-11-07 NOTE — TELEPHONE ENCOUNTER
RECORDS STATUS - ALL OTHER DIAGNOSIS      RECORDS RECEIVED FROM: [facility], Paulo, [place]   NOTES STATUS DETAILS   OFFICE NOTE from referring provider Paulo Ochoa   OFFICE NOTE from other specialty  External - [facility] 9/16/2024 - Dr. Reg William   DISCHARGE SUMMARY from hospital     DISCHARGE REPORT from the ER     OPERATIVE REPORT Paulo 11/4/2024 - ENDOSCOPIC ULTRASOUND UPPER     MEDICATION LIST -[place]     LABS     PATHOLOGY REPORTS External - [facility] 11/4/2024   ANYTHING RELATED TO DIAGNOSIS [facility] 9/11/2024   PATHOLOGY FEDEX TRACKING   TRACKING #:   GENONOMIC TESTING     TYPE:     IMAGING (NEED IMAGES & REPORT)     CT SCANS PACS CT Aortic Survey: 10/28/2023   MRI PACS MR Enterography: 10/3/2024   XRAYS     ULTRASOUND     PET     IMAGE DISC FEDEX TRACKING   TRACKING #:

## 2024-11-18 ENCOUNTER — ONCOLOGY VISIT (OUTPATIENT)
Dept: SURGERY | Facility: CLINIC | Age: 64
End: 2024-11-18
Attending: INTERNAL MEDICINE
Payer: MEDICARE

## 2024-11-18 ENCOUNTER — PRE VISIT (OUTPATIENT)
Dept: SURGERY | Facility: CLINIC | Age: 64
End: 2024-11-18
Payer: MEDICARE

## 2024-11-18 VITALS
DIASTOLIC BLOOD PRESSURE: 91 MMHG | SYSTOLIC BLOOD PRESSURE: 149 MMHG | WEIGHT: 265.4 LBS | BODY MASS INDEX: 38.63 KG/M2 | OXYGEN SATURATION: 97 % | RESPIRATION RATE: 20 BRPM | HEART RATE: 63 BPM | TEMPERATURE: 98.4 F

## 2024-11-18 DIAGNOSIS — E66.9 OBESITY: ICD-10-CM

## 2024-11-18 DIAGNOSIS — K44.9 HIATAL HERNIA: ICD-10-CM

## 2024-11-18 DIAGNOSIS — C49.A2 MALIGNANT GASTROINTESTINAL STROMAL TUMOR (GIST) OF STOMACH (H): Primary | ICD-10-CM

## 2024-11-18 PROCEDURE — G0463 HOSPITAL OUTPT CLINIC VISIT: HCPCS | Performed by: SURGERY

## 2024-11-18 RX ORDER — METHOTREXATE 2.5 MG/1
TABLET ORAL
COMMUNITY
Start: 2024-11-13

## 2024-11-18 RX ORDER — ONABOTULINUMTOXINA 100 [USP'U]/1
INJECTION, POWDER, LYOPHILIZED, FOR SOLUTION INTRADERMAL; INTRAMUSCULAR
COMMUNITY
Start: 2023-02-27

## 2024-11-18 RX ORDER — INFLIXIMAB 100 MG/1
INJECTION, POWDER, LYOPHILIZED, FOR SOLUTION INTRAVENOUS
COMMUNITY
Start: 2024-08-09

## 2024-11-18 RX ORDER — FOLIC ACID 1 MG/1
1000 TABLET ORAL DAILY
COMMUNITY

## 2024-11-18 ASSESSMENT — PAIN SCALES - GENERAL: PAINLEVEL_OUTOF10: NO PAIN (0)

## 2024-11-18 NOTE — PROGRESS NOTES
"Routing refill request to provider for review/approval because:  Says OV not within the last 6 months, however pt was seen 11/24/2020    Requested Prescriptions   Pending Prescriptions Disp Refills     insulin NPH-Regular (HUMULIN MIX 70/30 KWIKPEN) susp [Pharmacy Med Name: HUMULIN 70/30 U-100 KWIKPEN INJ 3ML] 30 mL 3     Sig: INJECT 46U UNDER THE SKIN BEFORE BREAKFAST AND 44U BEFORE SUPPER(INCREASE BY 2U EVERY 3 DAYS UNTIL READINGS ARE UNDER 120)       Short Acting Insulin Protocol Failed - 4/13/2021  1:54 PM        Failed - Recent (6 mo) or future (30 days) visit within the authorizing provider's specialty     Patient had office visit in the last 6 months or has a visit in the next 30 days with authorizing provider or within the authorizing provider's specialty.  See \"Patient Info\" tab in inbasket, or \"Choose Columns\" in Meds & Orders section of the refill encounter.            Passed - Serum creatinine on file in past 12 months     Recent Labs   Lab Test 11/24/20  1013   CR 0.65       Ok to refill medication if creatinine is low          Passed - HgbA1C in past 3 or 6 months     If HgbA1C is 8 or greater, it needs to be on file within the past 3 months.  If less than 8, must be on file within the past 6 months.     Recent Labs   Lab Test 11/24/20  1013   A1C 7.3*             Passed - Medication is active on med list        Passed - Patient is age 18 or older             " Mr. Bhatti is a 64-year-old gentleman with a longstanding history of Crohn's disease for which he is currently on immunotherapy.  In the 1990s he had an ileocecal resection.  He has ongoing problems with occasional flareups and cramping etc.  At the moment he is feeling well.  He recently had a MR enterography which noted a 7-1/2 cm mass along the greater curvature of his stomach.  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  In hindsight it has been present on previous imaging all the way back to 2016 although it was never mentioned.  The patient is referred for discussion regarding possible partial gastrectomy.  In addition the patient is noted to have a hiatal hernia.  He does have symptoms with reflux and has been on longstanding acid blocking medications.    Past medical history is significant for ankylosing spondylitis.  He also has intractable migraines.  Crohn's disease requiring immunotherapy.  He has also had spinal fusion.  Hyperlipidemia, hypertension, degenerative disc disease, chronic kidney disease.    His BMI is currently 38.6 with a weight of 265 kg.    I had a long discussion with the patient and his wife today regarding the findings of his imaging and biopsies.  I discussed that GIST tumors are a type of cancer and the sarcoma family.  I discussed that normally we would recommend resection however his tumor has been stable for quite a long period of time and he has other medical comorbidities that I think should be optimized prior to surgery.  He will be meeting medical oncology to discuss the possibility of Gleevec therapy.    Prior to moving forward with a partial gastrectomy I discussed that I would like him to meet with one of our minimally invasive surgeons to discuss the possibility of a hiatal hernia repair at the same time.  In addition I will refer him to our medical weight loss program because the  patient states he has gained about 60 pounds and is very interested in losing weight prior to surgery.  I do have some concern that if we resect the greater curvature of the stomach we may make his hiatal hernia likely to get worse, particularly with his obesity.  We will see what our colleagues think about repairing it.    The patient will require recent staging which can be done following his medical oncology visit and I will let them arrange that.    I will plan to see the patient in about 3 months time to see how he is doing with his weight loss.  Given the fact that we will be delaying his surgery to get him optimized, it may be worthwhile starting him on Gleevec at this point however given the stability of his disease for such a long period of time I will await medical oncology's opinion as to whether we need to do that or not.    I will look forward to seeing the patient in about 3 months time to reassess his progress and hopefully his weight loss.  He was very happy with that plan.    30 minutes were spent in face-to-face time.  15 minutes were spent in review of history, imaging, coordination of care.  5 minutes were spent documentation.    The above was transcribed using Dragon voice recognition software that is now required for use by [facility] and [facility] in place of transcription of dictated notes.  This change may unfortunately lead into an increase in errors in the EMR. While I reviewed and edited the transcription, I may miss errors.  Please let me know of any of serious errors and I will addend the note.

## 2024-11-18 NOTE — LETTER
11/18/2024      Avi Bhatti  [address] [facility] [address]      Dear Colleague,    Thank you for referring your patient, Avi Bhatti, to the [facility]. Please see a copy of my visit note below.    Mr. Bhatti is a 64-year-old gentleman with a longstanding history of Crohn's disease for which he is currently on immunotherapy.  In the 1990s he had an ileocecal resection.  He has ongoing problems with occasional flareups and cramping etc.  At the moment he is feeling well.  He recently had a MR enterography which noted a 7-1/2 cm mass along the greater curvature of his stomach.  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  In hindsight it has been present on previous imaging all the way back to 2016 although it was never mentioned.  The patient is referred for discussion regarding possible partial gastrectomy.  In addition the patient is noted to have a hiatal hernia.  He does have symptoms with reflux and has been on longstanding acid blocking medications.    Past medical history is significant for ankylosing spondylitis.  He also has intractable migraines.  Crohn's disease requiring immunotherapy.  He has also had spinal fusion.  Hyperlipidemia, hypertension, degenerative disc disease, chronic kidney disease.    His BMI is currently 38.6 with a weight of 265 kg.    I had a long discussion with the patient and his wife today regarding the findings of his imaging and biopsies.  I discussed that GIST tumors are a type of cancer and the sarcoma family.  I discussed that normally we would recommend resection however his tumor has been stable for quite a long period of time and he has other medical comorbidities that I think should be optimized prior to surgery.  He will be meeting medical oncology to discuss the possibility of Gleevec therapy.    Prior to moving forward with a  partial gastrectomy I discussed that I would like him to meet with one of our minimally invasive surgeons to discuss the possibility of a hiatal hernia repair at the same time.  In addition I will refer him to our medical weight loss program because the patient states he has gained about 60 pounds and is very interested in losing weight prior to surgery.  I do have some concern that if we resect the greater curvature of the stomach we may make his hiatal hernia likely to get worse, particularly with his obesity.  We will see what our colleagues think about repairing it.    The patient will require recent staging which can be done following his medical oncology visit and I will let them arrange that.    I will plan to see the patient in about 3 months time to see how he is doing with his weight loss.  Given the fact that we will be delaying his surgery to get him optimized, it may be worthwhile starting him on Gleevec at this point however given the stability of his disease for such a long period of time I will await medical oncology's opinion as to whether we need to do that or not.    I will look forward to seeing the patient in about 3 months time to reassess his progress and hopefully his weight loss.  He was very happy with that plan.    30 minutes were spent in face-to-face time.  15 minutes were spent in review of history, imaging, coordination of care.  5 minutes were spent documentation.    The above was transcribed using Dragon voice recognition software that is now required for use by [facility] and [facility] in place of transcription of dictated notes.  This change may unfortunately lead into an increase in errors in the EMR. While I reviewed and edited the transcription, I may miss errors.  Please let me know of any of serious errors and I will addend the note.       Again, thank you for allowing me to participate in the care of your patient.        Sincerely,        Jakub  Daniel Villalba MD

## 2024-11-18 NOTE — NURSING NOTE
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    Weight as of this encounter: 120.4 kg (265 lb 6.4 oz). Body surface area is 2.43 meters squared.  No Pain (0) Comment: Data Unavailable   No LMP for male patient.  Allergies reviewed: Yes  Medications reviewed: Yes    Medications: Medication refills not needed today.  Pharmacy name entered into [facility]:    [id number] [place] [address] [facility] [place] - [address] [id number] - [place] - [address]    Frailty Screening:   Is the patient here for a new oncology consult visit in cancer care? 2. No      Clinical concerns: none      Cynthia Prabhakar, EMT  11/18/2024            "

## 2024-11-20 NOTE — TELEPHONE ENCOUNTER
REFERRAL INFORMATION:  Referring Provider:  Jakub Villalba MD   Referring Clinic:  [facility]   Reason for Visit/Diagnosis: K44.9 (ICD-10-CM) - Hiatal hernia        FUTURE VISIT INFORMATION:  Appointment Date: 11/22/24  Appointment Time:      NOTES RECORD STATUS  DETAILS   OFFICE NOTE from Referring Provider Internal 11/18/24   ENDOSCOPY (EGD)  Care Everywhere 11/4/2449-[facility]   PERTINENT LABS Internal    PATHOLOGY REPORTS (RELATED) Care Everywhere 11/4/2444-[facility]   IMAGING (CT, MRI, US, XR)  Internal 10/3/24-MR Enterography  12/11/23-XR chest  11/27/23-XR chest     Records Requested    Facility    Fax:    Outcome

## 2024-11-21 ENCOUNTER — TRANSFERRED RECORDS (OUTPATIENT)
Dept: HEALTH INFORMATION MANAGEMENT | Facility: CLINIC | Age: 64
End: 2024-11-21
Payer: MEDICARE

## 2024-11-21 LAB
ALT SERPL-CCNC: 32 IU/L (ref 9–46)
AST SERPL-CCNC: 24 U/L (ref 10–40)
CREATININE (EXTERNAL): 1.3 MG/DL (ref 0.6–1.35)

## 2024-11-22 ENCOUNTER — PRE VISIT (OUTPATIENT)
Dept: SURGERY | Facility: CLINIC | Age: 64
End: 2024-11-22

## 2024-11-23 DIAGNOSIS — F32.A DEPRESSION, UNSPECIFIED DEPRESSION TYPE: ICD-10-CM

## 2024-11-25 DIAGNOSIS — E78.5 HYPERLIPIDEMIA, UNSPECIFIED HYPERLIPIDEMIA TYPE: ICD-10-CM

## 2024-11-25 DIAGNOSIS — I10 HYPERTENSION, UNSPECIFIED TYPE: ICD-10-CM

## 2024-11-25 RX ORDER — CARVEDILOL 25 MG/1
50 TABLET ORAL 2 TIMES DAILY WITH MEALS
Qty: 120 TABLET | Refills: 5 | Status: SHIPPED | OUTPATIENT
Start: 2024-11-25

## 2024-11-25 RX ORDER — DULOXETIN HYDROCHLORIDE 60 MG/1
CAPSULE, DELAYED RELEASE ORAL
Qty: 90 CAPSULE | Refills: 3 | Status: SHIPPED | OUTPATIENT
Start: 2024-11-25

## 2024-11-25 RX ORDER — DULOXETIN HYDROCHLORIDE 30 MG/1
CAPSULE, DELAYED RELEASE ORAL
Qty: 90 CAPSULE | Refills: 3 | Status: SHIPPED | OUTPATIENT
Start: 2024-11-25

## 2024-11-26 RX ORDER — ATORVASTATIN CALCIUM 20 MG/1
20 TABLET, FILM COATED ORAL DAILY
Qty: 90 TABLET | Refills: 11 | Status: SHIPPED | OUTPATIENT
Start: 2024-11-26

## 2024-12-05 NOTE — TELEPHONE ENCOUNTER
RECORDS STATUS - ALL OTHER DIAGNOSIS      RECORDS RECEIVED FROM: [facility]   NOTES STATUS DETAILS   OFFICE NOTE from referring provider External - [facility] Dr. Charity Ochoa    OFFICE NOTE from other specialist External - [facility]  9/16/2024 - Dr. Reg William    OFFICE NOTE from surgery [facility] 11/22/2024 - Dr. Jakub Moreno    11/18/2024 - Dr. Jakub Villalba   OPERATIVE REPORT [facility]  11/4/2024 - ENDOSCOPIC ULTRASOUND UPPER     MEDICATION LIST [facility]    LABS     PATHOLOGY REPORTS Req from [facility]    PATHOLOGY TRACKING #:  [id number] 11/4/2024 - [id number]    ANYTHING RELATED TO DIAGNOSIS Epic 11/21/2024   IMAGING (NEED IMAGES & REPORT)     CT SCANS PACS CT Aortic Survey: 10/28/2023    MRI PACS MR Enterography: 10/3/2024

## 2024-12-09 NOTE — PROGRESS NOTES
Oncology Clinic Initial Visit Note  12-     Chief complaint: I  We saw Fernando Bhatti who is being referred for a GIST.     HPI:      Mr. 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      He was recently evaluated by imaging to monitor his Crohn's disease, where a mass was noted near his stomach and above his spleen.  This was concerning for malignancy, and patient underwent endoscopic biopsy on 11/4/2024.  He notes that he was told that this was a GIST based on the biopsy results.  He notes that he was also told that this tumor may have been present since 2016, based on comparison to prior MR abdomen images.  He was told that this tumor may be closing off his hiatal hernia, and resecting tumor to help improve his hiatal hernia and associated symptoms of GERD.  He was recently evaluated by Dr. Villalba for potential partial gastrectomy to remove the tumor given his size of 4.4 cm.  He subsequently had a follow-up appointment to be evaluated for a hiatal hernia repair.  He was told that he was being referred to the oncology clinic for consideration of medical therapies for his GIST and further evaluation.     Today, he does not note any acute medical concerns or needs.He notes that he has generally felt declining energy and lethargy over the last 3 to 5 years.  He attributes this to his sedentary work prior to retiring in May of this year.  He is also concerned about weight gain, for which he plans to follow-up with a weight clinic in 2025.  He notes recent influenza infection, that has spontaneously resolved.  He otherwise denies any other illnesses, chest pains, shortness of breath, abdominal pain/tenderness, or lower extremity edema.  He had  "some hyperplastic polyps removed at colonoscopy 10-23-24 and will get another in 2 years. He does note that he continues to have 3-6 bowel movements per day, related to his Crohn's disease.  Despite this, he notes that he is able to regularly do yard work in his home, including lifting heavy objects.  He does not feel winded when walking up 2 flights of stairs.  Despite his fatigue, patient is able to walk at least 1 mile without resting.      In his family history, patient notes history of colon cancer in his grandmother who passed away this in her early 70s.  No history of cancers in patient's immediate family, including parents and siblings.     His wife accompanied him on the visit.     ROS:   Review of systems was unremarkable, unless noted above      Past medical history:  -Crohn's disease, on Remicade, 3-6 bowel movements daily  -Chronic migraines, controlled with gabapentin  -Hypertension, on antihypertensives  -Sleep apnea     Past surgical history:  -Left total hip arthroplasty -11/19/2019  -L3-4 lumbar fusion - 2/2/2021  -Small bowel resection -1993     Family history:   Grandmother: Colon cancer at the age of 72   No other notable cancer history in immediate or extended family per patient     Social history:  He denies any history of smoking and notes minimal alcohol use.  No history of recreational drug use.  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        Objective    BP (!) 149/103 (BP Location: Right arm, Patient Position: Sitting, Cuff Size: Adult Large)   Pulse 58   Temp 98.3  F (36.8  C) (Oral)   Resp 20   Ht 1.75 m (5' 8.9\")   Wt 119.5 kg (263 lb 6.4 oz)   SpO2 95%   BMI 39.01 kg/m       On exam he appeared comfortable with normal affect  Head: Normocephalic. No masses, lesions, tenderness or abnormalities  Neck: Neck supple. No adenopathy. Thyroid symmetric, normal " size,, Carotids without bruits.  ENT: ENT exam normal, no neck nodes or sinus tenderness  Cardiovascular: negative, PMI normal. No lifts, heaves, or thrills. RRR. No murmurs, clicks gallops or rub  Respiratory: negative, Percussion normal. Good diaphragmatic excursion. Lungs clear  Gastrointestinal: Abdomen soft, non-tender. BS normal. No masses, organomegaly, positive findings: obese  : Deferred  Musculoskeletal: extremities normal- no gross deformities noted, gait normal, and normal muscle tone  Skin: no suspicious lesions or rashes  Neurologic: Gait normal. Reflexes normal and symmetric. Sensation grossly WNL.  Psychiatric: mentation appears normal and affect normal/bright  Hematologic/Lymphatic/Immunologic: Normal cervical lymph nodes       We reviewed the images and reviewed the key images with him and his wife.  The tumor is of similar size now as in 2016.    Relevant Imaging:  MRI enterography 10-3-24  IMPRESSION:  1.  7.5 cm exophytic mass off the gastric fundus, indeterminate,  possibly GIST or leiomyoma. Note this is stable since at least 2016.  2.  No evidence of active Crohn's disease     CT Aortic Survey 10-28-24  IMPRESSION:  1.  No evidence for thoracic aortic dissection or aneurysm. No abdominal aortic aneurysm or dissection. No evidence for great vessel aortic arch hemodynamically significant stenosis. No hemodynamically significant stenosis involving the branch vessels of   the abdominal aorta including the pelvic arteries.  2.  6.1 x 4.6 x 7.4 cm lobulated partially calcified nonenhancing mass in the left upper quadrant adjacent to the spleen and the stomach. This has not changed significantly since the prior MRI. This is indeterminant but allowing for stability is likely benign.  3.  Moderate hiatal hernia.  4.  Otherwise, unremarkable.     MR enterography 8- Though not noted on this impression, a mass noted on following up evaluation and used as a comparison for imaging noted  above.  IMPRESSION: Unremarkable MR enterography. No evidence for active  Crohn's inflammation.        Pathology:   Upper EUS  biopsy   Impressions/Post-Op Diagnosis:       - 4.4 cm gastric submucosal mass. FNA done. Suspicious for        GIST tumor       - Pancreatic steatosis.     Pathology 11-4-2024   [facility] FNA report  11-4-24  STOMACH, SUBMUCOSAL MASS, EUS-GUIDED FINE NEEDLE ASPIRATION:  1. Gastrointestinal stromal tumor (GIST), characterized by:  a. Histologic type: Spindle cell type  b. Necrosis: Present  2. See comment     The histomorphology and immunophenotype (see below) are consistent with gastrointestinal stromal  tumor (GIST). Risk of progressive disease* in GIST is estimated based on tumor size and mitotic  activity. Although minimal mitotic activity is identified in this generous needle biopsy (the  sample is greater than 5 mm ), histologic grading and determining the risk of recurrence is  deferred to the resection specimen, if performed. Provisionally, a 4.4 cm tumor with 0 mitoses/5  mm  is regarded as having very low risk of progressive disease*.     All slides were reviewed. The microscopic appearance substantiates the diagnosis. The biopsy  demonstrates a spindle cell neoplasm with several foci of necrosis and without mitotic figures;  the lesional cells have the following immunohistochemical labeling profile:  : Positive  DOG1: Positive  CD34: Positive  S100: Negative  SMA: Negative              Transferred Records on 11/21/2024   Component Date Value Ref Range Status    Creatinine (External) 11/21/2024 1.300  0.600 - 1.350 mg/dL Final    AST (External) 11/21/2024 24  10 - 40 U/L Final    ALT (External) 11/21/2024 32  9 - 46 IU/L Final            Uric Acid   Date Value Ref Range Status   08/31/2024 5.6 3.4 - 7.0 mg/dL Final         Current medications:       Current Outpatient Medications   Medication Instructions    acetaminophen (TYLENOL) 500 mg, 3 TIMES DAILY    atorvastatin (LIPITOR)  20 mg, Oral, DAILY    botulinum toxin type A (BOTOX) 100 units injection Once a month as directed    calcium carbonate-vitamin D (CALCIUM 600+D) 600-200 MG-UNIT TABS 2 TIMES DAILY    carvedilol (COREG) 50 mg, Oral, 2 TIMES DAILY WITH MEALS    DULoxetine (CYMBALTA) 30 MG capsule TAKE ONE CAPSULE BY MOUTH EVERY DAY. TAKE WITH 60 MG CAPSULE FOR A DAILY TOTAL OF 90MG    DULoxetine (CYMBALTA) 60 MG capsule TAKE 1 CAPSULE BY MOUTH ONCE DAILY WITH 30 MG CAPSULE FOR A TOTAL DAILY DOSE OF 90 MG    febuxostat (ULORIC) 40 mg, Oral, DAILY    folic acid (FOLVITE) 1,000 mcg, DAILY    gabapentin (NEURONTIN) 300 MG capsule Take 1 capsule(300 mg) every morning & 3 capsules(900 mg) at bedtime    inFLIXimab (REMICADE) in sodium chloride 0.9 % 250 mL via gravity infusion Infliximab infusions through [facility] every 8 weeks    inFLIXimab-abda (RENFLEXIS) 100 MG injection EVERY 8 WEEKS    LORazepam (ATIVAN) 0.5 MG tablet Take 1/2 to 1 tablet 1-2 hours prior to MRI and may repeat times one if needed before scan    methotrexate 2.5 MG tablet TAKE 3 TABLETS BY MOUTH EVERY WEEK.    multivitamin w/minerals (THERA-VIT-M) tablet 1 tablet, DAILY    neomycin-polymixin-dexamethasone (MAXITROL) ophthalmic ointment DAILY    Nurtec 75 mg, DAILY PRN    olmesartan (BENICAR) 40 mg, Oral, DAILY    pantoprazole (PROTONIX) 40 mg, Oral, 2 TIMES DAILY    tiZANidine (ZANAFLEX) 16 mg, AT BEDTIME    Vitamin D3 (CHOLECALCIFEROL) 25 mcg, Oral, 2 TIMES DAILY       Assessment & Plan   Mr. Bhatti is a 64-year-old man with PMHx of Crohn's disease s/p bowel resection in his 30s and on Remicade, hypertension, sleep apnea, HLD, hiatal hernia, and obesity who presents to the oncology clinic for evaluation of GIST tumor in his abdomen that was present at least a far back as 2016. He is evaluated for consideration of medical management and therapy for his intraabdominal GIST tumor.      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 On imaging, the tumor measures 4.4 cm and is abutting the stomach and spleen. I reviewed the images and the mass appears to have been of similar size in August 2016, as seen on MR enterography.  Notably on most recent CT imaging from 10/28/2023, there are calcifications in the mass, indicating that mass is likely been present for a long period of time.  Appears that NGS has not yet been completed for biopsy sample.      He is being considered for partial gastrectomy with hiatal hernia repair.  Given chronic and stable nature of abdominal mass, evidenced by calcifications on CT and comparisons to prior imaging, I do not feel this tumor warrants urgent treatment. However given the large size, tumor should eventually be removed if the morbidity is acceptable.    We have sent for NGS but given the history I suspect it will not be a gleevec responsive GIST and likely a low MI low risk lesion.  After we see the KIT status we can revisit the idea of preoperative TKI.  If it were potentially gleevec responsive we would probably get a PET before starting.    Will plan on following up with patient based on results. Discussed plan with the patient who was agreeable to further workup.     Plan:   - Ordered NGS of tumor biopsy sample, obtained on 11/4/2024. Testing results will determine possible medical therapies  - Will follow up with patient once results are back, expect 2-3 weeks for turnaround   - Encouraged patient to follow up with surgery for further plans for partial gastrectomy w/ hiatal hernia repair     Jan Gruber MD ([place])  Medicine PGY-2   Pager # [phone]     Seen and staffed with Dr. Cavanaugh      I examined the pt w Dr Gruber, reviewed all notes/labs/images and agree w the above.  t>60 min including chart/image review/orders/doc    Rubin Cavanaugh M.D.  Professor  Hematology, Oncology and Transplantation

## 2024-12-11 ENCOUNTER — PRE VISIT (OUTPATIENT)
Dept: ONCOLOGY | Facility: CLINIC | Age: 64
End: 2024-12-11
Payer: MEDICARE

## 2024-12-11 ENCOUNTER — ONCOLOGY VISIT (OUTPATIENT)
Dept: ONCOLOGY | Facility: CLINIC | Age: 64
End: 2024-12-11
Attending: INTERNAL MEDICINE
Payer: MEDICARE

## 2024-12-11 VITALS
SYSTOLIC BLOOD PRESSURE: 149 MMHG | BODY MASS INDEX: 39.01 KG/M2 | HEIGHT: 69 IN | OXYGEN SATURATION: 95 % | TEMPERATURE: 98.3 F | HEART RATE: 58 BPM | DIASTOLIC BLOOD PRESSURE: 103 MMHG | WEIGHT: 263.4 LBS | RESPIRATION RATE: 20 BRPM

## 2024-12-11 DIAGNOSIS — E66.9 OBESITY, UNSPECIFIED CLASS, UNSPECIFIED OBESITY TYPE, UNSPECIFIED WHETHER SERIOUS COMORBIDITY PRESENT: ICD-10-CM

## 2024-12-11 DIAGNOSIS — C49.A2: ICD-10-CM

## 2024-12-11 DIAGNOSIS — K50.00 CROHN'S DISEASE OF SMALL INTESTINE WITHOUT COMPLICATION (H): Chronic | ICD-10-CM

## 2024-12-11 DIAGNOSIS — G47.33 SLEEP APNEA, OBSTRUCTIVE: Primary | ICD-10-CM

## 2024-12-11 DIAGNOSIS — K44.9 HIATAL HERNIA: ICD-10-CM

## 2024-12-11 DIAGNOSIS — M45.9 ANKYLOSING SPONDYLITIS, UNSPECIFIED SITE OF SPINE (H): Chronic | ICD-10-CM

## 2024-12-11 PROCEDURE — G0463 HOSPITAL OUTPT CLINIC VISIT: HCPCS | Performed by: INTERNAL MEDICINE

## 2024-12-11 ASSESSMENT — PAIN SCALES - GENERAL: PAINLEVEL_OUTOF10: NO PAIN (0)

## 2024-12-11 NOTE — LETTER
12/11/2024      Avi Bhatti  [address] [facility] [id number]      Dear Colleague,    Thank you for referring your patient, Avi Bhatti, to the [facility]. Please see a copy of my visit note below.    Oncology Clinic Initial Visit Note  12-     Chief complaint: I  We saw Fernando Bhatti who is being referred for a GIST.     HPI:      Mr. 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      He was recently evaluated by imaging to monitor his Crohn's disease, where a mass was noted near his stomach and above his spleen.  This was concerning for malignancy, and patient underwent endoscopic biopsy on 11/4/2024.  He notes that he was told that this was a GIST based on the biopsy results.  He notes that he was also told that this tumor may have been present since 2016, based on comparison to prior MR abdomen images.  He was told that this tumor may be closing off his hiatal hernia, and resecting tumor to help improve his hiatal hernia and associated symptoms of GERD.  He was recently evaluated by Dr. Villalba for potential partial gastrectomy to remove the tumor given his size of 4.4 cm.  He subsequently had a follow-up appointment to be evaluated for a hiatal hernia repair.  He was told that he was being referred to the oncology clinic for consideration of medical therapies for his GIST and further evaluation.     Today, he does not note any acute medical concerns or needs.He notes that he has generally felt declining energy and lethargy over the last 3 to 5 years.  He attributes this to his sedentary work prior to retiring in May of this year.  He is also concerned about weight gain, for which  he plans to follow-up with a weight clinic in 2025.  He notes recent influenza infection, that has spontaneously resolved.  He otherwise denies any other illnesses, chest pains, shortness of breath, abdominal pain/tenderness, or lower extremity edema.  He had some hyperplastic polyps removed at colonoscopy 10-23-24 and will get another in 2 years. He does note that he continues to have 3-6 bowel movements per day, related to his Crohn's disease.  Despite this, he notes that he is able to regularly do yard work in his home, including lifting heavy objects.  He does not feel winded when walking up 2 flights of stairs.  Despite his fatigue, patient is able to walk at least 1 mile without resting.      In his family history, patient notes history of colon cancer in his grandmother who passed away this in her early 70s.  No history of cancers in patient's immediate family, including parents and siblings.     His wife accompanied him on the visit.     ROS:   Review of systems was unremarkable, unless noted above      Past medical history:  -Crohn's disease, on Remicade, 3-6 bowel movements daily  -Chronic migraines, controlled with gabapentin  -Hypertension, on antihypertensives  -Sleep apnea     Past surgical history:  -Left total hip arthroplasty -11/19/2019  -L3-4 lumbar fusion - 2/2/2021  -Small bowel resection -1993     Family history:   Grandmother: Colon cancer at the age of 72   No other notable cancer history in immediate or extended family per patient     Social history:  He denies any history of smoking and notes minimal alcohol use.  No history of recreational drug use.  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        Objective   BP (!) 149/103 (BP Location: Right arm, Patient Position: Sitting, Cuff Size: Adult Large)   Pulse 58   Temp 98.3  F (36.8  C) (Oral)   Resp 20   Ht 1.75 m  "(5' 8.9\")   Wt 119.5 kg (263 lb 6.4 oz)   SpO2 95%   BMI 39.01 kg/m       On exam he appeared comfortable with normal affect  Head: Normocephalic. No masses, lesions, tenderness or abnormalities  Neck: Neck supple. No adenopathy. Thyroid symmetric, normal size,, Carotids without bruits.  ENT: ENT exam normal, no neck nodes or sinus tenderness  Cardiovascular: negative, PMI normal. No lifts, heaves, or thrills. RRR. No murmurs, clicks gallops or rub  Respiratory: negative, Percussion normal. Good diaphragmatic excursion. Lungs clear  Gastrointestinal: Abdomen soft, non-tender. BS normal. No masses, organomegaly, positive findings: obese  : Deferred  Musculoskeletal: extremities normal- no gross deformities noted, gait normal, and normal muscle tone  Skin: no suspicious lesions or rashes  Neurologic: Gait normal. Reflexes normal and symmetric. Sensation grossly WNL.  Psychiatric: mentation appears normal and affect normal/bright  Hematologic/Lymphatic/Immunologic: Normal cervical lymph nodes       We reviewed the images and reviewed the key images with him and his wife.  The tumor is of similar size now as in 2016.    Relevant Imaging:  MRI enterography 10-3-24  IMPRESSION:  1.  7.5 cm exophytic mass off the gastric fundus, indeterminate,  possibly GIST or leiomyoma. Note this is stable since at least 2016.  2.  No evidence of active Crohn's disease     CT Aortic Survey 10-28-24  IMPRESSION:  1.  No evidence for thoracic aortic dissection or aneurysm. No abdominal aortic aneurysm or dissection. No evidence for great vessel aortic arch hemodynamically significant stenosis. No hemodynamically significant stenosis involving the branch vessels of   the abdominal aorta including the pelvic arteries.  2.  6.1 x 4.6 x 7.4 cm lobulated partially calcified nonenhancing mass in the left upper quadrant adjacent to the spleen and the stomach. This has not changed significantly since the prior MRI. This is indeterminant but " allowing for stability is likely benign.  3.  Moderate hiatal hernia.  4.  Otherwise, unremarkable.     MR enterography 8- Though not noted on this impression, a mass noted on following up evaluation and used as a comparison for imaging noted above.  IMPRESSION: Unremarkable MR enterography. No evidence for active  Crohn's inflammation.        Pathology:   Upper EUS  biopsy   Impressions/Post-Op Diagnosis:       - 4.4 cm gastric submucosal mass. FNA done. Suspicious for        GIST tumor       - Pancreatic steatosis.     Pathology 11-4-2024   [facility] FNA report  11-4-24  STOMACH, SUBMUCOSAL MASS, EUS-GUIDED FINE NEEDLE ASPIRATION:  1. Gastrointestinal stromal tumor (GIST), characterized by:  a. Histologic type: Spindle cell type  b. Necrosis: Present  2. See comment     The histomorphology and immunophenotype (see below) are consistent with gastrointestinal stromal  tumor (GIST). Risk of progressive disease* in GIST is estimated based on tumor size and mitotic  activity. Although minimal mitotic activity is identified in this generous needle biopsy (the  sample is greater than 5 mm ), histologic grading and determining the risk of recurrence is  deferred to the resection specimen, if performed. Provisionally, a 4.4 cm tumor with 0 mitoses/5  mm  is regarded as having very low risk of progressive disease*.     All slides were reviewed. The microscopic appearance substantiates the diagnosis. The biopsy  demonstrates a spindle cell neoplasm with several foci of necrosis and without mitotic figures;  the lesional cells have the following immunohistochemical labeling profile:  : Positive  DOG1: Positive  CD34: Positive  S100: Negative  SMA: Negative              Transferred Records on 11/21/2024   Component Date Value Ref Range Status     Creatinine (External) 11/21/2024 1.300  0.600 - 1.350 mg/dL Final     AST (External) 11/21/2024 24  10 - 40 U/L Final     ALT (External) 11/21/2024 32  9 - 46 IU/L Final             Uric Acid   Date Value Ref Range Status   08/31/2024 5.6 3.4 - 7.0 mg/dL Final         Current medications:       Current Outpatient Medications   Medication Instructions     acetaminophen (TYLENOL) 500 mg, 3 TIMES DAILY     atorvastatin (LIPITOR) 20 mg, Oral, DAILY     botulinum toxin type A (BOTOX) 100 units injection Once a month as directed     calcium carbonate-vitamin D (CALCIUM 600+D) 600-200 MG-UNIT TABS 2 TIMES DAILY     carvedilol (COREG) 50 mg, Oral, 2 TIMES DAILY WITH MEALS     DULoxetine (CYMBALTA) 30 MG capsule TAKE ONE CAPSULE BY MOUTH EVERY DAY. TAKE WITH 60 MG CAPSULE FOR A DAILY TOTAL OF 90MG     DULoxetine (CYMBALTA) 60 MG capsule TAKE 1 CAPSULE BY MOUTH ONCE DAILY WITH 30 MG CAPSULE FOR A TOTAL DAILY DOSE OF 90 MG     febuxostat (ULORIC) 40 mg, Oral, DAILY     folic acid (FOLVITE) 1,000 mcg, DAILY     gabapentin (NEURONTIN) 300 MG capsule Take 1 capsule(300 mg) every morning & 3 capsules(900 mg) at bedtime     inFLIXimab (REMICADE) in sodium chloride 0.9 % 250 mL via gravity infusion Infliximab infusions through [facility] every 8 weeks     inFLIXimab-abda (RENFLEXIS) 100 MG injection EVERY 8 WEEKS     LORazepam (ATIVAN) 0.5 MG tablet Take 1/2 to 1 tablet 1-2 hours prior to MRI and may repeat times one if needed before scan     methotrexate 2.5 MG tablet TAKE 3 TABLETS BY MOUTH EVERY WEEK.     multivitamin w/minerals (THERA-VIT-M) tablet 1 tablet, DAILY     neomycin-polymixin-dexamethasone (MAXITROL) ophthalmic ointment DAILY     Nurtec 75 mg, DAILY PRN     olmesartan (BENICAR) 40 mg, Oral, DAILY     pantoprazole (PROTONIX) 40 mg, Oral, 2 TIMES DAILY     tiZANidine (ZANAFLEX) 16 mg, AT BEDTIME     Vitamin D3 (CHOLECALCIFEROL) 25 mcg, Oral, 2 TIMES DAILY       Assessment & Plan  Mr. Bhatti is a 64-year-old man with PMHx of Crohn's disease s/p bowel resection in his 30s and on Remicade, hypertension, sleep apnea, HLD, hiatal hernia, and obesity who presents to the oncology clinic for evaluation  of GIST tumor in his abdomen that was present at least a far back as 2016. He is evaluated for consideration of medical management and therapy for his intraabdominal GIST tumor.      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 On imaging, the tumor measures 4.4 cm and is abutting the stomach and spleen. I reviewed the images and the mass appears to have been of similar size in August 2016, as seen on MR enterography.  Notably on most recent CT imaging from 10/28/2023, there are calcifications in the mass, indicating that mass is likely been present for a long period of time.  Appears that NGS has not yet been completed for biopsy sample.      He is being considered for partial gastrectomy with hiatal hernia repair.  Given chronic and stable nature of abdominal mass, evidenced by calcifications on CT and comparisons to prior imaging, I do not feel this tumor warrants urgent treatment. However given the large size, tumor should eventually be removed if the morbidity is acceptable.    We have sent for NGS but given the history I suspect it will not be a gleevec responsive GIST and likely a low MI low risk lesion.  After we see the KIT status we can revisit the idea of preoperative TKI.  If it were potentially gleevec responsive we would probably get a PET before starting.    Will plan on following up with patient based on results. Discussed plan with the patient who was agreeable to further workup.     Plan:   - Ordered NGS of tumor biopsy sample, obtained on 11/4/2024. Testing results will determine possible medical therapies  - Will follow up with patient once results are back, expect 2-3 weeks for turnaround   - Encouraged patient to follow up with surgery for further plans for partial gastrectomy w/ hiatal hernia repair     Jan Gruber MD ([place])  Medicine PGY-2   Pager # [phone]     Seen and staffed with Dr. Cavanaugh      I examined the pt w  Dr Gruber, reviewed all notes/labs/images and agree w the above.  t>60 min including chart/image review/orders/doc    Rubin Cavanaugh M.D.  Professor  Hematology, Oncology and Transplantation        Again, thank you for allowing me to participate in the care of your patient.        Sincerely,        Rubin Cavanaugh MD

## 2024-12-11 NOTE — NURSING NOTE
"Oncology Rooming Note    December 11, 2024 1:54 PM   Avi Bhatti is a 64 year old male who presents for:    Chief Complaint   Patient presents with    Oncology Clinic Visit     Gastrointestinal stromal tumor      Initial Vitals: BP (!) 149/103 (BP Location: Right arm, Patient Position: Sitting, Cuff Size: Adult Large)   Pulse 58   Temp 98.3  F (36.8  C) (Oral)   Resp 20   Ht 1.75 m (5' 8.9\")   Wt 119.5 kg (263 lb 6.4 oz)   SpO2 95%   BMI 39.01 kg/m   Estimated body mass index is 39.01 kg/m  as calculated from the following:    Height as of this encounter: 1.75 m (5' 8.9\").    Weight as of this encounter: 119.5 kg (263 lb 6.4 oz). Body surface area is 2.41 meters squared.  No Pain (0) Comment: Data Unavailable   No LMP for male patient.  Allergies reviewed: Yes  Medications reviewed: Yes    Medications: Medication refills not needed today.  Pharmacy name entered into [employer]:    [id number] [place] [address] [facility] [address] [place] [address] [id number] - [place], [place] - [address] [facility]    Frailty Screening:   Is the patient here for a new oncology consult visit in cancer care? 1. Yes. Over the past month, have you experienced difficulty or required a caregiver to assist with:   1. Balance, walking or general mobility (including any falls)? NO  2. Completion of self-care tasks such as bathing, dressing, toileting, grooming/hygiene?  NO  3. Concentration or memory that affects your daily life?  NO       Clinical concerns: none      Vivienne Sebastian              "

## 2024-12-11 NOTE — PROGRESS NOTES
Oncology Clinic Initial Visit Note    Chief complaint: I saw Fernando Bhatti who is being referred for a GIST.     HPI:     Mr. 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     He was recently evaluated by imaging to monitor his Crohn's disease, where a mass was noted near his stomach and above his spleen.  This was concerning for malignancy, and patient underwent endoscopic biopsy on 11/4/2024.  He notes that he was told that this was a GIST based on the biopsy results.  He notes that he was also told that this tumor may have been present since 2016, based on comparison to prior MR abdomen images.  He was told that this tumor may be closing off his hiatal hernia, and resecting tumor to help improve his hiatal hernia and associated symptoms of GERD.  He was recently evaluated by Dr. Villalba for potential partial gastrectomy to remove the tumor given his size of 4.4 cm.  He subsequently had a follow-up appointment to be evaluated for a hiatal hernia repair.  He was told that he was being referred to the oncology clinic for consideration of medical therapies for his GIST and further evaluation.    Today, patient does not note any acute medical concerns or needs.He notes that he has generally felt declining energy and lethargy over the last 3 to 5 years.  He attributes this to his sedentary work prior to retiring in May of this year.  He is also concerned about weight gain, for which he plans to follow-up with a weight clinic in 2025.  He notes recent influenza infection, that has spontaneously resolved.  He otherwise denies any other illnesses, chest pains, shortness of breath, abdominal pain/tenderness, or lower extremity edema.  He had some  "hyperplastic polyps removed at colonoscopy 10-23-24 and will get another in 2 years. He does note that he continues to have 3-6 bowel movements per day, related to his Crohn's disease.  Despite this, patient notes that he is able to regularly do yard work in his home, including lifting heavy objects.  He does not feel winded when walking up 2 flights of stairs.  Despite his fatigue, patient is able to walk at least 1 mile without resting.     In his family history, patient notes history of colon cancer in his grandmother who passed away this in her early 70s.  No history of cancers in patient's immediate family, including parents and siblings.  Patient denies any history of smoking and notes minimal alcohol use.  No history of recreational drug use.  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  He recently retired in 5/2024 and lives at home with his wife.     ROS:   Review of systems was unremarkable, unless noted above      Past medical history:  -Crohn's disease, on Remicade, 3-6 bowel movements daily  -Chronic migraines, controlled with gabapentin  -Hypertension, on antihypertensives  -Sleep apnea    Past surgical history:  -Left total hip arthroplasty -11/19/2019  -L3-4 lumbar fusion - 2/2/2021  -Small bowel resection -1993    Family history:   Grandmother: Colon cancer at the age of 72   No other notable cancer history in immediate or extended family per patient    Objective    BP (!) 149/103 (BP Location: Right arm, Patient Position: Sitting, Cuff Size: Adult Large)   Pulse 58   Temp 98.3  F (36.8  C) (Oral)   Resp 20   Ht 1.75 m (5' 8.9\")   Wt 119.5 kg (263 lb 6.4 oz)   SpO2 95%   BMI 39.01 kg/m      On exam he appeared  Exam:  Constitutional: healthy, alert, and no distress  Head: Normocephalic. No masses, lesions, tenderness or abnormalities  Neck: Neck supple. No adenopathy. Thyroid symmetric, normal size,, Carotids without " bruits.  ENT: ENT exam normal, no neck nodes or sinus tenderness  Cardiovascular: negative, PMI normal. No lifts, heaves, or thrills. RRR. No murmurs, clicks gallops or rub  Respiratory: negative, Percussion normal. Good diaphragmatic excursion. Lungs clear  Gastrointestinal: Abdomen soft, non-tender. BS normal. No masses, organomegaly, positive findings: obese  : Deferred  Musculoskeletal: extremities normal- no gross deformities noted, gait normal, and normal muscle tone  Skin: no suspicious lesions or rashes  Neurologic: Gait normal. Reflexes normal and symmetric. Sensation grossly WNL.  Psychiatric: mentation appears normal and affect normal/bright  Hematologic/Lymphatic/Immunologic: Normal cervical lymph nodes    Relevant Imaging:  MRI enterography 10-3-24  IMPRESSION:  1.  7.5 cm exophytic mass off the gastric fundus, indeterminate,  possibly GIST or leiomyoma. Note this is stable since at least 2016.  2.  No evidence of active Crohn's disease     CT Aortic Survey 10-28-24  IMPRESSION:  1.  No evidence for thoracic aortic dissection or aneurysm. No abdominal aortic aneurysm or dissection. No evidence for great vessel aortic arch hemodynamically significant stenosis. No hemodynamically significant stenosis involving the branch vessels of   the abdominal aorta including the pelvic arteries.  2.  6.1 x 4.6 x 7.4 cm lobulated partially calcified nonenhancing mass in the left upper quadrant adjacent to the spleen and the stomach. This has not changed significantly since the prior MRI. This is indeterminant but allowing for stability is likely benign.  3.  Moderate hiatal hernia.  4.  Otherwise, unremarkable.    MR enterography 8- Though not noted on this impression, a mass noted on following up evaluation and used as a comparison for imaging noted above.  IMPRESSION: Unremarkable MR enterography. No evidence for active  Crohn's inflammation.       Pathology:   Upper EUS  biopsy   Impressions/Post-Op  Diagnosis:       - 4.4 cm gastric submucosal mass. FNA done and pending. Suspicious for        GIST tumor       - Pancreatic steatosis.    Pathology 11-4-2024   [facility] FNA report  11-4-24  STOMACH, SUBMUCOSAL MASS, EUS-GUIDED FINE NEEDLE ASPIRATION:  1. Gastrointestinal stromal tumor (GIST), characterized by:  a. Histologic type: Spindle cell type  b. Necrosis: Present  2. See comment     The histomorphology and immunophenotype (see below) are consistent with gastrointestinal stromal  tumor (GIST). Risk of progressive disease* in GIST is estimated based on tumor size and mitotic  activity. Although minimal mitotic activity is identified in this generous needle biopsy (the  sample is greater than 5 mm ), histologic grading and determining the risk of recurrence is  deferred to the resection specimen, if performed. Provisionally, a 4.4 cm tumor with 0 mitoses/5  mm  is regarded as having very low risk of progressive disease*.     All slides were reviewed. The microscopic appearance substantiates the diagnosis. The biopsy  demonstrates a spindle cell neoplasm with several foci of necrosis and without mitotic figures;  the lesional cells have the following immunohistochemical labeling profile:  : Positive  DOG1: Positive  CD34: Positive  S100: Negative  SMA: Negative      Transferred Records on 11/21/2024   Component Date Value Ref Range Status    Creatinine (External) 11/21/2024 1.300  0.600 - 1.350 mg/dL Final    AST (External) 11/21/2024 24  10 - 40 U/L Final    ALT (External) 11/21/2024 32  9 - 46 IU/L Final     Uric Acid   Date Value Ref Range Status   08/31/2024 5.6 3.4 - 7.0 mg/dL Final       Current medications:  Current Outpatient Medications   Medication Instructions    acetaminophen (TYLENOL) 500 mg, 3 TIMES DAILY    atorvastatin (LIPITOR) 20 mg, Oral, DAILY    botulinum toxin type A (BOTOX) 100 units injection Once a month as directed    calcium carbonate-vitamin D (CALCIUM 600+D) 600-200 MG-UNIT TABS 2  TIMES DAILY    carvedilol (COREG) 50 mg, Oral, 2 TIMES DAILY WITH MEALS    DULoxetine (CYMBALTA) 30 MG capsule TAKE ONE CAPSULE BY MOUTH EVERY DAY. TAKE WITH 60 MG CAPSULE FOR A DAILY TOTAL OF 90MG    DULoxetine (CYMBALTA) 60 MG capsule TAKE 1 CAPSULE BY MOUTH ONCE DAILY WITH 30 MG CAPSULE FOR A TOTAL DAILY DOSE OF 90 MG    febuxostat (ULORIC) 40 mg, Oral, DAILY    folic acid (FOLVITE) 1,000 mcg, DAILY    gabapentin (NEURONTIN) 300 MG capsule Take 1 capsule(300 mg) every morning & 3 capsules(900 mg) at bedtime    inFLIXimab (REMICADE) in sodium chloride 0.9 % 250 mL via gravity infusion Infliximab infusions through [facility] every 8 weeks    inFLIXimab-abda (RENFLEXIS) 100 MG injection EVERY 8 WEEKS    LORazepam (ATIVAN) 0.5 MG tablet Take 1/2 to 1 tablet 1-2 hours prior to MRI and may repeat times one if needed before scan    methotrexate 2.5 MG tablet TAKE 3 TABLETS BY MOUTH EVERY WEEK.    multivitamin w/minerals (THERA-VIT-M) tablet 1 tablet, DAILY    neomycin-polymixin-dexamethasone (MAXITROL) ophthalmic ointment DAILY    Nurtec 75 mg, DAILY PRN    olmesartan (BENICAR) 40 mg, Oral, DAILY    pantoprazole (PROTONIX) 40 mg, Oral, 2 TIMES DAILY    tiZANidine (ZANAFLEX) 16 mg, AT BEDTIME    Vitamin D3 (CHOLECALCIFEROL) 25 mcg, Oral, 2 TIMES DAILY        Assessment & Plan     Mr. Bhatti is a 64-year-old man with PMHx of Crohn's disease s/p bowel resection in his 30s and on Remicade, hypertension, sleep apnea, HLD, hiatal hernia, and obesity who presents to the oncology clinic for evaluation of GIST tumor in his abdomen that was present at least a far back as 2016. He is evaluated for consideration of medical management and therapy for his intraabdominal GIST tumor.     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 On imaging, the tumor measures 4.4 cm and is abutting the stomach and spleen. I reviewed the images and the mass appears  to have been of similar size in August 2016, as seen on MR enterography.  Notably on most recent CT imaging from 10/28/2023, there are calcifications in the mass, indicating that mass is likely been present for a long period of time.  Appears that NGS has not yet been completed for biopsy sample.  On exam no notable abdominal tenderness, or discomfort noted. Patient is being considered for partial gastrectomy with hiatal hernia repair.  Given chronic and stable nature of abdominal mass, evidenced by calcifications on CT and comparisons to prior imaging, I do not feel this tumor warrants urgent treatment with medical therapy. Also given imaging and time course, patient's tumor may not be responsive therapies such as imatinib.  However given the size of 4.4 cm, tumor should eventually be removed.  Given the NGS has not been completed, ordered further testing of biopsy sample obtained on 11/4/2024.  This should be completed prior to starting any medical therapy.  Will plan on following up with patient based on results. Discussed plan with the patient who was agreeable to further workup prior to initiating any therapy.     Plan:   - Ordered NGS of tumor biopsy sample, obtained on 11/4/2024. Testing results will determine possible medical therapies  - Will follow up with patient once results back, expect 2-3 weeks for turnaround   - Continue to encourage follow up with surgery for further plans for partial gastrectomy w/ hiatal hernia repair    Jan Gruber MD ([place])  Medicine PGY-2   Pager # [phone]    Seen and staffed with Dr. Cavanaugh

## 2024-12-22 ENCOUNTER — HEALTH MAINTENANCE LETTER (OUTPATIENT)
Age: 64
End: 2024-12-22

## 2025-01-21 ENCOUNTER — TRANSFERRED RECORDS (OUTPATIENT)
Dept: HEALTH INFORMATION MANAGEMENT | Facility: CLINIC | Age: 65
End: 2025-01-21
Payer: MEDICARE

## 2025-02-04 NOTE — PROGRESS NOTES
2-5-25    I saw Fernando NavaBhatti for f/up of a GIST.    Background  In brief, he has a PMHx of Crohn's disease s/p bowel resection in his 30s and on Remicade, hypertension, sleep apnea, HLD, hiatal hernia, and obesity who was found to have a GIST thought to have been present since at least 2016.   During imaging to monitor his Crohn's disease, a mass was noted near his stomach and above his spleen.  Endoscopic biopsy showed a GIST.    He was told that this tumor may be closing off his hiatal hernia, and resecting tumor to help improve his hiatal hernia and associated symptoms of GERD.  He was recently evaluated by Dr. Villalba for potential partial gastrectomy to remove the tumor given his size of 4.4 cm.  He subsequently had a follow-up appointment to be evaluated for a hiatal hernia repair.  He was referred to the oncology clinic for consideration of medical therapies for his GIST and further evaluation.    -  Pathology:   Upper EUS  biopsy   Impressions/Post-Op Diagnosis:       - 4.4 cm gastric submucosal mass. FNA done. Suspicious for        GIST tumor       - Pancreatic steatosis.     Pathology 11-4-2024   [facility] FNA report  11-4-24  STOMACH, SUBMUCOSAL MASS, EUS-GUIDED FINE NEEDLE ASPIRATION:  1. Gastrointestinal stromal tumor (GIST), characterized by:  a. Histologic type: Spindle cell type  b. Necrosis: Present  2. See comment     The histomorphology and immunophenotype (see below) are consistent with gastrointestinal stromal  tumor (GIST). Risk of progressive disease* in GIST is estimated based on tumor size and mitotic  activity. Although minimal mitotic activity is identified in this generous needle biopsy (the  sample is greater than 5 mm ), histologic grading and determining the risk of recurrence is  deferred to the resection specimen, if performed. Provisionally, a 4.4 cm tumor with 0 mitoses/5  mm  is regarded as having very low risk of progressive disease*.   -      Interval history     His main  problem seems to be frequent bowel movements and is having 4-6 a day.  This is not a new issue.  He also has some issues with GERD.  He is set to see GI next week.  He is not clear on whether the frequent bowel movements relate mostly to Crohn's disease, which at 1 point he was told was inactive and he is on Remicade, or possibly due to the fact that he had a bowel resection in the past or something else.    He is also concerned about weight gain, for which he plans to follow-up with a weight clinic in 2025.      He had some hyperplastic polyps removed at colonoscopy 10-23-24 and will get another in 2 years. He does note that he continues to have 3-6 bowel movements per day, related to his Crohn's disease.     Despite this, he notes that he is able to regularly do yard work in his home, including lifting heavy objects.  He does not feel winded when walking up 2 flights of stairs.  Despite his fatigue, patient is able to walk at least 1 mile without resting.      -  [place] PMH, FH, SH, migraines, lumbar fusion, small bowel resection 1993, hip arthroplasty  PMH -Crohns, HBP, YORDY, HH, obesity, GIST  FH- colon cancer in his grandmother who passed away this in her early 70s.    SH never smoker, no alcohol/drug abuse.  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  He is retired and lives with his wife.  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     Relevant Imaging:  MRI enterography 10-3-24  IMPRESSION:  1.  7.5 cm exophytic mass off the gastric fundus, indeterminate,  possibly GIST or leiomyoma. Note this is stable since at least 2016.  2.  No evidence of active Crohn's disease     CT Aortic Survey 10-28-24  IMPRESSION:  1.  No evidence for thoracic aortic dissection or aneurysm. No abdominal aortic aneurysm or dissection. No evidence for great vessel aortic arch hemodynamically significant stenosis. No hemodynamically significant stenosis involving the branch vessels of   the abdominal aorta including the pelvic arteries.  2.  6.1 x 4.6 x 7.4 cm lobulated partially calcified nonenhancing mass in the left upper quadrant adjacent to the spleen and the stomach. This has not changed significantly since the prior MRI. This is indeterminant but allowing for stability is likely benign.  3.  Moderate hiatal hernia.  4.  Otherwise, unremarkable.     -  We discussed the situation.    His situation is complicated by the fact that he has a PMHx of Crohn's disease s/p bowel resection in his 30s and on Remicade, hypertension, sleep apnea, HLD, hiatal hernia, and obesity.    -GIST, 6.1 x 4.6 x 7.4 cm, spindle type with necrosis on biopsy    On imaging the mass appears to have been of similar size in August 2016, as seen on MR enterography.  Notably on most recent CT imaging from 10/28/2023, there are calcifications in the mass, indicating that mass is likely been present for a long period of time.      We had a long discussion about the pros and cons of trying some Gleevec.  Based on the mutation status it is likely to respond to Gleevec if it is an active tumor.  On the other hand the tumor has not really changed for 8 years and has calcification and we cannot be sure how much of it is actually alive at present.  We discussed that if we were going to use Gleevec we want to get a PET scan first.  We also discussed that a major side effect of  Gleevec is diarrhea and he would probably have more of a problem with that than he currently has.  Nausea is another common issue.  We discussed we would probably treat at least 3 months if we are getting a response and the goal-oriented shrink it preoperatively.    We also discussed limiting alcohol to 1 alcoholic drink a day and Tylenol to no more than 2000 mg a day on Gleevec.     He is being considered for partial gastrectomy with hiatal hernia repair.  Given chronic and stable nature of abdominal mass, evidenced by calcifications on CT and comparisons to prior imaging, I do not feel this tumor warrants urgent treatment. However given the large size, tumor should eventually be removed if the morbidity is acceptable.    It is likely a gleevec sensitive tumor.  However I suspect it is likely a low MI low risk lesion thus not warranting adjuvant treatment.    After some discussion we decided to have him touch base with GI which is already set for next week to get a better feeling of the cause of hisdiarrhea and what can be done about it and what the goals of the surgery would be and what the side effects of that would be.  At that point if he would like to proceed with Gleevec we will arrange PET/CT and then initiate treatment.    His wife accompanied him and all of their questions were addressed and they will call if others arise.    Rubin aCvanaugh M.D.  Professor  Hematology, Oncology and Transplantation

## 2025-02-05 ENCOUNTER — ONCOLOGY VISIT (OUTPATIENT)
Dept: ONCOLOGY | Facility: CLINIC | Age: 65
End: 2025-02-05
Attending: INTERNAL MEDICINE
Payer: MEDICARE

## 2025-02-05 VITALS
RESPIRATION RATE: 22 BRPM | WEIGHT: 263.5 LBS | BODY MASS INDEX: 39.03 KG/M2 | HEART RATE: 60 BPM | DIASTOLIC BLOOD PRESSURE: 95 MMHG | SYSTOLIC BLOOD PRESSURE: 145 MMHG | OXYGEN SATURATION: 99 % | TEMPERATURE: 98.3 F

## 2025-02-05 DIAGNOSIS — C49.A2: Primary | ICD-10-CM

## 2025-02-05 DIAGNOSIS — R19.7 DIARRHEA, UNSPECIFIED TYPE: ICD-10-CM

## 2025-02-05 DIAGNOSIS — K44.9 HIATAL HERNIA: ICD-10-CM

## 2025-02-05 DIAGNOSIS — K50.00 CROHN'S DISEASE OF SMALL INTESTINE WITHOUT COMPLICATION (H): ICD-10-CM

## 2025-02-05 DIAGNOSIS — E66.9 OBESITY, UNSPECIFIED CLASS, UNSPECIFIED OBESITY TYPE, UNSPECIFIED WHETHER SERIOUS COMORBIDITY PRESENT: ICD-10-CM

## 2025-02-05 DIAGNOSIS — K21.9 GASTROESOPHAGEAL REFLUX DISEASE WITHOUT ESOPHAGITIS: ICD-10-CM

## 2025-02-05 PROCEDURE — G0463 HOSPITAL OUTPT CLINIC VISIT: HCPCS | Performed by: INTERNAL MEDICINE

## 2025-02-05 PROCEDURE — 99214 OFFICE O/P EST MOD 30 MIN: CPT | Performed by: INTERNAL MEDICINE

## 2025-02-05 ASSESSMENT — PAIN SCALES - GENERAL: PAINLEVEL_OUTOF10: MODERATE PAIN (4)

## 2025-02-05 NOTE — NURSING NOTE
"Oncology Rooming Note    February 5, 2025 2:54 PM   Avi Bhatti is a 64 year old male who presents for:    Chief Complaint   Patient presents with    Oncology Clinic Visit     Malignant gastrointestinal stromal tumor (GIST) of stomach     Initial Vitals: BP (!) 145/95 (BP Location: Right arm, Patient Position: Sitting, Cuff Size: Adult Large)   Pulse 60   Temp 98.3  F (36.8  C) (Oral)   Resp 22   Wt 119.5 kg (263 lb 8 oz)   SpO2 99%   BMI 39.03 kg/m   Estimated body mass index is 39.03 kg/m  as calculated from the following:    Height as of 12/11/24: 1.75 m (5' 8.9\").    Weight as of this encounter: 119.5 kg (263 lb 8 oz). Body surface area is 2.41 meters squared.  Moderate Pain (4) Comment: Data Unavailable   No LMP for male patient.  Allergies reviewed: Yes  Medications reviewed: Yes    Medications: Medication refills not needed today.  Pharmacy name entered into [employer]:    [id number] [place] [address] [facility] [place] [address] [id number] - [place] - [address]    Frailty Screening:   Is the patient here for a new oncology consult visit in cancer care? 2. No      Clinical concerns: Patient states that he is still experiencing the symptoms from last summer (diarrhea, cramps, and gas). They thought it was Crohn's disease, but the symptoms have not gone away.       Manuel Frank, EMT            "

## 2025-02-05 NOTE — LETTER
2/5/2025      Avi Bhatti  [address] [facility] [address]      Dear Colleague,    Thank you for referring your patient, Avi Bhatti, to the [facility]. Please see a copy of my visit note below.          2-5-25    I saw Fernando Bhatti for f/up of a GIST.    Background  In brief, he has a PMHx of Crohn's disease s/p bowel resection in his 30s and on Remicade, hypertension, sleep apnea, HLD, hiatal hernia, and obesity who was found to have a GIST thought to have been present since at least 2016.   During imaging to monitor his Crohn's disease, a mass was noted near his stomach and above his spleen.  Endoscopic biopsy showed a GIST.    He was told that this tumor may be closing off his hiatal hernia, and resecting tumor to help improve his hiatal hernia and associated symptoms of GERD.  He was recently evaluated by Dr. Villalba for potential partial gastrectomy to remove the tumor given his size of 4.4 cm.  He subsequently had a follow-up appointment to be evaluated for a hiatal hernia repair.  He was referred to the oncology clinic for consideration of medical therapies for his GIST and further evaluation.    -  Pathology:   Upper EUS  biopsy   Impressions/Post-Op Diagnosis:       - 4.4 cm gastric submucosal mass. FNA done. Suspicious for        GIST tumor       - Pancreatic steatosis.     Pathology 11-4-2024   [facility] FNA report  11-4-24  STOMACH, SUBMUCOSAL MASS, EUS-GUIDED FINE NEEDLE ASPIRATION:  1. Gastrointestinal stromal tumor (GIST), characterized by:  a. Histologic type: Spindle cell type  b. Necrosis: Present  2. See comment     The histomorphology and immunophenotype (see below) are consistent with gastrointestinal stromal  tumor (GIST). Risk of progressive disease* in GIST is estimated based on tumor size and mitotic  activity. Although minimal mitotic activity is identified in this generous needle biopsy (the  sample is greater than 5 mm ), histologic  grading and determining the risk of recurrence is  deferred to the resection specimen, if performed. Provisionally, a 4.4 cm tumor with 0 mitoses/5  mm  is regarded as having very low risk of progressive disease*.   -      Interval history     His main problem seems to be frequent bowel movements and is having 4-6 a day.  This is not a new issue.  He also has some issues with GERD.  He is set to see GI next week.  He is not clear on whether the frequent bowel movements relate mostly to Crohn's disease, which at 1 point he was told was inactive and he is on Remicade, or possibly due to the fact that he had a bowel resection in the past or something else.    He is also concerned about weight gain, for which he plans to follow-up with a weight clinic in 2025.      He had some hyperplastic polyps removed at colonoscopy 10-23-24 and will get another in 2 years. He does note that he continues to have 3-6 bowel movements per day, related to his Crohn's disease.     Despite this, he notes that he is able to regularly do yard work in his home, including lifting heavy objects.  He does not feel winded when walking up 2 flights of stairs.  Despite his fatigue, patient is able to walk at least 1 mile without resting.      -  [facility] PMH, FH, SH, migraines, lumbar fusion, small bowel resection 1993, hip arthroplasty  PMH -Crohns, HBP, YORDY, HH, obesity, GIST  FH- colon cancer in his grandmother who passed away this in her early 70s.    SH never smoker, no alcohol/drug abuse.  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  He is retired and lives with his wife.  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     Relevant Imaging:  MRI enterography 10-3-24  IMPRESSION:  1.  7.5 cm exophytic mass off the gastric fundus, indeterminate,  possibly GIST or leiomyoma. Note this is stable since at least 2016.  2.  No evidence of active Crohn's disease     CT Aortic Survey 10-28-24  IMPRESSION:  1.  No evidence for thoracic aortic dissection or aneurysm. No abdominal aortic aneurysm or dissection. No evidence for great vessel aortic arch hemodynamically significant stenosis. No hemodynamically significant stenosis involving the branch vessels of   the abdominal aorta including the pelvic arteries.  2.  6.1 x 4.6 x 7.4 cm lobulated partially calcified nonenhancing mass in the left upper quadrant adjacent to the spleen and the stomach. This has not changed significantly since the prior MRI. This is indeterminant but allowing for stability is likely benign.  3.  Moderate hiatal hernia.  4.  Otherwise, unremarkable.     -  We discussed the situation.    His situation is complicated by the fact that he has a PMHx of Crohn's disease s/p bowel resection in his 30s and on Remicade, hypertension, sleep apnea, HLD, hiatal hernia, and obesity.    -GIST, 6.1 x 4.6 x 7.4 cm, spindle type with necrosis on biopsy    On imaging the mass appears to have been of similar size in August 2016, as seen on MR enterography.  Notably on most recent CT imaging from 10/28/2023, there are calcifications in the mass, indicating that mass is likely been present for a long period of time.      We had a long discussion about the pros and cons of trying some Gleevec.  Based on the mutation status it is likely to respond to Gleevec if it is an active tumor.  On the other hand the tumor  has not really changed for 8 years and has calcification and we cannot be sure how much of it is actually alive at present.  We discussed that if we were going to use Gleevec we want to get a PET scan first.  We also discussed that a major side effect of Gleevec is diarrhea and he would probably have more of a problem with that than he currently has.  Nausea is another common issue.  We discussed we would probably treat at least 3 months if we are getting a response and the goal-oriented shrink it preoperatively.    We also discussed limiting alcohol to 1 alcoholic drink a day and Tylenol to no more than 2000 mg a day on Gleevec.     He is being considered for partial gastrectomy with hiatal hernia repair.  Given chronic and stable nature of abdominal mass, evidenced by calcifications on CT and comparisons to prior imaging, I do not feel this tumor warrants urgent treatment. However given the large size, tumor should eventually be removed if the morbidity is acceptable.    It is likely a gleevec sensitive tumor.  However I suspect it is likely a low MI low risk lesion thus not warranting adjuvant treatment.    After some discussion we decided to have him touch base with GI which is already set for next week to get a better feeling of the cause of hisdiarrhea and what can be done about it and what the goals of the surgery would be and what the side effects of that would be.  At that point if he would like to proceed with Gleevec we will arrange PET/CT and then initiate treatment.    His wife accompanied him and all of their questions were addressed and they will call if others arise.    Rubin Cavanaugh M.D.  Professor  Hematology, Oncology and Transplantation      Again, thank you for allowing me to participate in the care of your patient.        Sincerely,        Rubin Cavanaugh MD    Electronically signed

## 2025-02-27 ENCOUNTER — TELEPHONE (OUTPATIENT)
Dept: CARDIOLOGY | Facility: CLINIC | Age: 65
End: 2025-02-27
Payer: MEDICARE

## 2025-02-27 DIAGNOSIS — I10 HYPERTENSION, UNSPECIFIED TYPE: ICD-10-CM

## 2025-02-27 RX ORDER — OLMESARTAN MEDOXOMIL 40 MG/1
40 TABLET ORAL DAILY
Qty: 90 TABLET | Refills: 0 | Status: SHIPPED | OUTPATIENT
Start: 2025-02-27

## 2025-03-03 ENCOUNTER — PATIENT OUTREACH (OUTPATIENT)
Dept: ONCOLOGY | Facility: CLINIC | Age: 65
End: 2025-03-03
Payer: MEDICARE

## 2025-03-04 NOTE — PROGRESS NOTES
[facility]: Cancer Care                                                                                          Called Fernando to see if he has decided if he would like to start Gleevec.  Per - We discussed that if we were going to use Gleevec we want to get a PET scan first. We also discussed that a major side effect of Gleevec is diarrhea and he would probably have more of a problem with that than he currently has.  Fernando is opting to go with the weight management route at this time and not to initiate Gleevec.  Care teams have been notified.  All of his questions have been answered.    Zack Bravo RN

## 2025-03-05 ENCOUNTER — TRANSFERRED RECORDS (OUTPATIENT)
Dept: HEALTH INFORMATION MANAGEMENT | Facility: CLINIC | Age: 65
End: 2025-03-05
Payer: MEDICARE

## 2025-03-05 LAB
ALT SERPL-CCNC: 27 IU/L (ref 0–44)
AST SERPL-CCNC: 24 IU/L (ref 0–40)

## 2025-03-12 ENCOUNTER — OFFICE VISIT (OUTPATIENT)
Dept: PEDIATRICS | Facility: CLINIC | Age: 65
End: 2025-03-12
Payer: MEDICARE

## 2025-03-12 VITALS
BODY MASS INDEX: 35.18 KG/M2 | DIASTOLIC BLOOD PRESSURE: 50 MMHG | HEIGHT: 69 IN | OXYGEN SATURATION: 97 % | HEART RATE: 60 BPM | RESPIRATION RATE: 16 BRPM | SYSTOLIC BLOOD PRESSURE: 101 MMHG | TEMPERATURE: 97.5 F | WEIGHT: 237.5 LBS

## 2025-03-12 DIAGNOSIS — K50.00 CROHN'S DISEASE OF SMALL INTESTINE WITHOUT COMPLICATION (H): ICD-10-CM

## 2025-03-12 DIAGNOSIS — G47.33 SLEEP APNEA, OBSTRUCTIVE: ICD-10-CM

## 2025-03-12 DIAGNOSIS — E78.2 MIXED HYPERLIPIDEMIA: ICD-10-CM

## 2025-03-12 DIAGNOSIS — N18.2 CKD (CHRONIC KIDNEY DISEASE) STAGE 2, GFR 60-89 ML/MIN: ICD-10-CM

## 2025-03-12 DIAGNOSIS — C49.A2 MALIGNANT GASTROINTESTINAL STROMAL TUMOR (GIST) OF STOMACH (H): ICD-10-CM

## 2025-03-12 DIAGNOSIS — M1A.9XX0 CHRONIC GOUT WITHOUT TOPHUS, UNSPECIFIED CAUSE, UNSPECIFIED SITE: ICD-10-CM

## 2025-03-12 DIAGNOSIS — M45.9 ANKYLOSING SPONDYLITIS, UNSPECIFIED SITE OF SPINE (H): ICD-10-CM

## 2025-03-12 DIAGNOSIS — L71.9 ROSACEA: ICD-10-CM

## 2025-03-12 DIAGNOSIS — I10 ESSENTIAL HYPERTENSION, BENIGN: ICD-10-CM

## 2025-03-12 DIAGNOSIS — Z12.5 SCREENING FOR PROSTATE CANCER: ICD-10-CM

## 2025-03-12 DIAGNOSIS — D12.6 ADENOMATOUS POLYP OF COLON, UNSPECIFIED PART OF COLON: ICD-10-CM

## 2025-03-12 DIAGNOSIS — F33.41 RECURRENT MAJOR DEPRESSIVE DISORDER, IN PARTIAL REMISSION: ICD-10-CM

## 2025-03-12 DIAGNOSIS — E66.01 MORBID (SEVERE) OBESITY DUE TO EXCESS CALORIES (H): ICD-10-CM

## 2025-03-12 DIAGNOSIS — G43.019 INTRACTABLE MIGRAINE WITHOUT AURA AND WITHOUT STATUS MIGRAINOSUS: ICD-10-CM

## 2025-03-12 DIAGNOSIS — Z00.00 ENCOUNTER FOR MEDICARE ANNUAL WELLNESS EXAM: Primary | ICD-10-CM

## 2025-03-12 PROBLEM — Z96.649 S/P TOTAL HIP ARTHROPLASTY: Status: RESOLVED | Noted: 2019-11-19 | Resolved: 2025-03-12

## 2025-03-12 PROBLEM — Z98.1 S/P LUMBAR FUSION: Status: RESOLVED | Noted: 2021-02-02 | Resolved: 2025-03-12

## 2025-03-12 PROCEDURE — 36415 COLL VENOUS BLD VENIPUNCTURE: CPT | Performed by: INTERNAL MEDICINE

## 2025-03-12 RX ORDER — BUPROPION HYDROCHLORIDE 150 MG/1
150 TABLET ORAL EVERY MORNING
Qty: 30 TABLET | Refills: 1 | Status: SHIPPED | OUTPATIENT
Start: 2025-03-12

## 2025-03-12 RX ORDER — OLMESARTAN MEDOXOMIL 40 MG/1
20 TABLET ORAL DAILY
COMMUNITY
Start: 2025-03-12

## 2025-03-12 RX ORDER — AZELAIC ACID 0.15 G/G
GEL TOPICAL 2 TIMES DAILY
Qty: 50 G | Refills: 2 | Status: SHIPPED | OUTPATIENT
Start: 2025-03-12

## 2025-03-12 SDOH — HEALTH STABILITY: PHYSICAL HEALTH: ON AVERAGE, HOW MANY DAYS PER WEEK DO YOU ENGAGE IN MODERATE TO STRENUOUS EXERCISE (LIKE A BRISK WALK)?: 0 DAYS

## 2025-03-12 ASSESSMENT — SOCIAL DETERMINANTS OF HEALTH (SDOH): HOW OFTEN DO YOU GET TOGETHER WITH FRIENDS OR RELATIVES?: TWICE A WEEK

## 2025-03-12 ASSESSMENT — PAIN SCALES - GENERAL: PAINLEVEL_OUTOF10: NO PAIN (0)

## 2025-03-12 NOTE — PROGRESS NOTES
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  Being considered for partial gastrectomy with hiatal hernia repair.  Recently met with [facility] oncology/Gleevec regimen was discussed but oncology did not feel it was warranted.    (K50.00) Crohn's disease of small intestine without complication (H)  Comment:   Plan: Stable on infliximab.  Following up with [facility]    (M45.9) Ankylosing spondylitis, unspecified site of spine (H)  Comment:   Plan: Managed by rheumatology-continue methotrexate.    (I10) Essential hypertension, benign  Comment:   Plan: olmesartan (BENICAR) 40 MG tablet        Patient describes lower home blood pressures with orthostatic symptoms recently.  Blood pressure today borderline recommended reducing olmesartan from 80 to 40 mg daily and patient will forward home blood pressure readings in the next 2 weeks.    (E78.2) Mixed hyperlipidemia  Comment:   Lab Results   Component Value Date    LDL 28 11/09/2023     04/20/2021    Plan: Lipid panel reflex to direct LDL Non-fasting  Due for lab work continue atorvastatin.          (G47.33) Sleep apnea, obstructive  Comment:   Plan: Continue CPAP    (D12.6) Adenomatous polyp of colon, unspecified part of colon  Comment:   Plan: colonoscopy 2024    (E66.01) obesity, severe  Comment:   Plan: History obesity contributing to hypertension, hyperlipidemia and sleep apnea.    (M1A.9XX0) Chronic gout without tophus, unspecified cause, unspecified site  Comment:   Plan: Stable, no recent flare continue febuxostat    (G43.019) Intractable migraine without aura and without status migrainosus  Comment:   Plan: History of chronic  "headaches/chronic migraine.  Managed by neurology.  Regimen: Duloxetine, gabapentin with Nurtec as needed    (F33.41) Recurrent major depressive disorder, in partial remission  Comment:   Plan: buPROPion (WELLBUTRIN XL) 150 MG 24 hr tablet        Concerns today regarding worsening depression.  Current regimen: Cymbalta 90 mg daily.  Dose had been increased from 60 mg-patient states he has not noted any significant difference in his depression with the additional 30 mg.  Recommended the following: Reduce duloxetine to 60 mg daily, add bupropion 150 mg as adjunct.  Patient will return for depression follow-up in 4 weeks.    (L71.9) Rosacea  Comment:   Plan: azelaic acid (FINACIA) 15 % external gel        Recurrent rosacea start trial of Finacea gel    (Z12.5) Screening for prostate cancer  Comment:   Plan: PSA, screen                    BMI  Estimated body mass index is 34.82 kg/m  as calculated from the following:    Height as of this encounter: 1.759 m (5' 9.25\").    Weight as of this encounter: 107.7 kg (237 lb 8 oz).       Counseling  Appropriate preventive services were addressed with this patient via screening, questionnaire, or discussion as appropriate for fall prevention, nutrition, physical activity, Tobacco-use cessation, social engagement, weight loss and cognition.  Checklist reviewing preventive services available has been given to the patient.  Reviewed patient's diet, addressing concerns and/or questions.   The patient was instructed to see the dentist every 6 months.   Discussed possible causes of fatigue. The patient was provided with written information regarding signs of hearing loss.           Subjective   Fernando is a 65 year old, presenting for the following:  Physical        3/12/2025    10:50 AM   Additional Questions   Roomed by Nicole Stout   Accompanied by BRIONNA BRADLEY           Advance Care Planning  Patient does not have a Health Care Directive: reviewed      3/12/2025   General Health "   How would you rate your overall physical health? (!) FAIR   Feel stress (tense, anxious, or unable to sleep) Only a little   (!) STRESS CONCERN      3/12/2025   Nutrition   Diet: I don't know         3/12/2025   Exercise   Days per week of moderate/strenous exercise 0 days   (!) EXERCISE CONCERN      3/12/2025   Social Factors   Frequency of gathering with friends or relatives Twice a week   Worry food won't last until get money to buy more No   Food not last or not have enough money for food? No   Do you have housing? (Housing is defined as stable permanent housing and does not include staying ouside in a car, in a tent, in an abandoned building, in an overnight shelter, or couch-surfing.) Yes   Are you worried about losing your housing? No   Lack of transportation? No   Unable to get utilities (heat,electricity)? No         3/12/2025   Fall Risk   Fallen 2 or more times in the past year? No   Trouble with walking or balance? No          3/12/2025   Activities of Daily Living- Home Safety   Needs help with the following daily activites None of the above   Safety concerns in the home None of the above         3/12/2025   Dental   Dentist two times every year? (!) NO         3/12/2025   Hearing Screening   Hearing concerns? (!) I FEEL THAT PEOPLE ARE MUMBLING OR NOT SPEAKING CLEARLY.    (!) I NEED TO ASK PEOPLE TO SPEAK UP OR REPEAT THEMSELVES.    (!) IT'S HARDER TO UNDERSTAND WOMEN'S VOICES THAN MEN'S VOICES.    (!) IT'S HARD TO FOLLOW A CONVERSATION IN A NOISY RESTAURANT OR CROWDED ROOM.    (!) TROUBLE UNDESTANDING A SPEAKER IN A PUBLIC MEETING OR Confucianism SERVICE.    (!) TROUBLE UNDERSTANDING SOFT OR WHISPERED SPEECH.       Multiple values from one day are sorted in reverse-chronological order         3/12/2025   Driving Risk Screening   Patient/family members have concerns about driving No         3/12/2025   General Alertness/Fatigue Screening   Have you been more tired than usual lately? (!) YES          3/12/2025   Urinary Incontinence Screening   Bothered by leaking urine in past 6 months No           Today's PHQ-2 Score:       3/12/2025    10:47 AM   PHQ-2 ( 1999 [employer])   Q1: Little interest or pleasure in doing things 1   Q2: Feeling down, depressed or hopeless 1   PHQ-2 Score 2    Q1: Little interest or pleasure in doing things Several days   Q2: Feeling down, depressed or hopeless Several days   PHQ-2 Score 2       Patient-reported           3/12/2025   Substance Use   Alcohol more than 3/day or more than 7/wk No   Do you have a current opioid prescription? No   How severe/bad is pain from 1 to 10? 0/10 (No Pain)   Do you use any other substances recreationally? No     Social History     Tobacco Use    Smoking status: Never    Smokeless tobacco: Never   Vaping Use    Vaping status: Never Used   Substance Use Topics    Alcohol use: Yes     Comment: seldom    Drug use: No           3/12/2025   AAA Screening   Family history of Abdominal Aortic Aneurysm (AAA)? No   Last PSA:   PSA   Date Value Ref Range Status   05/17/2018 0.88 0 - 4 ug/L Final     Comment:     Assay Method:  Chemiluminescence using Siemens Vista analyzer     Prostate Specific Antigen Screen   Date Value Ref Range Status   10/02/2023 0.89 0.00 - 4.50 ng/mL Final   08/17/2022 1.26 0.00 - 4.00 ug/L Final     ASCVD Risk   The 10-year ASCVD risk score (Eulalia NOEL, et al., 2019) is: 8.5%    Values used to calculate the score:      Age: 65 years      Sex: Male      Is Non- : No      Diabetic: No      Tobacco smoker: No      Systolic Blood Pressure: 101 mmHg      Is BP treated: Yes      HDL Cholesterol: 47 mg/dL      Total Cholesterol: 134 mg/dL            Reviewed and updated as needed this visit by Provider                    Patient Active Problem List   Diagnosis    Sleep apnea, obstructive    Ankylosing spondylitis (H)    Mixed hyperlipidemia    Morbid (severe) obesity due to excess calories (H)    Essential  hypertension, benign    Crohn's disease (H)    Adenomatous colon polyp    Cervicalgia    Intractable migraine without aura and without status migrainosus    Degenerative disc disease, cervical    CKD (chronic kidney disease) stage 2, GFR 60-89 ml/min    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)    Hiatal hernia    Chronic gout without tophus, unspecified cause, unspecified site     Past Surgical History:   Procedure Laterality Date    ARTHROPLASTY HIP Left 11/19/2019    Procedure: Left total hip arthroplasty;  Surgeon: Allen Coats MD;  Location: [facility] OR    BOTOX CHRONIC MIGRAINE HEAD ACHES      for chronic headache and neck pain, injections every 12 weeks    COLONOSCOPY      EXTRACTION(S) DENTAL  12/18/2012    Procedure: EXTRACTION(S) DENTAL;  Extraction of Teeth 30, 19;  Surgeon: Sujey Cunningham DDS;  Location: [facility] OR    OPTICAL TRACKING SYSTEM FUSION POSTERIOR SPINE LUMBAR N/A 2/2/2021    Procedure: Lumbar 3-4 posterior lumbar instrumented fusion with interbody cage;  Surgeon: Jakub Velasquez MD;  Location: [facility] OR    repair fracture & insert pins left hand  1996    SMALL BOWEL RESECTION  1993       Social History     Tobacco Use    Smoking status: Never    Smokeless tobacco: Never   Substance Use Topics    Alcohol use: Yes     Comment: seldom     Family History   Problem Relation Age of Onset    Hypertension Father     Alcoholism Father     Other - See Comments Father     Coronary Artery Disease Father     Skin Cancer Father     Kidney failure Father          Current Outpatient Medications   Medication Sig Dispense Refill    acetaminophen (TYLENOL) 500 MG tablet Take 500 mg by mouth 2 times daily.      atorvastatin (LIPITOR) 20 MG tablet Take 1 tablet (20 mg) by mouth daily. 90 tablet 11    azelaic acid (FINACIA) 15 % external gel Apply topically 2 times daily. 50 g 2    botulinum toxin type A (BOTOX) 100 units injection once every twelve weeks.      buPROPion (WELLBUTRIN XL) 150 MG 24  hr tablet Take 1 tablet (150 mg) by mouth every morning. 30 tablet 1    calcium carbonate-vitamin D (CALCIUM 600+D) 600-200 MG-UNIT TABS Take by mouth 2 times daily       carvedilol (COREG) 25 MG tablet Take 2 tablets (50 mg) by mouth 2 times daily (with meals). 120 tablet 5    DULoxetine (CYMBALTA) 60 MG capsule TAKE 1 CAPSULE BY MOUTH ONCE DAILY WITH 30 MG CAPSULE FOR A TOTAL DAILY DOSE OF 90 MG 90 capsule 3    febuxostat (ULORIC) 40 MG TABS tablet TAKE ONE TABLET BY MOUTH EVERY DAY 90 tablet 1    folic acid (FOLVITE) 1 MG tablet Take 1,000 mcg by mouth daily.      gabapentin (NEURONTIN) 300 MG capsule Take 1 capsule(300 mg) every morning & 3 capsules(900 mg) at bedtime 360 capsule 1    inFLIXimab (REMICADE) in sodium chloride 0.9 % 250 mL via gravity infusion Infliximab infusions through [facility] every 8 weeks      inFLIXimab-abda (RENFLEXIS) 100 MG injection Inject into the vein once every eight weeks.      LORazepam (ATIVAN) 0.5 MG tablet Take 1/2 to 1 tablet 1-2 hours prior to MRI and may repeat times one if needed before scan 2 tablet 0    methotrexate 2.5 MG tablet TAKE 3 TABLETS BY MOUTH EVERY WEEK.      multivitamin w/minerals (THERA-VIT-M) tablet Take 1 tablet by mouth daily.      neomycin-polymixin-dexamethasone (MAXITROL) ophthalmic ointment Place into both eyes as needed. Pea sized amount      olmesartan (BENICAR) 40 MG tablet Take 0.5 tablets (20 mg) by mouth daily.      pantoprazole (PROTONIX) 40 MG EC tablet Take 1 tablet (40 mg) by mouth 2 times daily. 180 tablet 0    rimegepant (NURTEC) 75 MG ODT tablet Place 75 mg under the tongue daily as needed for migraine      tiZANidine (ZANAFLEX) 4 MG tablet Take 16 mg by mouth at bedtime      vitamin B-Complex Take 1 tablet by mouth daily.      Vitamin D3 (CHOLECALCIFEROL) 25 mcg (1000 units) tablet Take 1 tablet (25 mcg) by mouth 2 times daily 180 tablet 0     Current providers sharing in care for this patient include:  Patient Care Team:  Joby Billingsley  "MD Carlos Alberto as PCP - General (Internal Medicine - Pediatrics)  Reg David MD as Assigned Heart and Vascular Provider  Simone Vo DPM as Assigned Musculoskeletal Provider  Jakub Villalba MD as MD (Surgery)  Charity Ochoa MD as MD (Gastroenterology)  Rubin Cavanaugh MD as MD (Medical Oncology)  Jakub Moreno MD as MD (Surgery)  Joby Billingsley MD as Assigned PCP  Jakub Moreno MD as Assigned Surgical Provider  Rubin Cavanaugh MD as Assigned Cancer Care Provider    The following health maintenance items are reviewed in Epic and correct as of today:  Health Maintenance   Topic Date Due    ANNUAL REVIEW OF HM ORDERS  04/20/2022    COVID-19 Vaccine (6 - 2024-25 season) 09/01/2024    MEDICARE ANNUAL WELLNESS VISIT  10/02/2024    MICROALBUMIN  10/02/2024    LIPID  11/09/2024    BMP  12/27/2024    FALL RISK ASSESSMENT  03/12/2026    COLORECTAL CANCER SCREENING  10/23/2026    GLUCOSE  09/11/2027    DTAP/TDAP/TD IMMUNIZATION (3 - Td or Tdap) 05/17/2028    ADVANCE CARE PLANNING  10/04/2028    HEPATITIS C SCREENING  Completed    MIGRAINE ACTION PLAN  Completed    PHQ-2 (once per calendar year)  Completed    INFLUENZA VACCINE  Completed    Pneumococcal Vaccine: 50+ Years  Completed    URINALYSIS  Completed    ZOSTER IMMUNIZATION  Completed    RSV VACCINE  Completed    HPV IMMUNIZATION  Aged Out    MENINGITIS IMMUNIZATION  Aged Out         Review of Systems  Constitutional, neuro, ENT, endocrine, pulmonary, cardiac, gastrointestinal, genitourinary, musculoskeletal, integument and psychiatric systems are negative, except as otherwise noted.     Objective    Exam  /50 (BP Location: Right arm, Patient Position: Sitting, Cuff Size: Adult Regular)   Pulse 60   Temp 97.5  F (36.4  C) (Temporal)   Resp 16   Ht 1.759 m (5' 9.25\")   Wt 107.7 kg (237 lb 8 oz)   SpO2 97%   BMI 34.82 kg/m     Estimated body mass index is 34.82 kg/m  as calculated from the " "following:    Height as of this encounter: 1.759 m (5' 9.25\").    Weight as of this encounter: 107.7 kg (237 lb 8 oz).    Physical Exam  GENERAL: alert and no distress  EYES: Eyes grossly normal to inspection, PERRL and conjunctivae and sclerae normal  HENT: ear canals and TM's normal, nose and mouth without ulcers or lesions  NECK: no adenopathy, no asymmetry, masses, or scars  RESP: lungs clear to auscultation - no rales, rhonchi or wheezes  CV: regular rate and rhythm, normal S1 S2, no S3 or S4, no murmur, click or rub, no peripheral edema  ABDOMEN: soft, nontender, no hepatosplenomegaly, no masses and bowel sounds normal  MS: no gross musculoskeletal defects noted, no edema  SKIN: Rosacea present forehead cheeks and bridge of nose  NEURO: Normal strength and tone, mentation intact and speech normal  PSYCH: mentation appears normal, affect normal/bright        3/12/2025   Mini Cog   Mini-Cog Not Completed (choose reason) Patient declines   Clock Draw Score 2 Normal   3 Item Recall 1 object recalled   Mini Cog Total Score 3             Vision Screen  Reason Vision Screen Not Completed: Screening Recommend: Patient/Guardian Declined      Signed Electronically by: Joby Billingsley MD    "

## 2025-03-12 NOTE — PATIENT INSTRUCTIONS
Depression:     Reduce duloxetine from 90 to 60mg daily     Start bupropion 150mg daily     Follow-up 4 weeks    Blood pressure     Reduce olmesartan from 40 to 20mg daily (take half tablet)     Start checking home blood pressures a few times per week  Preventive Care Advice   This is general advice given by our system to help you stay healthy. However, your care team may have specific advice just for you. Please talk to your care team about your preventive care needs.  Nutrition  Eat 5 or more servings of fruits and vegetables each day.  Try wheat bread, brown rice and whole grain pasta (instead of white bread, rice, and pasta).  Get enough calcium and vitamin D. Check the label on foods and aim for 100% of the RDA (recommended daily allowance).  Lifestyle  Exercise at least 150 minutes each week  (30 minutes a day, 5 days a week).  Do muscle strengthening activities 2 days a week. These help control your weight and prevent disease.  No smoking.  Wear sunscreen to prevent skin cancer.  Have a dental exam and cleaning every 6 months.  Yearly exams  See your health care team every year to talk about:  Any changes in your health.  Any medicines your care team has prescribed.  Preventive care, family planning, and ways to prevent chronic diseases.  Shots (vaccines)   HPV shots (up to age 26), if you've never had them before.  Hepatitis B shots (up to age 59), if you've never had them before.  COVID-19 shot: Get this shot when it's due.  Flu shot: Get a flu shot every year.  Tetanus shot: Get a tetanus shot every 10 years.  Pneumococcal, hepatitis A, and RSV shots: Ask your care team if you need these based on your risk.  Shingles shot (for age 50 and up)  General health tests  Diabetes screening:  Starting at age 35, Get screened for diabetes at least every 3 years.  If you are younger than age 35, ask your care team if you should be screened for diabetes.  Cholesterol test: At age 39, start having a cholesterol test  every 5 years, or more often if advised.  Bone density scan (DEXA): At age 50, ask your care team if you should have this scan for osteoporosis (brittle bones).  Hepatitis C: Get tested at least once in your life.  STIs (sexually transmitted infections)  Before age 24: Ask your care team if you should be screened for STIs.  After age 24: Get screened for STIs if you're at risk. You are at risk for STIs (including HIV) if:  You are sexually active with more than one person.  You don't use condoms every time.  You or a partner was diagnosed with a sexually transmitted infection.  If you are at risk for HIV, ask about PrEP medicine to prevent HIV.  Get tested for HIV at least once in your life, whether you are at risk for HIV or not.  Cancer screening tests  Cervical cancer screening: If you have a cervix, begin getting regular cervical cancer screening tests starting at age 21.  Breast cancer scan (mammogram): If you've ever had breasts, begin having regular mammograms starting at age 40. This is a scan to check for breast cancer.  Colon cancer screening: It is important to start screening for colon cancer at age 45.  Have a colonoscopy test every 10 years (or more often if you're at risk) Or, ask your provider about stool tests like a FIT test every year or Cologuard test every 3 years.  To learn more about your testing options, visit:   .  For help making a decision, visit:   https://bit.ly/rl32780.  Prostate cancer screening test: If you have a prostate, ask your care team if a prostate cancer screening test (PSA) at age 55 is right for you.  Lung cancer screening: If you are a current or former smoker ages 50 to 80, ask your care team if ongoing lung cancer screenings are right for you.  For informational purposes only. Not to replace the advice of your health care provider. Copyright   2023 [employer]. All rights reserved. Clinically reviewed by the [facility]. [employer]  880693 - REV 01/24.  Learning About Sleeping Well  What does sleeping well mean?     Sleeping well means getting enough sleep to feel good and stay healthy. How much sleep is enough varies among people.  The number of hours you sleep and how you feel when you wake up are both important. If you do not feel refreshed, you probably need more sleep. Another sign of not getting enough sleep is feeling tired during the day.  Experts recommend that adults get at least 7 or more hours of sleep per day. Children and older adults need more sleep.  Why is getting enough sleep important?  Getting enough quality sleep is a basic part of good health. When your sleep suffers, your physical health, mood, and your thoughts can suffer too. You may find yourself feeling more grumpy or stressed. Not getting enough sleep also can lead to serious problems, including injury, accidents, anxiety, and depression.  What might cause poor sleeping?  Many things can cause sleep problems, including:  Changes to your sleep schedule.  Stress. Stress can be caused by fear about a single event, such as giving a speech. Or you may have ongoing stress, such as worry about work or school.  Depression, anxiety, and other mental or emotional conditions.  Changes in your sleep habits or surroundings. This includes changes that happen where you sleep, such as noise, light, or sleeping in a different bed. It also includes changes in your sleep pattern, such as having jet lag or working a late shift.  Health problems, such as pain, breathing problems, and restless legs syndrome.  Lack of regular exercise.  Using alcohol, nicotine, or caffeine before bed.  How can you help yourself?  Here are some tips that may help you sleep more soundly and wake up feeling more refreshed.  Your sleeping area   Use your bedroom only for sleeping and sex. A bit of light reading may help you fall asleep. But if it doesn't, do your reading elsewhere in the house. Try not to use  "your TV, computer, smartphone, or tablet while you are in bed.  Be sure your bed is big enough to stretch out comfortably, especially if you have a sleep partner.  Keep your bedroom quiet, dark, and cool. Use curtains, blinds, or a sleep mask to block out light. To block out noise, use earplugs, soothing music, or a \"white noise\" machine.  Your evening and bedtime routine   Create a relaxing bedtime routine. You might want to take a warm shower or bath, or listen to soothing music.  Go to bed at the same time every night. And get up at the same time every morning, even if you feel tired.  What to avoid   Limit caffeine (coffee, tea, caffeinated sodas) during the day, and don't have any for at least 6 hours before bedtime.  Avoid drinking alcohol before bedtime. Alcohol can cause you to wake up more often during the night.  Try not to smoke or use tobacco, especially in the evening. Nicotine can keep you awake.  Limit naps during the day, especially close to bedtime.  Avoid lying in bed awake for too long. If you can't fall asleep or if you wake up in the middle of the night and can't get back to sleep within about 20 minutes, get out of bed and go to another room until you feel sleepy.  Avoid taking medicine right before bed that may keep you awake or make you feel hyper or energized. Your doctor can tell you if your medicine may do this and if you can take it earlier in the day.  If you can't sleep   Imagine yourself in a peaceful, pleasant scene. Focus on the details and feelings of being in a place that is relaxing.  Get up and do a quiet or boring activity until you feel sleepy.  Avoid drinking any liquids before going to bed to help prevent waking up often to use the bathroom.  Where can you learn more?  Go to https://www.[employer].net/patiented  Enter J942 in the search box to learn more about \"Learning About Sleeping Well.\"  Current as of: July 31, 2024  Content Version: 14.3    2024 [facility] [employer]. "   Care instructions adapted under license by your healthcare professional. If you have questions about a medical condition or this instruction, always ask your healthcare professional. [employer] disclaims any warranty or liability for your use of this information.

## 2025-03-13 LAB
CHOLEST SERPL-MCNC: 125 MG/DL
FASTING STATUS PATIENT QL REPORTED: ABNORMAL
HDLC SERPL-MCNC: 36 MG/DL
LDLC SERPL CALC-MCNC: 19 MG/DL
NONHDLC SERPL-MCNC: 89 MG/DL
PSA SERPL DL<=0.01 NG/ML-MCNC: 1.5 NG/ML (ref 0–4.5)
TRIGL SERPL-MCNC: 350 MG/DL

## 2025-03-16 ASSESSMENT — PATIENT HEALTH QUESTIONNAIRE - PHQ9
SUM OF ALL RESPONSES TO PHQ QUESTIONS 1-9: 7
SUM OF ALL RESPONSES TO PHQ QUESTIONS 1-9: 7
10. IF YOU CHECKED OFF ANY PROBLEMS, HOW DIFFICULT HAVE THESE PROBLEMS MADE IT FOR YOU TO DO YOUR WORK, TAKE CARE OF THINGS AT HOME, OR GET ALONG WITH OTHER PEOPLE: SOMEWHAT DIFFICULT

## 2025-03-17 ENCOUNTER — TELEPHONE (OUTPATIENT)
Facility: CLINIC | Age: 65
End: 2025-03-17

## 2025-03-17 ENCOUNTER — OFFICE VISIT (OUTPATIENT)
Dept: ENDOCRINOLOGY | Facility: CLINIC | Age: 65
End: 2025-03-17
Payer: MEDICARE

## 2025-03-17 VITALS
BODY MASS INDEX: 38.09 KG/M2 | HEART RATE: 52 BPM | WEIGHT: 257.2 LBS | HEIGHT: 69 IN | OXYGEN SATURATION: 94 % | SYSTOLIC BLOOD PRESSURE: 125 MMHG | DIASTOLIC BLOOD PRESSURE: 82 MMHG

## 2025-03-17 DIAGNOSIS — E66.812 OBESITY, CLASS II, BMI 35-39.9: Primary | ICD-10-CM

## 2025-03-17 DIAGNOSIS — G47.33 SLEEP APNEA, OBSTRUCTIVE: ICD-10-CM

## 2025-03-17 DIAGNOSIS — I10 ESSENTIAL HYPERTENSION, BENIGN: ICD-10-CM

## 2025-03-17 PROCEDURE — 3074F SYST BP LT 130 MM HG: CPT | Performed by: INTERNAL MEDICINE

## 2025-03-17 PROCEDURE — 99202 OFFICE O/P NEW SF 15 MIN: CPT | Mod: GC | Performed by: INTERNAL MEDICINE

## 2025-03-17 PROCEDURE — 3079F DIAST BP 80-89 MM HG: CPT | Performed by: INTERNAL MEDICINE

## 2025-03-17 PROCEDURE — 1126F AMNT PAIN NOTED NONE PRSNT: CPT | Performed by: INTERNAL MEDICINE

## 2025-03-17 ASSESSMENT — PAIN SCALES - GENERAL: PAINLEVEL_OUTOF10: NO PAIN (0)

## 2025-03-17 NOTE — TELEPHONE ENCOUNTER
PA Denied for Zepbound. There is no Appeal option available. Order has been sent to pharmacy for cash pay option. Closing encounter

## 2025-03-17 NOTE — NURSING NOTE
"Chief Complaint   Patient presents with    New Patient     New MWM consult       Vitals:    03/17/25 1133   BP: 125/82   BP Location: Left arm   Patient Position: Chair   Cuff Size: Adult Regular   Pulse: 52   SpO2: 94%   Weight: 116.7 kg (257 lb 3.2 oz)   Height: 1.759 m (5' 9.25\")       Body mass index is 37.71 kg/m .                           "

## 2025-03-17 NOTE — TELEPHONE ENCOUNTER
PA Initiation    Medication: ZEPBOUND 2.5 MG/0.5ML SC SOAJ  Insurance Company: [employer] - Phone [phone] Fax [phone]  Pharmacy Filling the Rx: [id number] - [place], [place] - [address] [place] [address]  Filling Pharmacy Phone: [phone]  Filling Pharmacy Fax: [phone]  Start Date: 3/17/2025

## 2025-03-17 NOTE — TELEPHONE ENCOUNTER
PRIOR AUTHORIZATION DENIED    Medication: ZEPBOUND 2.5 MG/0.5ML SC SOAJ  Insurance Company: [employer] - Phone [phone] Fax [phone]  Denial Date: 3/17/2025  Denial Reason(s):   Appeal Information: N/A  Patient Notified: clinic to discuss with pt what they would like to do

## 2025-03-17 NOTE — LETTER
"3/17/2025       RE: Avi Bhatti  [address] [facility] [address]     Dear Colleague,    Thank you for referring your patient, Avi Bhatti, to the [facility] [place] [facility]. Please see a copy of my visit note below.        New Medical Weight Management Consult    PATIENT:  Avi Bhatti  MRN:         [id number]  :         1960  LOWELL:         3/17/2025    Dear Deejay,    I had the pleasure of seeing your patient, Avi Bhatti. Full intake/assessment was done to determine barriers to weight loss success and develop a treatment plan. Avi Bhatti is a 65 year old male interested in treatment of medical problems associated with excess weight. He has a height of 5' 9.25\", a weight of 257 lbs 3.2 oz, and the calculated Body mass index is 37.71 kg/m .    ASSESSMENT/PLAN:  Avi is a patient with mature onset Class II obesity without significant element of familial/genetic influence and with current health consequences. He does need aggressive weight loss plan due to consideration of GIST removal along with YORDY on CPAP, GERD, OA s/p hip replacement and spinal fusion, HTN and HLD.  Avi Bhatti eats a high carb diet, uses food as a reward, and tends to snack/graze throughout day, rarely sitting to eat a true meal.    His problem is complicated by a hunger disorder, strong craving/reward pathways, and poor lifestyle choices    His ability to lose weight is impacted by functional impairment, physical impairment, and lack of confidence.    PLAN:    - Stop snacking  - Stop sugary drinks  - Decrease starchy foods and cheese  - Start Tirzepatide: 2.5mg for a month and then 5mg    F/U in 4 months.    Patient was seen with Dr. Christy.    Andrew Musa (Kumar Gary DO, MS  Gastroenterology Fellow  [facility]  Text Page or Vocera      No orders of the defined " types were placed in this encounter.      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 Villalba in 11/2024 for consideration of partial gastrectomy.  GIST was diagnosted in 10/2024 but based on prior imaging, has been stable since 2016.  Dr. Villalba would like him to be optimized from surgical perspective with some weight loss and possible hiatal hernia repair.  He was seen by Dr. Moreno from bariatric surgery who is potentially willing to offer robotic HHR but patient is not interested in bariatric surgery.   He was evaluated by Dr. Cavanaugh from oncology and the tumor is respnosiev to Gleevec, pending PET scan,    Other PMH is significant for Crohn's disease since age 31, on Infliximab, with ileocecal resection in 1990s; he also has ankylosing spondylitis methotrexate .  Currently follows with Dr. William in [facility].   The GIST was found incidentally on a MRE 10/2024 for Crohn's disease and biopsy proven.  Migraines, on duloxetine, gabapentin, and botox.  He also has Class II Obesity c/b HTN, HLD, OA s/p L hip replacement and DDD s/p spinal fusion, YORDY on cpap, and CKD.     Patient is currently around 260 lbs and this is the highest weight he has ever been.   - Typical breakfast is around 8am leftovers for breakfast or donut or cereal.  - Crackers for snacks  - Cold meat sandwich or hot dogs for lunch around 2 or 3pm.  - For dinner around 6pm have chilli or hamburger or chicken and rice or pizza.   - Then around candy or crackers and cheese post dinner.   - Drinking tea with sugar or coke.     Patient was using food as a reward from frustrations from work.  Currently retired.  Still using food as a reward from boredom / depression.         3/16/2025     1:03 PM   --   I have the following health issues associated with obesity High Blood Pressure    High Cholesterol    Sleep Apnea   I have the following symptoms associated with  "obesity Depression    Back Pain    Fatigue           3/16/2025     1:03 PM   Patient Goals   If yes, please indicate which surgery? GIST tumor removed,  possible Hiatal hernia repair           3/16/2025     1:03 PM   Referring Provider   Please name the provider who referred you to Medical Weight Management  If you do not know, please answer \"I Don't Know\" Dr Villalba  surgeon  [facility]           3/16/2025     1:03 PM   Weight History   How concerned are you about your weight? Very Concerned   I became overweight As an Adult   The following factors have contributed to my weight gain Mental Health Issues    Change in Schedule    Eating Wrong Types of Food    Eating Too Much    Lack of Exercise    Stress   I have tried the following methods to lose weight Watching Portions or Calories   My lowest weight since age 17 was 135   My highest weight since age 17 was 266   The most weight I have ever lost was (lbs) 40   I have the following family history of obesity/being overweight My father is overweight    One or more of my siblings are overweight   How has your weight changed over the last year? Gained           3/16/2025     1:03 PM   Diet Recall Review with Patient   If you do eat supper, what types of food do you typically eat? lately quick food, sandwiches, pizza, burgers on the grill   If you do snack, what types of food do you typically eat? chips, hotdog, left-over meals, cookies, candy   How many glasses of juice do you drink in a typical day? 0   How many of glasses of milk do you drink in a typical day? 0   How many 8oz glasses of sugar containing drinks such as Brady-Aid/sweet tea do you drink in a day? 8   How many cans/bottles of sugar pop/soda/tea/sports drinks do you drink in a day? 4   How many cans/bottles of diet pop/soda/tea or sports drink do you drink in a day? 0   How often do you have a drink of alcohol? Monthly or Less   If you do drink, how many drinks might you have in a day? 1 or 2           " 3/16/2025     1:03 PM   Eating Habits   Generally, my meals include foods like these bread, pasta, rice, potatoes, corn, crackers, sweet dessert, pop, or juice A Few Times a Week   Generally, my meals include foods like these fried meats, brats, burgers, french fries, pizza, cheese, chips, or ice cream A Few Times a Week   Eat fast food (like McDonalds, [employer], Taco Bell) Less Than Weekly   Eat at a buffet or sit-down restaurant Never   Eat most of my meals in front of the TV or computer Once a Week   Often skip meals, eat at random times, have no regular eating times Everyday   Rarely sit down for a meal but snack or graze throughout Everyday   Eat extra snacks between meals Almost Everyday   Eat most of my food at the end of the day Less Than Weekly   Eat in the middle of the night or wake up at night to eat Never   Eat extra snacks to prevent or correct low blood sugar Never   Eat to prevent acid reflux or stomach pain A Few Times a Week   Worry about not having enough food to eat Never   I eat when I am depressed A Few Times a Week   I eat when I am stressed A Few Times a Week   I eat when I am bored Almost Everyday   I eat when I am anxious A Few Times a Week   I eat when I am happy or as a reward Less Than Weekly   I feel hungry all the time even if I just have eaten Almost Everyday   Feeling full is important to me A Few Times a Week   I finish all the food on my plate even if I am already full A Few Times a Week   I can't resist eating delicious food or walk past the good food/smell A Few Times a Week   I eat/snack without noticing that I am eating Almost Everyday   I eat when I am preparing the meal Almost Everyday   I eat more than usual when I see others eating A Few Times a Week   I have trouble not eating sweets, ice cream, cookies, or chips if they are around the house Almost Everyday   I think about food all day Almost Everyday   What foods, if any, do you crave? Chips/Crackers   Please list any  other foods you crave? meat items, cakes, cookies, pastries           3/16/2025     1:03 PM   Amount of Food   I feel out of control when eating Weekly   I eat a large amount of food, like a loaf of bread, a box of cookies, a pint/quart of ice cream, all at once Never   I eat a large amount of food even when I am not hungry Weekly   I eat rapidly Weekly   I eat alone because I feel embarrassed and do not want others to see how much I have eaten Never   I eat until I am uncomfortably full Never   I feel bad, disgusted, or guilty after I overeat Weekly           3/16/2025     1:03 PM   Activity/Exercise History   How much of a typical 12 hour day do you spend sitting? Half the Day   How much of a typical 12 hour day do you spend lying down? Less Than Half the Day   How much of a typical day do you spend walking/standing? Less Than Half the Day   How many hours (not including work) do you spend on the TV/Video Games/Computer/Tablet/Phone? 4-5 Hours   How many times a week are you active for the purpose of exercise? Never   What keeps you from being more active? Pain    Shortness of Breath    Too tired   How many total minutes do you spend doing some activity for the purpose of exercising when you exercise? None       PAST MEDICAL HISTORY:  Past Medical History:   Diagnosis Date     Ankylosing spondylitis (H)      Arthritis      Crohn's colitis (H)      Gastroesophageal reflux disease      Hypertension      Migraine      Other chronic pain     headache and neck pain, receives botox injections Q 12 weeks     Sleep apnea     cpap           3/16/2025     1:03 PM   Work/Social History Reviewed With Patient   My employment status is Retired   My job is retired [employer]-worker   How much of your job is spent on the computer or phone? 100%   How many hours do you spend commuting to work daily? Did work from home 13yrs   What is your marital status? /In a Relationship   If in a relationship, is your significant other  overweight? Yes   If you have children, are they overweight? No   Who do you live with? Wife, 3 dogs   Who does the food shopping? myself           3/16/2025     1:03 PM   Mental Health History Reviewed With Patient   Have you ever been physically or sexually abused? No   How often in the past 2 weeks have you felt little interest or pleasure in doing things? For Several Days   Over the past 2 weeks how often have you felt down, depressed, or hopeless? For Several Days           3/16/2025     1:03 PM   Sleep History Reviewed With Patient   How many hours do you sleep at night? 6       MEDICATIONS:   Current Outpatient Medications   Medication Sig Dispense Refill     acetaminophen (TYLENOL) 500 MG tablet Take 500 mg by mouth 2 times daily.       atorvastatin (LIPITOR) 20 MG tablet Take 1 tablet (20 mg) by mouth daily. 90 tablet 11     azelaic acid (FINACIA) 15 % external gel Apply topically 2 times daily. 50 g 2     botulinum toxin type A (BOTOX) 100 units injection once every twelve weeks.       buPROPion (WELLBUTRIN XL) 150 MG 24 hr tablet Take 1 tablet (150 mg) by mouth every morning. 30 tablet 1     calcium carbonate-vitamin D (CALCIUM 600+D) 600-200 MG-UNIT TABS Take by mouth 2 times daily        carvedilol (COREG) 25 MG tablet Take 2 tablets (50 mg) by mouth 2 times daily (with meals). 120 tablet 5     DULoxetine (CYMBALTA) 60 MG capsule TAKE 1 CAPSULE BY MOUTH ONCE DAILY WITH 30 MG CAPSULE FOR A TOTAL DAILY DOSE OF 90 MG 90 capsule 3     febuxostat (ULORIC) 40 MG TABS tablet TAKE ONE TABLET BY MOUTH EVERY DAY 90 tablet 1     folic acid (FOLVITE) 1 MG tablet Take 1,000 mcg by mouth daily.       gabapentin (NEURONTIN) 300 MG capsule Take 1 capsule(300 mg) every morning & 3 capsules(900 mg) at bedtime 360 capsule 1     inFLIXimab (REMICADE) in sodium chloride 0.9 % 250 mL via gravity infusion Infliximab infusions through [facility] every 8 weeks       inFLIXimab-abda (RENFLEXIS) 100 MG injection Inject into the vein  once every eight weeks.       LORazepam (ATIVAN) 0.5 MG tablet Take 1/2 to 1 tablet 1-2 hours prior to MRI and may repeat times one if needed before scan 2 tablet 0     methotrexate 2.5 MG tablet TAKE 3 TABLETS BY MOUTH EVERY WEEK.       multivitamin w/minerals (THERA-VIT-M) tablet Take 1 tablet by mouth daily.       neomycin-polymixin-dexamethasone (MAXITROL) ophthalmic ointment Place into both eyes as needed. Pea sized amount       olmesartan (BENICAR) 40 MG tablet Take 0.5 tablets (20 mg) by mouth daily.       pantoprazole (PROTONIX) 40 MG EC tablet Take 1 tablet (40 mg) by mouth 2 times daily. 180 tablet 0     rimegepant (NURTEC) 75 MG ODT tablet Place 75 mg under the tongue daily as needed for migraine       tiZANidine (ZANAFLEX) 4 MG tablet Take 16 mg by mouth at bedtime       vitamin B-Complex Take 1 tablet by mouth daily.       Vitamin D3 (CHOLECALCIFEROL) 25 mcg (1000 units) tablet Take 1 tablet (25 mcg) by mouth 2 times daily 180 tablet 0       ALLERGIES:   Allergies   Allergen Reactions     Prednisone Other (See Comments)     Elevated heart rate, has had without problems more recently  No longer has reaction     Unknown [No Clinical Screening - See Comments]      benny have not worked in past     Metoclopramide Other (See Comments) and Anxiety     agitation       Relevant imaging:  MRI enterography 10-3-24  IMPRESSION:  1.  7.5 cm exophytic mass off the gastric fundus, indeterminate,  possibly GIST or leiomyoma. Note this is stable since at least 2016.  2.  No evidence of active Crohn's disease    Upper EUS  biopsy   Impressions/Post-Op Diagnosis:       - 4.4 cm gastric submucosal mass. FNA done. Suspicious for        GIST tumor       - Pancreatic steatosis.  Pathology 11-4-2024   [facility] FNA report  11-4-24  STOMACH, SUBMUCOSAL MASS, EUS-GUIDED FINE NEEDLE ASPIRATION:  1. Gastrointestinal stromal tumor (GIST), characterized by:  a. Histologic type: Spindle cell type  b. Necrosis: Present  2. See  comment  The histomorphology and immunophenotype (see below) are consistent with gastrointestinal stromal  tumor (GIST). Risk of progressive disease* in GIST is estimated based on tumor size and mitotic  activity. Although minimal mitotic activity is identified in this generous needle biopsy (the  sample is greater than 5 mm ), histologic grading and determining the risk of recurrence is  deferred to the resection specimen, if performed. Provisionally, a 4.4 cm tumor with 0 mitoses/5  mm  is regarded as having very low risk of progressive disease*.  All slides were reviewed. The microscopic appearance substantiates the diagnosis. 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    TIME: 30 min spent on evaluation, management, counseling, education, & motivational interviewing with greater than 50 % of the total time was spent on counseling and coordinating care    Sincerely,    Andrew Musa DO    Pt was seen and evaluated with the medical fellow. Clinical data was reviewed  and discussed with the patient and with the fellow. The note above reflects our  history and findings, and the evaluation and plan reflects my clinical opinion.    Josesito Christy      Again, thank you for allowing me to participate in the care of your patient.       Sincerely,    Josesito Christy MD

## 2025-03-17 NOTE — PROGRESS NOTES
"    New Medical Weight Management Consult    PATIENT:  Avi Bhatti  MRN:         [id number]  :         1960  LOWELL:         3/17/2025    Dear Deejay,    I had the pleasure of seeing your patient, Avi Bhatti. Full intake/assessment was done to determine barriers to weight loss success and develop a treatment plan. Avi Bhatti is a 65 year old male interested in treatment of medical problems associated with excess weight. He has a height of 5' 9.25\", a weight of 257 lbs 3.2 oz, and the calculated Body mass index is 37.71 kg/m .    ASSESSMENT/PLAN:  Avi is a patient with mature onset Class II obesity without significant element of familial/genetic influence and with current health consequences. He does need aggressive weight loss plan due to consideration of GIST removal along with YORDY on CPAP, GERD, OA s/p hip replacement and spinal fusion, HTN and HLD.  Avi Bhatti eats a high carb diet, uses food as a reward, and tends to snack/graze throughout day, rarely sitting to eat a true meal.    His problem is complicated by a hunger disorder, strong craving/reward pathways, and poor lifestyle choices    His ability to lose weight is impacted by functional impairment, physical impairment, and lack of confidence.    PLAN:    - Stop snacking  - Stop sugary drinks  - Decrease starchy foods and cheese  - Start Tirzepatide: 2.5mg for a month and then 5mg    F/U in 4 months.    Patient was seen with Dr. Christy.    Andrew Musa (Neer-Jhor Dot-toe) DO, MS  Gastroenterology Fellow  [facility]  Text Page or Vocera      No orders of the defined types were placed in this encounter.      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 Villalba in 2024 for consideration of partial gastrectomy.  GIST was diagnosted in 10/2024 but based on prior imaging, has been stable since 2016.  Dr." Deejay would like him to be optimized from surgical perspective with some weight loss and possible hiatal hernia repair.  He was seen by Dr. Moreno from bariatric surgery who is potentially willing to offer robotic HHR but patient is not interested in bariatric surgery.   He was evaluated by Dr. Cavanaugh from oncology and the tumor is respnosiev to Gleevec, pending PET scan,    Other PMH is significant for Crohn's disease since age 31, on Infliximab, with ileocecal resection in 1990s; he also has ankylosing spondylitis methotrexate .  Currently follows with Dr. William in [facility].   The GIST was found incidentally on a MRE 10/2024 for Crohn's disease and biopsy proven.  Migraines, on duloxetine, gabapentin, and botox.  He also has Class II Obesity c/b HTN, HLD, OA s/p L hip replacement and DDD s/p spinal fusion, YORDY on cpap, and CKD.     Patient is currently around 260 lbs and this is the highest weight he has ever been.   - Typical breakfast is around 8am leftovers for breakfast or donut or cereal.  - Crackers for snacks  - Cold meat sandwich or hot dogs for lunch around 2 or 3pm.  - For dinner around 6pm have chilli or hamburger or chicken and rice or pizza.   - Then around candy or crackers and cheese post dinner.   - Drinking tea with sugar or coke.     Patient was using food as a reward from frustrations from work.  Currently retired.  Still using food as a reward from boredom / depression.         3/16/2025     1:03 PM   --   I have the following health issues associated with obesity High Blood Pressure    High Cholesterol    Sleep Apnea   I have the following symptoms associated with obesity Depression    Back Pain    Fatigue           3/16/2025     1:03 PM   Patient Goals   If yes, please indicate which surgery? GIST tumor removed,  possible Hiatal hernia repair           3/16/2025     1:03 PM   Referring Provider   Please name the provider who referred you to Medical Weight Management  If you do not know, please  "answer \"I Don't Know\" Dr Villalba  [facility]           3/16/2025     1:03 PM   Weight History   How concerned are you about your weight? Very Concerned   I became overweight As an Adult   The following factors have contributed to my weight gain Mental Health Issues    Change in Schedule    Eating Wrong Types of Food    Eating Too Much    Lack of Exercise    Stress   I have tried the following methods to lose weight Watching Portions or Calories   My lowest weight since age 17 was 135   My highest weight since age 17 was 266   The most weight I have ever lost was (lbs) 40   I have the following family history of obesity/being overweight My father is overweight    One or more of my siblings are overweight   How has your weight changed over the last year? Gained           3/16/2025     1:03 PM   Diet Recall Review with Patient   If you do eat supper, what types of food do you typically eat? lately quick food, sandwiches, pizza, burgers on the grill   If you do snack, what types of food do you typically eat? chips, hotdog, left-over meals, cookies, candy   How many glasses of juice do you drink in a typical day? 0   How many of glasses of milk do you drink in a typical day? 0   How many 8oz glasses of sugar containing drinks such as Brady-Aid/sweet tea do you drink in a day? 8   How many cans/bottles of sugar pop/soda/tea/sports drinks do you drink in a day? 4   How many cans/bottles of diet pop/soda/tea or sports drink do you drink in a day? 0   How often do you have a drink of alcohol? Monthly or Less   If you do drink, how many drinks might you have in a day? 1 or 2           3/16/2025     1:03 PM   Eating Habits   Generally, my meals include foods like these bread, pasta, rice, potatoes, corn, crackers, sweet dessert, pop, or juice A Few Times a Week   Generally, my meals include foods like these fried meats, brats, burgers, french fries, pizza, cheese, chips, or ice cream A Few Times a Week   Eat fast food " (like [employer], Will Douglas, Catalino Bell) Less Than Weekly   Eat at a buffet or sit-down restaurant Never   Eat most of my meals in front of the TV or computer Once a Week   Often skip meals, eat at random times, have no regular eating times Everyday   Rarely sit down for a meal but snack or graze throughout Everyday   Eat extra snacks between meals Almost Everyday   Eat most of my food at the end of the day Less Than Weekly   Eat in the middle of the night or wake up at night to eat Never   Eat extra snacks to prevent or correct low blood sugar Never   Eat to prevent acid reflux or stomach pain A Few Times a Week   Worry about not having enough food to eat Never   I eat when I am depressed A Few Times a Week   I eat when I am stressed A Few Times a Week   I eat when I am bored Almost Everyday   I eat when I am anxious A Few Times a Week   I eat when I am happy or as a reward Less Than Weekly   I feel hungry all the time even if I just have eaten Almost Everyday   Feeling full is important to me A Few Times a Week   I finish all the food on my plate even if I am already full A Few Times a Week   I can't resist eating delicious food or walk past the good food/smell A Few Times a Week   I eat/snack without noticing that I am eating Almost Everyday   I eat when I am preparing the meal Almost Everyday   I eat more than usual when I see others eating A Few Times a Week   I have trouble not eating sweets, ice cream, cookies, or chips if they are around the house Almost Everyday   I think about food all day Almost Everyday   What foods, if any, do you crave? Chips/Crackers   Please list any other foods you crave? meat items, cakes, cookies, pastries           3/16/2025     1:03 PM   Amount of Food   I feel out of control when eating Weekly   I eat a large amount of food, like a loaf of bread, a box of cookies, a pint/quart of ice cream, all at once Never   I eat a large amount of food even when I am not hungry Weekly   I  eat rapidly Weekly   I eat alone because I feel embarrassed and do not want others to see how much I have eaten Never   I eat until I am uncomfortably full Never   I feel bad, disgusted, or guilty after I overeat Weekly           3/16/2025     1:03 PM   Activity/Exercise History   How much of a typical 12 hour day do you spend sitting? Half the Day   How much of a typical 12 hour day do you spend lying down? Less Than Half the Day   How much of a typical day do you spend walking/standing? Less Than Half the Day   How many hours (not including work) do you spend on the TV/Video Games/Computer/Tablet/Phone? 4-5 Hours   How many times a week are you active for the purpose of exercise? Never   What keeps you from being more active? Pain    Shortness of Breath    Too tired   How many total minutes do you spend doing some activity for the purpose of exercising when you exercise? 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 100%   How many hours do you spend commuting to work daily? Did work from home 13yrs   What is your marital status? /In a Relationship   If in a relationship, is your significant other overweight? Yes   If you have children, are they overweight? No   Who do you live with? Wife, 3 dogs   Who does the food shopping? myself           3/16/2025     1:03 PM   Mental Health History Reviewed With Patient   Have you ever been physically or sexually abused? No   How often in the past 2 weeks have you felt little interest or pleasure in  doing things? For Several Days   Over the past 2 weeks how often have you felt down, depressed, or hopeless? For Several Days           3/16/2025     1:03 PM   Sleep History Reviewed With Patient   How many hours do you sleep at night? 6       MEDICATIONS:   Current Outpatient Medications   Medication Sig Dispense Refill    acetaminophen (TYLENOL) 500 MG tablet Take 500 mg by mouth 2 times daily.      atorvastatin (LIPITOR) 20 MG tablet Take 1 tablet (20 mg) by mouth daily. 90 tablet 11    azelaic acid (FINACIA) 15 % external gel Apply topically 2 times daily. 50 g 2    botulinum toxin type A (BOTOX) 100 units injection once every twelve weeks.      buPROPion (WELLBUTRIN XL) 150 MG 24 hr tablet Take 1 tablet (150 mg) by mouth every morning. 30 tablet 1    calcium carbonate-vitamin D (CALCIUM 600+D) 600-200 MG-UNIT TABS Take by mouth 2 times daily       carvedilol (COREG) 25 MG tablet Take 2 tablets (50 mg) by mouth 2 times daily (with meals). 120 tablet 5    DULoxetine (CYMBALTA) 60 MG capsule TAKE 1 CAPSULE BY MOUTH ONCE DAILY WITH 30 MG CAPSULE FOR A TOTAL DAILY DOSE OF 90 MG 90 capsule 3    febuxostat (ULORIC) 40 MG TABS tablet TAKE ONE TABLET BY MOUTH EVERY DAY 90 tablet 1    folic acid (FOLVITE) 1 MG tablet Take 1,000 mcg by mouth daily.      gabapentin (NEURONTIN) 300 MG capsule Take 1 capsule(300 mg) every morning & 3 capsules(900 mg) at bedtime 360 capsule 1    inFLIXimab (REMICADE) in sodium chloride 0.9 % 250 mL via gravity infusion Infliximab infusions through [facility] every 8 weeks      inFLIXimab-abda (RENFLEXIS) 100 MG injection Inject into the vein once every eight weeks.      LORazepam (ATIVAN) 0.5 MG tablet Take 1/2 to 1 tablet 1-2 hours prior to MRI and may repeat times one if needed before scan 2 tablet 0    methotrexate 2.5 MG tablet TAKE 3 TABLETS BY MOUTH EVERY WEEK.      multivitamin w/minerals (THERA-VIT-M) tablet Take 1 tablet by mouth daily.      neomycin-polymixin-dexamethasone (MAXITROL)  ophthalmic ointment Place into both eyes as needed. Pea sized amount      olmesartan (BENICAR) 40 MG tablet Take 0.5 tablets (20 mg) by mouth daily.      pantoprazole (PROTONIX) 40 MG EC tablet Take 1 tablet (40 mg) by mouth 2 times daily. 180 tablet 0    rimegepant (NURTEC) 75 MG ODT tablet Place 75 mg under the tongue daily as needed for migraine      tiZANidine (ZANAFLEX) 4 MG tablet Take 16 mg by mouth at bedtime      vitamin B-Complex Take 1 tablet by mouth daily.      Vitamin D3 (CHOLECALCIFEROL) 25 mcg (1000 units) tablet Take 1 tablet (25 mcg) by mouth 2 times daily 180 tablet 0       ALLERGIES:   Allergies   Allergen Reactions    Prednisone Other (See Comments)     Elevated heart rate, has had without problems more recently  No longer has reaction    Unknown [No Clinical Screening - See Comments]      benny have not worked in past    Metoclopramide Other (See Comments) and Anxiety     agitation       Relevant imaging:  MRI enterography 10-3-24  IMPRESSION:  1.  7.5 cm exophytic mass off the gastric fundus, indeterminate,  possibly GIST or leiomyoma. Note this is stable since at least 2016.  2.  No evidence of active Crohn's disease    Upper EUS  biopsy   Impressions/Post-Op Diagnosis:       - 4.4 cm gastric submucosal mass. FNA done. Suspicious for        GIST tumor       - Pancreatic steatosis.  Pathology 11-4-2024   [facility] FNA report  11-4-24  STOMACH, SUBMUCOSAL MASS, EUS-GUIDED FINE NEEDLE ASPIRATION:  1. Gastrointestinal stromal tumor (GIST), characterized by:  a. Histologic type: Spindle cell type  b. Necrosis: Present  2. See comment  The histomorphology and immunophenotype (see below) are consistent with gastrointestinal stromal  tumor (GIST). Risk of progressive disease* in GIST is estimated based on tumor size and mitotic  activity. Although minimal mitotic activity is identified in this generous needle biopsy (the  sample is greater than 5 mm ), histologic grading and determining the risk of  recurrence is  deferred to the resection specimen, if performed. Provisionally, a 4.4 cm tumor with 0 mitoses/5  mm  is regarded as having very low risk of progressive disease*.  All slides were reviewed. The microscopic appearance substantiates the diagnosis. 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    TIME: 30 min spent on evaluation, management, counseling, education, & motivational interviewing with greater than 50 % of the total time was spent on counseling and coordinating care    Sincerely,    Andrew Musa DO    Pt was seen and evaluated with the medical fellow. Clinical data was reviewed  and discussed with the patient and with the fellow. The note above reflects our  history and findings, and the evaluation and plan reflects my clinical opinion.    Josesito Christy

## 2025-03-18 ENCOUNTER — TELEPHONE (OUTPATIENT)
Dept: ENDOCRINOLOGY | Facility: CLINIC | Age: 65
End: 2025-03-18

## 2025-03-18 ENCOUNTER — PATIENT OUTREACH (OUTPATIENT)
Dept: SURGERY | Facility: CLINIC | Age: 65
End: 2025-03-18

## 2025-03-18 NOTE — TELEPHONE ENCOUNTER
Medication Question or Refill    Contacts       Contact Date/Time Type Contact Phone/Fax    03/18/2025 04:46 PM CDT Phone (Incoming) Fernando Bhatti (Self) [phone] (M)            What medication are you calling about (include dose and sig)?: Tirzepatide    Preferred Pharmacy:       [id number] - [place], [place] - [address]  [address] [facility] [address]  Phone: [phone] Fax: [phone]      Controlled Substance Agreement on file:   CSA -- Patient Level:    CSA: None found at the patient level.       Who prescribed the medication?: Dr. Christy    Do you need a refill? No    When did you use the medication last? Pt. Has not started the medication    Patient offered an appointment? No, Pt. Has an appointment    Do you have any questions or concerns?  Yes: pt. Has not picked up the medication because it will cost $1,000 a  month. Is there something else that can be prescribed?      Could we send this information to you in [employer] or would you prefer to receive a phone call?:   Patient would prefer a phone call or [employer]  Okay to leave a detailed message?: Yes at Cell number on file:    Telephone Information:   Mobile [phone]

## 2025-03-18 NOTE — PROGRESS NOTES
Outgoing call                               Discussion with Patient/Care Team:                                                      Return phone call to patient and wife to discuss questions regarding diagnosis call as needed for paperwork for referral to medical weight management.  Informed him of the diagnosis code link with the referral per recommendation of Dr. Villalba.  Informed him unfortunately we do not have a say in insurance covering office visits, often times things do need to be submitted and resubmitted if denied we visional information as they do not always review all necessary notes and reasons for consultations for such things as medical weight management.  Informed him due to his surgery that he would need in his hiatal hernia it was recommended that he lose weight prior to any surgery.  He verbalized understanding and is aware that he will continue to work with his insurance company and follow-up with forms with necessary diagnosis codes that he was provided today.    He is aware we will plan to follow-up in approximately 3 to 4 months to see how things are going and make sure that he is doing well on his medical weight management and progressing in the right direction.  He agreed with this plan and has no further questions at this time.    Follow up/Plan                                                       Follow-up with Dr. Villalba in approximately 3 to 4 months.    Loulou Low, RN, BSN  Surgical Oncology and Hepatobiliary Surgery

## 2025-03-20 NOTE — TELEPHONE ENCOUNTER
His other plan is a supplement plan. When billing them it says to bill Part D plan. I did submit PA to the Part D and it is plan exclusion with no option for appeal

## 2025-03-28 ENCOUNTER — MYC MEDICAL ADVICE (OUTPATIENT)
Dept: ENDOCRINOLOGY | Facility: CLINIC | Age: 65
End: 2025-03-28
Payer: MEDICARE

## 2025-03-28 DIAGNOSIS — E66.812 OBESITY, CLASS II, BMI 35-39.9: Primary | ICD-10-CM

## 2025-03-31 ENCOUNTER — TELEPHONE (OUTPATIENT)
Dept: ENDOCRINOLOGY | Facility: CLINIC | Age: 65
End: 2025-03-31
Payer: MEDICARE

## 2025-03-31 NOTE — TELEPHONE ENCOUNTER
PRIOR AUTHORIZATION DENIED    Medication: WEGOVY 0.25 MG/0.5ML SC SOAJ  Insurance Company: [employer] - Phone [phone] Fax [phone]  Denial Date: 3/31/2025  Denial Reason(s):   Appeal Information: N/A  Patient Notified: clinic to discuss with pt what they would like to do

## 2025-03-31 NOTE — TELEPHONE ENCOUNTER
PA Initiation    Medication: WEGOVY 0.25 MG/0.5ML SC SOAJ  Insurance Company: [employer] - Phone [phone] Fax [phone]  Pharmacy Filling the Rx: [id number] - [place], [place] - [address] [place] [address]  Filling Pharmacy Phone: [phone]  Filling Pharmacy Fax: [phone]  Start Date: 3/31/2025

## 2025-04-08 ENCOUNTER — OFFICE VISIT (OUTPATIENT)
Dept: PEDIATRICS | Facility: CLINIC | Age: 65
End: 2025-04-08
Payer: MEDICARE

## 2025-04-08 VITALS
TEMPERATURE: 97.1 F | SYSTOLIC BLOOD PRESSURE: 110 MMHG | HEART RATE: 53 BPM | WEIGHT: 253.8 LBS | BODY MASS INDEX: 37.21 KG/M2 | OXYGEN SATURATION: 97 % | DIASTOLIC BLOOD PRESSURE: 80 MMHG | RESPIRATION RATE: 16 BRPM

## 2025-04-08 DIAGNOSIS — K50.00 CROHN'S DISEASE OF SMALL INTESTINE WITHOUT COMPLICATION (H): Chronic | ICD-10-CM

## 2025-04-08 DIAGNOSIS — R53.83 OTHER FATIGUE: ICD-10-CM

## 2025-04-08 DIAGNOSIS — R42 DIZZINESS: Primary | ICD-10-CM

## 2025-04-08 DIAGNOSIS — G47.33 SLEEP APNEA, OBSTRUCTIVE: ICD-10-CM

## 2025-04-08 DIAGNOSIS — K21.00 GASTROESOPHAGEAL REFLUX DISEASE WITH ESOPHAGITIS, UNSPECIFIED WHETHER HEMORRHAGE: ICD-10-CM

## 2025-04-08 DIAGNOSIS — J31.0 CHRONIC RHINITIS: ICD-10-CM

## 2025-04-08 DIAGNOSIS — L71.9 ROSACEA: ICD-10-CM

## 2025-04-08 LAB
BASOPHILS # BLD AUTO: 0 10E3/UL (ref 0–0.2)
BASOPHILS NFR BLD AUTO: 1 %
EOSINOPHIL # BLD AUTO: 0.2 10E3/UL (ref 0–0.7)
EOSINOPHIL NFR BLD AUTO: 3 %
ERYTHROCYTE [DISTWIDTH] IN BLOOD BY AUTOMATED COUNT: 14.2 % (ref 10–15)
HCT VFR BLD AUTO: 44.9 % (ref 40–53)
HGB BLD-MCNC: 14.7 G/DL (ref 13.3–17.7)
IMM GRANULOCYTES # BLD: 0 10E3/UL
IMM GRANULOCYTES NFR BLD: 0 %
LYMPHOCYTES # BLD AUTO: 1.5 10E3/UL (ref 0.8–5.3)
LYMPHOCYTES NFR BLD AUTO: 23 %
MCH RBC QN AUTO: 31.4 PG (ref 26.5–33)
MCHC RBC AUTO-ENTMCNC: 32.7 G/DL (ref 31.5–36.5)
MCV RBC AUTO: 96 FL (ref 78–100)
MONOCYTES # BLD AUTO: 0.5 10E3/UL (ref 0–1.3)
MONOCYTES NFR BLD AUTO: 8 %
NEUTROPHILS # BLD AUTO: 4.3 10E3/UL (ref 1.6–8.3)
NEUTROPHILS NFR BLD AUTO: 66 %
PLATELET # BLD AUTO: 198 10E3/UL (ref 150–450)
RBC # BLD AUTO: 4.68 10E6/UL (ref 4.4–5.9)
WBC # BLD AUTO: 6.5 10E3/UL (ref 4–11)

## 2025-04-08 PROCEDURE — 85025 COMPLETE CBC W/AUTO DIFF WBC: CPT | Performed by: INTERNAL MEDICINE

## 2025-04-08 PROCEDURE — 36415 COLL VENOUS BLD VENIPUNCTURE: CPT | Performed by: INTERNAL MEDICINE

## 2025-04-08 PROCEDURE — 99214 OFFICE O/P EST MOD 30 MIN: CPT | Performed by: INTERNAL MEDICINE

## 2025-04-08 PROCEDURE — 3074F SYST BP LT 130 MM HG: CPT | Performed by: INTERNAL MEDICINE

## 2025-04-08 PROCEDURE — 3079F DIAST BP 80-89 MM HG: CPT | Performed by: INTERNAL MEDICINE

## 2025-04-08 PROCEDURE — 1126F AMNT PAIN NOTED NONE PRSNT: CPT | Performed by: INTERNAL MEDICINE

## 2025-04-08 RX ORDER — IPRATROPIUM BROMIDE 42 UG/1
2 SPRAY, METERED NASAL 3 TIMES DAILY PRN
Qty: 15 ML | Refills: 1 | Status: SHIPPED | OUTPATIENT
Start: 2025-04-08

## 2025-04-08 RX ORDER — PANTOPRAZOLE SODIUM 40 MG/1
40 TABLET, DELAYED RELEASE ORAL 2 TIMES DAILY
Qty: 180 TABLET | Refills: 3 | Status: SHIPPED | OUTPATIENT
Start: 2025-04-08

## 2025-04-08 RX ORDER — METRONIDAZOLE 10 MG/G
GEL TOPICAL DAILY
Qty: 60 G | Refills: 3 | Status: SHIPPED | OUTPATIENT
Start: 2025-04-08

## 2025-04-08 ASSESSMENT — PAIN SCALES - GENERAL: PAINLEVEL_OUTOF10: NO PAIN (0)

## 2025-04-08 NOTE — TELEPHONE ENCOUNTER
Pt had a visit with pcp this morning. Called pt to get clarification. He says that he started this medication late(he had alternate med on hand & wanted to use it up), but taking pantoprazole on regular basis now.     pantoprazole (PROTONIX) 40 MG EC tablet 180 tablet 0 9/3/2024 -- No   Sig - Route: Take 1 tablet (40 mg) by mouth 2 times daily. - Oral     Farideh Meyer RN  [facility]

## 2025-04-08 NOTE — TELEPHONE ENCOUNTER
Clinic RN: Please investigate patient's chart or contact patient if the information cannot be found because patient should have run out of this medication on 12/3/24. Confirm patient is taking this medication as prescribed. Document findings and route refill encounter to provider for approval or denial.

## 2025-04-08 NOTE — PROGRESS NOTES
"  Assessment & Plan     (R42) Dizziness  (primary encounter diagnosis)  Comment:   Plan: CBC with platelets and differential, Adult ENT          Referral        Episodic dizziness, history suggests vertigo.  Consider eustachian tube dysfunction, labyrinthitis, BPV, Ménière's.  suspect eustachian tube dysfunction-start trial of Atrovent spray.  Pt will see ENT if symptoms persist    (R53.83) Other fatigue  Comment:   Plan: Generalized fatigue, likely multifactorial: hx depression/anxiety, sleep apnea, GIST tumor, IBD.  Continue to monitor /discussed additional testing if symptoms persist.    (L71.9) Rosacea  Comment:   Plan: metroNIDAZOLE (METROGEL) 1 % external gel       Side effects secondary to Finacea-skin irritation.  Substitute metronidazole gel    (J31.0) Chronic rhinitis  Comment:   Plan: ipratropium (ATROVENT) 0.06 % nasal spray            BMI  Estimated body mass index is 37.21 kg/m  as calculated from the following:    Height as of 3/17/25: 1.759 m (5' 9.25\").    Weight as of this encounter: 115.1 kg (253 lb 12.8 oz).             Regis King is a 65 year old, presenting for the following health issues:  Follow Up        4/8/2025     9:52 AM   Additional Questions   Roomed by Nicole Stout   Accompanied by BRIONNA     History of Present Illness       Mental Health Follow-up:  Patient presents to follow-up on Depression.Patient's depression since last visit has been:  Medium  The patient is having other symptoms associated with depression.      Any significant life events: health concerns  Patient is not feeling anxious or having panic attacks.  Patient has no concerns about alcohol or drug use.    Hypertension: He presents for follow up of hypertension.  He does check blood pressure  regularly outside of the clinic. Outside blood pressures have been over 140/90. He does not follow a low salt diet.     Reason for visit:  Facial skin issues    He eats 0-1 servings of fruits and vegetables daily.He " consumes 0 sweetened beverage(s) daily.He exercises with enough effort to increase his heart rate 9 or less minutes per day.  He exercises with enough effort to increase his heart rate 3 or less days per week.   He is taking medications regularly.      Concerns regarding episodic dizziness.  Episodic dizziness over the past few months occuring every few days.  Describes dizziness as a movement sensation/feeling off balance.  Often occurs after getting up in the morning but not associated with orthostatic change.  Notes dizziness when doing chores around the house particularly when changing position, lifting and turning head.  Most recent visit,Benicar dose was reduced-symptoms have persisted despite the dose change.  Denies associated chest pain/ palpitations /syncopal events/ tinnitus /vision changes/ focal neurologic symptoms.  Does use CPAP each night-some ongoing sinus drainage that has been longstanding.  Also notes ongoing mood symptoms and generalized fatigue.      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      atorvastatin (LIPITOR) 20 MG tablet Take 1 tablet (20 mg) by mouth daily. 90 tablet 11    botulinum toxin type A (BOTOX) 100 units injection once every twelve weeks.      buPROPion (WELLBUTRIN XL) 150 MG 24 hr tablet Take  1 tablet (150 mg) by mouth every morning. 30 tablet 1    calcium carbonate-vitamin D (CALCIUM 600+D) 600-200 MG-UNIT TABS Take by mouth 2 times daily       carvedilol (COREG) 25 MG tablet Take 2 tablets (50 mg) by mouth 2 times daily (with meals). 120 tablet 5    DULoxetine (CYMBALTA) 60 MG capsule TAKE 1 CAPSULE BY MOUTH ONCE DAILY WITH 30 MG CAPSULE FOR A TOTAL DAILY DOSE OF 90 MG 90 capsule 3    febuxostat (ULORIC) 40 MG TABS tablet TAKE ONE TABLET BY MOUTH EVERY DAY 90 tablet 1    folic acid (FOLVITE) 1 MG tablet Take 1,000 mcg by mouth daily.      gabapentin (NEURONTIN) 300 MG capsule Take 1 capsule(300 mg) every morning & 3 capsules(900 mg) at bedtime 360 capsule 1    inFLIXimab (REMICADE) in sodium chloride 0.9 % 250 mL via gravity infusion Infliximab infusions through [facility] every 8 weeks      inFLIXimab-abda (RENFLEXIS) 100 MG injection Inject into the vein once every eight weeks.      ipratropium (ATROVENT) 0.06 % nasal spray Spray 2 sprays into both nostrils 3 times daily as needed for rhinitis. 15 mL 1    LORazepam (ATIVAN) 0.5 MG tablet Take 1/2 to 1 tablet 1-2 hours prior to MRI and may repeat times one if needed before scan 2 tablet 0    methotrexate 2.5 MG tablet TAKE 3 TABLETS BY MOUTH EVERY WEEK.      metroNIDAZOLE (METROGEL) 1 % external gel Apply topically daily. 60 g 3    multivitamin w/minerals (THERA-VIT-M) tablet Take 1 tablet by mouth daily.      neomycin-polymixin-dexamethasone (MAXITROL) ophthalmic ointment Place into both eyes as needed. Pea sized amount      olmesartan (BENICAR) 40 MG tablet Take 0.5 tablets (20 mg) by mouth daily.      Phentermine-Topiramate 7.5-46 MG CP24 Take 1 capsule by mouth daily. 30 capsule 5    rimegepant (NURTEC) 75 MG ODT tablet Place 75 mg under the tongue daily as needed for migraine      tiZANidine (ZANAFLEX) 4 MG tablet Take 16 mg by mouth at bedtime      vitamin B-Complex Take 1 tablet by mouth daily.      Vitamin D3 (CHOLECALCIFEROL) 25 mcg (1000 units)  tablet Take 1 tablet (25 mcg) by mouth 2 times daily 180 tablet 0    pantoprazole (PROTONIX) 40 MG EC tablet TAKE ONE TABLET BY MOUTH TWICE A  tablet 3              Objective    /80 (BP Location: Right arm, Patient Position: Sitting, Cuff Size: Adult Large)   Pulse 53   Temp 97.1  F (36.2  C) (Temporal)   Resp 16   Wt 115.1 kg (253 lb 12.8 oz)   SpO2 97%   BMI 37.21 kg/m    Body mass index is 37.21 kg/m .  Physical Exam   GENERAL: alert and no distress  HENT: Oropharynx normal.  Right TM normal.  Left TM-effusion without erythema  NECK: no adenopathy, no asymmetry, masses, or scars  RESP: lungs clear to auscultation - no rales, rhonchi or wheezes  CV: regular rate and rhythm, normal S1 S2, no S3 or S4  MS: no gross musculoskeletal defects noted, no edema  PSYCH: mentation appears normal, affect normal/bright    Signed Electronically by: Joby Billingsley MD

## 2025-04-14 NOTE — TELEPHONE ENCOUNTER
Action Taken 04/14/2025 8:55 AM  [id number]    Forward to audiology to review     REFERRAL INFORMATION  Referring By: Joby Billingsley MD  Referring Clinic:  [facility]   Reason for Visit/Diagnosis: Vertigo- Referred by Joby Billingsley MD in  [facility]    FUTURE VISIT INFORMATION:  Appointment Date: 7/25/25  Appointment Time: 10:40 AM   NOTES STATUS DETAILS   OFFICE NOTE from referring provider Epic [facility]   4/8/25: Joby Billingsley MD   OFFICE NOTE from other specialist Care Everywhere  [facility]   3/10/25: Joe Vizcarra PA-C

## 2025-04-21 DIAGNOSIS — R42 DIZZINESS: Primary | ICD-10-CM

## 2025-04-22 ENCOUNTER — TELEPHONE (OUTPATIENT)
Dept: OTOLARYNGOLOGY | Facility: CLINIC | Age: 65
End: 2025-04-22
Payer: MEDICARE

## 2025-04-22 NOTE — TELEPHONE ENCOUNTER
LVM that Audio and PT eval are needed prior to ENT visit. Patient already set up for eval. Gave date and time of ENT audio with Sonia HILL visit      Gave ENT number

## 2025-04-23 ENCOUNTER — THERAPY VISIT (OUTPATIENT)
Dept: PHYSICAL THERAPY | Facility: CLINIC | Age: 65
End: 2025-04-23
Attending: PHYSICIAN ASSISTANT
Payer: MEDICARE

## 2025-04-23 DIAGNOSIS — R42 DIZZINESS: ICD-10-CM

## 2025-04-23 PROCEDURE — 97161 PT EVAL LOW COMPLEX 20 MIN: CPT | Mod: GP | Performed by: PHYSICAL THERAPIST

## 2025-04-23 NOTE — PROGRESS NOTES
PHYSICAL THERAPY EVALUATION  Type of Visit: Evaluation       Fall Risk Screen:  Have you fallen 2 or more times in the past year?: No  Have you fallen and had an injury in the past year?: No  Fall screen comments: no concerns about balance or falling    Subjective         Presenting condition or subjective complaint:  Early 30's diagnosed with ankylosing spondylitis, onset of headaches (seeing neurology - botox and meds), issues with low blood pressure (follow-uped with PCP, changes in meds, PCP doesn't think blood pressure related and referred to ENT).  Lightheaded with getting up from chair; see below vestibular evaluation for additional subjective information.    Date of onset: 04/21/25 (date of order)    Relevant medical history:     Past Medical History:   Diagnosis Date    Ankylosing spondylitis (H)     Arthritis     Crohn's colitis (H)     Gastroesophageal reflux disease     Hypertension     Migraine     Other chronic pain     headache and neck pain, receives botox injections Q 12 weeks    Sleep apnea     cpap   Dates & types of surgery:    Past Surgical History:   Procedure Laterality Date    ARTHROPLASTY HIP Left 11/19/2019    Procedure: Left total hip arthroplasty;  Surgeon: Allen Coats MD;  Location:  OR    BOTOX CHRONIC MIGRAINE HEAD ACHES      for chronic headache and neck pain, injections every 12 weeks    COLONOSCOPY      EXTRACTION(S) DENTAL  12/18/2012    Procedure: EXTRACTION(S) DENTAL;  Extraction of Teeth 30, 19;  Surgeon: Sujey Cunningham DDS;  Location:  OR    OPTICAL TRACKING SYSTEM FUSION POSTERIOR SPINE LUMBAR N/A 2/2/2021    Procedure: Lumbar 3-4 posterior lumbar instrumented fusion with interbody cage;  Surgeon: Jakub Velasquez MD;  Location:  OR    repair fracture & insert pins left hand  1996    SMALL BOWEL RESECTION  1993     Prior diagnostic imaging/testing results:     See electronic medical record  Prior therapy history for the same diagnosis, illness or injury:     "No    Prior Level of Function  Independent with ADLs, IADLs, and functional mobility; chronic neck and head pain    Patient goals for therapy:  not sure, was referred by ENT    Pain assessment: Current pain in back of neck and right side  Migraine/headache: starts in back of head, down in shoulders or in front of front head; younger more migraine symptoms of light sensitivity  Frequency of a few times a week especially when more active.       Objective      Cognitive Status Examination  Alert and oriented x 3    OBSERVATION: Pleasant and in no apparent distress  POSTURE: Forward head and rounded shoulders  PALPATION: Tight bilateral cervical musculature  RANGE OF MOTION: Bilateral upper and lower extremities WFL for tasks performed  STRENGTH: Bilateral upper and lower extremities WFL for tasks performed    BED MOBILITY: Reports independence    TRANSFERS: Modified independence    GAIT: Ambulates independently with no significant instability observed    BALANCE: No significant instability observed with functional mobility; mCTSIB: 30 seconds all conditions with increased postural sway with eyes closed and increased with stance on foam.       VESTIBULAR EVALUATION  ADDITIONAL HISTORY:  Description of symptoms: Light-headedness (\"woozy\"),  pills hit me weird today   Dizzy attacks:   Start: Thinks symptoms have been present for about a year (Thinks symptoms have been present for about a year)   Last attack: No attacks of dizziness; most recently light-headedness last night   Frequency of occurrences: Daily for the most part   Length of attack: Just a brief moment  Difficulty hearing: Both ears (bilateral hearing aides)  Noise in ears? No    Alleviates symptoms: \"ride it out\"  Worsens symptoms: positional changes    Pertinent visual history: reports no double vision  Pertinent history of current vestibular problem: Hearing loss, Migraines; reports poor tolerance for spinning rides/movement  DHI:      Cervicogenic Screen  " "  Neck ROM WFL for vestibular testing, pain - pt with hx of chronic neck and head pain   Vertebral Artery Test Normal     Oculomotor Screen    Ocular ROM Normal   Smooth Pursuit Normal   Saccades Normal   VOR Normal; head felt like \"swimming\" and some neck pain   VOR Cancellation Normal     Infrared Goggle Exam Vestibular Suppressant in Last 24 Hours? No  Exam Completed With: Infrared goggles   Spontaneous Nystagmus Negative   Gaze Evoked Nystagmus Negative; some end range nystagmus towards direction of left gaze   Head Shake Horizontal Nystagmus Negative   Positional Testing     Left Right   [place]-Hallpike (performed sidelying) Negative Negative   HSCC Supine Roll Test Negative Negative     Dynamic Visual Acuity (DVA)    Static Acuity (LogMar) 10   Horizontal Head Movement at 1 Hz (LogMar)    Horizontal Head Movement at 2 Hz (LogMar) 4   No dizziness complaints       Assessment & Plan   CLINICAL IMPRESSIONS  Medical Diagnosis: Dizziness    Impression/Assessment: Patient is a 65 year old male referred to physical therapy to be assessed prior to upcoming ENT appointment.  Negative findings for peripheral vestibular etiology of symptoms; patient with limitations with gaze stabilization based of DVA, however, reporting no functional limitations.  No vestibular physical therapy recommended at this time.    Clinical Decision Making (Complexity):  Clinical Presentation: Stable/Uncomplicated  Clinical Presentation Rationale: based on medical and personal factors listed in PT evaluation  Clinical Decision Making (Complexity): Low complexity    PLAN OF CARE  Frequency of Treatment: eval only  Duration of Treatment: eval only    Recommended Referrals to Other Professionals: attend upcoming ENT appointment, patient is planning to follow-up with neurology regarding headaches and neck pain  Education Assessment:   Learner/Method: Patient;Listening;Pictures/Video;No Barriers to Learning  Education Comments: Education regarding " objective findings.  No significant signs or symptoms of peripheral vestibular presentation.  Discussed differential diagnosis of cervicogenic etiology, headache and/or migraine, or other central etiology.    Risks and benefits of evaluation/treatment have been explained.   Patient/Family/caregiver agrees with Plan of Care.     Evaluation Time:     PT Eval, Low Complexity Minutes (57522): 45  Evaluation Only     Signing Clinician: Martina Quach PT, DPT

## 2025-05-08 ENCOUNTER — TELEPHONE (OUTPATIENT)
Dept: ENDOCRINOLOGY | Facility: CLINIC | Age: 65
End: 2025-05-08
Payer: MEDICARE

## 2025-05-08 NOTE — TELEPHONE ENCOUNTER
You have an appointment currently scheduled with Dr Christy on 7/21/25.  Due to changes to the providers schedule we need to reschedule your appointment.      Please use your MyChart or call  to reschedule this appointment at your earliest convenience.    We apologize for any inconvenience this may cause.    We look forward to seeing you at your upcoming appointment and thank you for choosing [facility].    Thank you for trusting us with your care.    Sincerely, [facility]

## 2025-05-13 ENCOUNTER — TRANSFERRED RECORDS (OUTPATIENT)
Dept: ADMINISTRATIVE | Facility: CLINIC | Age: 65
End: 2025-05-13
Payer: MEDICARE

## 2025-05-14 NOTE — PROGRESS NOTES
_________________________________________________________________________________________________    Patient name: Avi Bhatti  YOB: 1960  Encounter date: 05/13/2025 01:00 PM  Current date: 05/13/2025 02:30 PM  Procedure: Infusion      Infusion/Additional Medication Orders    Assessment: Crohn's disease of both small and large intestine without complications K50.80     Medication grids  Order Date Medication Dose Route Rate Rate 2 Rate 3 Rate 4 Int. of Treat.   09/23/2024 Remicade 5mg/kg IV 250cc/hr    8 Weeks   Inf. Date Medication % Conc. Inf. # Therapy Type Bag # Vials Total mgs Lot # Exp. Date   05/13/2025 Remicade  8 maintenance  6 552.00 [id number] 10/31/2027   Inf. Date Medication IV Site IV Gauge Start Stop Total Time Wt. (lbs) Wt. (kgs)   05/13/2025 Remicade left antecubital 22 105  PM 0 hour(s) 0 minute(s) 242.80 110.113     Vitals Flowsheet  Time Temp Pulse Resp Sys BP Lina BP Reaction Conc Rate   1250 PM 97.0 53 16 131 82      1:35 PM 96.6 50 16 125 83      2:05 PM 96.6 52 16 127 81        Post Infusion  The patient did tolerate the infusion.     Nursing Notes  Order date: 09/23/2024  Last infusion date: 03/18/2025  Oral premeds per order: N/A  Specialty Pharmacy: N  Change in health status per assessment? N  IV maintenance 0.9% NS 500ml TKO  Start time: 100 PM  IV start by: Taylor Ramírez RN  IV and Fluid D/C time: 220 PM  NS volume infused: <50mL  Site condition: CDI and patent throughout infusion, no redness/swelling at IV site  D/C instructions reviewed, Pt verbalized understanding.  Taylor Ramírez RN    Date Medication Total mg Used mg Wasted mg   05/13/2025 Remicade 600.00 552.00 48.00   03/18/2025 Remicade 600.00 600.00 0.00   01/21/2025 Remicade 600.00 600.00 0.00   11/26/2024 Remicade 600.00 600.00 0.00       Visit oversight provided by:  Sidney David MD 05/13/2025 01:00 PM

## 2025-06-11 ENCOUNTER — TRANSFERRED RECORDS (OUTPATIENT)
Dept: HEALTH INFORMATION MANAGEMENT | Facility: CLINIC | Age: 65
End: 2025-06-11
Payer: MEDICARE

## 2025-07-02 ENCOUNTER — MYC MEDICAL ADVICE (OUTPATIENT)
Dept: ENDOCRINOLOGY | Facility: CLINIC | Age: 65
End: 2025-07-02
Payer: MEDICARE

## 2025-07-02 DIAGNOSIS — E66.812 OBESITY, CLASS II, BMI 35-39.9: ICD-10-CM

## 2025-07-02 RX ORDER — PHENTERMINE AND TOPIRAMATE 11.25; 69 MG/1; MG/1
1 CAPSULE, EXTENDED RELEASE ORAL DAILY
Qty: 30 CAPSULE | Refills: 5 | Status: SHIPPED | OUTPATIENT
Start: 2025-07-02

## 2025-07-03 ENCOUNTER — OFFICE VISIT (OUTPATIENT)
Dept: PEDIATRICS | Facility: CLINIC | Age: 65
End: 2025-07-03
Payer: MEDICARE

## 2025-07-03 VITALS
WEIGHT: 238.1 LBS | HEART RATE: 63 BPM | SYSTOLIC BLOOD PRESSURE: 108 MMHG | OXYGEN SATURATION: 98 % | HEIGHT: 69 IN | BODY MASS INDEX: 35.27 KG/M2 | DIASTOLIC BLOOD PRESSURE: 71 MMHG | RESPIRATION RATE: 14 BRPM

## 2025-07-03 DIAGNOSIS — E66.812 OBESITY, CLASS II, BMI 35-39.9: ICD-10-CM

## 2025-07-03 DIAGNOSIS — M54.6 CHRONIC LEFT-SIDED THORACIC BACK PAIN: Primary | ICD-10-CM

## 2025-07-03 DIAGNOSIS — E66.01 MORBID (SEVERE) OBESITY DUE TO EXCESS CALORIES (H): ICD-10-CM

## 2025-07-03 DIAGNOSIS — M45.9 ANKYLOSING SPONDYLITIS, UNSPECIFIED SITE OF SPINE (H): Chronic | ICD-10-CM

## 2025-07-03 DIAGNOSIS — G89.29 CHRONIC LEFT-SIDED THORACIC BACK PAIN: Primary | ICD-10-CM

## 2025-07-03 PROCEDURE — 3074F SYST BP LT 130 MM HG: CPT | Performed by: INTERNAL MEDICINE

## 2025-07-03 PROCEDURE — G2211 COMPLEX E/M VISIT ADD ON: HCPCS | Performed by: INTERNAL MEDICINE

## 2025-07-03 PROCEDURE — 3078F DIAST BP <80 MM HG: CPT | Performed by: INTERNAL MEDICINE

## 2025-07-03 PROCEDURE — 99213 OFFICE O/P EST LOW 20 MIN: CPT | Performed by: INTERNAL MEDICINE

## 2025-07-03 ASSESSMENT — ENCOUNTER SYMPTOMS: BACK PAIN: 1

## 2025-07-03 ASSESSMENT — PATIENT HEALTH QUESTIONNAIRE - PHQ9
SUM OF ALL RESPONSES TO PHQ QUESTIONS 1-9: 0
SUM OF ALL RESPONSES TO PHQ QUESTIONS 1-9: 0
10. IF YOU CHECKED OFF ANY PROBLEMS, HOW DIFFICULT HAVE THESE PROBLEMS MADE IT FOR YOU TO DO YOUR WORK, TAKE CARE OF THINGS AT HOME, OR GET ALONG WITH OTHER PEOPLE: NOT DIFFICULT AT ALL

## 2025-07-03 NOTE — PROGRESS NOTES
"  Assessment & Plan     (M54.6,  G89.29) Chronic left-sided thoracic back pain  (primary encounter diagnosis)  Comment:   Plan: Physical Therapy  Referral     Left upper back pain, intermittent for the past 3 months.  Plain films T-spine reviewed today: Normal alignment without compression fracture.  Musculoskeletal upper back pain-recommended course of physical therapy and follow-up in the next 1-2 months if no improvement.    (I50.564) Obesity, Class II, BMI 35-39.9  (E66.01) obesity, severe  Comment:   Plan: Continues to work on diet changes/increasing activity, recently started on phentermine.      BMI  Estimated body mass index is 34.91 kg/m  as calculated from the following:    Height as of this encounter: 1.759 m (5' 9.25\").    Weight as of this encounter: 108 kg (238 lb 1.6 oz).             Subjective   Fernando is a 65 year old, presenting for the following health issues:  Back Pain (Seen at [facility] for back pain)      7/3/2025    10:55 AM   Additional Questions   Roomed by MICHI ARRIETA     Back Pain     65-year-old male with a history of Crohn's disease, ankylosing spondylitis, GIST, and hypertension presents for evaluation of left-sided upper mid back pain  Left sided mid back pain present for about 3 months.  Describes pain in the region below his left scapula without numbness paresthesias or radicular symptoms.  Standing in certain positions tends to aggravate the pain.  Stretching and massage has helped to a degree.  Denies any known injury or strain.  Does have a prior history of lumbar surgery in the past and history of ankylosing spondylitis.  No history of flank pain, hematuria, fevers, chills or unintentional weight loss.    Patient states that the pain comes and goes, mostly in the form of spasms. Was seen at [facility] for this issue on 6/11/25.      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      atorvastatin (LIPITOR) 20 MG tablet Take 1 tablet (20 mg) by mouth daily. 90 tablet 11    botulinum toxin type A (BOTOX) 100 units injection once every twelve weeks.      buPROPion (WELLBUTRIN XL) 150 MG 24 hr tablet Take 1 tablet (150 mg) by mouth every morning. 90 tablet 3    calcium carbonate-vitamin D (CALCIUM 600+D) 600-200 MG-UNIT TABS Take by mouth 2 times daily       carvedilol (COREG) 25 MG tablet Take 2 tablets (50 mg) by mouth 2 times daily (with meals). 120 tablet 5    DULoxetine (CYMBALTA) 60 MG capsule TAKE 1 CAPSULE BY MOUTH ONCE DAILY WITH 30 MG CAPSULE FOR A TOTAL DAILY DOSE OF 90 MG 90 capsule 3    febuxostat (ULORIC) 40 MG TABS tablet TAKE ONE TABLET BY MOUTH EVERY DAY 90 tablet 1    folic acid (FOLVITE) 1 MG tablet Take 1,000 mcg by mouth daily.      gabapentin (NEURONTIN) 300 MG capsule Take 1 capsule(300 mg) every morning & 3 capsules(900 mg) at bedtime 360 capsule 1    inFLIXimab (REMICADE) in sodium chloride 0.9 % 250 mL via gravity infusion Infliximab infusions through [facility] every 8 weeks      inFLIXimab-abda (RENFLEXIS) 100 MG injection Inject into the vein once every eight weeks.      ipratropium (ATROVENT) 0.06 % nasal spray Spray 2 sprays into both nostrils 3 times daily as needed for rhinitis. 15 mL 1    LORazepam (ATIVAN) 0.5 MG tablet Take 1/2 to 1 tablet 1-2 hours prior to MRI and may repeat times one if needed  "before scan 2 tablet 0    methotrexate 2.5 MG tablet TAKE 3 TABLETS BY MOUTH EVERY WEEK.      metroNIDAZOLE (METROGEL) 1 % external gel Apply topically daily. 60 g 3    multivitamin w/minerals (THERA-VIT-M) tablet Take 1 tablet by mouth daily.      neomycin-polymixin-dexamethasone (MAXITROL) ophthalmic ointment Place into both eyes as needed. Pea sized amount      olmesartan (BENICAR) 40 MG tablet Take 0.5 tablets (20 mg) by mouth daily.      Phentermine-Topiramate 7.5-46 MG CP24 Take 1 capsule by mouth daily. 30 capsule 5    Phentermine-Topiramate ER 11.25-69 MG CP24 Take 1 capsule by mouth daily. 30 capsule 5    rimegepant (NURTEC) 75 MG ODT tablet Place 75 mg under the tongue daily as needed for migraine      tiZANidine (ZANAFLEX) 4 MG tablet Take 16 mg by mouth at bedtime      vitamin B-Complex Take 1 tablet by mouth daily.      Vitamin D3 (CHOLECALCIFEROL) 25 mcg (1000 units) tablet Take 1 tablet (25 mcg) by mouth 2 times daily 180 tablet 0                      Objective    /71 (BP Location: Right arm, Patient Position: Sitting, Cuff Size: Adult Large)   Pulse 63   Resp 14   Ht 1.759 m (5' 9.25\")   Wt 108 kg (238 lb 1.6 oz)   SpO2 98%   BMI 34.91 kg/m    Body mass index is 34.91 kg/m .  Physical Exam   GENERAL: alert and no distress  NECK: no adenopathy, no asymmetry, masses, or scars  RESP: lungs clear to auscultation - no rales, rhonchi or wheezes  Back: Without midline cervical or thoracic spinous tenderness, paraspinous muscles nontender.  CV: regular rate and rhythm, normal S1 S2, no S3 or S4, no murmur  MS: no gross musculoskeletal defects noted, no edema  PSYCH: mentation appears normal, affect normal/bright            Signed Electronically by: Joby Billingsley MD    Answers submitted by the patient for this visit:  Patient Health Questionnaire (Submitted on 7/3/2025)  If you checked off any problems, how difficult have these problems made it for you to do your work, take care of things at " home, or get along with other people?: Not difficult at all  PHQ9 TOTAL SCORE: 0

## 2025-07-09 ENCOUNTER — TRANSFERRED RECORDS (OUTPATIENT)
Dept: ADMINISTRATIVE | Facility: CLINIC | Age: 65
End: 2025-07-09
Payer: MEDICARE

## 2025-07-10 NOTE — PROGRESS NOTES
_________________________________________________________________________________________________    Patient name: Avi Bhatti  YOB: 1960  Encounter date: 07/09/2025 02:45 PM  Current date: 07/09/2025 03:51 PM  Procedure: Infusion      Infusion/Additional Medication Orders    Assessment: Crohn's disease of both small and large intestine without complications K50.80     Medication grids  Order Date Medication Dose Route Rate Rate 2 Rate 3 Rate 4 Int. of Treat.   09/23/2024 Remicade 5mg/kg  mL/hr    8 Weeks   Inf. Date Medication % Conc. Inf. # Therapy Type Bag # Vials Total mgs Lot # Exp. Date   07/09/2025 Remicade  9 maintenance  6 536.00 [id number] 01/31/2028   Inf. Date Medication IV Site IV Gauge Start Stop Total Time Wt. (lbs) Wt. (kgs)   07/09/2025 Remicade left forearm 22 2:45 PM 3:45 PM 0 hour(s) 0 minute(s) 235.80 106.939     Vitals Flowsheet  Time Temp Pulse Resp Sys BP Lina BP Reaction Conc Rate   2:35 PM 97.5 66 16 133 78      3:15 PM 98.3 64 16 118 75      3:45 PM 97.6 56 16 124 82        Post Infusion  The patient did tolerate the infusion.     Nursing Notes  Order date: 09/23/2024  Last infusion date: 03/18/2025  Oral premeds per order: N  Specialty Pharmacy: N  Change in health status per assessment? N  IV maintenance 0.9% NS 500ml TKO  Start time: 1440  IV start by: Sylvia LEIVA RN  IV and Fluid D/C time: 1600  NS volume infused: <50mL  Site condition: CDI and patent throughout infusion, no redness/swelling at IV site  D/C instructions reviewed, Pt verbalized understanding.  Sylvia LEIVA RN    Date Medication Total mg Used mg Wasted mg   07/09/2025 Remicade 600.00 536.00 64.00   05/13/2025 Remicade 600.00 552.00 48.00   03/18/2025 Remicade 600.00 600.00 0.00   01/21/2025 Remicade 600.00 600.00 0.00   11/26/2024 Remicade 600.00 600.00 0.00       Visit oversight provided by:  Benjy Mendenhall MD 07/09/2025 02:45 PM

## 2025-07-18 PROBLEM — G89.29 CHRONIC LEFT-SIDED THORACIC BACK PAIN: Status: ACTIVE | Noted: 2025-07-18

## 2025-07-18 PROBLEM — M54.6 CHRONIC LEFT-SIDED THORACIC BACK PAIN: Status: ACTIVE | Noted: 2025-07-18

## 2025-07-21 ENCOUNTER — ONCOLOGY VISIT (OUTPATIENT)
Dept: SURGERY | Facility: CLINIC | Age: 65
End: 2025-07-21
Attending: SURGERY
Payer: MEDICARE

## 2025-07-21 VITALS
DIASTOLIC BLOOD PRESSURE: 77 MMHG | OXYGEN SATURATION: 96 % | SYSTOLIC BLOOD PRESSURE: 120 MMHG | WEIGHT: 241 LBS | TEMPERATURE: 97.9 F | RESPIRATION RATE: 18 BRPM | BODY MASS INDEX: 35.33 KG/M2 | HEART RATE: 51 BPM

## 2025-07-21 DIAGNOSIS — K44.9 HIATAL HERNIA: ICD-10-CM

## 2025-07-21 DIAGNOSIS — C49.A2 MALIGNANT GASTROINTESTINAL STROMAL TUMOR (GIST) OF STOMACH (H): Primary | ICD-10-CM

## 2025-07-21 PROCEDURE — G0463 HOSPITAL OUTPT CLINIC VISIT: HCPCS | Performed by: SURGERY

## 2025-07-21 ASSESSMENT — PAIN SCALES - GENERAL: PAINLEVEL_OUTOF10: NO PAIN (0)

## 2025-07-21 NOTE — LETTER
7/21/2025      Avi Bhatti  [address] [facility] [address]      Dear Colleague,    Thank you for referring your patient, Avi Bhatti, to the [facility]. Please see a copy of my visit note below.    Mr. 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  He is enrolled with the weight loss clinic and has lost about 26 pounds.  He said he initially had quite a bit of weight loss but has had a bit of difficulty maintaining his diet.  He is otherwise feeling well.  He does have symptoms of reflux with heartburn.  He does not have any new imaging.  I spoke to him and his wife about the need for repeat imaging at this point because it has been a couple years.  Hopefully his gastrointestinal stromal tumor has remained stable.    We discussed several options moving forward.  I discussed the possibility of moving forward with a laparoscopic or robotic resection of his gastrointestinal stromal tumor at this point.  He is somewhat undecided about the possibility of hiatal hernia repair and he is vocally against the idea of bariatric surgery.  Given his lack of significant weight loss I am not sure that he will be a candidate for a hiatal hernia repair although that would be ideal given the fact that he does have symptoms of reflux and will be performing a partial gastrectomy of his proximal stomach which I indicated to him could actually make his hiatal hernia worse.  I do wonder if his tumor is actually tethering his stomach within his abdomen and its removal could affect the size of his hiatal hernia negatively.    The patient would like to have further discussion with Dr. Moreno regarding hiatal hernia repair which I think is very appropriate and we will get that set up.  In the meantime I will also get a repeat CT chest abdomen pelvis to reassess his gastrointestinal stromal tumor for resectability.    I will have a phone call with  the patient after his visit with Dr. Moreno to finalize a plan moving forward.  Of note I did discuss with him that we could perform a robotic partial gastrectomy in the absence of a hiatal hernia repair if needed.    I will look forward to speaking with the patient again in the coming weeks.    30 minutes was spent on this case, more than half the time was in face-to-face, the rest was in review of history, imaging, coordination of care, documentation.    Again, thank you for allowing me to participate in the care of your patient.        Sincerely,        Jakub Villalba MD    Electronically signed

## 2025-07-21 NOTE — NURSING NOTE
"Oncology Rooming Note    July 21, 2025 11:20 AM   Avi Bhatti is a 65 year old male who presents for:    Chief Complaint   Patient presents with    Oncology Clinic Visit      GIST     Initial Vitals: /77 (BP Location: Right arm, Patient Position: Sitting, Cuff Size: Adult Large)   Pulse 51   Temp 97.9  F (36.6  C) (Oral)   Resp 18   Wt 109.3 kg (241 lb)   SpO2 96%   BMI 35.33 kg/m   Estimated body mass index is 35.33 kg/m  as calculated from the following:    Height as of 7/3/25: 1.759 m (5' 9.25\").    Weight as of this encounter: 109.3 kg (241 lb). Body surface area is 2.31 meters squared.  No Pain (0) Comment: Data Unavailable   No LMP for male patient.  Allergies reviewed: Yes  Medications reviewed: Yes    Medications: Medication refills not needed today.  Pharmacy name entered into [employer]:    [id number] [place] [address] [facility] [place] [address] [id number] - [place] - [address] [facility]    PHQ9:  Did this patient require a PHQ9?: No      Clinical concerns: none.      Isaias Roland"

## 2025-07-21 NOTE — PROGRESS NOTES
Mr. 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  He is enrolled with the weight loss clinic and has lost about 26 pounds.  He said he initially had quite a bit of weight loss but has had a bit of difficulty maintaining his diet.  He is otherwise feeling well.  He does have symptoms of reflux with heartburn.  He does not have any new imaging.  I spoke to him and his wife about the need for repeat imaging at this point because it has been a couple years.  Hopefully his gastrointestinal stromal tumor has remained stable.    We discussed several options moving forward.  I discussed the possibility of moving forward with a laparoscopic or robotic resection of his gastrointestinal stromal tumor at this point.  He is somewhat undecided about the possibility of hiatal hernia repair and he is vocally against the idea of bariatric surgery.  Given his lack of significant weight loss I am not sure that he will be a candidate for a hiatal hernia repair although that would be ideal given the fact that he does have symptoms of reflux and will be performing a partial gastrectomy of his proximal stomach which I indicated to him could actually make his hiatal hernia worse.  I do wonder if his tumor is actually tethering his stomach within his abdomen and its removal could affect the size of his hiatal hernia negatively.    The patient would like to have further discussion with Dr. Moreno regarding hiatal hernia repair which I think is very appropriate and we will get that set up.  In the meantime I will also get a repeat CT chest abdomen pelvis to reassess his gastrointestinal stromal tumor for resectability.    I will have a phone call with the patient after his visit with Dr. Moreno to finalize a plan moving forward.  Of note I did discuss with him that we could perform a robotic partial gastrectomy in the absence of a hiatal hernia repair if needed.    I will look forward to speaking with the  TX from [place] discussed our plan for care for the remainder of her pregnancy.  Her pregnancy thus far has been uncomplicated and unremarkable.  She has no questions for me at this time.  I did discuss the phone number she can call if she has questions after hours she does have our OB unit phone number.  I discussed  labor and what she might expect and also  when she should come to the hospital.  I talked about the medications and delivery options.  All questions were answered to the patients satisfaction.  I look forward to seeing her up on labor and delivery.  Dereck Conrad MD     patient again in the coming weeks.    30 minutes was spent on this case, more than half the time was in face-to-face, the rest was in review of history, imaging, coordination of care, documentation.

## 2025-07-24 ENCOUNTER — THERAPY VISIT (OUTPATIENT)
Dept: PHYSICAL THERAPY | Facility: CLINIC | Age: 65
End: 2025-07-24
Attending: INTERNAL MEDICINE
Payer: MEDICARE

## 2025-07-24 DIAGNOSIS — G89.29 CHRONIC LEFT-SIDED THORACIC BACK PAIN: Primary | ICD-10-CM

## 2025-07-24 DIAGNOSIS — M54.6 CHRONIC LEFT-SIDED THORACIC BACK PAIN: Primary | ICD-10-CM

## 2025-07-25 ENCOUNTER — PRE VISIT (OUTPATIENT)
Dept: OTOLARYNGOLOGY | Facility: CLINIC | Age: 65
End: 2025-07-25

## 2025-07-28 ENCOUNTER — TRANSFERRED RECORDS (OUTPATIENT)
Dept: HEALTH INFORMATION MANAGEMENT | Facility: CLINIC | Age: 65
End: 2025-07-28
Payer: MEDICARE

## 2025-07-28 LAB
ALT SERPL-CCNC: 18 IU/L (ref 9–46)
AST SERPL-CCNC: 17 U/L (ref 10–40)

## 2025-08-07 ENCOUNTER — THERAPY VISIT (OUTPATIENT)
Dept: PHYSICAL THERAPY | Facility: CLINIC | Age: 65
End: 2025-08-07
Payer: MEDICARE

## 2025-08-07 DIAGNOSIS — G89.29 CHRONIC LEFT-SIDED THORACIC BACK PAIN: Primary | ICD-10-CM

## 2025-08-07 DIAGNOSIS — M54.6 CHRONIC LEFT-SIDED THORACIC BACK PAIN: Primary | ICD-10-CM

## 2025-08-11 ENCOUNTER — VIRTUAL VISIT (OUTPATIENT)
Dept: SURGERY | Facility: CLINIC | Age: 65
End: 2025-08-11
Attending: SURGERY
Payer: MEDICARE

## 2025-08-11 ENCOUNTER — PREP FOR PROCEDURE (OUTPATIENT)
Dept: ONCOLOGY | Facility: CLINIC | Age: 65
End: 2025-08-11

## 2025-08-11 ENCOUNTER — APPOINTMENT (OUTPATIENT)
Dept: CT IMAGING | Facility: CLINIC | Age: 65
End: 2025-08-11
Attending: EMERGENCY MEDICINE
Payer: MEDICARE

## 2025-08-11 ENCOUNTER — HOSPITAL ENCOUNTER (OUTPATIENT)
Facility: CLINIC | Age: 65
Setting detail: OBSERVATION
Discharge: HOME | End: 2025-08-12
Attending: EMERGENCY MEDICINE | Admitting: INTERNAL MEDICINE
Payer: MEDICARE

## 2025-08-11 VITALS — HEIGHT: 69 IN | BODY MASS INDEX: 34.8 KG/M2 | WEIGHT: 235 LBS

## 2025-08-11 DIAGNOSIS — C49.A2 MALIGNANT GASTROINTESTINAL STROMAL TUMOR (GIST) OF STOMACH (H): Primary | ICD-10-CM

## 2025-08-11 DIAGNOSIS — C49.A2: Primary | ICD-10-CM

## 2025-08-11 DIAGNOSIS — K80.20 SYMPTOMATIC CHOLELITHIASIS: Primary | ICD-10-CM

## 2025-08-11 LAB
BASOPHILS # BLD AUTO: 0 10E3/UL (ref 0–0.2)
BASOPHILS NFR BLD AUTO: 0 %
CREAT BLD-MCNC: 1.5 MG/DL (ref 0.7–1.2)
EGFRCR SERPLBLD CKD-EPI 2021: 51 ML/MIN/1.73M2
EOSINOPHIL # BLD AUTO: 0.3 10E3/UL (ref 0–0.7)
EOSINOPHIL NFR BLD AUTO: 3 %
ERYTHROCYTE [DISTWIDTH] IN BLOOD BY AUTOMATED COUNT: 14 % (ref 10–15)
HCO3 BLDV-SCNC: 23 MMOL/L (ref 21–28)
HCT VFR BLD AUTO: 44.7 % (ref 40–53)
HGB BLD-MCNC: 15.3 G/DL (ref 13.3–17.7)
IMM GRANULOCYTES # BLD: 0 10E3/UL
IMM GRANULOCYTES NFR BLD: 0 %
LACTATE BLD-SCNC: 1.8 MMOL/L (ref 0.7–2)
LYMPHOCYTES # BLD AUTO: 2.6 10E3/UL (ref 0.8–5.3)
LYMPHOCYTES NFR BLD AUTO: 27 %
MCH RBC QN AUTO: 33.4 PG (ref 26.5–33)
MCHC RBC AUTO-ENTMCNC: 34.2 G/DL (ref 31.5–36.5)
MCV RBC AUTO: 98 FL (ref 78–100)
MONOCYTES # BLD AUTO: 0.8 10E3/UL (ref 0–1.3)
MONOCYTES NFR BLD AUTO: 9 %
NEUTROPHILS # BLD AUTO: 5.9 10E3/UL (ref 1.6–8.3)
NEUTROPHILS NFR BLD AUTO: 61 %
NRBC # BLD AUTO: 0 10E3/UL
NRBC BLD AUTO-RTO: 0 /100
PCO2 BLDV: 38 MM HG (ref 40–50)
PH BLDV: 7.39 [PH] (ref 7.32–7.43)
PLATELET # BLD AUTO: 179 10E3/UL (ref 150–450)
PO2 BLDV: 26 MM HG (ref 25–47)
RBC # BLD AUTO: 4.58 10E6/UL (ref 4.4–5.9)
SAO2 % BLDV: 48 % (ref 70–75)
WBC # BLD AUTO: 9.7 10E3/UL (ref 4–11)

## 2025-08-11 PROCEDURE — 250N000009 HC RX 250: Performed by: EMERGENCY MEDICINE

## 2025-08-11 PROCEDURE — 84484 ASSAY OF TROPONIN QUANT: CPT | Performed by: EMERGENCY MEDICINE

## 2025-08-11 PROCEDURE — 96374 THER/PROPH/DIAG INJ IV PUSH: CPT | Mod: 59

## 2025-08-11 PROCEDURE — 82803 BLOOD GASES ANY COMBINATION: CPT

## 2025-08-11 PROCEDURE — 99285 EMERGENCY DEPT VISIT HI MDM: CPT | Mod: 25 | Performed by: EMERGENCY MEDICINE

## 2025-08-11 PROCEDURE — 82565 ASSAY OF CREATININE: CPT

## 2025-08-11 PROCEDURE — 36415 COLL VENOUS BLD VENIPUNCTURE: CPT | Performed by: EMERGENCY MEDICINE

## 2025-08-11 PROCEDURE — 71260 CT THORAX DX C+: CPT

## 2025-08-11 PROCEDURE — 83690 ASSAY OF LIPASE: CPT | Performed by: EMERGENCY MEDICINE

## 2025-08-11 PROCEDURE — 82248 BILIRUBIN DIRECT: CPT | Performed by: EMERGENCY MEDICINE

## 2025-08-11 PROCEDURE — 250N000011 HC RX IP 250 OP 636: Performed by: EMERGENCY MEDICINE

## 2025-08-11 PROCEDURE — 93005 ELECTROCARDIOGRAM TRACING: CPT

## 2025-08-11 PROCEDURE — 80048 BASIC METABOLIC PNL TOTAL CA: CPT | Performed by: EMERGENCY MEDICINE

## 2025-08-11 PROCEDURE — 258N000003 HC RX IP 258 OP 636: Performed by: EMERGENCY MEDICINE

## 2025-08-11 PROCEDURE — 85025 COMPLETE CBC W/AUTO DIFF WBC: CPT | Performed by: EMERGENCY MEDICINE

## 2025-08-11 RX ORDER — HYDROMORPHONE HYDROCHLORIDE 1 MG/ML
0.5 INJECTION, SOLUTION INTRAMUSCULAR; INTRAVENOUS; SUBCUTANEOUS ONCE
Refills: 0 | Status: COMPLETED | OUTPATIENT
Start: 2025-08-11 | End: 2025-08-11

## 2025-08-11 RX ORDER — HYDROMORPHONE HYDROCHLORIDE 1 MG/ML
0.5 INJECTION, SOLUTION INTRAMUSCULAR; INTRAVENOUS; SUBCUTANEOUS EVERY 30 MIN PRN
Refills: 0 | Status: COMPLETED | OUTPATIENT
Start: 2025-08-11 | End: 2025-08-12

## 2025-08-11 RX ORDER — IOPAMIDOL 755 MG/ML
500 INJECTION, SOLUTION INTRAVASCULAR ONCE
Status: COMPLETED | OUTPATIENT
Start: 2025-08-11 | End: 2025-08-11

## 2025-08-11 RX ADMIN — HYDROMORPHONE HYDROCHLORIDE 0.5 MG: 1 INJECTION, SOLUTION INTRAMUSCULAR; INTRAVENOUS; SUBCUTANEOUS at 23:42

## 2025-08-11 RX ADMIN — SODIUM CHLORIDE 1000 ML: 0.9 INJECTION, SOLUTION INTRAVENOUS at 23:57

## 2025-08-11 RX ADMIN — SODIUM CHLORIDE 60 ML: 9 INJECTION, SOLUTION INTRAVENOUS at 23:44

## 2025-08-11 RX ADMIN — IOPAMIDOL 80 ML: 755 INJECTION, SOLUTION INTRAVENOUS at 23:44

## 2025-08-11 ASSESSMENT — PAIN SCALES - GENERAL: PAINLEVEL_OUTOF10: NO PAIN (0)

## 2025-08-11 ASSESSMENT — COLUMBIA-SUICIDE SEVERITY RATING SCALE - C-SSRS
2. HAVE YOU ACTUALLY HAD ANY THOUGHTS OF KILLING YOURSELF IN THE PAST MONTH?: NO
1. IN THE PAST MONTH, HAVE YOU WISHED YOU WERE DEAD OR WISHED YOU COULD GO TO SLEEP AND NOT WAKE UP?: NO
6. HAVE YOU EVER DONE ANYTHING, STARTED TO DO ANYTHING, OR PREPARED TO DO ANYTHING TO END YOUR LIFE?: NO

## 2025-08-12 ENCOUNTER — ANESTHESIA (OUTPATIENT)
Dept: SURGERY | Facility: CLINIC | Age: 65
End: 2025-08-12
Payer: MEDICARE

## 2025-08-12 ENCOUNTER — ANESTHESIA EVENT (OUTPATIENT)
Dept: SURGERY | Facility: CLINIC | Age: 65
End: 2025-08-12
Payer: MEDICARE

## 2025-08-12 ENCOUNTER — APPOINTMENT (OUTPATIENT)
Dept: ULTRASOUND IMAGING | Facility: CLINIC | Age: 65
End: 2025-08-12
Attending: EMERGENCY MEDICINE
Payer: MEDICARE

## 2025-08-12 VITALS
BODY MASS INDEX: 40.97 KG/M2 | TEMPERATURE: 97.1 F | SYSTOLIC BLOOD PRESSURE: 141 MMHG | OXYGEN SATURATION: 92 % | HEIGHT: 64 IN | HEART RATE: 94 BPM | WEIGHT: 240 LBS | DIASTOLIC BLOOD PRESSURE: 102 MMHG | RESPIRATION RATE: 16 BRPM

## 2025-08-12 LAB
ALBUMIN SERPL BCG-MCNC: 4 G/DL (ref 3.5–5.2)
ALBUMIN SERPL BCG-MCNC: 4.2 G/DL (ref 3.5–5.2)
ALP SERPL-CCNC: 87 U/L (ref 40–150)
ALP SERPL-CCNC: 99 U/L (ref 40–150)
ALT SERPL W P-5'-P-CCNC: 24 U/L (ref 0–70)
ALT SERPL W P-5'-P-CCNC: 29 U/L (ref 0–70)
ANION GAP SERPL CALCULATED.3IONS-SCNC: 13 MMOL/L (ref 7–15)
ANION GAP SERPL CALCULATED.3IONS-SCNC: 14 MMOL/L (ref 7–15)
AST SERPL W P-5'-P-CCNC: 22 U/L (ref 0–45)
AST SERPL W P-5'-P-CCNC: 28 U/L (ref 0–45)
ATRIAL RATE - MUSE: 51 BPM
BILIRUB DIRECT SERPL-MCNC: <0.08 MG/DL (ref 0–0.3)
BILIRUB SERPL-MCNC: 0.3 MG/DL
BILIRUB SERPL-MCNC: 0.3 MG/DL
BUN SERPL-MCNC: 17.3 MG/DL (ref 8–23)
BUN SERPL-MCNC: 18.3 MG/DL (ref 8–23)
CALCIUM SERPL-MCNC: 8.7 MG/DL (ref 8.8–10.4)
CALCIUM SERPL-MCNC: 9.3 MG/DL (ref 8.8–10.4)
CHLORIDE SERPL-SCNC: 105 MMOL/L (ref 98–107)
CHLORIDE SERPL-SCNC: 108 MMOL/L (ref 98–107)
CREAT SERPL-MCNC: 1.14 MG/DL (ref 0.67–1.17)
CREAT SERPL-MCNC: 1.36 MG/DL (ref 0.67–1.17)
DIASTOLIC BLOOD PRESSURE - MUSE: NORMAL MMHG
EGFRCR SERPLBLD CKD-EPI 2021: 57 ML/MIN/1.73M2
EGFRCR SERPLBLD CKD-EPI 2021: 71 ML/MIN/1.73M2
ERYTHROCYTE [DISTWIDTH] IN BLOOD BY AUTOMATED COUNT: 14.1 % (ref 10–15)
GLUCOSE SERPL-MCNC: 108 MG/DL (ref 70–99)
GLUCOSE SERPL-MCNC: 112 MG/DL (ref 70–99)
HCO3 SERPL-SCNC: 18 MMOL/L (ref 22–29)
HCO3 SERPL-SCNC: 20 MMOL/L (ref 22–29)
HCT VFR BLD AUTO: 42.1 % (ref 40–53)
HGB BLD-MCNC: 14.4 G/DL (ref 13.3–17.7)
HOLD SPECIMEN: NORMAL
HOLD SPECIMEN: NORMAL
INTERPRETATION ECG - MUSE: NORMAL
LIPASE SERPL-CCNC: 47 U/L (ref 13–60)
MCH RBC QN AUTO: 33.4 PG (ref 26.5–33)
MCHC RBC AUTO-ENTMCNC: 34.2 G/DL (ref 31.5–36.5)
MCV RBC AUTO: 98 FL (ref 78–100)
P AXIS - MUSE: 33 DEGREES
PLATELET # BLD AUTO: 159 10E3/UL (ref 150–450)
POTASSIUM SERPL-SCNC: 4.3 MMOL/L (ref 3.4–5.3)
POTASSIUM SERPL-SCNC: 4.6 MMOL/L (ref 3.4–5.3)
PR INTERVAL - MUSE: 130 MS
PROT SERPL-MCNC: 6.4 G/DL (ref 6.4–8.3)
PROT SERPL-MCNC: 7.2 G/DL (ref 6.4–8.3)
QRS DURATION - MUSE: 82 MS
QT - MUSE: 420 MS
QTC - MUSE: 387 MS
R AXIS - MUSE: 3 DEGREES
RBC # BLD AUTO: 4.31 10E6/UL (ref 4.4–5.9)
SODIUM SERPL-SCNC: 139 MMOL/L (ref 135–145)
SODIUM SERPL-SCNC: 139 MMOL/L (ref 135–145)
SYSTOLIC BLOOD PRESSURE - MUSE: NORMAL MMHG
T AXIS - MUSE: 50 DEGREES
TROPONIN T SERPL HS-MCNC: 11 NG/L
TROPONIN T SERPL HS-MCNC: 9 NG/L
VENTRICULAR RATE- MUSE: 51 BPM
WBC # BLD AUTO: 12 10E3/UL (ref 4–11)

## 2025-08-12 PROCEDURE — 47562 LAPAROSCOPIC CHOLECYSTECTOMY: CPT | Mod: AS | Performed by: PHYSICIAN ASSISTANT

## 2025-08-12 PROCEDURE — 84484 ASSAY OF TROPONIN QUANT: CPT | Performed by: EMERGENCY MEDICINE

## 2025-08-12 PROCEDURE — G0378 HOSPITAL OBSERVATION PER HR: HCPCS

## 2025-08-12 PROCEDURE — 258N000003 HC RX IP 258 OP 636: Performed by: ANESTHESIOLOGY

## 2025-08-12 PROCEDURE — 710N000012 HC RECOVERY PHASE 2, PER MINUTE: Performed by: SURGERY

## 2025-08-12 PROCEDURE — 250N000013 HC RX MED GY IP 250 OP 250 PS 637: Performed by: ANESTHESIOLOGY

## 2025-08-12 PROCEDURE — 250N000013 HC RX MED GY IP 250 OP 250 PS 637: Performed by: NURSE PRACTITIONER

## 2025-08-12 PROCEDURE — 360N000076 HC SURGERY LEVEL 3, PER MIN: Performed by: SURGERY

## 2025-08-12 PROCEDURE — 272N000001 HC OR GENERAL SUPPLY STERILE: Performed by: SURGERY

## 2025-08-12 PROCEDURE — 370N000017 HC ANESTHESIA TECHNICAL FEE, PER MIN: Performed by: SURGERY

## 2025-08-12 PROCEDURE — 82947 ASSAY GLUCOSE BLOOD QUANT: CPT | Performed by: INTERNAL MEDICINE

## 2025-08-12 PROCEDURE — 250N000013 HC RX MED GY IP 250 OP 250 PS 637: Performed by: INTERNAL MEDICINE

## 2025-08-12 PROCEDURE — 250N000009 HC RX 250: Performed by: NURSE ANESTHETIST, CERTIFIED REGISTERED

## 2025-08-12 PROCEDURE — 88304 TISSUE EXAM BY PATHOLOGIST: CPT | Mod: TC | Performed by: SURGERY

## 2025-08-12 PROCEDURE — 250N000011 HC RX IP 250 OP 636: Performed by: EMERGENCY MEDICINE

## 2025-08-12 PROCEDURE — 999N000141 HC STATISTIC PRE-PROCEDURE NURSING ASSESSMENT: Performed by: SURGERY

## 2025-08-12 PROCEDURE — 36415 COLL VENOUS BLD VENIPUNCTURE: CPT | Performed by: EMERGENCY MEDICINE

## 2025-08-12 PROCEDURE — 96376 TX/PRO/DX INJ SAME DRUG ADON: CPT

## 2025-08-12 PROCEDURE — 99207 PR NO BILLABLE SERVICE THIS VISIT: CPT | Performed by: NURSE PRACTITIONER

## 2025-08-12 PROCEDURE — 250N000011 HC RX IP 250 OP 636: Performed by: NURSE ANESTHETIST, CERTIFIED REGISTERED

## 2025-08-12 PROCEDURE — 47562 LAPAROSCOPIC CHOLECYSTECTOMY: CPT | Performed by: SURGERY

## 2025-08-12 PROCEDURE — 99221 1ST HOSP IP/OBS SF/LOW 40: CPT | Mod: 57 | Performed by: SURGERY

## 2025-08-12 PROCEDURE — 99222 1ST HOSP IP/OBS MODERATE 55: CPT | Performed by: INTERNAL MEDICINE

## 2025-08-12 PROCEDURE — 258N000001 HC RX 258: Performed by: SURGERY

## 2025-08-12 PROCEDURE — 96375 TX/PRO/DX INJ NEW DRUG ADDON: CPT | Mod: 59

## 2025-08-12 PROCEDURE — 250N000009 HC RX 250: Performed by: STUDENT IN AN ORGANIZED HEALTH CARE EDUCATION/TRAINING PROGRAM

## 2025-08-12 PROCEDURE — 85027 COMPLETE CBC AUTOMATED: CPT | Performed by: INTERNAL MEDICINE

## 2025-08-12 PROCEDURE — 250N000011 HC RX IP 250 OP 636: Performed by: SURGERY

## 2025-08-12 PROCEDURE — 710N000009 HC RECOVERY PHASE 1, LEVEL 1, PER MIN: Performed by: SURGERY

## 2025-08-12 PROCEDURE — 250N000025 HC SEVOFLURANE, PER MIN: Performed by: SURGERY

## 2025-08-12 PROCEDURE — 250N000009 HC RX 250: Performed by: SURGERY

## 2025-08-12 PROCEDURE — 250N000011 HC RX IP 250 OP 636: Performed by: ANESTHESIOLOGY

## 2025-08-12 PROCEDURE — 258N000003 HC RX IP 258 OP 636: Performed by: NURSE ANESTHETIST, CERTIFIED REGISTERED

## 2025-08-12 PROCEDURE — 76705 ECHO EXAM OF ABDOMEN: CPT

## 2025-08-12 PROCEDURE — 96361 HYDRATE IV INFUSION ADD-ON: CPT

## 2025-08-12 PROCEDURE — 250N000011 HC RX IP 250 OP 636: Performed by: INTERNAL MEDICINE

## 2025-08-12 PROCEDURE — 36415 COLL VENOUS BLD VENIPUNCTURE: CPT | Performed by: INTERNAL MEDICINE

## 2025-08-12 RX ORDER — AMOXICILLIN 250 MG
1 CAPSULE ORAL 2 TIMES DAILY PRN
Status: DISCONTINUED | OUTPATIENT
Start: 2025-08-12 | End: 2025-08-12 | Stop reason: HOSPADM

## 2025-08-12 RX ORDER — SODIUM CHLORIDE, SODIUM LACTATE, POTASSIUM CHLORIDE, CALCIUM CHLORIDE 600; 310; 30; 20 MG/100ML; MG/100ML; MG/100ML; MG/100ML
INJECTION, SOLUTION INTRAVENOUS CONTINUOUS
Status: DISCONTINUED | OUTPATIENT
Start: 2025-08-12 | End: 2025-08-12 | Stop reason: HOSPADM

## 2025-08-12 RX ORDER — BUPIVACAINE HYDROCHLORIDE 5 MG/ML
INJECTION, SOLUTION EPIDURAL; INTRACAUDAL; PERINEURAL PRN
Status: DISCONTINUED | OUTPATIENT
Start: 2025-08-12 | End: 2025-08-12 | Stop reason: HOSPADM

## 2025-08-12 RX ORDER — NALOXONE HYDROCHLORIDE 0.4 MG/ML
0.1 INJECTION, SOLUTION INTRAMUSCULAR; INTRAVENOUS; SUBCUTANEOUS
Status: DISCONTINUED | OUTPATIENT
Start: 2025-08-12 | End: 2025-08-12 | Stop reason: HOSPADM

## 2025-08-12 RX ORDER — ACETAMINOPHEN 325 MG/1
650 TABLET ORAL EVERY 4 HOURS PRN
Qty: 100 TABLET | Refills: 0 | Status: SHIPPED | OUTPATIENT
Start: 2025-08-12

## 2025-08-12 RX ORDER — INDOCYANINE GREEN AND WATER 25 MG
2.5 KIT INJECTION ONCE
Status: COMPLETED | OUTPATIENT
Start: 2025-08-12 | End: 2025-08-12

## 2025-08-12 RX ORDER — CEFAZOLIN SODIUM/WATER 2 G/20 ML
2 SYRINGE (ML) INTRAVENOUS SEE ADMIN INSTRUCTIONS
Status: DISCONTINUED | OUTPATIENT
Start: 2025-08-12 | End: 2025-08-12 | Stop reason: HOSPADM

## 2025-08-12 RX ORDER — OXYCODONE HYDROCHLORIDE 5 MG/1
5 TABLET ORAL EVERY 4 HOURS PRN
Refills: 0 | Status: DISCONTINUED | OUTPATIENT
Start: 2025-08-12 | End: 2025-08-12 | Stop reason: HOSPADM

## 2025-08-12 RX ORDER — GABAPENTIN 300 MG/1
900 CAPSULE ORAL AT BEDTIME
Status: DISCONTINUED | OUTPATIENT
Start: 2025-08-12 | End: 2025-08-12 | Stop reason: HOSPADM

## 2025-08-12 RX ORDER — BUPROPION HYDROCHLORIDE 150 MG/1
150 TABLET ORAL EVERY MORNING
Status: DISCONTINUED | OUTPATIENT
Start: 2025-08-12 | End: 2025-08-12 | Stop reason: HOSPADM

## 2025-08-12 RX ORDER — FENTANYL CITRATE 50 UG/ML
50 INJECTION, SOLUTION INTRAMUSCULAR; INTRAVENOUS EVERY 5 MIN PRN
Status: DISCONTINUED | OUTPATIENT
Start: 2025-08-12 | End: 2025-08-12 | Stop reason: HOSPADM

## 2025-08-12 RX ORDER — AMOXICILLIN 250 MG
2 CAPSULE ORAL 2 TIMES DAILY PRN
Status: DISCONTINUED | OUTPATIENT
Start: 2025-08-12 | End: 2025-08-12 | Stop reason: HOSPADM

## 2025-08-12 RX ORDER — LIDOCAINE 40 MG/G
CREAM TOPICAL
Status: DISCONTINUED | OUTPATIENT
Start: 2025-08-12 | End: 2025-08-12 | Stop reason: HOSPADM

## 2025-08-12 RX ORDER — HYDROMORPHONE HCL IN WATER/PF 6 MG/30 ML
0.4 PATIENT CONTROLLED ANALGESIA SYRINGE INTRAVENOUS
Refills: 0 | Status: DISCONTINUED | OUTPATIENT
Start: 2025-08-12 | End: 2025-08-12 | Stop reason: HOSPADM

## 2025-08-12 RX ORDER — LIDOCAINE HYDROCHLORIDE 20 MG/ML
INJECTION, SOLUTION INFILTRATION; PERINEURAL PRN
Status: DISCONTINUED | OUTPATIENT
Start: 2025-08-12 | End: 2025-08-12

## 2025-08-12 RX ORDER — ONDANSETRON 4 MG/1
4 TABLET, ORALLY DISINTEGRATING ORAL EVERY 30 MIN PRN
Status: DISCONTINUED | OUTPATIENT
Start: 2025-08-12 | End: 2025-08-12 | Stop reason: HOSPADM

## 2025-08-12 RX ORDER — DEXAMETHASONE SODIUM PHOSPHATE 4 MG/ML
4 INJECTION, SOLUTION INTRA-ARTICULAR; INTRALESIONAL; INTRAMUSCULAR; INTRAVENOUS; SOFT TISSUE
Status: DISCONTINUED | OUTPATIENT
Start: 2025-08-12 | End: 2025-08-12 | Stop reason: HOSPADM

## 2025-08-12 RX ORDER — EPHEDRINE SULFATE 50 MG/ML
INJECTION, SOLUTION INTRAMUSCULAR; INTRAVENOUS; SUBCUTANEOUS PRN
Status: DISCONTINUED | OUTPATIENT
Start: 2025-08-12 | End: 2025-08-12

## 2025-08-12 RX ORDER — ONDANSETRON 2 MG/ML
4 INJECTION INTRAMUSCULAR; INTRAVENOUS EVERY 30 MIN PRN
Status: DISCONTINUED | OUTPATIENT
Start: 2025-08-12 | End: 2025-08-12 | Stop reason: HOSPADM

## 2025-08-12 RX ORDER — CARVEDILOL 25 MG/1
50 TABLET ORAL 2 TIMES DAILY WITH MEALS
Status: DISCONTINUED | OUTPATIENT
Start: 2025-08-12 | End: 2025-08-12 | Stop reason: HOSPADM

## 2025-08-12 RX ORDER — PROPOFOL 10 MG/ML
INJECTION, EMULSION INTRAVENOUS PRN
Status: DISCONTINUED | OUTPATIENT
Start: 2025-08-12 | End: 2025-08-12

## 2025-08-12 RX ORDER — LABETALOL HYDROCHLORIDE 5 MG/ML
10 INJECTION, SOLUTION INTRAVENOUS
Status: DISCONTINUED | OUTPATIENT
Start: 2025-08-12 | End: 2025-08-12 | Stop reason: HOSPADM

## 2025-08-12 RX ORDER — LIDOCAINE HYDROCHLORIDE 20 MG/ML
JELLY TOPICAL ONCE
Status: COMPLETED | OUTPATIENT
Start: 2025-08-12 | End: 2025-08-12

## 2025-08-12 RX ORDER — OXYCODONE HYDROCHLORIDE 5 MG/1
10 TABLET ORAL
Status: DISCONTINUED | OUTPATIENT
Start: 2025-08-12 | End: 2025-08-12 | Stop reason: HOSPADM

## 2025-08-12 RX ORDER — HYDROMORPHONE HCL IN WATER/PF 6 MG/30 ML
0.4 PATIENT CONTROLLED ANALGESIA SYRINGE INTRAVENOUS EVERY 5 MIN PRN
Status: DISCONTINUED | OUTPATIENT
Start: 2025-08-12 | End: 2025-08-12 | Stop reason: HOSPADM

## 2025-08-12 RX ORDER — FENTANYL CITRATE 50 UG/ML
25 INJECTION, SOLUTION INTRAMUSCULAR; INTRAVENOUS
Status: DISCONTINUED | OUTPATIENT
Start: 2025-08-12 | End: 2025-08-12 | Stop reason: HOSPADM

## 2025-08-12 RX ORDER — OXYCODONE HYDROCHLORIDE 5 MG/1
5 TABLET ORAL
Status: COMPLETED | OUTPATIENT
Start: 2025-08-12 | End: 2025-08-12

## 2025-08-12 RX ORDER — FENTANYL CITRATE 50 UG/ML
25 INJECTION, SOLUTION INTRAMUSCULAR; INTRAVENOUS EVERY 5 MIN PRN
Status: DISCONTINUED | OUTPATIENT
Start: 2025-08-12 | End: 2025-08-12 | Stop reason: HOSPADM

## 2025-08-12 RX ORDER — CEFAZOLIN SODIUM/WATER 2 G/20 ML
2 SYRINGE (ML) INTRAVENOUS
Status: COMPLETED | OUTPATIENT
Start: 2025-08-12 | End: 2025-08-12

## 2025-08-12 RX ORDER — ALBUTEROL SULFATE 0.83 MG/ML
2.5 SOLUTION RESPIRATORY (INHALATION) EVERY 4 HOURS PRN
Status: DISCONTINUED | OUTPATIENT
Start: 2025-08-12 | End: 2025-08-12 | Stop reason: HOSPADM

## 2025-08-12 RX ORDER — HYDRALAZINE HYDROCHLORIDE 20 MG/ML
2.5-5 INJECTION INTRAMUSCULAR; INTRAVENOUS EVERY 10 MIN PRN
Status: DISCONTINUED | OUTPATIENT
Start: 2025-08-12 | End: 2025-08-12 | Stop reason: HOSPADM

## 2025-08-12 RX ORDER — HYDROMORPHONE HCL IN WATER/PF 6 MG/30 ML
0.2 PATIENT CONTROLLED ANALGESIA SYRINGE INTRAVENOUS
Refills: 0 | Status: DISCONTINUED | OUTPATIENT
Start: 2025-08-12 | End: 2025-08-12 | Stop reason: HOSPADM

## 2025-08-12 RX ORDER — HYDROCODONE BITARTRATE AND ACETAMINOPHEN 5; 325 MG/1; MG/1
1-2 TABLET ORAL EVERY 4 HOURS PRN
Qty: 10 TABLET | Refills: 0 | Status: SHIPPED | OUTPATIENT
Start: 2025-08-12

## 2025-08-12 RX ORDER — ACETAMINOPHEN 325 MG/1
650 TABLET ORAL EVERY 4 HOURS PRN
Status: DISCONTINUED | OUTPATIENT
Start: 2025-08-12 | End: 2025-08-12 | Stop reason: HOSPADM

## 2025-08-12 RX ORDER — SODIUM CHLORIDE, SODIUM LACTATE, POTASSIUM CHLORIDE, CALCIUM CHLORIDE 600; 310; 30; 20 MG/100ML; MG/100ML; MG/100ML; MG/100ML
INJECTION, SOLUTION INTRAVENOUS CONTINUOUS PRN
Status: DISCONTINUED | OUTPATIENT
Start: 2025-08-12 | End: 2025-08-12

## 2025-08-12 RX ORDER — LOSARTAN POTASSIUM 50 MG/1
50 TABLET ORAL DAILY
Status: DISCONTINUED | OUTPATIENT
Start: 2025-08-12 | End: 2025-08-12 | Stop reason: HOSPADM

## 2025-08-12 RX ORDER — CEFTRIAXONE 1 G/1
1 INJECTION, POWDER, FOR SOLUTION INTRAMUSCULAR; INTRAVENOUS EVERY 24 HOURS
Status: DISCONTINUED | OUTPATIENT
Start: 2025-08-12 | End: 2025-08-12 | Stop reason: HOSPADM

## 2025-08-12 RX ORDER — FOLIC ACID 1 MG/1
1000 TABLET ORAL DAILY
Status: DISCONTINUED | OUTPATIENT
Start: 2025-08-12 | End: 2025-08-12 | Stop reason: HOSPADM

## 2025-08-12 RX ORDER — HYDROCODONE BITARTRATE AND ACETAMINOPHEN 5; 325 MG/1; MG/1
1 TABLET ORAL
Status: DISCONTINUED | OUTPATIENT
Start: 2025-08-12 | End: 2025-08-12 | Stop reason: HOSPADM

## 2025-08-12 RX ORDER — FENTANYL CITRATE 50 UG/ML
INJECTION, SOLUTION INTRAMUSCULAR; INTRAVENOUS PRN
Status: DISCONTINUED | OUTPATIENT
Start: 2025-08-12 | End: 2025-08-12

## 2025-08-12 RX ORDER — ACETAMINOPHEN 650 MG/1
650 SUPPOSITORY RECTAL EVERY 4 HOURS PRN
Status: DISCONTINUED | OUTPATIENT
Start: 2025-08-12 | End: 2025-08-12 | Stop reason: HOSPADM

## 2025-08-12 RX ORDER — ONDANSETRON 4 MG/1
4 TABLET, ORALLY DISINTEGRATING ORAL EVERY 6 HOURS PRN
Qty: 20 TABLET | Refills: 1 | Status: SHIPPED | OUTPATIENT
Start: 2025-08-12

## 2025-08-12 RX ORDER — MEPERIDINE HYDROCHLORIDE 25 MG/ML
12.5 INJECTION INTRAMUSCULAR; INTRAVENOUS; SUBCUTANEOUS EVERY 5 MIN PRN
Status: DISCONTINUED | OUTPATIENT
Start: 2025-08-12 | End: 2025-08-12 | Stop reason: HOSPADM

## 2025-08-12 RX ORDER — ONDANSETRON 2 MG/ML
4 INJECTION INTRAMUSCULAR; INTRAVENOUS EVERY 6 HOURS PRN
Status: DISCONTINUED | OUTPATIENT
Start: 2025-08-12 | End: 2025-08-12 | Stop reason: HOSPADM

## 2025-08-12 RX ORDER — DULOXETIN HYDROCHLORIDE 60 MG/1
60 CAPSULE, DELAYED RELEASE ORAL DAILY
Status: DISCONTINUED | OUTPATIENT
Start: 2025-08-12 | End: 2025-08-12 | Stop reason: HOSPADM

## 2025-08-12 RX ORDER — PROPOFOL 10 MG/ML
INJECTION, EMULSION INTRAVENOUS CONTINUOUS PRN
Status: DISCONTINUED | OUTPATIENT
Start: 2025-08-12 | End: 2025-08-12

## 2025-08-12 RX ORDER — HYDROMORPHONE HCL IN WATER/PF 6 MG/30 ML
0.2 PATIENT CONTROLLED ANALGESIA SYRINGE INTRAVENOUS EVERY 5 MIN PRN
Status: DISCONTINUED | OUTPATIENT
Start: 2025-08-12 | End: 2025-08-12 | Stop reason: HOSPADM

## 2025-08-12 RX ORDER — DEXAMETHASONE SODIUM PHOSPHATE 4 MG/ML
INJECTION, SOLUTION INTRA-ARTICULAR; INTRALESIONAL; INTRAMUSCULAR; INTRAVENOUS; SOFT TISSUE PRN
Status: DISCONTINUED | OUTPATIENT
Start: 2025-08-12 | End: 2025-08-12

## 2025-08-12 RX ORDER — GABAPENTIN 300 MG/1
300 CAPSULE ORAL EVERY MORNING
Status: DISCONTINUED | OUTPATIENT
Start: 2025-08-12 | End: 2025-08-12 | Stop reason: HOSPADM

## 2025-08-12 RX ORDER — AMOXICILLIN 250 MG
1 CAPSULE ORAL 2 TIMES DAILY PRN
Qty: 30 TABLET | Refills: 0 | Status: SHIPPED | OUTPATIENT
Start: 2025-08-12

## 2025-08-12 RX ORDER — GLYCOPYRROLATE 0.2 MG/ML
INJECTION, SOLUTION INTRAMUSCULAR; INTRAVENOUS PRN
Status: DISCONTINUED | OUTPATIENT
Start: 2025-08-12 | End: 2025-08-12

## 2025-08-12 RX ORDER — ONDANSETRON 2 MG/ML
INJECTION INTRAMUSCULAR; INTRAVENOUS PRN
Status: DISCONTINUED | OUTPATIENT
Start: 2025-08-12 | End: 2025-08-12

## 2025-08-12 RX ORDER — DULOXETIN HYDROCHLORIDE 60 MG/1
60 CAPSULE, DELAYED RELEASE ORAL DAILY
COMMUNITY

## 2025-08-12 RX ORDER — ONDANSETRON 4 MG/1
4 TABLET, ORALLY DISINTEGRATING ORAL EVERY 6 HOURS PRN
Status: DISCONTINUED | OUTPATIENT
Start: 2025-08-12 | End: 2025-08-12 | Stop reason: HOSPADM

## 2025-08-12 RX ORDER — POLYETHYLENE GLYCOL 3350 17 G/17G
17 POWDER, FOR SOLUTION ORAL DAILY
Status: DISCONTINUED | OUTPATIENT
Start: 2025-08-12 | End: 2025-08-12 | Stop reason: HOSPADM

## 2025-08-12 RX ORDER — OXYCODONE HYDROCHLORIDE 5 MG/1
2.5-5 TABLET ORAL EVERY 4 HOURS PRN
Qty: 15 TABLET | Refills: 0 | Status: SHIPPED | OUTPATIENT
Start: 2025-08-12

## 2025-08-12 RX ORDER — CARVEDILOL 25 MG/1
50 TABLET ORAL ONCE
Status: DISCONTINUED | OUTPATIENT
Start: 2025-08-12 | End: 2025-08-12

## 2025-08-12 RX ORDER — PROCHLORPERAZINE MALEATE 5 MG/1
5 TABLET ORAL EVERY 6 HOURS PRN
Status: DISCONTINUED | OUTPATIENT
Start: 2025-08-12 | End: 2025-08-12 | Stop reason: HOSPADM

## 2025-08-12 RX ORDER — ACETAMINOPHEN 325 MG/1
975 TABLET ORAL ONCE
Status: COMPLETED | OUTPATIENT
Start: 2025-08-12 | End: 2025-08-12

## 2025-08-12 RX ADMIN — DEXAMETHASONE SODIUM PHOSPHATE 8 MG: 4 INJECTION, SOLUTION INTRA-ARTICULAR; INTRALESIONAL; INTRAMUSCULAR; INTRAVENOUS; SOFT TISSUE at 12:19

## 2025-08-12 RX ADMIN — FENTANYL CITRATE 100 MCG: 50 INJECTION INTRAMUSCULAR; INTRAVENOUS at 12:19

## 2025-08-12 RX ADMIN — SUGAMMADEX 200 MG: 100 INJECTION, SOLUTION INTRAVENOUS at 13:06

## 2025-08-12 RX ADMIN — SODIUM CHLORIDE, SODIUM LACTATE, POTASSIUM CHLORIDE, AND CALCIUM CHLORIDE: .6; .31; .03; .02 INJECTION, SOLUTION INTRAVENOUS at 12:09

## 2025-08-12 RX ADMIN — ONDANSETRON 4 MG: 2 INJECTION INTRAMUSCULAR; INTRAVENOUS at 12:54

## 2025-08-12 RX ADMIN — FENTANYL CITRATE 50 MCG: 50 INJECTION INTRAMUSCULAR; INTRAVENOUS at 12:46

## 2025-08-12 RX ADMIN — HYDROMORPHONE HYDROCHLORIDE 0.2 MG: 0.2 INJECTION, SOLUTION INTRAMUSCULAR; INTRAVENOUS; SUBCUTANEOUS at 14:45

## 2025-08-12 RX ADMIN — FENTANYL CITRATE 50 MCG: 50 INJECTION, SOLUTION INTRAMUSCULAR; INTRAVENOUS at 14:00

## 2025-08-12 RX ADMIN — GABAPENTIN 300 MG: 300 CAPSULE ORAL at 10:00

## 2025-08-12 RX ADMIN — HYDRALAZINE HYDROCHLORIDE 5 MG: 20 INJECTION INTRAMUSCULAR; INTRAVENOUS at 15:18

## 2025-08-12 RX ADMIN — ROCURONIUM BROMIDE 50 MG: 50 INJECTION, SOLUTION INTRAVENOUS at 12:19

## 2025-08-12 RX ADMIN — Medication 2 G: at 12:09

## 2025-08-12 RX ADMIN — FOLIC ACID 1000 MCG: 1 TABLET ORAL at 10:18

## 2025-08-12 RX ADMIN — CARVEDILOL 50 MG: 25 TABLET, FILM COATED ORAL at 10:01

## 2025-08-12 RX ADMIN — SODIUM CHLORIDE, SODIUM LACTATE, POTASSIUM CHLORIDE, AND CALCIUM CHLORIDE: .6; .31; .03; .02 INJECTION, SOLUTION INTRAVENOUS at 11:08

## 2025-08-12 RX ADMIN — PROPOFOL 200 MG: 10 INJECTION, EMULSION INTRAVENOUS at 12:19

## 2025-08-12 RX ADMIN — GLYCOPYRROLATE 0.2 MG: 0.2 INJECTION, SOLUTION INTRAMUSCULAR; INTRAVENOUS at 12:29

## 2025-08-12 RX ADMIN — HYDROMORPHONE HYDROCHLORIDE 0.5 MG: 1 INJECTION, SOLUTION INTRAMUSCULAR; INTRAVENOUS; SUBCUTANEOUS at 01:37

## 2025-08-12 RX ADMIN — FENTANYL CITRATE 50 MCG: 50 INJECTION INTRAMUSCULAR; INTRAVENOUS at 12:42

## 2025-08-12 RX ADMIN — DULOXETINE 60 MG: 60 CAPSULE, DELAYED RELEASE ORAL at 10:04

## 2025-08-12 RX ADMIN — OXYCODONE HYDROCHLORIDE 5 MG: 5 TABLET ORAL at 03:54

## 2025-08-12 RX ADMIN — EPHEDRINE SULFATE 10 MG: 5 INJECTION INTRAVENOUS at 12:26

## 2025-08-12 RX ADMIN — LOSARTAN POTASSIUM 50 MG: 50 TABLET, FILM COATED ORAL at 10:01

## 2025-08-12 RX ADMIN — CEFTRIAXONE 1 G: 1 INJECTION, POWDER, FOR SOLUTION INTRAMUSCULAR; INTRAVENOUS at 04:31

## 2025-08-12 RX ADMIN — HYDRALAZINE HYDROCHLORIDE 5 MG: 20 INJECTION INTRAMUSCULAR; INTRAVENOUS at 14:48

## 2025-08-12 RX ADMIN — HYDROMORPHONE HYDROCHLORIDE 0.5 MG: 1 INJECTION, SOLUTION INTRAMUSCULAR; INTRAVENOUS; SUBCUTANEOUS at 00:00

## 2025-08-12 RX ADMIN — MIDAZOLAM 1 MG: 1 INJECTION INTRAMUSCULAR; INTRAVENOUS at 12:11

## 2025-08-12 RX ADMIN — PROPOFOL 50 MCG/KG/MIN: 10 INJECTION, EMULSION INTRAVENOUS at 12:40

## 2025-08-12 RX ADMIN — GLYCOPYRROLATE 0.2 MG: 0.2 INJECTION, SOLUTION INTRAMUSCULAR; INTRAVENOUS at 12:19

## 2025-08-12 RX ADMIN — OXYCODONE HYDROCHLORIDE 5 MG: 5 TABLET ORAL at 14:26

## 2025-08-12 RX ADMIN — PHENYLEPHRINE HYDROCHLORIDE 100 MCG: 10 INJECTION INTRAVENOUS at 12:31

## 2025-08-12 RX ADMIN — INDOCYANINE GREEN AND WATER 2.5 MG: KIT at 11:11

## 2025-08-12 RX ADMIN — LIDOCAINE HYDROCHLORIDE 1 TUBE: 20 JELLY TOPICAL at 19:03

## 2025-08-12 RX ADMIN — LIDOCAINE HYDROCHLORIDE 50 MG: 20 INJECTION, SOLUTION INFILTRATION; PERINEURAL at 12:19

## 2025-08-12 RX ADMIN — HYDROMORPHONE HYDROCHLORIDE 0.5 MG: 1 INJECTION, SOLUTION INTRAMUSCULAR; INTRAVENOUS; SUBCUTANEOUS at 00:50

## 2025-08-12 RX ADMIN — OXYCODONE HYDROCHLORIDE 5 MG: 5 TABLET ORAL at 15:26

## 2025-08-12 RX ADMIN — PHENYLEPHRINE HYDROCHLORIDE 100 MCG: 10 INJECTION INTRAVENOUS at 12:35

## 2025-08-12 RX ADMIN — ACETAMINOPHEN 975 MG: 325 TABLET ORAL at 15:13

## 2025-08-12 RX ADMIN — HYDROMORPHONE HYDROCHLORIDE 0.4 MG: 0.2 INJECTION, SOLUTION INTRAMUSCULAR; INTRAVENOUS; SUBCUTANEOUS at 15:00

## 2025-08-12 RX ADMIN — EPHEDRINE SULFATE 10 MG: 5 INJECTION INTRAVENOUS at 12:31

## 2025-08-12 RX ADMIN — EPHEDRINE SULFATE 5 MG: 5 INJECTION INTRAVENOUS at 12:24

## 2025-08-12 RX ADMIN — FENTANYL CITRATE 50 MCG: 50 INJECTION, SOLUTION INTRAMUSCULAR; INTRAVENOUS at 13:43

## 2025-08-12 RX ADMIN — SODIUM CHLORIDE, SODIUM LACTATE, POTASSIUM CHLORIDE, AND CALCIUM CHLORIDE: .6; .31; .03; .02 INJECTION, SOLUTION INTRAVENOUS at 14:01

## 2025-08-12 RX ADMIN — BUPROPION HYDROCHLORIDE 150 MG: 150 TABLET, EXTENDED RELEASE ORAL at 10:18

## 2025-08-12 ASSESSMENT — ACTIVITIES OF DAILY LIVING (ADL)
ADLS_ACUITY_SCORE: 38
ADLS_ACUITY_SCORE: 58
ADLS_ACUITY_SCORE: 38
ADLS_ACUITY_SCORE: 58
ADLS_ACUITY_SCORE: 38
ADLS_ACUITY_SCORE: 58
ADLS_ACUITY_SCORE: 38

## 2025-08-13 ENCOUNTER — TELEPHONE (OUTPATIENT)
Dept: SURGERY | Facility: CLINIC | Age: 65
End: 2025-08-13
Payer: MEDICARE

## 2025-08-13 ENCOUNTER — PATIENT OUTREACH (OUTPATIENT)
Dept: PEDIATRICS | Facility: CLINIC | Age: 65
End: 2025-08-13
Payer: MEDICARE

## 2025-08-14 ENCOUNTER — PATIENT OUTREACH (OUTPATIENT)
Dept: ONCOLOGY | Facility: CLINIC | Age: 65
End: 2025-08-14

## 2025-08-14 DIAGNOSIS — C49.A2: Primary | ICD-10-CM

## 2025-08-14 LAB
PATH REPORT.COMMENTS IMP SPEC: NORMAL
PATH REPORT.COMMENTS IMP SPEC: NORMAL
PATH REPORT.FINAL DX SPEC: NORMAL
PATH REPORT.GROSS SPEC: NORMAL
PATH REPORT.MICROSCOPIC SPEC OTHER STN: NORMAL
PATH REPORT.RELEVANT HX SPEC: NORMAL
PHOTO IMAGE: NORMAL

## 2025-08-14 PROCEDURE — 88304 TISSUE EXAM BY PATHOLOGIST: CPT | Mod: 26 | Performed by: PATHOLOGY

## 2025-08-26 ENCOUNTER — OFFICE VISIT (OUTPATIENT)
Dept: PEDIATRICS | Facility: CLINIC | Age: 65
End: 2025-08-26
Payer: MEDICARE

## 2025-08-26 VITALS
DIASTOLIC BLOOD PRESSURE: 68 MMHG | SYSTOLIC BLOOD PRESSURE: 130 MMHG | BODY MASS INDEX: 40.29 KG/M2 | TEMPERATURE: 97.7 F | RESPIRATION RATE: 18 BRPM | WEIGHT: 236 LBS | HEIGHT: 64 IN | OXYGEN SATURATION: 99 % | HEART RATE: 60 BPM

## 2025-08-26 DIAGNOSIS — I10 ESSENTIAL HYPERTENSION, BENIGN: ICD-10-CM

## 2025-08-26 DIAGNOSIS — C49.A2 MALIGNANT GASTROINTESTINAL STROMAL TUMOR (GIST) OF STOMACH (H): ICD-10-CM

## 2025-08-26 DIAGNOSIS — L71.9 ROSACEA: ICD-10-CM

## 2025-08-26 DIAGNOSIS — Z90.49 HISTORY OF LAPAROSCOPIC CHOLECYSTECTOMY: Primary | ICD-10-CM

## 2025-08-26 DIAGNOSIS — N18.2 CKD (CHRONIC KIDNEY DISEASE) STAGE 2, GFR 60-89 ML/MIN: ICD-10-CM

## 2025-08-26 DIAGNOSIS — K50.00 CROHN'S DISEASE OF SMALL INTESTINE WITHOUT COMPLICATION (H): Chronic | ICD-10-CM

## 2025-08-26 PROCEDURE — 1126F AMNT PAIN NOTED NONE PRSNT: CPT | Performed by: INTERNAL MEDICINE

## 2025-08-26 PROCEDURE — 3075F SYST BP GE 130 - 139MM HG: CPT | Performed by: INTERNAL MEDICINE

## 2025-08-26 PROCEDURE — 99212 OFFICE O/P EST SF 10 MIN: CPT | Performed by: INTERNAL MEDICINE

## 2025-08-26 PROCEDURE — 3078F DIAST BP <80 MM HG: CPT | Performed by: INTERNAL MEDICINE

## 2025-08-26 RX ORDER — METRONIDAZOLE 10 MG/G
GEL TOPICAL DAILY
Qty: 60 G | Refills: 2 | Status: SHIPPED | OUTPATIENT
Start: 2025-08-26

## 2025-08-26 ASSESSMENT — PAIN SCALES - GENERAL: PAINLEVEL_OUTOF10: NO PAIN (0)

## 2025-09-02 ENCOUNTER — MYC MEDICAL ADVICE (OUTPATIENT)
Dept: PEDIATRICS | Facility: CLINIC | Age: 65
End: 2025-09-02
Payer: MEDICARE

## 2025-09-02 ENCOUNTER — TELEPHONE (OUTPATIENT)
Dept: SURGERY | Facility: CLINIC | Age: 65
End: 2025-09-02
Payer: MEDICARE

## 2025-09-02 DIAGNOSIS — K21.00 GASTROESOPHAGEAL REFLUX DISEASE WITH ESOPHAGITIS, UNSPECIFIED WHETHER HEMORRHAGE: ICD-10-CM

## 2025-09-03 ENCOUNTER — TRANSFERRED RECORDS (OUTPATIENT)
Dept: ADMINISTRATIVE | Facility: CLINIC | Age: 65
End: 2025-09-03
Payer: MEDICARE

## 2025-09-03 RX ORDER — PANTOPRAZOLE SODIUM 40 MG/1
40 TABLET, DELAYED RELEASE ORAL 2 TIMES DAILY
Qty: 180 TABLET | Refills: 11 | Status: SHIPPED | OUTPATIENT
Start: 2025-09-03

## 2025-10-28 ENCOUNTER — PRE VISIT (OUTPATIENT)
Dept: SURGERY | Facility: CLINIC | Age: 65
End: 2025-10-28

## (undated) DEVICE — ENDO POUCH UNIV RETRIEVAL SYSTEM INZII 10MM CD001

## (undated) DEVICE — TOOL DISSECT MIDAS MR8 14CM MATCH HEAD 3MM MR8-14MH30

## (undated) DEVICE — CLIP APPLIER ENDO 5MM M/L LIGAMAX EL5ML

## (undated) DEVICE — ENDO TROCAR SLEEVE KII Z-THREADED 05X100MM CTS02

## (undated) DEVICE — PREP CHLORAPREP 26ML TINTED HI-LITE ORANGE 930815

## (undated) DEVICE — SU STRATAFIX PDS PLUS 1 CT-1 12" SXPP1A443

## (undated) DEVICE — SU FIBERWIRE 2 38"  AR-7200

## (undated) DEVICE — GOWN IMPERVIOUS SPECIALTY XLG/XLONG 32474

## (undated) DEVICE — SUCTION TIP YANKAUER W/O VENT K86

## (undated) DEVICE — BLADE KNIFE SURG 11 371111

## (undated) DEVICE — DRAIN HEMOVAC RESERVOIR KIT 10FR 1/8" MED 00-2550-002-10

## (undated) DEVICE — SYR 30ML LL W/O NDL 302832

## (undated) DEVICE — SYR 03ML LL W/O NDL 309657

## (undated) DEVICE — ENDO TROCAR FIRST ENTRY KII FIOS ADV FIX 05X100MM CFF03

## (undated) DEVICE — SET HANDPIECE INTERPULSE W/COAXIAL FAN SPRAY TIP 0210118000

## (undated) DEVICE — SU ETHIBOND 0 CT-1 CR 8X18" CX21D

## (undated) DEVICE — DEVICE DUST COLLECTOR BONE BOX S-3500

## (undated) DEVICE — PREP DURAPREP 26ML APL 8630

## (undated) DEVICE — NDL BLUNT 18GA 1" W/O FILTER 305181

## (undated) DEVICE — SOL NACL 0.9% IRRIG 1000ML BOTTLE 2F7124

## (undated) DEVICE — CATH TRAY FOLEY COUDE SURESTEP 16FR W/DRN BAG LATEX A304416A

## (undated) DEVICE — SU VICRYL 1 CT-1 27" J341H

## (undated) DEVICE — BAG CLEAR TRASH 1.3M 39X33" P4040C

## (undated) DEVICE — GLOVE PROTEXIS POWDER FREE 7.0 ORTHOPEDIC 2D73ET70

## (undated) DEVICE — PACK SMALL SPINE RIDGES

## (undated) DEVICE — DRAPE LAP W/ARMBOARD 29410

## (undated) DEVICE — PACK TOTAL HIP RIDGES LATEX PO15HIFSG

## (undated) DEVICE — SOLUTION IRRIGATION 0.9% NACL 1000ML BOTTLE R5200-01

## (undated) DEVICE — LINEN HALF SHEET 5512

## (undated) DEVICE — MARKER SPHERES PASSIVE MEDT PACK 5 8801075

## (undated) DEVICE — Device

## (undated) DEVICE — ENDO SPONGE ENDOSTICK KITTNER 5MM CHERRY 37002010

## (undated) DEVICE — LINEN POUCH DBL 5427

## (undated) DEVICE — ESU CORD MONOPOLAR 10'  E0510

## (undated) DEVICE — DRSG TEGADERM 4X4 3/4" 1626W

## (undated) DEVICE — DRAPE STERI TOWEL LG 1010

## (undated) DEVICE — MANIFOLD NEPTUNE 4 PORT 700-20

## (undated) DEVICE — SU VICRYL 2-0 CT-1 27" UND J259H

## (undated) DEVICE — LINEN FULL SHEET 5511

## (undated) DEVICE — SU VICRYL+ 4-0 UNDYED PS-2 VCP496ZH

## (undated) DEVICE — PREP CHLORAPREP 26ML TINTED ORANGE  260815

## (undated) DEVICE — PACK SET-UP STD 9102

## (undated) DEVICE — LINEN ORTHO ACL PACK 5447

## (undated) DEVICE — GLOVE PROTEXIS W/NEU-THERA 8.0  2D73TE80

## (undated) DEVICE — MARKER SPHERES PASSIVE MEDT PACK 1 8801071

## (undated) DEVICE — BLADE SAW SAGITTAL STRK 19.5X90X1.27MM 2108-109-000S11

## (undated) DEVICE — POSITIONER PT PRONESAFE HEAD REST W/DERMAPROX INSERT 40599

## (undated) DEVICE — SYR 10ML LL W/O NDL 302995

## (undated) DEVICE — DRAPE X-RAY TUBE 00-901169-01-OEC

## (undated) DEVICE — LINEN DRAPE 54X72" 5467

## (undated) DEVICE — SU DERMABOND ADVANCED .7ML DNX12

## (undated) DEVICE — SPONGE SURGIFOAM 01GM POWDER 1978

## (undated) DEVICE — DRSG AQUACEL AG 3.5X6.0" HYDROFIBER 412010

## (undated) DEVICE — CATH IV ANGIO INTRO 12GA 382277

## (undated) DEVICE — SUCTION FRAZIER 12FR W/OBTURATOR 33120

## (undated) DEVICE — ADH SKIN CLOSURE PREMIERPRO EXOFIN 1.0ML 3470

## (undated) DEVICE — SUCTION MANIFOLD NEPTUNE 2 SYS 4 PORT 0702-020-000

## (undated) DEVICE — GLOVE PROTEXIS W/NEU-THERA 8.5  2D73TE85

## (undated) DEVICE — ENDO TROCAR BLUNT TIP KII BALLOON 12X100MM C0R47

## (undated) DEVICE — BLADE CLIPPER DISP 4406

## (undated) DEVICE — SPONGE COTTONOID 1/2X1/2" 80-1400

## (undated) DEVICE — SOLUTION IV IRRIGATION 0.9% NACL 3L R8206

## (undated) DEVICE — GLOVE PROTEXIS POWDER FREE 8.5 ORTHOPEDIC 2D73ET85

## (undated) DEVICE — GLOVE PROTEXIS BLUE W/NEU-THERA 8.5  2D73EB85

## (undated) DEVICE — GOWN LG DISP 9515

## (undated) DEVICE — ESU ELEC BLADE 2.75" COATED/INSULATED E1455

## (undated) DEVICE — GLOVE PROTEXIS BLUE W/NEU-THERA 7.0  2D73EB70

## (undated) DEVICE — SU MONOCRYL 3-0 PS-2 27" Y427H

## (undated) DEVICE — SU ETHILON 2-0 PS 18" 585H

## (undated) DEVICE — LINEN TOWEL PACK X10 5473

## (undated) DEVICE — ESU GROUND PAD ADULT W/CORD E7507

## (undated) DEVICE — GLOVE PROTEXIS W/NEU-THERA 7.0  2D73TE70

## (undated) DEVICE — SU VICRYL+ 0 27 UR6 VLT VCP603H

## (undated) DEVICE — GLOVE PROTEXIS BLUE W/NEU-THERA 8.0  2D73EB80

## (undated) DEVICE — SUCTION IRR STRYKERFLOW II W/TIP 250-070-520

## (undated) RX ORDER — FENTANYL CITRATE 50 UG/ML
INJECTION, SOLUTION INTRAMUSCULAR; INTRAVENOUS
Status: DISPENSED
Start: 2019-11-19

## (undated) RX ORDER — ACETAMINOPHEN 325 MG/1
TABLET ORAL
Status: DISPENSED
Start: 2025-08-12

## (undated) RX ORDER — GLYCOPYRROLATE 0.2 MG/ML
INJECTION INTRAMUSCULAR; INTRAVENOUS
Status: DISPENSED
Start: 2021-02-02

## (undated) RX ORDER — FENTANYL CITRATE 50 UG/ML
INJECTION, SOLUTION INTRAMUSCULAR; INTRAVENOUS
Status: DISPENSED
Start: 2025-08-12

## (undated) RX ORDER — FENTANYL CITRATE 50 UG/ML
INJECTION, SOLUTION INTRAMUSCULAR; INTRAVENOUS
Status: DISPENSED
Start: 2021-02-02

## (undated) RX ORDER — LIDOCAINE HYDROCHLORIDE 10 MG/ML
INJECTION, SOLUTION EPIDURAL; INFILTRATION; INTRACAUDAL; PERINEURAL
Status: DISPENSED
Start: 2024-01-22

## (undated) RX ORDER — LIDOCAINE HYDROCHLORIDE 20 MG/ML
INJECTION, SOLUTION EPIDURAL; INFILTRATION; INTRACAUDAL; PERINEURAL
Status: DISPENSED
Start: 2019-11-19

## (undated) RX ORDER — BUPIVACAINE HYDROCHLORIDE 5 MG/ML
INJECTION, SOLUTION EPIDURAL; INTRACAUDAL; PERINEURAL
Status: DISPENSED
Start: 2025-08-12

## (undated) RX ORDER — EPHEDRINE SULFATE 50 MG/ML
INJECTION, SOLUTION INTRAMUSCULAR; INTRAVENOUS; SUBCUTANEOUS
Status: DISPENSED
Start: 2021-02-02

## (undated) RX ORDER — KETOROLAC TROMETHAMINE 30 MG/ML
INJECTION, SOLUTION INTRAMUSCULAR; INTRAVENOUS
Status: DISPENSED
Start: 2019-11-19

## (undated) RX ORDER — OXYCODONE HYDROCHLORIDE 5 MG/1
TABLET ORAL
Status: DISPENSED
Start: 2025-08-12

## (undated) RX ORDER — GLYCOPYRROLATE 0.2 MG/ML
INJECTION, SOLUTION INTRAMUSCULAR; INTRAVENOUS
Status: DISPENSED
Start: 2025-08-12

## (undated) RX ORDER — ONDANSETRON 2 MG/ML
INJECTION INTRAMUSCULAR; INTRAVENOUS
Status: DISPENSED
Start: 2019-11-19

## (undated) RX ORDER — KETAMINE HCL IN 0.9 % NACL 50 MG/5 ML
SYRINGE (ML) INTRAVENOUS
Status: DISPENSED
Start: 2019-11-19

## (undated) RX ORDER — DEXAMETHASONE SODIUM PHOSPHATE 4 MG/ML
INJECTION, SOLUTION INTRA-ARTICULAR; INTRALESIONAL; INTRAMUSCULAR; INTRAVENOUS; SOFT TISSUE
Status: DISPENSED
Start: 2021-02-02

## (undated) RX ORDER — PROPOFOL 10 MG/ML
INJECTION, EMULSION INTRAVENOUS
Status: DISPENSED
Start: 2021-02-02

## (undated) RX ORDER — GLYCOPYRROLATE 0.2 MG/ML
INJECTION INTRAMUSCULAR; INTRAVENOUS
Status: DISPENSED
Start: 2019-11-19

## (undated) RX ORDER — HYDROMORPHONE HCL IN WATER/PF 6 MG/30 ML
PATIENT CONTROLLED ANALGESIA SYRINGE INTRAVENOUS
Status: DISPENSED
Start: 2025-08-12

## (undated) RX ORDER — INDOCYANINE GREEN AND WATER 25 MG
KIT INJECTION
Status: DISPENSED
Start: 2025-08-12

## (undated) RX ORDER — ONDANSETRON 2 MG/ML
INJECTION INTRAMUSCULAR; INTRAVENOUS
Status: DISPENSED
Start: 2021-02-02

## (undated) RX ORDER — DEXAMETHASONE SODIUM PHOSPHATE 4 MG/ML
INJECTION, SOLUTION INTRA-ARTICULAR; INTRALESIONAL; INTRAMUSCULAR; INTRAVENOUS; SOFT TISSUE
Status: DISPENSED
Start: 2025-08-12

## (undated) RX ORDER — FENTANYL CITRATE-0.9 % NACL/PF 10 MCG/ML
PLASTIC BAG, INJECTION (ML) INTRAVENOUS
Status: DISPENSED
Start: 2025-08-12

## (undated) RX ORDER — EPHEDRINE SULFATE 50 MG/ML
INJECTION, SOLUTION INTRAMUSCULAR; INTRAVENOUS; SUBCUTANEOUS
Status: DISPENSED
Start: 2019-11-19

## (undated) RX ORDER — LIDOCAINE HYDROCHLORIDE 20 MG/ML
JELLY TOPICAL
Status: DISPENSED
Start: 2025-08-12

## (undated) RX ORDER — HYDROMORPHONE HYDROCHLORIDE 1 MG/ML
INJECTION, SOLUTION INTRAMUSCULAR; INTRAVENOUS; SUBCUTANEOUS
Status: DISPENSED
Start: 2019-11-19

## (undated) RX ORDER — DEXAMETHASONE SODIUM PHOSPHATE 4 MG/ML
INJECTION, SOLUTION INTRA-ARTICULAR; INTRALESIONAL; INTRAMUSCULAR; INTRAVENOUS; SOFT TISSUE
Status: DISPENSED
Start: 2019-11-19

## (undated) RX ORDER — PROPOFOL 10 MG/ML
INJECTION, EMULSION INTRAVENOUS
Status: DISPENSED
Start: 2025-08-12

## (undated) RX ORDER — EPHEDRINE SULFATE 50 MG/ML
INJECTION, SOLUTION INTRAMUSCULAR; INTRAVENOUS; SUBCUTANEOUS
Status: DISPENSED
Start: 2025-08-12

## (undated) RX ORDER — CEFAZOLIN SODIUM 2 G/100ML
INJECTION, SOLUTION INTRAVENOUS
Status: DISPENSED
Start: 2019-11-19

## (undated) RX ORDER — LIDOCAINE HYDROCHLORIDE 10 MG/ML
INJECTION, SOLUTION EPIDURAL; INFILTRATION; INTRACAUDAL; PERINEURAL
Status: DISPENSED
Start: 2025-08-12

## (undated) RX ORDER — HYDRALAZINE HYDROCHLORIDE 20 MG/ML
INJECTION INTRAMUSCULAR; INTRAVENOUS
Status: DISPENSED
Start: 2025-08-12

## (undated) RX ORDER — PROPOFOL 10 MG/ML
INJECTION, EMULSION INTRAVENOUS
Status: DISPENSED
Start: 2019-11-19

## (undated) RX ORDER — TRIAMCINOLONE ACETONIDE 40 MG/ML
INJECTION, SUSPENSION INTRA-ARTICULAR; INTRAMUSCULAR
Status: DISPENSED
Start: 2024-01-22

## (undated) RX ORDER — CEFAZOLIN SODIUM/WATER 2 G/20 ML
SYRINGE (ML) INTRAVENOUS
Status: DISPENSED
Start: 2025-08-12

## (undated) RX ORDER — NEOSTIGMINE METHYLSULFATE 1 MG/ML
VIAL (ML) INJECTION
Status: DISPENSED
Start: 2021-02-02

## (undated) RX ORDER — LIDOCAINE HYDROCHLORIDE 10 MG/ML
INJECTION, SOLUTION EPIDURAL; INFILTRATION; INTRACAUDAL; PERINEURAL
Status: DISPENSED
Start: 2021-02-02